# Patient Record
Sex: MALE | Race: WHITE | Employment: OTHER | URBAN - METROPOLITAN AREA
[De-identification: names, ages, dates, MRNs, and addresses within clinical notes are randomized per-mention and may not be internally consistent; named-entity substitution may affect disease eponyms.]

---

## 2022-02-14 ENCOUNTER — HOSPITAL ENCOUNTER (INPATIENT)
Age: 62
LOS: 5 days | Discharge: HOME | DRG: 982 | End: 2022-02-19
Attending: EMERGENCY MEDICINE | Admitting: INTERNAL MEDICINE
Payer: COMMERCIAL

## 2022-02-14 ENCOUNTER — APPOINTMENT (OUTPATIENT)
Dept: CT IMAGING | Age: 62
DRG: 982 | End: 2022-02-14
Payer: COMMERCIAL

## 2022-02-14 ENCOUNTER — APPOINTMENT (OUTPATIENT)
Dept: GENERAL RADIOLOGY | Age: 62
DRG: 982 | End: 2022-02-14
Payer: COMMERCIAL

## 2022-02-14 DIAGNOSIS — E11.10 DIABETIC KETOACIDOSIS WITHOUT COMA ASSOCIATED WITH TYPE 2 DIABETES MELLITUS (HCC): Primary | ICD-10-CM

## 2022-02-14 DIAGNOSIS — D64.9 ANEMIA, UNSPECIFIED TYPE: ICD-10-CM

## 2022-02-14 DIAGNOSIS — R93.5 ABNORMAL CT OF THE ABDOMEN: ICD-10-CM

## 2022-02-14 DIAGNOSIS — C20 RECTAL CANCER (HCC): ICD-10-CM

## 2022-02-14 PROBLEM — E10.10 DKA, TYPE 1, NOT AT GOAL (HCC): Status: ACTIVE | Noted: 2022-02-14

## 2022-02-14 LAB
A/G RATIO: 0.9 (ref 1.1–2.2)
ABO/RH: NORMAL
ALBUMIN SERPL-MCNC: 3.1 G/DL (ref 3.4–5)
ALP BLD-CCNC: 126 U/L (ref 40–129)
ALT SERPL-CCNC: 17 U/L (ref 10–40)
ANION GAP SERPL CALCULATED.3IONS-SCNC: 15 MMOL/L (ref 3–16)
ANION GAP SERPL CALCULATED.3IONS-SCNC: 29 MMOL/L (ref 3–16)
ANION GAP SERPL CALCULATED.3IONS-SCNC: 29 MMOL/L (ref 3–16)
ANTIBODY SCREEN: NORMAL
AST SERPL-CCNC: 10 U/L (ref 15–37)
BASE EXCESS VENOUS: -17.6 MMOL/L (ref -3–3)
BETA-HYDROXYBUTYRATE: >8 MMOL/L (ref 0–0.27)
BILIRUB SERPL-MCNC: <0.2 MG/DL (ref 0–1)
BILIRUBIN URINE: NEGATIVE
BLOOD, URINE: NEGATIVE
BUN BLDV-MCNC: 20 MG/DL (ref 7–20)
BUN BLDV-MCNC: 25 MG/DL (ref 7–20)
BUN BLDV-MCNC: 31 MG/DL (ref 7–20)
CALCIUM SERPL-MCNC: 8.6 MG/DL (ref 8.3–10.6)
CALCIUM SERPL-MCNC: 8.6 MG/DL (ref 8.3–10.6)
CALCIUM SERPL-MCNC: 9.1 MG/DL (ref 8.3–10.6)
CARBOXYHEMOGLOBIN: 3.1 % (ref 0–1.5)
CHLORIDE BLD-SCNC: 100 MMOL/L (ref 99–110)
CHLORIDE BLD-SCNC: 87 MMOL/L (ref 99–110)
CHLORIDE BLD-SCNC: 96 MMOL/L (ref 99–110)
CLARITY: CLEAR
CO2: 16 MMOL/L (ref 21–32)
CO2: 8 MMOL/L (ref 21–32)
CO2: 9 MMOL/L (ref 21–32)
COLOR: YELLOW
CREAT SERPL-MCNC: 1 MG/DL (ref 0.8–1.3)
CREAT SERPL-MCNC: 1.1 MG/DL (ref 0.8–1.3)
CREAT SERPL-MCNC: 1.3 MG/DL (ref 0.8–1.3)
FERRITIN: 60.5 NG/ML (ref 30–400)
FOLATE: 11.29 NG/ML (ref 4.78–24.2)
GFR AFRICAN AMERICAN: >60
GFR NON-AFRICAN AMERICAN: 56
GFR NON-AFRICAN AMERICAN: >60
GFR NON-AFRICAN AMERICAN: >60
GLUCOSE BLD-MCNC: 161 MG/DL (ref 70–99)
GLUCOSE BLD-MCNC: 163 MG/DL (ref 70–99)
GLUCOSE BLD-MCNC: 166 MG/DL (ref 70–99)
GLUCOSE BLD-MCNC: 177 MG/DL (ref 70–99)
GLUCOSE BLD-MCNC: 191 MG/DL (ref 70–99)
GLUCOSE BLD-MCNC: 234 MG/DL (ref 70–99)
GLUCOSE BLD-MCNC: 320 MG/DL (ref 70–99)
GLUCOSE BLD-MCNC: 356 MG/DL (ref 70–99)
GLUCOSE BLD-MCNC: 363 MG/DL (ref 70–99)
GLUCOSE BLD-MCNC: 543 MG/DL (ref 70–99)
GLUCOSE BLD-MCNC: 597 MG/DL (ref 70–99)
GLUCOSE URINE: >=1000 MG/DL
HCO3 VENOUS: 10 MMOL/L (ref 23–29)
HCT VFR BLD CALC: 26.7 % (ref 40.5–52.5)
HEMATOLOGY PATH CONSULT: NORMAL
HEMOGLOBIN, VEN, REDUCED: 38 %
HEMOGLOBIN: 7.9 G/DL (ref 13.5–17.5)
IRON SATURATION: 10 % (ref 20–50)
IRON: 21 UG/DL (ref 59–158)
KETONES, URINE: >=80 MG/DL
LACTIC ACID: 1.9 MMOL/L (ref 0.4–2)
LEUKOCYTE ESTERASE, URINE: NEGATIVE
LIPASE: 60 U/L (ref 13–60)
MAGNESIUM: 2 MG/DL (ref 1.8–2.4)
MAGNESIUM: 2 MG/DL (ref 1.8–2.4)
METHEMOGLOBIN VENOUS: 0.3 %
MICROSCOPIC EXAMINATION: ABNORMAL
NITRITE, URINE: NEGATIVE
O2 CONTENT, VEN: 8 VOL %
O2 SAT, VEN: 61 %
O2 THERAPY: ABNORMAL
OCCULT BLOOD DIAGNOSTIC: ABNORMAL
PCO2, VEN: 29.3 MMHG (ref 40–50)
PERFORMED ON: ABNORMAL
PH UA: 5 (ref 5–8)
PH VENOUS: 7.14 (ref 7.35–7.45)
PHOSPHORUS: 1.4 MG/DL (ref 2.5–4.9)
PHOSPHORUS: 2.7 MG/DL (ref 2.5–4.9)
PO2, VEN: 40.5 MMHG (ref 25–40)
POTASSIUM REFLEX MAGNESIUM: 4.8 MMOL/L (ref 3.5–5.1)
POTASSIUM SERPL-SCNC: 4.1 MMOL/L (ref 3.5–5.1)
POTASSIUM SERPL-SCNC: 4.6 MMOL/L (ref 3.5–5.1)
PROTEIN UA: NEGATIVE MG/DL
SODIUM BLD-SCNC: 125 MMOL/L (ref 136–145)
SODIUM BLD-SCNC: 131 MMOL/L (ref 136–145)
SODIUM BLD-SCNC: 133 MMOL/L (ref 136–145)
SPECIFIC GRAVITY UA: 1.03 (ref 1–1.03)
TCO2 CALC VENOUS: 24 MMOL/L
TOTAL IRON BINDING CAPACITY: 209 UG/DL (ref 260–445)
TOTAL PROTEIN: 6.7 G/DL (ref 6.4–8.2)
URINE REFLEX TO CULTURE: ABNORMAL
URINE TYPE: ABNORMAL
UROBILINOGEN, URINE: 0.2 E.U./DL
VITAMIN B-12: 1044 PG/ML (ref 211–911)

## 2022-02-14 PROCEDURE — 96375 TX/PRO/DX INJ NEW DRUG ADDON: CPT

## 2022-02-14 PROCEDURE — 85025 COMPLETE CBC W/AUTO DIFF WBC: CPT

## 2022-02-14 PROCEDURE — 6360000002 HC RX W HCPCS: Performed by: NURSE PRACTITIONER

## 2022-02-14 PROCEDURE — 2580000003 HC RX 258: Performed by: EMERGENCY MEDICINE

## 2022-02-14 PROCEDURE — 96361 HYDRATE IV INFUSION ADD-ON: CPT

## 2022-02-14 PROCEDURE — 82746 ASSAY OF FOLIC ACID SERUM: CPT

## 2022-02-14 PROCEDURE — 99285 EMERGENCY DEPT VISIT HI MDM: CPT

## 2022-02-14 PROCEDURE — 99254 IP/OBS CNSLTJ NEW/EST MOD 60: CPT | Performed by: SURGERY

## 2022-02-14 PROCEDURE — 83690 ASSAY OF LIPASE: CPT

## 2022-02-14 PROCEDURE — 74177 CT ABD & PELVIS W/CONTRAST: CPT

## 2022-02-14 PROCEDURE — 71045 X-RAY EXAM CHEST 1 VIEW: CPT

## 2022-02-14 PROCEDURE — 86923 COMPATIBILITY TEST ELECTRIC: CPT

## 2022-02-14 PROCEDURE — 86901 BLOOD TYPING SEROLOGIC RH(D): CPT

## 2022-02-14 PROCEDURE — 6360000002 HC RX W HCPCS: Performed by: EMERGENCY MEDICINE

## 2022-02-14 PROCEDURE — 2580000003 HC RX 258: Performed by: INTERNAL MEDICINE

## 2022-02-14 PROCEDURE — 85018 HEMOGLOBIN: CPT

## 2022-02-14 PROCEDURE — 2000000000 HC ICU R&B

## 2022-02-14 PROCEDURE — 6370000000 HC RX 637 (ALT 250 FOR IP): Performed by: INTERNAL MEDICINE

## 2022-02-14 PROCEDURE — 83550 IRON BINDING TEST: CPT

## 2022-02-14 PROCEDURE — 2580000003 HC RX 258: Performed by: NURSE PRACTITIONER

## 2022-02-14 PROCEDURE — 82728 ASSAY OF FERRITIN: CPT

## 2022-02-14 PROCEDURE — 82607 VITAMIN B-12: CPT

## 2022-02-14 PROCEDURE — G0328 FECAL BLOOD SCRN IMMUNOASSAY: HCPCS

## 2022-02-14 PROCEDURE — 84100 ASSAY OF PHOSPHORUS: CPT

## 2022-02-14 PROCEDURE — 36415 COLL VENOUS BLD VENIPUNCTURE: CPT

## 2022-02-14 PROCEDURE — P9016 RBC LEUKOCYTES REDUCED: HCPCS

## 2022-02-14 PROCEDURE — 80053 COMPREHEN METABOLIC PANEL: CPT

## 2022-02-14 PROCEDURE — 83540 ASSAY OF IRON: CPT

## 2022-02-14 PROCEDURE — 86900 BLOOD TYPING SEROLOGIC ABO: CPT

## 2022-02-14 PROCEDURE — 82378 CARCINOEMBRYONIC ANTIGEN: CPT

## 2022-02-14 PROCEDURE — 6360000004 HC RX CONTRAST MEDICATION: Performed by: NURSE PRACTITIONER

## 2022-02-14 PROCEDURE — 2500000003 HC RX 250 WO HCPCS: Performed by: EMERGENCY MEDICINE

## 2022-02-14 PROCEDURE — 96374 THER/PROPH/DIAG INJ IV PUSH: CPT

## 2022-02-14 PROCEDURE — 87040 BLOOD CULTURE FOR BACTERIA: CPT

## 2022-02-14 PROCEDURE — 83735 ASSAY OF MAGNESIUM: CPT

## 2022-02-14 PROCEDURE — 82803 BLOOD GASES ANY COMBINATION: CPT

## 2022-02-14 PROCEDURE — 6370000000 HC RX 637 (ALT 250 FOR IP): Performed by: NURSE PRACTITIONER

## 2022-02-14 PROCEDURE — 86850 RBC ANTIBODY SCREEN: CPT

## 2022-02-14 PROCEDURE — 85014 HEMATOCRIT: CPT

## 2022-02-14 PROCEDURE — APPNB30 APP NON BILLABLE TIME 0-30 MINS: Performed by: NURSE PRACTITIONER

## 2022-02-14 PROCEDURE — 83605 ASSAY OF LACTIC ACID: CPT

## 2022-02-14 PROCEDURE — 81003 URINALYSIS AUTO W/O SCOPE: CPT

## 2022-02-14 PROCEDURE — 82010 KETONE BODYS QUAN: CPT

## 2022-02-14 RX ORDER — DEXTROSE MONOHYDRATE 25 G/50ML
12.5 INJECTION, SOLUTION INTRAVENOUS PRN
Status: DISCONTINUED | OUTPATIENT
Start: 2022-02-14 | End: 2022-02-14 | Stop reason: SDUPTHER

## 2022-02-14 RX ORDER — 0.9 % SODIUM CHLORIDE 0.9 %
1000 INTRAVENOUS SOLUTION INTRAVENOUS ONCE
Status: COMPLETED | OUTPATIENT
Start: 2022-02-14 | End: 2022-02-14

## 2022-02-14 RX ORDER — MAGNESIUM SULFATE 1 G/100ML
1000 INJECTION INTRAVENOUS PRN
Status: DISCONTINUED | OUTPATIENT
Start: 2022-02-14 | End: 2022-02-19 | Stop reason: HOSPADM

## 2022-02-14 RX ORDER — MORPHINE SULFATE 4 MG/ML
4 INJECTION, SOLUTION INTRAMUSCULAR; INTRAVENOUS ONCE
Status: COMPLETED | OUTPATIENT
Start: 2022-02-14 | End: 2022-02-14

## 2022-02-14 RX ORDER — POTASSIUM CHLORIDE 7.45 MG/ML
10 INJECTION INTRAVENOUS PRN
Status: DISCONTINUED | OUTPATIENT
Start: 2022-02-14 | End: 2022-02-16

## 2022-02-14 RX ORDER — METOPROLOL SUCCINATE 100 MG/1
50 TABLET, EXTENDED RELEASE ORAL DAILY
COMMUNITY
End: 2022-10-18

## 2022-02-14 RX ORDER — POTASSIUM CHLORIDE 7.45 MG/ML
10 INJECTION INTRAVENOUS PRN
Status: DISCONTINUED | OUTPATIENT
Start: 2022-02-14 | End: 2022-02-14 | Stop reason: SDUPTHER

## 2022-02-14 RX ORDER — 0.9 % SODIUM CHLORIDE 0.9 %
15 INTRAVENOUS SOLUTION INTRAVENOUS ONCE
Status: DISCONTINUED | OUTPATIENT
Start: 2022-02-14 | End: 2022-02-14 | Stop reason: SDUPTHER

## 2022-02-14 RX ORDER — DEXTROSE MONOHYDRATE 25 G/50ML
12.5 INJECTION, SOLUTION INTRAVENOUS PRN
Status: DISCONTINUED | OUTPATIENT
Start: 2022-02-14 | End: 2022-02-19 | Stop reason: HOSPADM

## 2022-02-14 RX ORDER — VERAPAMIL HYDROCHLORIDE 360 MG/1
360 CAPSULE, DELAYED RELEASE PELLETS ORAL NIGHTLY
Status: ON HOLD | COMMUNITY
End: 2022-02-18 | Stop reason: HOSPADM

## 2022-02-14 RX ORDER — ACETAMINOPHEN 325 MG/1
650 TABLET ORAL EVERY 4 HOURS PRN
Status: DISCONTINUED | OUTPATIENT
Start: 2022-02-14 | End: 2022-02-19 | Stop reason: HOSPADM

## 2022-02-14 RX ORDER — MAGNESIUM SULFATE 1 G/100ML
1000 INJECTION INTRAVENOUS PRN
Status: DISCONTINUED | OUTPATIENT
Start: 2022-02-14 | End: 2022-02-14 | Stop reason: SDUPTHER

## 2022-02-14 RX ORDER — DEXTROSE AND SODIUM CHLORIDE 5; .45 G/100ML; G/100ML
INJECTION, SOLUTION INTRAVENOUS CONTINUOUS PRN
Status: DISCONTINUED | OUTPATIENT
Start: 2022-02-14 | End: 2022-02-16

## 2022-02-14 RX ORDER — METOPROLOL SUCCINATE 50 MG/1
100 TABLET, EXTENDED RELEASE ORAL DAILY
Status: DISCONTINUED | OUTPATIENT
Start: 2022-02-14 | End: 2022-02-19 | Stop reason: HOSPADM

## 2022-02-14 RX ORDER — SODIUM CHLORIDE 9 MG/ML
INJECTION, SOLUTION INTRAVENOUS CONTINUOUS
Status: DISCONTINUED | OUTPATIENT
Start: 2022-02-14 | End: 2022-02-15

## 2022-02-14 RX ORDER — ONDANSETRON 2 MG/ML
4 INJECTION INTRAMUSCULAR; INTRAVENOUS ONCE
Status: COMPLETED | OUTPATIENT
Start: 2022-02-14 | End: 2022-02-14

## 2022-02-14 RX ORDER — DEXTROSE, SODIUM CHLORIDE, AND POTASSIUM CHLORIDE 5; .45; .15 G/100ML; G/100ML; G/100ML
INJECTION INTRAVENOUS CONTINUOUS PRN
Status: DISCONTINUED | OUTPATIENT
Start: 2022-02-14 | End: 2022-02-14 | Stop reason: SDUPTHER

## 2022-02-14 RX ORDER — SODIUM CHLORIDE 9 MG/ML
INJECTION, SOLUTION INTRAVENOUS CONTINUOUS
Status: DISCONTINUED | OUTPATIENT
Start: 2022-02-14 | End: 2022-02-14 | Stop reason: SDUPTHER

## 2022-02-14 RX ORDER — POLYETHYLENE GLYCOL 3350 17 G/17G
17 POWDER, FOR SOLUTION ORAL DAILY PRN
Status: DISCONTINUED | OUTPATIENT
Start: 2022-02-14 | End: 2022-02-19 | Stop reason: HOSPADM

## 2022-02-14 RX ADMIN — SODIUM CHLORIDE 1000 ML: 9 INJECTION, SOLUTION INTRAVENOUS at 11:31

## 2022-02-14 RX ADMIN — ACETAMINOPHEN 650 MG: 325 TABLET ORAL at 22:12

## 2022-02-14 RX ADMIN — DEXTROSE AND SODIUM CHLORIDE: 5; 450 INJECTION, SOLUTION INTRAVENOUS at 18:16

## 2022-02-14 RX ADMIN — SODIUM CHLORIDE: 9 INJECTION, SOLUTION INTRAVENOUS at 15:33

## 2022-02-14 RX ADMIN — SODIUM CHLORIDE 7.7 UNITS/HR: 9 INJECTION, SOLUTION INTRAVENOUS at 15:43

## 2022-02-14 RX ADMIN — SODIUM CHLORIDE 1000 ML: 9 INJECTION, SOLUTION INTRAVENOUS at 10:11

## 2022-02-14 RX ADMIN — SODIUM PHOSPHATE, MONOBASIC, MONOHYDRATE 20 MMOL: 276; 142 INJECTION, SOLUTION INTRAVENOUS at 22:35

## 2022-02-14 RX ADMIN — IOPAMIDOL 75 ML: 755 INJECTION, SOLUTION INTRAVENOUS at 11:23

## 2022-02-14 RX ADMIN — METOPROLOL SUCCINATE 100 MG: 50 TABLET, FILM COATED, EXTENDED RELEASE ORAL at 15:51

## 2022-02-14 RX ADMIN — POTASSIUM CHLORIDE 10 MEQ: 7.46 INJECTION, SOLUTION INTRAVENOUS at 23:19

## 2022-02-14 RX ADMIN — POTASSIUM CHLORIDE 10 MEQ: 7.46 INJECTION, SOLUTION INTRAVENOUS at 15:56

## 2022-02-14 RX ADMIN — POTASSIUM CHLORIDE 10 MEQ: 7.46 INJECTION, SOLUTION INTRAVENOUS at 22:14

## 2022-02-14 RX ADMIN — MORPHINE SULFATE 4 MG: 4 INJECTION INTRAVENOUS at 13:31

## 2022-02-14 RX ADMIN — INSULIN HUMAN 10 UNITS: 100 INJECTION, SOLUTION PARENTERAL at 11:45

## 2022-02-14 RX ADMIN — ONDANSETRON 4 MG: 2 INJECTION INTRAMUSCULAR; INTRAVENOUS at 13:31

## 2022-02-14 RX ADMIN — POTASSIUM CHLORIDE 10 MEQ: 7.46 INJECTION, SOLUTION INTRAVENOUS at 18:20

## 2022-02-14 ASSESSMENT — ENCOUNTER SYMPTOMS
EYE DISCHARGE: 0
BLOOD IN STOOL: 1
BACK PAIN: 0
EYE ITCHING: 0
COLOR CHANGE: 0
CHEST TIGHTNESS: 0
APNEA: 0
ABDOMINAL PAIN: 1

## 2022-02-14 ASSESSMENT — PAIN SCALES - GENERAL
PAINLEVEL_OUTOF10: 4
PAINLEVEL_OUTOF10: 2
PAINLEVEL_OUTOF10: 5
PAINLEVEL_OUTOF10: 8
PAINLEVEL_OUTOF10: 5

## 2022-02-14 ASSESSMENT — PAIN DESCRIPTION - ONSET: ONSET: GRADUAL

## 2022-02-14 ASSESSMENT — PAIN DESCRIPTION - FREQUENCY: FREQUENCY: CONTINUOUS

## 2022-02-14 ASSESSMENT — PAIN DESCRIPTION - DESCRIPTORS
DESCRIPTORS: DULL;ACHING
DESCRIPTORS: ACHING

## 2022-02-14 ASSESSMENT — PAIN DESCRIPTION - LOCATION
LOCATION: BACK
LOCATION: ABDOMEN

## 2022-02-14 ASSESSMENT — PAIN DESCRIPTION - ORIENTATION: ORIENTATION: LOWER

## 2022-02-14 NOTE — CONSULTS
Abdifatahon Tripp    CC-weakness, lightheadedness    HPI: 77-year-old male who presented to the emergency room with a 1 month his weakness, fatigue and lightheadedness. He presented to the emergency room today at the urging of one of his friends. He reports some lower abdominal fullness and back pain. This does not occur every day. The pain is dull. Nothing makes it better or worse. He has had recent change in bowel habits over the last 1 month. The stools have contained mucus/blood and he has become incontinent with frequent leakage. No previous colonoscopy in the past.  He has had a 35 pound weight loss over the last several months. He also reports decreased urination with some dysuria. No history of nausea, vomiting, anorexia fevers or chills. The patient is diabetic and reports that he has not been checking his blood sugars. He has been diagnosed with diabetic ketoacidosis and is to be admitted to the intensive care unit. Past Medical History:   Diagnosis Date    Diabetes mellitus ([facility] [address]     Hypertension        History reviewed. No pertinent surgical history.     Social History     Socioeconomic History    Marital status: Single     Spouse name: Not on file    Number of children: Not on file    Years of education: Not on file    Highest education level: Not on file   Occupational History    Not on file   Tobacco Use    Smoking status: Not on file    Smokeless tobacco: Not on file   Substance and Sexual Activity    Alcohol use: Not on file    Drug use: Not on file    Sexual activity: Not on file   Other Topics Concern    Not on file   Social History Narrative    Not on file     Social Determinants of Health     Financial Resource Strain:     Difficulty of Paying Living Expenses: Not on file   Food Insecurity:     Worried About Running Out of Food in the Last Year: Not on file    [place]ito of Food in the Last Year: Not on file   Transportation Needs:     Lack of Transportation (Medical): Not on file    Lack of Transportation (Non-Medical): Not on file   Physical Activity:     Days of Exercise per Week: Not on file    Minutes of Exercise per Session: Not on file   Stress:     Feeling of Stress : Not on file   Social Connections:     Frequency of Communication with Friends and Family: Not on file    Frequency of Social Gatherings with Friends and Family: Not on file    Attends Judaism Services: Not on file    Active Member of [address] [place] [address] [employer] or Organizations: Not on file    Attends Club or Organization Meetings: Not on file    Marital Status: Not on file   Intimate Partner Violence:     Fear of Current or Ex-Partner: Not on file    Emotionally Abused: Not on file    Physically Abused: Not on file    Sexually Abused: Not on file   Housing Stability:     Unable to Pay for Housing in the Last Year: Not on file    Number of [place] in the Last Year: Not on file    Unstable Housing in the Last Year: Not on file       Allergies: No Known Allergies    Prior to Admission medications    Medication Sig Start Date End Date Taking? Authorizing Provider   verapamil (VERELAN) 360 MG extended release capsule Take 360 mg by mouth nightly   Yes Historical Provider, MD   metoprolol succinate (TOPROL XL) 100 MG extended release tablet Take 100 mg by mouth daily   Yes Historical Provider, MD       Active Problems:    DKA, type 1, not at goal [facility])    Diabetic ketoacidosis without coma associated with type 2 diabetes mellitus ([facility]  Resolved Problems:    * No resolved hospital problems. *      Blood pressure 126/67, pulse 92, temperature 97.5 °F (36.4 °C), temperature source Oral, resp. rate 18, weight 170 lb (77.1 kg), SpO2 99 %. Review of Systems   Constitutional: Positive for activity change and unexpected weight change. Negative for appetite change, chills and fever. HENT: Negative for congestion and dental problem. Eyes: Negative for discharge and itching.    Respiratory: Negative for apnea and chest tightness. Cardiovascular: Negative for chest pain and leg swelling. Gastrointestinal: Positive for abdominal pain and blood in stool. Endocrine: Negative for cold intolerance and heat intolerance. Genitourinary: Negative for difficulty urinating and dysuria. Musculoskeletal: Negative for arthralgias and back pain. Skin: Negative for color change and pallor. Allergic/Immunologic: Negative for environmental allergies and food allergies. Neurological: Negative for dizziness and facial asymmetry. Hematological: Negative for adenopathy. Does not bruise/bleed easily. Psychiatric/Behavioral: Negative for agitation and behavioral problems. Physical Exam  Constitutional:       Appearance: He is well-developed. Comments: Patient appears cachectic   HENT:      Head: Normocephalic and atraumatic. Right Ear: External ear normal.      Left Ear: External ear normal.   Eyes:      Conjunctiva/sclera: Conjunctivae normal.   Cardiovascular:      Rate and Rhythm: Normal rate and regular rhythm. Pulmonary:      Effort: Pulmonary effort is normal.      Breath sounds: Normal breath sounds. Abdominal:      Palpations: Abdomen is soft. Comments: There is a central pelvic fullness without significant tenderness   Genitourinary:     Comments: On rectal examination, there is both blood and mucus noted. There is a mass which involves at least the posterior wall of the rectum and begins about 5 cm above the anal verge  Musculoskeletal:         General: Normal range of motion. Cervical back: Normal range of motion and neck supple. Skin:     General: Skin is warm and dry. Neurological:      Mental Status: He is alert and oriented to person, place, and time.    Psychiatric:         Behavior: Behavior normal.         Assessment:  60-year-old male diabetic with no previous history of colonoscopy who presented to the emergency department today with a 1 month history of complaints of blood/mucus in his stools. He also reports a full sensation in the pelvis and lower back pain. He has had about a 35 pound unexpected weight loss over the last 7 months. Other symptoms include less frequent urination and dysuria. On physical examination, there is no palpable peripheral adenopathy. There is a nontender fullness appreciated in the suprapubic region. On digital rectal examination, there is both mucus and blood appreciated. There is a mass which involves at least the posterior wall of the rectum starting about 5 cm above the anal verge. White blood count is elevated 18.5. The remainder of his labs are consistent with diabetic ketoacidosis. The overall findings are consistent with a locally advanced rectal cancer. Plan:  Admit to ICU for treatment of diabetic ketoacidosis per primary team  Neurology to see patient regarding bilateral hydronephrosis  Spoke with Dr. Chloé Lynne, gastroenterology, about a flexible sigmoidoscopy with biopsy when the patient is more medically stable.   Will check a CEA level and coags  Will involve medical oncology/radiation oncology once biopsies have been obtained    Presley Greco MD  2/14/2022

## 2022-02-14 NOTE — ACP (ADVANCE CARE PLANNING)
Advanced Care Planning Note.     Purpose of Encounter: Advanced care planning in light of hospitalization  Parties In Attendance: Patient,    Decisional Capacity: Yes  Subjective: Patient  understand that this conversation is to address long term care goal  Objective: Mid to hospital with sigmoid mass and GI bleeding and DKA  Goals of Care Determination: Patient would not want any aggressive measures including cpr or intubation   Code Status: dnr cca  Time spent on Advanced care Plannin minutes  Advanced Care Planning Documents: documented patient's wishes, would like Sister Lu Hines Early to make medical decisions if unable to make decisions

## 2022-02-14 NOTE — ED PROVIDER NOTES
In addition to the advanced practice provider, I personally saw Ye Castañeda and performed a substantive portion of the visit including all aspects of the medical decision making. Medical Decision making  62M, hx of DM, HTN, noted specks of blood in stool and fatigue. Hasn't been checking his sugars at home    On exam, WDWNM, NAD, heart RRR, Lungs cTAB    Labs noted, DKA with pseudohyponatremia hyperglycemia and anion gap 29. No renal dysfunction. Start DKA protocols & admit. 35min critical care time; indication and intervention as above. Screenings     Shahab Coma Scale  Eye Opening: Spontaneous  Best Verbal Response: Oriented  Best Motor Response: Obeys commands  Shahab Coma Scale Score: 15               Patient Referrals:  No follow-up provider specified. Discharge Medications:  New Prescriptions    No medications on file       FINAL IMPRESSION  1. Diabetic ketoacidosis without coma associated with type 2 diabetes mellitus ([facility] [address] [facility]    2. Anemia, unspecified type    3. Abnormal CT of the abdomen        Blood pressure 138/79, pulse 87, temperature 97.5 °F (36.4 °C), temperature source Oral, resp. rate 18, weight 170 lb (77.1 kg), SpO2 100 %. For further details of [facility] emergency department encounter, please see documentation by advanced practice provider, Jenna Wong.        Minna Lee MD  02/14/22 [id number]

## 2022-02-14 NOTE — ED PROVIDER NOTES
[address] [facility]        Pt Name: Cole Love  MRN: [id number]  [place] 1960  Date of evaluation: 2/14/2022  Provider: MELODY Molina CNP  PCP: No primary care provider on file. Note Started: 9:10 AM EST        I have seen and evaluated this patient with my supervising physician Dr. Su Goodman       Chief Complaint   Patient presents with    Abdominal Pain     Pt to triage c/o abd. pain x 5 days, rectal bleeding,       HISTORY OF PRESENT ILLNESS   (Location, Timing/Onset, Context/Setting, Quality, Duration, Modifying Factors, Severity, Associated Signs and Symptoms)  Note limiting factors. Chief Complaint: fatigue     Cole Love is a 58 y.o. male medical history of diabetes and hypertension who presents the emergency department with c/o fatigue and exhaustion for the past few days. Also c/o stool with red spots in stool for a few months. Did take a few doses of Pepto-Bismol and thought this was possibly the culprit. He does note whenever he took the Pepto-Bismol did seem to help with his abdominal cramping. He has had some intermittent abdominal cramping with current pain level at 8/10. Denies any aggravating or alleviating factors. Denies f/u with PCP for these complaints. Seen [facility] for poss UTI and was placed steroids and numerous ABx. He did not follow back up and is unsure what the initial diagnosis was. He denies being anticoagulated. Denies n/v/d, urinary complaints, abd pain, chest pain, cough, soa, wheezing, rashes, fevers, lightheaded, dizzy, syncope, leg swelling, palpitations, visual disturbance, ataxia. He denies any recent trauma, accidents, head injuries, or falls. .  Had covid 19 vaccine series. Denies any known exposure to any was tested positive for COVID-19. Nursing Notes were all reviewed and agreed with or any disagreements were addressed in the HPI.     REVIEW OF SYSTEMS    (2-9 systems for level 4, 10 or more for level 5)     Review of Systems    Positives and Pertinent negatives as per HPI. Except as noted above in the ROS, all other systems were reviewed and negative. PAST MEDICAL HISTORY     Past Medical History:   Diagnosis Date    Diabetes mellitus ([address]     Hypertension          SURGICAL HISTORY   History reviewed. No pertinent surgical history. CURRENTMEDICATIONS       Previous Medications    METOPROLOL SUCCINATE (TOPROL XL) 100 MG EXTENDED RELEASE TABLET    Take 100 mg by mouth daily    VERAPAMIL (VERELAN) 360 MG EXTENDED RELEASE CAPSULE    Take 360 mg by mouth nightly         ALLERGIES     Patient has no known allergies. FAMILYHISTORY     History reviewed. No pertinent family history. SOCIAL HISTORY       Social History     Tobacco Use    Smoking status: Not on file    Smokeless tobacco: Not on file   Substance Use Topics    Alcohol use: Not on file    Drug use: Not on file       SCREENINGS    Shahab Coma Scale  Eye Opening: Spontaneous  Best Verbal Response: Oriented  Best Motor Response: Obeys commands  Shahab Coma Scale Score: 15        PHYSICAL EXAM    (up to 7 for level 4, 8 or more for level 5)     ED Triage Vitals [02/14/22 1012]   Enc Vitals Group      BP (!) 142/74      Pulse 89      Resp 18      Temp       Temp src       SpO2 100 %   Temperature not documented at triage      Physical Exam  Vitals and nursing note reviewed. Constitutional:       General: He is awake. Appearance: Normal appearance. He is well-developed and overweight. HENT:      Head: Normocephalic and atraumatic. Nose: Nose normal.   Eyes:      General:         Right eye: No discharge. Left eye: No discharge. Cardiovascular:      Rate and Rhythm: Regular rhythm. Tachycardia present. Heart sounds: Normal heart sounds. Pulmonary:      Effort: Pulmonary effort is normal. No respiratory distress.       Breath sounds: Normal breath sounds. Abdominal:      General: Bowel sounds are normal.      Palpations: Abdomen is soft. Tenderness: There is no abdominal tenderness. There is no right CVA tenderness or left CVA tenderness. Genitourinary:     Rectum: Guaiac result positive. Tenderness present. Derrell Cabrera RN chaperoned rectal exam.  Mucousy stool with blood noted around a rectal fold  Musculoskeletal:         General: Normal range of motion. Cervical back: Normal range of motion. Skin:     General: Skin is warm and dry. Coloration: Skin is pale. Neurological:      Mental Status: He is alert and oriented to person, place, and time. Psychiatric:         Behavior: Behavior normal. Behavior is cooperative.          DIAGNOSTIC RESULTS   LABS:    Labs Reviewed   CBC WITH AUTO DIFFERENTIAL - Abnormal; Notable for the following components:       Result Value    WBC 18.5 (*)     Hemoglobin 9.1 (*)     Hematocrit 31.4 (*)     MCV 67.5 (*)     MCH 19.5 (*)     MCHC 28.9 (*)     RDW 18.7 (*)     Platelets 383 (*)     Neutrophils Absolute 15.9 (*)     Myelocyte Percent 1 (*)     Anisocytosis 1+ (*)     Poikilocytes Occasional (*)     Ovalocytes Occasional (*)     All other components within normal limits    Narrative:     Performed at:  [facility] [address], [facility], [address]   Phone [phone]   COMPREHENSIVE METABOLIC PANEL W/ REFLEX TO MG FOR LOW K - Abnormal; Notable for the following components:    Sodium 125 (*)     Chloride 87 (*)     CO2 9 (*)     Anion Gap 29 (*)     Glucose 597 (*)     BUN 31 (*)     GFR Non- 56 (*)     Albumin 3.1 (*)     Albumin/Globulin Ratio 0.9 (*)     AST 10 (*)     All other components within normal limits    Narrative:     CALL  Snow  [facility] tel. [phone],  Chemistry results called to and read back by wandy albert rn, 02/14/2022  11:07, by Neela Madsen  Performed at:  [facility]  [address], [facility] [place]   Phone [phone]   URINE RT REFLEX TO CULTURE - Abnormal; Notable for the following components:    Glucose, Ur >=1000 (*)     Ketones, Urine >=80 (*)     All other components within normal limits    Narrative:     Performed at:  [facility] [address] [facility], [address] [facility]   Phone [phone]   BETA-HYDROXYBUTYRATE - Abnormal; Notable for the following components:    Beta-Hydroxybutyrate >8.00 (*)     All other components within normal limits    Narrative:     Laverne Gabriele  [facility] tel. [phone],  Chemistry results called to and read back by wandy albert rn, 02/14/2022  11:07, by Luis Martin  Performed at:  [facility] [address],  [place], [facility] [place]   Phone [phone]   BLOOD GAS, VENOUS - Abnormal; Notable for the following components:    pH, Andrzej 7.140 (*)     pCO2, Andrzej 29.3 (*)     pO2, Andrzej 40.5 (*)     HCO3, Venous 10.0 (*)     Base Excess, Andrzej -17.6 (*)     Carboxyhemoglobin 3.1 (*)     All other components within normal limits    Narrative:     Laverne Suazo  [facility] tel. [phone],  Chemistry results called to and read back by paolo puri rn, 02/14/2022  10:19, by Luis Martin  Performed at:  [facility] [address],  [place], [facility] [place]   Phone [phone]   POCT GLUCOSE - Abnormal; Notable for the following components:    POC Glucose 543 (*)     All other components within normal limits    Narrative:     Performed at:  [facility] [address],  [place], [facility] [place]   Phone [phone]   CULTURE, BLOOD 2   CULTURE, BLOOD 1   LACTIC ACID, PLASMA    Narrative:     Performed at:  [facility] [address],  [place], [facility] [place]   Phone [phone]   BLOOD OCCULT STOOL DIAGNOSTIC   POCT GLUCOSE   TYPE AND SCREEN       When ordered only abnormal lab results are displayed.  All other labs were within normal range or not returned as of this dictation. EKG: When ordered, EKG's are interpreted by the Emergency Department Physician in the absence of a cardiologist.  Please see their note for interpretation of EKG. RADIOLOGY:   Non-plain film images such as CT, Ultrasound and MRI are read by the radiologist. Plain radiographic images are visualized and preliminarily interpreted by the ED Provider with the below findings:        Interpretation per the Radiologist below, if available at the time of this note:    CT ABDOMEN PELVIS W IV CONTRAST Additional Contrast? None   Preliminary Result   1. Large heterogeneous pelvic mass centered on the distal sigmoid colon and   rectum presumably malignant. Findings suggesting direct invasion of the   prostate and possibly the posterior bladder wall. Moderate stool proximal to   the mass with no evidence of obstruction. Probable adenopathy posterior to   the upper margin of the mass. No evidence of distal metastatic disease. Surgical consultation recommended. 2. Bilateral hydronephrosis secondary to mass effect on the distal ureters,   right greater than left. XR CHEST PORTABLE   Final Result   Clear chest without acute cardiopulmonary process. No results found.         PROCEDURES   Unless otherwise noted below, none     Procedures    CRITICAL CARE TIME       CONSULTS:  IP CONSULT TO HOSPITALIST  IP CONSULT TO GENERAL SURGERY      EMERGENCY DEPARTMENT COURSE and DIFFERENTIAL DIAGNOSIS/MDM:   Vitals:    Vitals:    02/14/22 1031 02/14/22 1046 02/14/22 1115 02/14/22 1133   BP: (!) 151/75 (!) 145/74 (!) 140/69 (!) 155/79   Pulse:    87   Resp:       Temp:    97.5 °F (36.4 °C)   TempSrc:    Oral   SpO2: 100% 100% 100%        Patient was given the following medications:  Medications   insulin regular (HUMULIN R;NOVOLIN R) 100 Units in sodium chloride 0.9 % 100 mL infusion (has no administration in time range)   morphine injection 4 mg (has no administration in time range)   ondansetron (ZOFRAN) injection 4 mg (has no administration in time range)   0.9 % sodium chloride bolus (0 mLs IntraVENous Stopped 2/14/22 1112)   0.9 % sodium chloride bolus (1,000 mLs IntraVENous New Bag 2/14/22 1131)   iopamidol (ISOVUE-370) 76 % injection 75 mL (75 mLs IntraVENous Given 2/14/22 1123)   insulin regular (HUMULIN R;NOVOLIN R) injection 10 Units (10 Units IntraVENous Given 2/14/22 1145)           Care of this patient took place during the COVID-19 pandemic emergency. ED COURSE & MEDICAL DECISION MAKING    - The patient presented to the ER with complaints of rectal bleeding, fatigue. Vital signs were reviewed. Exam well-developed, well-nourished male who appears uncomfortable. Peripheral IV placed. Labs, Imaging ordered. - Pertinent Labs & Imaging studies reviewed. (See chart for details)   -  Patient seen and evaluated in the emergency department. -  Triage and nursing notes reviewed and incorporated. -  Old chart records reviewed and incorporated.  -  Dr. Alma Rosa Griffiths emergency department attending assessed the patient  -  Differential diagnosis includes: kidney stone, pyelonephritis, UTI, peritonitis, hepatitis, Crohn's disease, ulcerative colitis, IBD, enteric adenitis, toxic megacolon, H. pylori, PUD, dissection, C. difficile, appendicitis, bowel obstruction, diverticulitis, hernia, gastroenteritis, colitis vs COVID-19  -  Work-up included:  See above  -  ED treatment included:   Insulin, morphine, Zofran, normal saline  -  Results discussed with patient. Leena Miller is a 41-year-old male with complaints of intermittent lower abdominal pain with rectal bleeding. Patient states he has felt generally unwell for the past few days and this is what prompted his ED arrival.  He denies any follow-up with PCP due to a change in insurance coverage. Does state he is diabetic, although is not compliant with medication or monitoring his glucose levels.  On exam he does appear pale. He does have some slight generalized abdominal tenderness. Lab work and imaging is obtained. Accu-Chek 543. CBC with WBC 18.5, hemoglobin 9.1, hematocrit 31.4, MCV 67.5, MCH 19.5, MCHC 28.9, RDW 18.7, platelets 125, absolute neutrophils 15.9. Actiq acid 1.9. VBG 7.140, PCO2 29.3, PO2 40.5, bicarb 10, base excess 17.6. UA with greater than 1000 glucose and greater than 80 ketones Abdomen pelvis shows a large heterogeneous pelvic mass centered on the distal sigmoid colon and rectum presumably malignant. Findings suggest direct invasion of the prostate possibly the posterior bladder wall. Moderate stool proximal to the mass with no evidence of obstruction. Probable adenopathy posterior to the upper margin of the mass. No evidence of distal metastatic disease. A surgical consult recommended. Bilateral hydronephrosis secondary to mass-effect on the distal ureters right greater than left. Hospitalist Dr. Alysha Miramontes was consulted admission orders are placed consult with Triston Medeiros with general surgery who said they will consult. SEP-1 CORE MEASURE DATA      Sepsis Criteria   Severe Sepsis Criteria   Septic Shock Criteria     Must be confirmed or suspected to move forward with diagnosis of sepsis. Must meet 2:    [] Temperature > 100.9 F (38.3 C)        or < 96.8 F (36 C)  [] HR > 90  [] RR > 20  [x] WBC > 12 or < 4 or 10% bands    AND:    [x] Infection Confirmed or        Suspected.     OR:    [] Exclude from SEP-1 because:    [] No infection present or suspected  [x] Does not have 2+ SIRS criteria but may have an incidental infection that requires treatment  [] May have sepsis, but does not meet criteria for severe sepsis or septic shock  [] Alternative explanation for abnormal labs and/or vitals (see MDM)  [] Viral etiology found or highly suspected (including COVID-19) without concomitant bacterial infection   Must meet 1:    [] Lactate > 2       or   [] Signs of Organ Dysfunction:    - SBP < 90 or MAP < 65  - Altered mental status  - Creatinine > 2 or increased from      baseline  - Urine Output < 0.5 ml/kg/hr  - Bilirubin > 2  - INR > 1.5 (not anticoagulated)  - Platelets < 458,402  - Acute Respiratory Failure as     evidenced by new need for NIPPV     or mechanical ventilation        [] No criteria met for Severe Sepsis. Must meet 1:    [] Lactate > 4        or   [] SBP < 90 or MAP < 65 for at        least two readings in the first        hour after fluid bolus        administration      [] Vasopressors initiated (if hypotension persists after fluid resuscitation)                [] No criteria met for Septic Shock. Patient Vitals for the past 6 hrs:   BP Temp Pulse Resp SpO2   02/14/22 1012 (!) 142/74 -- 89 18 100 %   02/14/22 1031 (!) 151/75 -- -- -- 100 %   02/14/22 1046 (!) 145/74 -- -- -- 100 %   02/14/22 1115 (!) 140/69 -- -- -- 100 %   02/14/22 1133 (!) 155/79 97.5 °F (36.4 °C) 87 -- --      Recent Labs     02/14/22  0950   WBC 18.5*   LACTA 1.9   CREATININE 1.3   BILITOT <0.2   *           CRITICAL CARE TIME   Total Critical Care time was 45 minutes, excluding separately reportable procedures. There was a high probability of clinically significant/life threatening deterioration in the patient's condition which required my urgent intervention. FINAL IMPRESSION      1. Diabetic ketoacidosis without coma associated with type 2 diabetes mellitus ([facility]    2. Anemia, unspecified type    3.  Abnormal CT of the abdomen          DISPOSITION/PLAN   DISPOSITION    Admission       (Please note that portions of this note were completed with a voice recognition program.  Efforts were made to edit the dictations but occasionally words are mis-transcribed.)    MELODY Ennis CNP (electronically signed)            MELODY Ennis CNP  02/14/22 1847

## 2022-02-14 NOTE — H&P
HOSPITALISTS HISTORY AND PHYSICAL    2/14/2022 1:18 PM    Patient Information:  Olvin Mathew is a 58 y.o. male [id number]  PCP:  No primary care provider on file. (Tel: None )    Chief complaint:    Chief Complaint   Patient presents with    Abdominal Pain     Pt to triage c/o abd. pain x 5 days, rectal bleeding,   =  History of Present Illness:  Carito Leone is a 58 y.o. male who has been having on and off abdominal pain for the last 1 month that is at worst 8 out of 10 sharp in nature no diarrhea no nausea vomiting with this patient does have on and off stool with some blood in it. Patient is also had about 5 pounds of weight loss in the last 3 months. Patient does have a history of diabetes and has been noncompliant with medication for the last 3 months as has lost PCP. Patient denies any cough fever chills nausea vomiting slightly lightheaded no shortness of          REVIEW OF SYSTEMS:   Constitutional: Negative for fever,chills or night sweats  ENT: Negative for rhinorrhea, epistaxis, hoarseness, sore throat. Respiratory: Negative for shortness of breath,wheezing  Cardiovascular: Negative for chest pain, palpitations   Gastrointestinal: see above  Genitourinary: Negative for polyuria, dysuria   Hematologic/Lymphatic: Negative for bleeding tendency, easy bruising  Musculoskeletal: Negative for myalgias and arthralgias  Neurologic: Negative for confusion,dysarthria. Skin: Negative for itching,rash  Psychiatric: Negative for depression,anxiety, agitation. Endocrine: Negative for polydipsia,polyuria,heat /cold intolerance. Past Medical History:   has a past medical history of Diabetes mellitus ([address] and Hypertension. Past Surgical History:  denies     Medications:  No current facility-administered medications on file prior to encounter.      Current Outpatient Medications on File Prior to Encounter   Medication Sig Dispense Refill    verapamil (VERELAN) 360 MG extended release capsule Take 360 mg by mouth nightly      metoprolol succinate (TOPROL XL) 100 MG extended release tablet Take 100 mg by mouth daily         Allergies:  No Known Allergies     Social History:  Patient Lives at home does not smoke or drink        Family History:  htn    Physical Exam:  /79   Pulse 92   Temp 97.5 °F (36.4 °C) (Oral)   Resp 18   Wt 170 lb (77.1 kg)   SpO2 100%     General appearance:  Appears comfortable. AAOx3  HEENT: atraumatic, Pupils equal, muscous membranes dry, no masses appreciated  Cardiovascular: Regular rate and rhythm no murmurs appreciated  Respiratory: CTAB no wheezing  Gastrointestinal: Abdomen soft, non-tender, BS+  EXT: no edema  Neurology: no gross focal deficts  Psychiatry: Appropriate affect. Not agitated  Skin: Warm, dry, no rashes appreciated    Labs:  CBC:   Lab Results   Component Value Date    WBC 18.5 02/14/2022    RBC 4.64 02/14/2022    HGB 9.1 02/14/2022    HCT 31.4 02/14/2022    MCV 67.5 02/14/2022    MCH 19.5 02/14/2022    MCHC 28.9 02/14/2022    RDW 18.7 02/14/2022     02/14/2022    MPV 7.6 02/14/2022     BMP:    Lab Results   Component Value Date     02/14/2022    K 4.8 02/14/2022    CL 87 02/14/2022    CO2 9 02/14/2022    BUN 31 02/14/2022    CREATININE 1.3 02/14/2022    CALCIUM 9.1 02/14/2022    GFRAA >60 02/14/2022    LABGLOM 56 02/14/2022    GLUCOSE 597 02/14/2022     CT ABDOMEN PELVIS W IV CONTRAST Additional Contrast? None   Preliminary Result   1. Large heterogeneous pelvic mass centered on the distal sigmoid colon and   rectum presumably malignant. Findings suggesting direct invasion of the   prostate and possibly the posterior bladder wall. Moderate stool proximal to   the mass with no evidence of obstruction. Probable adenopathy posterior to   the upper margin of the mass. No evidence of distal metastatic disease.    Surgical consultation recommended. 2. Bilateral hydronephrosis secondary to mass effect on the distal ureters,   right greater than left. XR CHEST PORTABLE   Final Result   Clear chest without acute cardiopulmonary process. Recent imaging reviewed          Assessment/Plan:   Dka: Insulin gtt   - bolous 2L ivf today then ns   Check a1c    Sigmoid and rectum mass  - surgey consult    GI bleeding secondary to above  - monitor h/h transfue to keep >7  - chek iron b12 folate    Bilateral hydronephrosis  - urology consulted      DVT prophylaxis scds  Code status dnr cca    I spent 35 minutes during critical care services to this patient        Admit as inpatient I anticipate hospitalization spanning more than two midnights for investigation and treatment of the above medically necessary diagnoses. Please note that some part of this chart was generated using Dragon dictation software. Although every effort was made to ensure the accuracy of this automated transcription, some errors in transcription may have occurred inadvertently. If you may need any clarification, please do not hesitate to contact me through [facility].        Latesha Raman MD    2/14/2022 1:18 PM

## 2022-02-15 ENCOUNTER — ANESTHESIA EVENT (OUTPATIENT)
Dept: ENDOSCOPY | Age: 62
DRG: 982 | End: 2022-02-15
Payer: COMMERCIAL

## 2022-02-15 ENCOUNTER — ANESTHESIA (OUTPATIENT)
Dept: ENDOSCOPY | Age: 62
DRG: 982 | End: 2022-02-15
Payer: COMMERCIAL

## 2022-02-15 ENCOUNTER — APPOINTMENT (OUTPATIENT)
Dept: CT IMAGING | Age: 62
DRG: 982 | End: 2022-02-15
Payer: COMMERCIAL

## 2022-02-15 VITALS
OXYGEN SATURATION: 99 % | DIASTOLIC BLOOD PRESSURE: 57 MMHG | RESPIRATION RATE: 12 BRPM | SYSTOLIC BLOOD PRESSURE: 100 MMHG

## 2022-02-15 LAB
ANION GAP SERPL CALCULATED.3IONS-SCNC: 10 MMOL/L (ref 3–16)
ANION GAP SERPL CALCULATED.3IONS-SCNC: 12 MMOL/L (ref 3–16)
ANION GAP SERPL CALCULATED.3IONS-SCNC: 12 MMOL/L (ref 3–16)
BUN BLDV-MCNC: 12 MG/DL (ref 7–20)
BUN BLDV-MCNC: 15 MG/DL (ref 7–20)
BUN BLDV-MCNC: 16 MG/DL (ref 7–20)
CALCIUM SERPL-MCNC: 8.2 MG/DL (ref 8.3–10.6)
CALCIUM SERPL-MCNC: 8.3 MG/DL (ref 8.3–10.6)
CALCIUM SERPL-MCNC: 8.5 MG/DL (ref 8.3–10.6)
CEA: 18.6 NG/ML (ref 0–5)
CEA: 22.4 NG/ML (ref 0–5)
CHLORIDE BLD-SCNC: 100 MMOL/L (ref 99–110)
CHLORIDE BLD-SCNC: 104 MMOL/L (ref 99–110)
CHLORIDE BLD-SCNC: 106 MMOL/L (ref 99–110)
CO2: 18 MMOL/L (ref 21–32)
CO2: 19 MMOL/L (ref 21–32)
CO2: 19 MMOL/L (ref 21–32)
CREAT SERPL-MCNC: 0.8 MG/DL (ref 0.8–1.3)
CREAT SERPL-MCNC: 0.8 MG/DL (ref 0.8–1.3)
CREAT SERPL-MCNC: 0.9 MG/DL (ref 0.8–1.3)
GFR AFRICAN AMERICAN: >60
GFR NON-AFRICAN AMERICAN: >60
GLUCOSE BLD-MCNC: 138 MG/DL (ref 70–99)
GLUCOSE BLD-MCNC: 151 MG/DL (ref 70–99)
GLUCOSE BLD-MCNC: 177 MG/DL (ref 70–99)
GLUCOSE BLD-MCNC: 178 MG/DL (ref 70–99)
GLUCOSE BLD-MCNC: 183 MG/DL (ref 70–99)
GLUCOSE BLD-MCNC: 184 MG/DL (ref 70–99)
GLUCOSE BLD-MCNC: 188 MG/DL (ref 70–99)
GLUCOSE BLD-MCNC: 192 MG/DL (ref 70–99)
GLUCOSE BLD-MCNC: 226 MG/DL (ref 70–99)
GLUCOSE BLD-MCNC: 240 MG/DL (ref 70–99)
GLUCOSE BLD-MCNC: 251 MG/DL (ref 70–99)
GLUCOSE BLD-MCNC: 258 MG/DL (ref 70–99)
GLUCOSE BLD-MCNC: 274 MG/DL (ref 70–99)
GLUCOSE BLD-MCNC: 295 MG/DL (ref 70–99)
HCT VFR BLD CALC: 25 % (ref 40.5–52.5)
HCT VFR BLD CALC: 25.8 % (ref 40.5–52.5)
HCT VFR BLD CALC: 25.9 % (ref 40.5–52.5)
HEMATOLOGY PATH CONSULT: NO
HEMOGLOBIN: 7.5 G/DL (ref 13.5–17.5)
HEMOGLOBIN: 7.6 G/DL (ref 13.5–17.5)
HEMOGLOBIN: 7.7 G/DL (ref 13.5–17.5)
INR BLD: 1.04 (ref 0.88–1.12)
MAGNESIUM: 1.7 MG/DL (ref 1.8–2.4)
MAGNESIUM: 1.7 MG/DL (ref 1.8–2.4)
MAGNESIUM: 1.8 MG/DL (ref 1.8–2.4)
PERFORMED ON: ABNORMAL
PHOSPHORUS: 1.7 MG/DL (ref 2.5–4.9)
PHOSPHORUS: 2.1 MG/DL (ref 2.5–4.9)
PHOSPHORUS: 2.4 MG/DL (ref 2.5–4.9)
POTASSIUM SERPL-SCNC: 4 MMOL/L (ref 3.5–5.1)
POTASSIUM SERPL-SCNC: 4.2 MMOL/L (ref 3.5–5.1)
POTASSIUM SERPL-SCNC: 4.3 MMOL/L (ref 3.5–5.1)
PROTHROMBIN TIME: 11.8 SEC (ref 9.9–12.7)
SARS-COV-2, NAAT: NOT DETECTED
SODIUM BLD-SCNC: 130 MMOL/L (ref 136–145)
SODIUM BLD-SCNC: 133 MMOL/L (ref 136–145)
SODIUM BLD-SCNC: 137 MMOL/L (ref 136–145)

## 2022-02-15 PROCEDURE — 71260 CT THORAX DX C+: CPT

## 2022-02-15 PROCEDURE — 85610 PROTHROMBIN TIME: CPT

## 2022-02-15 PROCEDURE — 6370000000 HC RX 637 (ALT 250 FOR IP): Performed by: INTERNAL MEDICINE

## 2022-02-15 PROCEDURE — 2500000003 HC RX 250 WO HCPCS: Performed by: EMERGENCY MEDICINE

## 2022-02-15 PROCEDURE — 1200000000 HC SEMI PRIVATE

## 2022-02-15 PROCEDURE — 84100 ASSAY OF PHOSPHORUS: CPT

## 2022-02-15 PROCEDURE — 7100000001 HC PACU RECOVERY - ADDTL 15 MIN: Performed by: INTERNAL MEDICINE

## 2022-02-15 PROCEDURE — 3609008300 HC SIGMOIDOSCOPY W/BIOPSY SINGLE/MULTIPLE: Performed by: INTERNAL MEDICINE

## 2022-02-15 PROCEDURE — 2709999900 HC NON-CHARGEABLE SUPPLY: Performed by: INTERNAL MEDICINE

## 2022-02-15 PROCEDURE — 6370000000 HC RX 637 (ALT 250 FOR IP): Performed by: CLINICAL NURSE SPECIALIST

## 2022-02-15 PROCEDURE — APPSS15 APP SPLIT SHARED TIME 0-15 MINUTES: Performed by: NURSE PRACTITIONER

## 2022-02-15 PROCEDURE — 84238 ASSAY NONENDOCRINE RECEPTOR: CPT

## 2022-02-15 PROCEDURE — 6360000004 HC RX CONTRAST MEDICATION: Performed by: SURGERY

## 2022-02-15 PROCEDURE — 2500000003 HC RX 250 WO HCPCS: Performed by: NURSE ANESTHETIST, CERTIFIED REGISTERED

## 2022-02-15 PROCEDURE — 3609008900 HC SIGMOIDOSCOPY POLYP SNARE: Performed by: INTERNAL MEDICINE

## 2022-02-15 PROCEDURE — 36415 COLL VENOUS BLD VENIPUNCTURE: CPT

## 2022-02-15 PROCEDURE — APPNB30 APP NON BILLABLE TIME 0-30 MINS: Performed by: NURSE PRACTITIONER

## 2022-02-15 PROCEDURE — 85018 HEMOGLOBIN: CPT

## 2022-02-15 PROCEDURE — 6370000000 HC RX 637 (ALT 250 FOR IP): Performed by: NURSE PRACTITIONER

## 2022-02-15 PROCEDURE — 88305 TISSUE EXAM BY PATHOLOGIST: CPT

## 2022-02-15 PROCEDURE — 0DJD8ZZ INSPECTION OF LOWER INTESTINAL TRACT, VIA NATURAL OR ARTIFICIAL OPENING ENDOSCOPIC: ICD-10-PCS | Performed by: INTERNAL MEDICINE

## 2022-02-15 PROCEDURE — 80048 BASIC METABOLIC PNL TOTAL CA: CPT

## 2022-02-15 PROCEDURE — 3700000000 HC ANESTHESIA ATTENDED CARE: Performed by: INTERNAL MEDICINE

## 2022-02-15 PROCEDURE — 87635 SARS-COV-2 COVID-19 AMP PRB: CPT

## 2022-02-15 PROCEDURE — 2580000003 HC RX 258: Performed by: ANESTHESIOLOGY

## 2022-02-15 PROCEDURE — 2000000000 HC ICU R&B

## 2022-02-15 PROCEDURE — 3700000001 HC ADD 15 MINUTES (ANESTHESIA): Performed by: INTERNAL MEDICINE

## 2022-02-15 PROCEDURE — 7100000000 HC PACU RECOVERY - FIRST 15 MIN: Performed by: INTERNAL MEDICINE

## 2022-02-15 PROCEDURE — 82378 CARCINOEMBRYONIC ANTIGEN: CPT

## 2022-02-15 PROCEDURE — 85014 HEMATOCRIT: CPT

## 2022-02-15 PROCEDURE — 6360000002 HC RX W HCPCS: Performed by: EMERGENCY MEDICINE

## 2022-02-15 PROCEDURE — 83735 ASSAY OF MAGNESIUM: CPT

## 2022-02-15 PROCEDURE — 6360000002 HC RX W HCPCS: Performed by: INTERNAL MEDICINE

## 2022-02-15 PROCEDURE — 6360000002 HC RX W HCPCS: Performed by: NURSE ANESTHETIST, CERTIFIED REGISTERED

## 2022-02-15 PROCEDURE — 99233 SBSQ HOSP IP/OBS HIGH 50: CPT | Performed by: SURGERY

## 2022-02-15 PROCEDURE — 2580000003 HC RX 258: Performed by: EMERGENCY MEDICINE

## 2022-02-15 RX ORDER — TRAMADOL HYDROCHLORIDE 50 MG/1
100 TABLET ORAL EVERY 4 HOURS PRN
Status: DISCONTINUED | OUTPATIENT
Start: 2022-02-15 | End: 2022-02-19 | Stop reason: HOSPADM

## 2022-02-15 RX ORDER — NICOTINE POLACRILEX 4 MG
15 LOZENGE BUCCAL PRN
Status: DISCONTINUED | OUTPATIENT
Start: 2022-02-15 | End: 2022-02-19 | Stop reason: HOSPADM

## 2022-02-15 RX ORDER — PROPOFOL 10 MG/ML
INJECTION, EMULSION INTRAVENOUS PRN
Status: DISCONTINUED | OUTPATIENT
Start: 2022-02-15 | End: 2022-02-15 | Stop reason: SDUPTHER

## 2022-02-15 RX ORDER — WATER / MINERAL OIL / WHITE PETROLATUM 16 OZ
CREAM TOPICAL PRN
Status: DISCONTINUED | OUTPATIENT
Start: 2022-02-15 | End: 2022-02-19 | Stop reason: HOSPADM

## 2022-02-15 RX ORDER — MORPHINE SULFATE 2 MG/ML
2 INJECTION, SOLUTION INTRAMUSCULAR; INTRAVENOUS
Status: DISCONTINUED | OUTPATIENT
Start: 2022-02-15 | End: 2022-02-19 | Stop reason: HOSPADM

## 2022-02-15 RX ORDER — SODIUM CHLORIDE 9 MG/ML
INJECTION, SOLUTION INTRAVENOUS CONTINUOUS
Status: DISCONTINUED | OUTPATIENT
Start: 2022-02-15 | End: 2022-02-15

## 2022-02-15 RX ORDER — LIDOCAINE HYDROCHLORIDE 20 MG/ML
INJECTION, SOLUTION EPIDURAL; INFILTRATION; INTRACAUDAL; PERINEURAL PRN
Status: DISCONTINUED | OUTPATIENT
Start: 2022-02-15 | End: 2022-02-15 | Stop reason: SDUPTHER

## 2022-02-15 RX ORDER — INSULIN LISPRO 100 [IU]/ML
0-6 INJECTION, SOLUTION INTRAVENOUS; SUBCUTANEOUS NIGHTLY
Status: DISCONTINUED | OUTPATIENT
Start: 2022-02-15 | End: 2022-02-19 | Stop reason: HOSPADM

## 2022-02-15 RX ORDER — PROPOFOL 10 MG/ML
INJECTION, EMULSION INTRAVENOUS CONTINUOUS PRN
Status: DISCONTINUED | OUTPATIENT
Start: 2022-02-15 | End: 2022-02-15 | Stop reason: SDUPTHER

## 2022-02-15 RX ORDER — DEXTROSE MONOHYDRATE 50 MG/ML
100 INJECTION, SOLUTION INTRAVENOUS PRN
Status: DISCONTINUED | OUTPATIENT
Start: 2022-02-15 | End: 2022-02-19 | Stop reason: HOSPADM

## 2022-02-15 RX ORDER — DEXTROSE MONOHYDRATE 25 G/50ML
12.5 INJECTION, SOLUTION INTRAVENOUS PRN
Status: DISCONTINUED | OUTPATIENT
Start: 2022-02-15 | End: 2022-02-15 | Stop reason: SDUPTHER

## 2022-02-15 RX ORDER — INSULIN LISPRO 100 [IU]/ML
6 INJECTION, SOLUTION INTRAVENOUS; SUBCUTANEOUS
Status: DISCONTINUED | OUTPATIENT
Start: 2022-02-15 | End: 2022-02-17

## 2022-02-15 RX ORDER — TAMSULOSIN HYDROCHLORIDE 0.4 MG/1
0.4 CAPSULE ORAL DAILY
Status: DISCONTINUED | OUTPATIENT
Start: 2022-02-15 | End: 2022-02-19 | Stop reason: HOSPADM

## 2022-02-15 RX ORDER — INSULIN LISPRO 100 [IU]/ML
0-12 INJECTION, SOLUTION INTRAVENOUS; SUBCUTANEOUS
Status: DISCONTINUED | OUTPATIENT
Start: 2022-02-15 | End: 2022-02-19 | Stop reason: HOSPADM

## 2022-02-15 RX ADMIN — POTASSIUM CHLORIDE 10 MEQ: 7.46 INJECTION, SOLUTION INTRAVENOUS at 03:39

## 2022-02-15 RX ADMIN — INSULIN LISPRO 6 UNITS: 100 INJECTION, SOLUTION INTRAVENOUS; SUBCUTANEOUS at 18:17

## 2022-02-15 RX ADMIN — TAMSULOSIN HYDROCHLORIDE 0.4 MG: 0.4 CAPSULE ORAL at 14:13

## 2022-02-15 RX ADMIN — DEXTROSE AND SODIUM CHLORIDE: 5; 450 INJECTION, SOLUTION INTRAVENOUS at 01:24

## 2022-02-15 RX ADMIN — TRAMADOL HYDROCHLORIDE 100 MG: 50 TABLET, COATED ORAL at 14:13

## 2022-02-15 RX ADMIN — MORPHINE SULFATE 2 MG: 2 INJECTION, SOLUTION INTRAMUSCULAR; INTRAVENOUS at 05:35

## 2022-02-15 RX ADMIN — TRAMADOL HYDROCHLORIDE 100 MG: 50 TABLET, COATED ORAL at 22:20

## 2022-02-15 RX ADMIN — SODIUM CHLORIDE: 9 INJECTION, SOLUTION INTRAVENOUS at 10:38

## 2022-02-15 RX ADMIN — PROPOFOL 140 MCG/KG/MIN: 10 INJECTION, EMULSION INTRAVENOUS at 11:44

## 2022-02-15 RX ADMIN — POTASSIUM CHLORIDE 10 MEQ: 7.46 INJECTION, SOLUTION INTRAVENOUS at 00:21

## 2022-02-15 RX ADMIN — LIDOCAINE HYDROCHLORIDE 50 MG: 20 INJECTION, SOLUTION EPIDURAL; INFILTRATION; INTRACAUDAL; PERINEURAL at 11:44

## 2022-02-15 RX ADMIN — INSULIN LISPRO 6 UNITS: 100 INJECTION, SOLUTION INTRAVENOUS; SUBCUTANEOUS at 14:03

## 2022-02-15 RX ADMIN — IOPAMIDOL 75 ML: 755 INJECTION, SOLUTION INTRAVENOUS at 17:13

## 2022-02-15 RX ADMIN — INSULIN LISPRO 3 UNITS: 100 INJECTION, SOLUTION INTRAVENOUS; SUBCUTANEOUS at 20:57

## 2022-02-15 RX ADMIN — INSULIN GLARGINE 20 UNITS: 100 INJECTION, SOLUTION SUBCUTANEOUS at 14:03

## 2022-02-15 RX ADMIN — INSULIN LISPRO 6 UNITS: 100 INJECTION, SOLUTION INTRAVENOUS; SUBCUTANEOUS at 14:04

## 2022-02-15 RX ADMIN — LIDOCAINE HYDROCHLORIDE 50 MG: 20 INJECTION, SOLUTION EPIDURAL; INFILTRATION; INTRACAUDAL; PERINEURAL at 11:53

## 2022-02-15 RX ADMIN — POTASSIUM CHLORIDE 10 MEQ: 7.46 INJECTION, SOLUTION INTRAVENOUS at 04:36

## 2022-02-15 RX ADMIN — POTASSIUM CHLORIDE 10 MEQ: 7.46 INJECTION, SOLUTION INTRAVENOUS at 06:54

## 2022-02-15 RX ADMIN — INSULIN LISPRO 6 UNITS: 100 INJECTION, SOLUTION INTRAVENOUS; SUBCUTANEOUS at 18:19

## 2022-02-15 RX ADMIN — POTASSIUM CHLORIDE 10 MEQ: 7.46 INJECTION, SOLUTION INTRAVENOUS at 08:05

## 2022-02-15 RX ADMIN — SODIUM PHOSPHATE, MONOBASIC, MONOHYDRATE 15 MMOL: 276; 142 INJECTION, SOLUTION INTRAVENOUS at 08:05

## 2022-02-15 RX ADMIN — PROPOFOL 50 MG: 10 INJECTION, EMULSION INTRAVENOUS at 11:44

## 2022-02-15 ASSESSMENT — PAIN SCALES - GENERAL
PAINLEVEL_OUTOF10: 0
PAINLEVEL_OUTOF10: 0
PAINLEVEL_OUTOF10: 8
PAINLEVEL_OUTOF10: 4
PAINLEVEL_OUTOF10: 0
PAINLEVEL_OUTOF10: 4
PAINLEVEL_OUTOF10: 0
PAINLEVEL_OUTOF10: 4
PAINLEVEL_OUTOF10: 8
PAINLEVEL_OUTOF10: 0

## 2022-02-15 ASSESSMENT — PULMONARY FUNCTION TESTS
PIF_VALUE: 1

## 2022-02-15 ASSESSMENT — PAIN DESCRIPTION - PAIN TYPE: TYPE: CHRONIC PAIN

## 2022-02-15 ASSESSMENT — PAIN DESCRIPTION - FREQUENCY: FREQUENCY: CONTINUOUS

## 2022-02-15 ASSESSMENT — PAIN - FUNCTIONAL ASSESSMENT
PAIN_FUNCTIONAL_ASSESSMENT: ACTIVITIES ARE NOT PREVENTED
PAIN_FUNCTIONAL_ASSESSMENT: PREVENTS OR INTERFERES SOME ACTIVE ACTIVITIES AND ADLS
PAIN_FUNCTIONAL_ASSESSMENT: 0-10

## 2022-02-15 ASSESSMENT — PAIN DESCRIPTION - LOCATION
LOCATION: BACK
LOCATION: BACK

## 2022-02-15 ASSESSMENT — PAIN DESCRIPTION - PROGRESSION: CLINICAL_PROGRESSION: GRADUALLY WORSENING

## 2022-02-15 ASSESSMENT — PAIN DESCRIPTION - ORIENTATION: ORIENTATION: LOWER

## 2022-02-15 ASSESSMENT — PAIN DESCRIPTION - DESCRIPTORS
DESCRIPTORS: ACHING
DESCRIPTORS: ACHING

## 2022-02-15 ASSESSMENT — PAIN DESCRIPTION - ONSET: ONSET: ON-GOING

## 2022-02-15 NOTE — PROGRESS NOTES
This RN transferred patient to room.  Bedside handoff with Carmen Blunt RN    Electronically signed by Quenten Moritz, RN on 2/15/2022 at [phone]

## 2022-02-15 NOTE — ANESTHESIA POSTPROCEDURE EVALUATION
Department of Anesthesiology  Postprocedure Note    Patient: Edison Chacon  MRN: [id number]  YOB: 1960  Date of evaluation: 2/15/2022  Time:  12:16 PM     Procedure Summary     Date: 02/15/22 Room / Location: [address] [place] [address]    Anesthesia Start: 1140 Anesthesia Stop: 2790    Procedure: SIGMOIDOSCOPY BIOPSY FLEXIBLE (N/A ) Diagnosis: (rectal mass)    Surgeons: Darrian Wiley MD Responsible Provider: Eleanor Torres MD    Anesthesia Type: MAC ASA Status: 3          Anesthesia Type: MAC    Reema Phase I: Reema Score: 9    Reema Phase II:      Last vitals: Reviewed and per EMR flowsheets.        Anesthesia Post Evaluation    Patient location during evaluation: PACU  Patient participation: complete - patient participated  Level of consciousness: awake  Airway patency: patent  Nausea & Vomiting: no vomiting and no nausea  Complications: no  Cardiovascular status: hemodynamically stable  Respiratory status: acceptable  Hydration status: stable  Multimodal analgesia pain management approach

## 2022-02-15 NOTE — PROGRESS NOTES
[facility]        Progress Note    Patient Name: Aleksandra Aldridge  MRN: [id number]  YOB: 1960  Date of Evaluation: 2/15/2022    Chief Complaint: Weakness, lightheadedness    Subjective:  No acute events overnight  No reports of significant pain  No nausea or vomiting  Resting in bed at this time, seen in recovery following scope      Vital Signs:  Patient Vitals for the past 24 hrs:   BP Temp Temp src Pulse Resp SpO2 Weight   02/15/22 1230 (!) 143/80 97.6 °F (36.4 °C) Temporal 80 19 100 % --   02/15/22 1220 133/76 97.6 °F (36.4 °C) Temporal 78 20 100 % --   02/15/22 1215 130/86 -- -- 80 -- 100 % --   02/15/22 1210 112/78 -- -- 76 -- 99 % --   02/15/22 1205 (!) 97/53 -- -- 77 -- 98 % --   02/15/22 1203 97/83 97 °F (36.1 °C) Temporal 77 18 98 % --   02/15/22 1019 137/79 98.2 °F (36.8 °C) Temporal 84 16 99 % --   02/15/22 0800 117/77 97.6 °F (36.4 °C) Temporal 80 16 96 % --   02/15/22 0400 (!) 136/91 97.4 °F (36.3 °C) Temporal 82 14 98 % 177 lb 4 oz (80.4 kg)   02/15/22 0200 (!) 114/46 -- -- 89 16 -- --   02/15/22 0000 124/82 97.2 °F (36.2 °C) Temporal 83 13 97 % --   22 2200 125/70 -- -- 86 17 -- --   22 2000 (!) 104/57 97.8 °F (36.6 °C) Temporal 86 15 -- --   22 1600 131/61 -- -- 91 17 -- 175 lb 4.3 oz (79.5 kg)      TEMPERATURE HISTORY 24H: Temp (24hrs), Av.6 °F (36.4 °C), Min:97 °F (36.1 °C), Max:98.2 °F (36.8 °C)    BLOOD PRESSURE HISTORY: Systolic (06YCT), GYZ:038 , Min:97 , FCE:949    Diastolic (57URA), USX:07, Min:46, Max:91      Intake/Output:  I/O last 3 completed shifts: In: 3603.7 [I.V.:2390.1;  IV Piggyback:1213.7]  Out: -   I/O this shift:  In: 200 [I.V.:200]  Out: 650 [Urine:650]  Drain/tube Output:       Physical Exam:  General: awake, alert, oriented to  person, place, time  Cardiovascular:  regular rate and rhythm and no murmur noted  Lungs: clear to auscultation  Abdomen: soft, nontender, mild lower abdominal distention, bowel sounds normal     Labs:  CBC:    Recent Labs     02/14/22  0950 02/14/22  0950 02/14/22  1914 02/15/22  0627 02/15/22  1349   WBC 18.5*  --   --   --   --    HGB 9.1*   < > 7.9* 7.5* 7.7*   HCT 31.4*   < > 26.7* 25.0* 25.9*   *  --   --   --   --     < > = values in this interval not displayed. BMP:    Recent Labs     02/15/22  0243 02/15/22  0626 02/15/22  1349    133* 130*   K 4.3 4.0 4.2    104 100   CO2 19* 19* 18*   BUN 16 15 12   CREATININE 0.9 0.8 0.8   GLUCOSE 178* 188* 258*     Hepatic:    Recent Labs     02/14/22  0950   AST 10*   ALT 17   BILITOT <0.2   ALKPHOS 126     Amylase:  No results found for: AMYLASE  Lipase:    Lab Results   Component Value Date    LIPASE 60.0 02/14/2022      Mag:    Lab Results   Component Value Date    MG 1.70 02/15/2022    MG 1.70 02/15/2022     Phos:     Lab Results   Component Value Date    PHOS 2.4 02/15/2022    PHOS 1.7 02/15/2022      Coags:   Lab Results   Component Value Date    PROTIME 11.8 02/15/2022    INR 1.04 02/15/2022       Cultures:  Anaerobic culture  No results found for: LABANAE  Fungus stain  No results found for requested labs within last 30 days. Gram stain  No results found for requested labs within last 30 days. Organism  No results found for: [facility]  Surgical culture  No results found for: CXSURG  Blood culture 1  No results found for requested labs within last 30 days. Blood culture 2  No results found for requested labs within last 30 days. Fecal occult  Results in Past 30 Days  Result Component Current Result Ref Range Previous Result Ref Range   Occult Blood Diagnostic Result: POSITIVE  Normal range: Negative   (A) (2/14/2022)  Not in Time Range      GI bacterial pathogens by PCR  No results found for requested labs within last 30 days. C. difficile  No results found for requested labs within last 30 days.      Urine culture  No results found for: LABURIN    Pathology:  Pathology results pending Imaging:  I have personally reviewed the following films:    CT ABDOMEN PELVIS W IV CONTRAST Additional Contrast? None    Result Date: 2/15/2022  EXAMINATION: CT OF THE ABDOMEN AND PELVIS WITH CONTRAST 2/14/2022 11:10 am TECHNIQUE: CT of the abdomen and pelvis was performed with the administration of intravenous contrast. Multiplanar reformatted images are provided for review. Dose modulation, iterative reconstruction, and/or weight based adjustment of the mA/kV was utilized to reduce the radiation dose to as low as reasonably achievable. COMPARISON: None. HISTORY: ORDERING SYSTEM PROVIDED HISTORY: lower abd pain, rectal bleeding TECHNOLOGIST PROVIDED HISTORY: Additional Contrast?->None Reason for exam:->lower abd pain, rectal bleeding Decision Support Exception - unselect if not a suspected or confirmed emergency medical condition->Emergency Medical Condition (MA) Reason for Exam: lower abd pain, rectal bleeding FINDINGS: Lower Chest: No acute infiltrate at the lung bases. Organs: The liver, spleen, pancreas and adrenal glands are unremarkable. No acute biliary findings. There is mild bilateral hydronephrosis and hydroureter, right greater than left. No cystic or solid renal mass. No obstructing calculi. GI/Bowel: There is a heterogeneous mass in the distal sigmoid colon and rectum measuring maximally 7.1 cm transversely, 10.1 cm in AP dimension and 14.4 cm in length. There is mild infiltration of the adjacent retroperitoneal fat. Mass likely encompasses the prostate gland and possibly the posterior bladder wall. The ureters are compressed by the mass but appear to be not directly involved. No significant free fluid. Possible adenopathy along the posterior to the superior margin of the mass measuring 14 x 20 mm and 10 x 16 mm. Mild retained stool throughout the colon. No small bowel distension. The stomach and duodenal sweep are unremarkable. Pelvis: No significant free pelvic fluid.   No additional enlarged pelvic nodes. Moderate distention of the urinary bladder. Mild bladder wall thickening posteriorly, possibly direct invasion from the mass. Peritoneum/Retroperitoneum: The abdominal aorta is normal in caliber. No pathologic retroperitoneal adenopathy. No upper abdominal ascites. Bones/Soft Tissues: No acute osseous or soft tissue abnormality. Small fat containing umbilical hernia. No lytic or blastic osseous lesions. 1. Large heterogeneous pelvic mass centered on the distal sigmoid colon and rectum presumably malignant. Findings suggesting direct invasion of the prostate and possibly the posterior bladder wall. Moderate stool proximal to the mass with no evidence of obstruction. Probable adenopathy posterior to the upper margin of the mass. No evidence of distal metastatic disease. Surgical consultation recommended. 2. Bilateral hydronephrosis secondary to mass effect on the distal ureters, right greater than left. XR CHEST PORTABLE    Result Date: 2/14/2022  EXAMINATION: ONE XRAY VIEW OF THE CHEST 2/14/2022 9:25 am COMPARISON: None. HISTORY: ORDERING SYSTEM PROVIDED HISTORY: fatigue TECHNOLOGIST PROVIDED HISTORY: Reason for exam:->fatigue Reason for Exam: fatigue FINDINGS: Cardiac and mediastinal silhouettes appear within normal limits for size. Pulmonary vascularity is normal.  No parenchymal consolidation or pleural effusion. No pneumothorax. No acute osseous abnormality is identified. Clear chest without acute cardiopulmonary process.        Scheduled Meds:   tamsulosin  0.4 mg Oral Daily    insulin glargine  0.25 Units/kg SubCUTAneous Nightly    insulin lispro  6 Units SubCUTAneous TID WC    insulin lispro  0-12 Units SubCUTAneous TID WC    insulin lispro  0-6 Units SubCUTAneous Nightly    metoprolol succinate  100 mg Oral Daily     Continuous Infusions:   dextrose      dextrose 5 % and 0.45 % NaCl 150 mL/hr at 02/15/22 0610     PRN Meds:.morphine, glucose, glucagon (rDNA), dextrose, traMADol, polyethylene glycol, dextrose, potassium chloride, magnesium sulfate, sodium phosphate IVPB **OR** sodium phosphate IVPB **OR** sodium phosphate IVPB, dextrose 5 % and 0.45 % NaCl, acetaminophen      Assessment:  58 y.o. male admitted with   1. Diabetic ketoacidosis without coma associated with type 2 diabetes mellitus ([facility] [address]    2. Anemia, unspecified type    3. Abnormal CT of the abdomen        Rectal mass  DKA  Bilateral hydronephrosis  Anemia      Plan:  1. Sigmoidoscopy with biopsy today per GI, biopsies pending; timing of possible colostomy and port placement to be determined  2. NPO for procedure; monitor bowel function  3. IV hydration; monitor and correct electrolytes  4. Activity as tolerated  5. PRN analgesics and antiemetics--minimizing narcotics as tolerated  6. DVT prophylaxis with SCD's  7. Management of medical comorbid etiologies per primary team and consulting services; consult placed to oncology    EDUCATION:  Educated patient on plan of care and disease process--all questions answered. Plans discussed with patient and nursing. Reviewed and discussed with Dr. Melba Roberson.       Signed:  MELODY Aviles - CNP  2/15/2022 3:27 PM     Endoscopy findings noted  The findings are consistent with a locally advanced rectal cancer  Hopefully will have biopsy results by tomorrow  CEA is elevated at 22.4  Spoke with Dr. Jocelyn Verdugo who will see him for an oncology consult  Okay for clear liquid diet today  Anticipating possible colostomy and port placement later this week

## 2022-02-15 NOTE — CONSULTS
Urology Consult Note  [facility]     Patient: Ashlyn Banegas MRN: [id number]  Room/Bed: [facility]5820/3786-05   YOB: 1960  Age/Sex: 58 y.o.male  Admission Date: 2/14/2022     Date of Service:  2/15/2022    Consulting Provider: Sage Matta, MELODY - REILLY  Admitting/Requesting Physician: Elmer Nicholas MD  Primary Care Physician: No primary care provider on file. Reason for Consult: Hydronephrosis    ASSESSMENT/PLAN     57 yo male seen down in endoscopy this morning    He presents with abdominal pain x1 month, rectal bleeding and weight loss  Hx of DM and has been noncompliant with medications, noted to be in DKA  CT a/p 02/14/2022 demonstrates a large pelvic mass centered in the sigmoid colon and rectum with invasion of the prostate and possibly the posterior bladder wall. Bilateral hydronephrosis 2/2 to mass effect on the distal ureters R>L. UA is unremarkable   Cr is improved to 0.8 today  AFVSS  Exam negative for flank pain, no CVAT, bladder is distended, + SP pain. He states, \"Pain where my spine meets my pelvis\"    Recommendations:  Bladder appears distended on CT, bladder scan and place multani for PVR >400cc's  Start Flomax  flex sigmoidoscopy with bx today  No urgent  intervention although we will follow along and monitor his Cr and for development of flank pain.       All patient questions were answered. He understands the plan as listed above. HISTORY     Chief Complaint:   Chief Complaint   Patient presents with    Abdominal Pain     Pt to triage c/o abd. pain x 5 days, rectal bleeding,       History of Present Illness: Ashlyn Banegas is a 58 y.o. male who has been having on and off abdominal pain for the last 1 month that is at worst 8 out of 10 sharp in nature no diarrhea no nausea vomiting with this patient does have on and off stool with some blood in it. Patient is also had about 5 pounds of weight loss in the last 3 months.   Patient does have a history of diabetes and has been noncompliant with medication for the last 3 months as has lost PCP. Patient denies any cough fever chills nausea vomiting slightly lightheaded no shortness of breath. Urology was consulted for hydronephrosis 2/2 to distal ureteral extrinsic compression 2/2 to sigmoid colon mass. He was noted to have a distended bladder. Quach was ordered. We will start Flomax and hopefully attempt a VT in the coming days. Past Medical History:  He has a past medical history of Diabetes mellitus ([facility] [address] and Hypertension. Hospital Problem List:  Active Problems:    DKA, type 1, not at goal [facility])    Diabetic ketoacidosis without coma associated with type 2 diabetes mellitus ([facility] [address]  Resolved Problems:    * No resolved hospital problems. *      Past Surgical History:  He has a past surgical history that includes Abdomen surgery (1975). Social History:  He reports that he has never smoked. He has never used smokeless tobacco. He reports previous alcohol use. He reports that he does not use drugs. Family History:  family history is not on file. Allergies:  No Known Allergies    Medications:  Scheduled Meds:   metoprolol succinate  100 mg Oral Daily     Continuous Infusions:   insulin 2.48 Units/hr (02/15/22 0809)    sodium chloride Stopped (02/14/22 1816)    dextrose 5 % and 0.45 % NaCl 150 mL/hr at 02/15/22 0610     PRN Meds:morphine, polyethylene glycol, dextrose, potassium chloride, magnesium sulfate, sodium phosphate IVPB **OR** sodium phosphate IVPB **OR** sodium phosphate IVPB, dextrose 5 % and 0.45 % NaCl, acetaminophen    Review of Systems:  Pertinent positives/negatives reviewed in HPI. All other systems reviewed and negative, unless noted below. Constitutional: Negative  Genitourinary: Frequency, incomplete bladder emptying, pelvis distension.   see HPI  HEENT: Negative   Cardiovascular: Negative   Respiratory: Negative   Gastrointestinal: lower abdominal pain and distension  Musculoskeletal: \"pain where my spine meets my pelvis\"   Neurological: Negative   Psychiatric: Negative   Integumentary: Negative     PHYSICAL EXAM     Vitals:    02/15/22 0800   BP: 117/77   Pulse: 80   Resp: 16   Temp: 97.6 °F (36.4 °C)   SpO2: 96%     CONSTITUTIONAL: The patient is well nourished/developed, with no distress noted. NEUROLOGICAL/PSYCHIATRIC: Oriented to place and time, normal affected noted. NECK: The neck is symmetrical and supple, with no masses noted. CARDIOVASCULAR: Regular rate and rhythm, no evidence of swelling noted. RESPIRATORY: Normal respiratory effort with no wheezing noted. ABDOMEN: Distended pelvis, suprapubic tenderness, otherwise Abdomen soft. No enlarged liver or spleen. No hernias noted. Stool occult blood not indicated. SKIN: Skin appears normal.  LYMPHATICS: No adenopathy noted. CVA: No CVA tenderness bilaterally. GENITOURINARY: The penis is without rash or lesions and meatus with expected size and location. The scrotum appears normal. Bilateral testicles appears to be of normal size and location. No masses or tenderness noted of testicles or epididymis.      Ins/Outs:    Intake/Output Summary (Last 24 hours) at 2/15/2022 0931  Last data filed at 2/15/2022 0908  Gross per 24 hour   Intake 3603.74 ml   Output 650 ml   Net 2953.74 ml       LABS     CBC   Lab Results   Component Value Date    WBC 18.5 02/14/2022    RBC 4.64 02/14/2022    HGB 7.5 02/15/2022    HCT 25.0 02/15/2022    MCV 67.5 02/14/2022    MCH 19.5 02/14/2022    MCHC 28.9 02/14/2022    RDW 18.7 02/14/2022     02/14/2022    MPV 7.6 02/14/2022     BMP   Lab Results   Component Value Date     02/15/2022    K 4.0 02/15/2022    K 4.8 02/14/2022     02/15/2022    CO2 19 02/15/2022    BUN 15 02/15/2022    CREATININE 0.8 02/15/2022    GLUCOSE 188 02/15/2022    CALCIUM 8.2 02/15/2022     Urinalysis:   Lab Results   Component Value Date    COLORU YELLOW 02/14/2022    GLUCOSEU >=1000 02/14/2022    BLOODU Negative 02/14/2022    NITRU Negative 02/14/2022    LEUKOCYTESUR Negative 02/14/2022     Urine culture: No results for input(s): Cher Lux in the last 72 hours. PSA: No results found for: PSA      IMAGING     CT ABDOMEN PELVIS W IV CONTRAST Additional Contrast? None    Result Date: 2/14/2022  EXAMINATION: CT OF THE ABDOMEN AND PELVIS WITH CONTRAST 2/14/2022 11:10 am TECHNIQUE: CT of the abdomen and pelvis was performed with the administration of intravenous contrast. Multiplanar reformatted images are provided for review. Dose modulation, iterative reconstruction, and/or weight based adjustment of the mA/kV was utilized to reduce the radiation dose to as low as reasonably achievable. COMPARISON: None. HISTORY: ORDERING SYSTEM PROVIDED HISTORY: lower abd pain, rectal bleeding TECHNOLOGIST PROVIDED HISTORY: Additional Contrast?->None Reason for exam:->lower abd pain, rectal bleeding Decision Support Exception - unselect if not a suspected or confirmed emergency medical condition->Emergency Medical Condition (MA) Reason for Exam: lower abd pain, rectal bleeding FINDINGS: Lower Chest: No acute infiltrate at the lung bases. Organs: The liver, spleen, pancreas and adrenal glands are unremarkable. No acute biliary findings. There is mild bilateral hydronephrosis and hydroureter, right greater than left. No cystic or solid renal mass. No obstructing calculi. GI/Bowel: There is a heterogeneous mass in the distal sigmoid colon and rectum measuring maximally 7.1 cm transversely, 10.1 cm in AP dimension and 14.4 cm in length. There is mild infiltration of the adjacent retroperitoneal fat. Mass likely encompasses the prostate gland and possibly the posterior bladder wall. The ureters are compressed by the mass but appear to be not directly involved. No significant free fluid. Possible adenopathy along the posterior to the superior margin of the mass measuring 14 x 20 mm and 10 x 16 mm.  Mild retained stool throughout the colon. No small bowel distension. The stomach and duodenal sweep are unremarkable. Pelvis: No significant free pelvic fluid. No additional enlarged pelvic nodes. Moderate distention of the urinary bladder. Mild bladder wall thickening posteriorly, possibly direct invasion from the mass. Peritoneum/Retroperitoneum: The abdominal aorta is normal in caliber. No pathologic retroperitoneal adenopathy. No upper abdominal ascites. Bones/Soft Tissues: No acute osseous or soft tissue abnormality. Small fat containing umbilical hernia. No lytic or blastic osseous lesions. 1. Large heterogeneous pelvic mass centered on the distal sigmoid colon and rectum presumably malignant. Findings suggesting direct invasion of the prostate and possibly the posterior bladder wall. Moderate stool proximal to the mass with no evidence of obstruction. Probable adenopathy posterior to the upper margin of the mass. No evidence of distal metastatic disease. Surgical consultation recommended. 2. Bilateral hydronephrosis secondary to mass effect on the distal ureters, right greater than left. XR CHEST PORTABLE    Result Date: 2/14/2022  EXAMINATION: ONE XRAY VIEW OF THE CHEST 2/14/2022 9:25 am COMPARISON: None. HISTORY: ORDERING SYSTEM PROVIDED HISTORY: fatigue TECHNOLOGIST PROVIDED HISTORY: Reason for exam:->fatigue Reason for Exam: fatigue FINDINGS: Cardiac and mediastinal silhouettes appear within normal limits for size. Pulmonary vascularity is normal.  No parenchymal consolidation or pleural effusion. No pneumothorax. No acute osseous abnormality is identified. Clear chest without acute cardiopulmonary process.             Electronically signed by: MELODY Bailey CNP  2/15/2022   [facility]  Office Contact: [phone]

## 2022-02-15 NOTE — CONSULTS
[facility]   CONSULT NOTE    2/15/2022 2:00 PM    Patient Information: Catina Zee   Date of Admit:  2/14/2022  Primary Care Physician:  No primary care provider on file. Requesting Physician:  Christiano Acosta MD    Reason for consult:   Evaluation and recommendations for rectal mass    Chief complaint:    Chief Complaint   Patient presents with    Abdominal Pain     Pt to triage c/o abd. pain x 5 days, rectal bleeding,       History of Present Illness:  Catina Zee is a 58 y.o. male on Christiano Acosta MD service who was admitted on 2/14/2022 for abdominal pain. He has had abdominal pain, back pain, constipation, rectal bleeding and difficulty urinating for about 6 weeks. He also reports about 30 lb weight loss this year. CT of the abdomen and pelvis this admit shows large heterogenous pelvic mass measuring 7.1 x 10.1 x 14.4 centered on the distal sigmoid colon and rectum presumably malignant, probable adenopathy posterior to the upper margin of the mass. There is bilateral hydronephrosis secondary to mass effect on the distal ureters, right greater than left. He had a colonoscopy today which found a large friable rectosigmoid malignant appearing mass which was biopsied. He denies personal and family history of cancer. He feels weak and tired, but states that he is independent at home and is able to drive. He lives in [place], [place]. Past Medical History:     has a past medical history of Diabetes mellitus ([address] and Hypertension.      Past Surgical History:    Past Surgical History:   Procedure Laterality Date    ABDOMEN SURGERY  1975    Exploratory        Current Medications:     tamsulosin  0.4 mg Oral Daily    insulin glargine  0.25 Units/kg SubCUTAneous Nightly    insulin lispro  6 Units SubCUTAneous TID WC    insulin lispro  0-12 Units SubCUTAneous TID WC    insulin lispro  0-6 Units SubCUTAneous Nightly    metoprolol succinate  100 mg Oral Daily       Allergies:    No Known extremities  Respiratory: Clear to auscultation bilaterally. No wheeze. Good inspiratory effort  Gastrointestinal: Abdomen soft, not tender, not distended, normal bowel sounds  Musculoskeletal: No cyanosis in digits, warm extremities  Neurologic: Cranial nerves grossly intact, no motor or speech deficits. Psychiatric: Normal affect. Alert and oriented to time, place and person. Skin: Warm, dry, normal turgor, no rash    DATA:  CBC:   Lab Results   Component Value Date    WBC 18.5 02/14/2022    RBC 4.64 02/14/2022    HGB 7.7 02/15/2022    HCT 25.9 02/15/2022    MCV 67.5 02/14/2022    MCH 19.5 02/14/2022    MCHC 28.9 02/14/2022    RDW 18.7 02/14/2022     02/14/2022    MPV 7.6 02/14/2022     BMP:  Lab Results   Component Value Date     02/15/2022    K 4.0 02/15/2022    K 4.8 02/14/2022     02/15/2022    CO2 19 02/15/2022    BUN 15 02/15/2022    CREATININE 0.8 02/15/2022    CALCIUM 8.2 02/15/2022    GFRAA >60 02/15/2022    LABGLOM >60 02/15/2022    GLUCOSE 188 02/15/2022     Magnesium:   Lab Results   Component Value Date    MG 1.70 02/15/2022    MG 1.80 02/15/2022    MG 2.00 02/14/2022     LIVER PROFILE:   Recent Labs     02/14/22  0950 02/14/22  1457   AST 10*  --    ALT 17  --    LIPASE  --  60.0   BILITOT <0.2  --    ALKPHOS 126  --      PT/INR:  No results found for: PROTIME, INR    IMAGING:    Narrative   EXAMINATION:   ONE XRAY VIEW OF THE CHEST       2/14/2022 9:25 am       COMPARISON:   None.       HISTORY:   ORDERING SYSTEM PROVIDED HISTORY: fatigue   TECHNOLOGIST PROVIDED HISTORY:   Reason for exam:->fatigue   Reason for Exam: fatigue       FINDINGS:   Cardiac and mediastinal silhouettes appear within normal limits for size.    Pulmonary vascularity is normal.  No parenchymal consolidation or pleural   effusion.  No pneumothorax.  No acute osseous abnormality is identified.           Impression   Clear chest without acute cardiopulmonary process.         Narrative   EXAMINATION:   CT OF THE ABDOMEN AND PELVIS WITH CONTRAST 2/14/2022 11:10 am       TECHNIQUE:   CT of the abdomen and pelvis was performed with the administration of   intravenous contrast. Multiplanar reformatted images are provided for review. Dose modulation, iterative reconstruction, and/or weight based adjustment of   the mA/kV was utilized to reduce the radiation dose to as low as reasonably   achievable.       COMPARISON:   None.       HISTORY:   ORDERING SYSTEM PROVIDED HISTORY: lower abd pain, rectal bleeding   TECHNOLOGIST PROVIDED HISTORY:   Additional Contrast?->None   Reason for exam:->lower abd pain, rectal bleeding   Decision Support Exception - unselect if not a suspected or confirmed   emergency medical condition->Emergency Medical Condition (MA)   Reason for Exam: lower abd pain, rectal bleeding       FINDINGS:   Lower Chest: No acute infiltrate at the lung bases.       Organs: The liver, spleen, pancreas and adrenal glands are unremarkable.  No   acute biliary findings.  There is mild bilateral hydronephrosis and   hydroureter, right greater than left.  No cystic or solid renal mass.  No   obstructing calculi.       GI/Bowel: There is a heterogeneous mass in the distal sigmoid colon and   rectum measuring maximally 7.1 cm transversely, 10.1 cm in AP dimension and   14.4 cm in length.  There is mild infiltration of the adjacent   retroperitoneal fat.  Mass likely encompasses the prostate gland and possibly   the posterior bladder wall.  The ureters are compressed by the mass but   appear to be not directly involved.  No significant free fluid.  Possible   adenopathy along the posterior to the superior margin of the mass measuring   14 x 20 mm and 10 x 16 mm.       Mild retained stool throughout the colon.  No small bowel distension.  The   stomach and duodenal sweep are unremarkable.       Pelvis: No significant free pelvic fluid.  No additional enlarged pelvic   nodes.  Moderate distention of the urinary bladder. -Transfuse if hgb less than 7.     4. DKA:  -Resolved. -Diabetes management per primary team.       This plan was discussed with the patient and he/she verbalized understanding. Thank you for allowing us to participate in the care of this patient. Ulysses Reid, [address]  [phone]          Agree with the above note. Suspected Locally advanced rectal cancer, biopsy results pending. If final pathology confirms rectal adenocarcinoma and staging CT thorax does not show lung mets, Will need port placement, Followed by total fela-adjuvant therapy 8-9 treatments of FOLFOX followed by Capecitabine plus radiation, followed by surgery. May require diverting colostomy prior to above therapy if impending obstruction is suspected surgery to evaluate patient for need for diverting colostomy.        Don Reyes MD  [facility]

## 2022-02-15 NOTE — PROGRESS NOTES
Assessment complete, vitals obtained. NSR. Pt A/o x4. C/o back pain 5/10. PRN tylenol given, see MAR. DKA protocol. On room air. Lungs clear, pt reports some SOB w/ exertion. Ab soft, + bowel sounds. Skin warm and dry, hands & feet dry / flaky / cracked. Pt incontinent of bowel, reports has been happening @ home recently. Reports some blood & mucous in stool. Ambulated to bathroom w/o difficulty. Repositioning self, reviewed plan of care. Call light in reach. Safety precautions in place. 19:00 labs not resulted yet. Call to lab, lab re-running labs. Will retime next set.

## 2022-02-15 NOTE — ANESTHESIA PRE PROCEDURE
Department of Anesthesiology  Preprocedure Note       Name:  Ashlyn Banegas   Age:  58 y.o.  :  1960                                          MRN:  [id number]         Date:  2/15/2022      Surgeon: Virginia Coats):  Sathya Galvez MD    Procedure: Procedure(s):  SIGMOIDOSCOPY DIAGNOSTIC FLEXIBLE    Medications prior to admission:   Prior to Admission medications    Medication Sig Start Date End Date Taking?  Authorizing Provider   verapamil (VERELAN) 360 MG extended release capsule Take 360 mg by mouth nightly   Yes Historical Provider, MD   metoprolol succinate (TOPROL XL) 100 MG extended release tablet Take 100 mg by mouth daily   Yes Historical Provider, MD       Current medications:    Current Facility-Administered Medications   Medication Dose Route Frequency Provider Last Rate Last Admin    morphine (PF) injection 2 mg  2 mg IntraVENous Q3H PRN August Oseguera MD   2 mg at 02/15/22 0535    0.9 % sodium chloride infusion   IntraVENous Continuous Brenda High  mL/hr at 02/15/22 1038 New Bag at 02/15/22 1038    tamsulosin (FLOMAX) capsule 0.4 mg  0.4 mg Oral Daily Delray Ahr, APRN - CNP        metoprolol succinate (TOPROL XL) extended release tablet 100 mg  100 mg Oral Daily Elmer Nicholas MD   100 mg at 22 1551    polyethylene glycol (GLYCOLAX) packet 17 g  17 g Oral Daily PRN Elmer Nicholas MD        insulin regular (HUMULIN R;NOVOLIN R) 100 Units in sodium chloride 0.9 % 100 mL infusion  0.1 Units/kg/hr IntraVENous Continuous Elmer Nicholas MD 2.5 mL/hr at 02/15/22 0809 2.48 Units/hr at 02/15/22 0809    dextrose 50 % IV solution  12.5 g IntraVENous PRN Abraham Vizcarra MD        potassium chloride 10 mEq/100 mL IVPB (Peripheral Line)  10 mEq IntraVENous PRN Abraham Vizcarra  mL/hr at 02/15/22 0805 10 mEq at 02/15/22 0805    magnesium sulfate 1000 mg in dextrose 5% 100 mL IVPB  1,000 mg IntraVENous PRN Abraham Vizcarra MD        sodium phosphate 10 mmol in dextrose 5 % 250 mL IVPB  10 mmol IntraVENous PRN Cecil Acuña MD        Or    sodium phosphate 15 mmol in dextrose 5 % 250 mL IVPB  15 mmol IntraVENous PRN Cecil Acuña MD 62.5 mL/hr at 02/15/22 0805 15 mmol at 02/15/22 0805    Or    sodium phosphate 20 mmol in dextrose 5 % 500 mL IVPB  20 mmol IntraVENous PRN Cecil Acuña MD   Stopped at 02/15/22 0501    0.9 % sodium chloride infusion   IntraVENous Continuous Cecil Acuña MD   Stopped at 02/14/22 1816    dextrose 5 % and 0.45 % sodium chloride infusion   IntraVENous Continuous PRN Cecil Acuña  mL/hr at 02/15/22 0610 Rate Verify at 02/15/22 0610    acetaminophen (TYLENOL) tablet 650 mg  650 mg Oral Q4H PRN Doris Adrian MD   650 mg at 02/14/22 2212       Allergies:  No Known Allergies    Problem List:    Patient Active Problem List   Diagnosis Code    DKA, type 1, not at goal [facility]) E10.10    Diabetic ketoacidosis without coma associated with type 2 diabetes mellitus ([facility] E11.10       Past Medical History:        Diagnosis Date    Diabetes mellitus ([facility]     Hypertension        Past Surgical History:        Procedure Laterality Date    ABDOMEN SURGERY  1975    Exploratory       Social History:    Social History     Tobacco Use    Smoking status: Never Smoker    Smokeless tobacco: Never Used   Substance Use Topics    Alcohol use: Not Currently     Comment: not in last 18 months                                Counseling given: Not Answered      Vital Signs (Current):   Vitals:    02/15/22 0200 02/15/22 0400 02/15/22 0800 02/15/22 1019   BP: (!) 114/46 (!) 136/91 117/77 137/79   Pulse: 89 82 80 84   Resp: 16 14 16 16   Temp:  97.4 °F (36.3 °C) 97.6 °F (36.4 °C) 98.2 °F (36.8 °C)   TempSrc:  Temporal Temporal Temporal   SpO2:  98% 96% 99%   Weight:  177 lb 4 oz (80.4 kg)                                                BP Readings from Last 3 Encounters:   02/15/22 137/79       NPO Status: Time of last liquid consumption: 2359                        Time of last solid consumption: 2359                        Date of last liquid consumption: 02/14/22                        Date of last solid food consumption: 02/13/22    BMI:   Wt Readings from Last 3 Encounters:   02/15/22 177 lb 4 oz (80.4 kg)     There is no height or weight on file to calculate BMI.    CBC:   Lab Results   Component Value Date    WBC 18.5 02/14/2022    RBC 4.64 02/14/2022    HGB 7.5 02/15/2022    HCT 25.0 02/15/2022    MCV 67.5 02/14/2022    RDW 18.7 02/14/2022     02/14/2022       CMP:   Lab Results   Component Value Date     02/15/2022    K 4.0 02/15/2022    K 4.8 02/14/2022     02/15/2022    CO2 19 02/15/2022    BUN 15 02/15/2022    CREATININE 0.8 02/15/2022    GFRAA >60 02/15/2022    AGRATIO 0.9 02/14/2022    LABGLOM >60 02/15/2022    GLUCOSE 188 02/15/2022    PROT 6.7 02/14/2022    CALCIUM 8.2 02/15/2022    BILITOT <0.2 02/14/2022    ALKPHOS 126 02/14/2022    AST 10 02/14/2022    ALT 17 02/14/2022       POC Tests:   Recent Labs     02/15/22  1040   POCGLU 226*       Coags: No results found for: PROTIME, INR, APTT    HCG (If Applicable): No results found for: PREGTESTUR, PREGSERUM, HCG, HCGQUANT     ABGs: No results found for: PHART, PO2ART, NDU6UOW, RSO7OKM, BEART, F1USWYFV     Type & Screen (If Applicable):  No results found for: LABABO, LABRH    Drug/Infectious Status (If Applicable):  No results found for: HIV, HEPCAB    COVID-19 Screening (If Applicable):   Lab Results   Component Value Date    COVID19 Not Detected 02/15/2022           Anesthesia Evaluation  Patient summary reviewed and Nursing notes reviewed  Airway: Mallampati: II        Dental:          Pulmonary:                              Cardiovascular:    (+) hypertension:,                   Neuro/Psych:               GI/Hepatic/Renal:             Endo/Other:    (+) Diabetes, . Abdominal:             Vascular:           Other Findings: Anesthesia Plan      MAC     ASA 3                                 Jeny MD Rosie   2/15/2022

## 2022-02-15 NOTE — PROGRESS NOTES
[facility] PROGRESS NOTE    2/15/2022 1:14 PM        Name: Carito Leone . Admitted: 2/14/2022  Primary Care Provider: No primary care provider on file. (Tel: None)            Subjective:    lyin in bed no chest pain sob fever or chills      Reviewed interval ancillary notes    Current Medications  morphine (PF) injection 2 mg, Q3H PRN  tamsulosin (FLOMAX) capsule 0.4 mg, Daily  insulin glargine (LANTUS;BASAGLAR) injection pen 20 Units, Nightly  insulin lispro (1 Unit Dial) 6 Units, TID WC  insulin lispro (1 Unit Dial) 0-12 Units, TID WC  insulin lispro (1 Unit Dial) 0-6 Units, Nightly  glucose (GLUTOSE) 40 % oral gel 15 g, PRN  dextrose 50 % IV solution, PRN  glucagon (rDNA) injection 1 mg, PRN  dextrose 5 % solution, PRN  metoprolol succinate (TOPROL XL) extended release tablet 100 mg, Daily  polyethylene glycol (GLYCOLAX) packet 17 g, Daily PRN  dextrose 50 % IV solution, PRN  potassium chloride 10 mEq/100 mL IVPB (Peripheral Line), PRN  magnesium sulfate 1000 mg in dextrose 5% 100 mL IVPB, PRN  sodium phosphate 10 mmol in dextrose 5 % 250 mL IVPB, PRN   Or  sodium phosphate 15 mmol in dextrose 5 % 250 mL IVPB, PRN   Or  sodium phosphate 20 mmol in dextrose 5 % 500 mL IVPB, PRN  dextrose 5 % and 0.45 % sodium chloride infusion, Continuous PRN  acetaminophen (TYLENOL) tablet 650 mg, Q4H PRN        Objective:  BP (!) 143/80   Pulse 80   Temp 97.6 °F (36.4 °C) (Temporal)   Resp 19   Wt 177 lb 4 oz (80.4 kg)   SpO2 100%     Intake/Output Summary (Last 24 hours) at 2/15/2022 1314  Last data filed at 2/15/2022 1202  Gross per 24 hour   Intake 3803.74 ml   Output 650 ml   Net 3153.74 ml      Wt Readings from Last 3 Encounters:   02/15/22 177 lb 4 oz (80.4 kg)       General appearance:  Appears comfortable.  AAOx3  HEENT: atraumatic, Pupils equal, muscous membranes dry, no masses appreciated  Cardiovascular: Regular rate and rhythm no murmurs appreciated  Respiratory: CTAB no wheezing  Gastrointestinal: Abdomen soft, non-tender, BS+  EXT: no edema  Neurology: no gross focal deficts  Psychiatry: Appropriate affect. Not agitated  Skin: Warm, dry, no rashes appreciated  Labs and Tests:  CBC:   Recent Labs     02/14/22  0950 02/14/22  1914 02/15/22  0627   WBC 18.5*  --   --    HGB 9.1* 7.9* 7.5*   *  --   --      BMP:    Recent Labs     02/14/22  1914 02/15/22  0243 02/15/22  0626   * 137 133*   K 4.1 4.3 4.0    106 104   CO2 16* 19* 19*   BUN 20 16 15   CREATININE 1.0 0.9 0.8   GLUCOSE 191* 178* 188*     Hepatic:   Recent Labs     02/14/22  0950   AST 10*   ALT 17   BILITOT <0.2   ALKPHOS 126     CT ABDOMEN PELVIS W IV CONTRAST Additional Contrast? None   Final Result   1. Large heterogeneous pelvic mass centered on the distal sigmoid colon and   rectum presumably malignant. Findings suggesting direct invasion of the   prostate and possibly the posterior bladder wall. Moderate stool proximal to   the mass with no evidence of obstruction. Probable adenopathy posterior to   the upper margin of the mass. No evidence of distal metastatic disease. Surgical consultation recommended. 2. Bilateral hydronephrosis secondary to mass effect on the distal ureters,   right greater than left. XR CHEST PORTABLE   Final Result   Clear chest without acute cardiopulmonary process. Recent imaging reviewed    Problem List  Active Problems:    DKA, type 1, not at goal [facility])    Diabetic ketoacidosis without coma associated with type 2 diabetes mellitus ([facility] [address]  Resolved Problems:    * No resolved hospital problems. *             Assessment/Plan:   Dka:   a1c pendign  - start lantus and lispro     Sigmoid and rectum mass  -s/p flex sig Large friable rectosigmoid malignant appearing mass, occupying 70-80% of lumen, from 20 cm to 5 cm. Extensive biopsies obtained. 6 mm sessile rectal polyp, cold snared.     GI bleeding secondary to above  - monitor h/h transfue to keep >7  - iron deficiecny anemia     Bilateral hydronephrosis  - urinary retention start on flomax and multani placed        DVT prophylaxis scds  Code status dnr cca         Shila Lynne MD   2/15/2022 1:14 PM

## 2022-02-15 NOTE — PROGRESS NOTES
Blood sugar 226. Per Breezy Hood RN ICU, insulin drip was stopped for procedure and will be restarted upon return to unit. Dr. Dennys Polanco updated. No new orders.

## 2022-02-15 NOTE — PROGRESS NOTES
Physician Progress Note      PATIENT:               Jonathan Richmond  CSN #:                  [id number]  :                       1960  ADMIT DATE:       2022 9:03 AM  [address] [place] DATE:  RESPONDING  PROVIDER #:        Waqas Nicole MD          QUERY TEXT:    Patient admitted with DKA and GI bleeding noted to have WBC 18.5 and HR 92. If   possible, please document in progress notes and discharge summary if you are   evaluating and/or treating any of the following: The medical record reflects the following:  Risk Factors: DKA  Clinical Indicators: Patient admitted with DKA and noted to have WBC 18.5 and   HR 92. Treatment: Insulin gtt,  monitor h/h  -  Options provided:  -- SIRS of non-infectious origin due to GI bleed with DKA  -- Other - I will add my own diagnosis  -- Disagree - Not applicable / Not valid  -- Disagree - Clinically unable to determine / Unknown  -- Refer to Clinical Documentation Reviewer    PROVIDER RESPONSE TEXT:    Provider disagreed with this query.     Query created by: Bobby Long on 2/15/2022 6:02 PM      Electronically signed by:  Waqas Nicole MD 2/15/2022 6:35 PM

## 2022-02-15 NOTE — CONSULTS
PALLIATIVE MEDICINE CONSULTATION     Patient name:Juan Bee   NJU:[id number]    :1960  Room/Bed:ICU-3907/3907-01   LOS: 1 day         Date of consult:2/15/2022    Consult Information  Palliative Medicine Consult performed by: MELODY Green CNP      Inpatient consult to Palliative Care  Consult performed by: MELODY Green CNP  Consult ordered by: Adry Izaguirre MD  Reason for consult: [address]        Number of admissions past 12 months: 0      ASSESSMENT/RECOMMENDATIONS     58 y.o. male with Back pain and weight loss with new pelvic mass      Symptom Management:  Back pain- related to pelvic mass pt reports morphine made him drowsy but didn't help with pain he reports that Tylenol helped at home. Will try Ultram Q4   Weight Loss- pt reports 50lb weight loss over past 3 months  Goals of Care- talked to sisters at bedside and attempted to talk to pt he reports that he is tired s/p colonoscopy and overwhelmed will attempt to discuss more tomorrow. Patient/Family Goals of Care :    talked to sisters at bedside and attempted to talk to pt he reports that he is tired s/p colonoscopy and overwhelmed will attempt to discuss more tomorrow. Disposition/Discharge Plan:   pending    Advance Directives:  Surrogate Decision Maker: Steph Anaya  Code status:  DNR-CCA    Case discussed with: patient, floor RN, Nini OLIVER  Thank you for allowing us to participate in the care of this patient. HISTORY     CC: Back pain  HPI: The patient is a 58 y.o. male with Hx of DM and has been noncompliant with medications, noted to be in DKA. CT a/p 2022 demonstrates a large pelvic mass centered in the sigmoid colon and rectum with invasion of the prostate and possibly the posterior bladder wall.      Palliative Medicine SymptomScreening/ROS:  Review of Systems -   History obtained from chart review and the patient  General ROS: positive for  - fatigue, malaise and weight loss  Gastrointestinal ROS: positive for - blood in stools, change in bowel habits, change in stools and constipation  Genito-Urinary ROS: positive for - dysuria and urinary retention     A complete 10 count ROS was obtained. Pertinent positives mentioned above in HPI/ROS. All others if not mentioned are negative.      Pain:    Home med list and hospital medications reviewed in chart as of 2/15/2022     EXAM     Vitals:    02/15/22 1230   BP: (!) 143/80   Pulse: 80   Resp: 19   Temp: 97.6 °F (36.4 °C)   SpO2: 100%       Physical Examination:   General appearance - alert, well appearing, and in no distress  Mental status - alert, oriented to person, place, and time  Neck - supple, no significant adenopathy  Chest - clear to auscultation, no wheezes, rales or rhonchi, symmetric air entry  Abdomen - soft, nontender, nondistended, no masses or organomegaly  Musculoskeletal - no joint tenderness, deformity or swelling  Skin - normal coloration and turgor, no rashes, no suspicious skin lesions noted        Current labs in the epic chart reviewed as of 2/15/2022   Review of previous notes, admits, labs, radiology and testing relevant to this consult done in this chart today 2/15/2022      Total time: 70 minutes  >50% of time spent counseling patient at bedside or POA/family member if applicable , reviewing information and discussing care, coordinating with care team  Signed By: Electronically signed by MELODY Osborne CNP on 2/15/2022 at 12:59 PM  Palliative Medicine     February 15, 2022

## 2022-02-15 NOTE — PROGRESS NOTES
Pt stable and able to be transferred from PACU to room 3907. A&O , VSS, with no complaints at this time. ICU called and notified that pt is being transferred out of PACU and to room. Cristin Pineda RN at bedside for transport.

## 2022-02-15 NOTE — PROGRESS NOTES
Reassessment complete, vitals obtained. Pt c/o back pain 8/10. Unable to get comfortable. Visibly restless and grimacing. Page to MD. Sarah Rios order for morphine.

## 2022-02-15 NOTE — PROGRESS NOTES
Pt arrived from endo to PACU bay 1. Reported received from endo staff. Pt arousable to voice. Pt on RA, NSR, and VSS. Will continue to monitor.

## 2022-02-16 ENCOUNTER — ANESTHESIA EVENT (OUTPATIENT)
Dept: OPERATING ROOM | Age: 62
DRG: 982 | End: 2022-02-16
Payer: COMMERCIAL

## 2022-02-16 DIAGNOSIS — C20 RECTAL CANCER (HCC): Primary | ICD-10-CM

## 2022-02-16 LAB
ANION GAP SERPL CALCULATED.3IONS-SCNC: 11 MMOL/L (ref 3–16)
ANISOCYTOSIS: ABNORMAL
BASOPHILS ABSOLUTE: 0 K/UL (ref 0–0.2)
BASOPHILS RELATIVE PERCENT: 0.3 %
BLOOD BANK DISPENSE STATUS: NORMAL
BLOOD BANK PRODUCT CODE: NORMAL
BPU ID: NORMAL
BUN BLDV-MCNC: 14 MG/DL (ref 7–20)
BURR CELLS: ABNORMAL
CALCIUM SERPL-MCNC: 7.9 MG/DL (ref 8.3–10.6)
CHLORIDE BLD-SCNC: 102 MMOL/L (ref 99–110)
CO2: 19 MMOL/L (ref 21–32)
CREAT SERPL-MCNC: 0.8 MG/DL (ref 0.8–1.3)
DESCRIPTION BLOOD BANK: NORMAL
EOSINOPHILS ABSOLUTE: 0.2 K/UL (ref 0–0.6)
EOSINOPHILS RELATIVE PERCENT: 2.1 %
GFR AFRICAN AMERICAN: >60
GFR NON-AFRICAN AMERICAN: >60
GLUCOSE BLD-MCNC: 144 MG/DL (ref 70–99)
GLUCOSE BLD-MCNC: 222 MG/DL (ref 70–99)
GLUCOSE BLD-MCNC: 239 MG/DL (ref 70–99)
GLUCOSE BLD-MCNC: 247 MG/DL (ref 70–99)
GLUCOSE BLD-MCNC: 261 MG/DL (ref 70–99)
HCT VFR BLD CALC: 22.8 % (ref 40.5–52.5)
HCT VFR BLD CALC: 25.8 % (ref 40.5–52.5)
HCT VFR BLD CALC: 26.3 % (ref 40.5–52.5)
HEMATOLOGY PATH CONSULT: NO
HEMOGLOBIN: 6.9 G/DL (ref 13.5–17.5)
HEMOGLOBIN: 8 G/DL (ref 13.5–17.5)
HEMOGLOBIN: 8.1 G/DL (ref 13.5–17.5)
HYPOCHROMIA: ABNORMAL
LYMPHOCYTES ABSOLUTE: 0.7 K/UL (ref 1–5.1)
LYMPHOCYTES RELATIVE PERCENT: 8.9 %
MCH RBC QN AUTO: 19.4 PG (ref 26–34)
MCHC RBC AUTO-ENTMCNC: 30.5 G/DL (ref 31–36)
MCV RBC AUTO: 63.7 FL (ref 80–100)
MICROCYTES: ABNORMAL
MONOCYTES ABSOLUTE: 0.8 K/UL (ref 0–1.3)
MONOCYTES RELATIVE PERCENT: 10 %
NEUTROPHILS ABSOLUTE: 6.5 K/UL (ref 1.7–7.7)
NEUTROPHILS RELATIVE PERCENT: 78.7 %
OVALOCYTES: ABNORMAL
PDW BLD-RTO: 18.3 % (ref 12.4–15.4)
PERFORMED ON: ABNORMAL
PLATELET # BLD: 332 K/UL (ref 135–450)
PLATELET SLIDE REVIEW: ADEQUATE
PMV BLD AUTO: 6.9 FL (ref 5–10.5)
POLYCHROMASIA: ABNORMAL
POTASSIUM REFLEX MAGNESIUM: 4.1 MMOL/L (ref 3.5–5.1)
RBC # BLD: 3.58 M/UL (ref 4.2–5.9)
SLIDE REVIEW: ABNORMAL
SODIUM BLD-SCNC: 132 MMOL/L (ref 136–145)
WBC # BLD: 8.3 K/UL (ref 4–11)

## 2022-02-16 PROCEDURE — 6370000000 HC RX 637 (ALT 250 FOR IP): Performed by: CLINICAL NURSE SPECIALIST

## 2022-02-16 PROCEDURE — 83036 HEMOGLOBIN GLYCOSYLATED A1C: CPT

## 2022-02-16 PROCEDURE — 36430 TRANSFUSION BLD/BLD COMPNT: CPT

## 2022-02-16 PROCEDURE — 6360000002 HC RX W HCPCS: Performed by: STUDENT IN AN ORGANIZED HEALTH CARE EDUCATION/TRAINING PROGRAM

## 2022-02-16 PROCEDURE — 85014 HEMATOCRIT: CPT

## 2022-02-16 PROCEDURE — APPNB30 APP NON BILLABLE TIME 0-30 MINS: Performed by: NURSE PRACTITIONER

## 2022-02-16 PROCEDURE — P9016 RBC LEUKOCYTES REDUCED: HCPCS

## 2022-02-16 PROCEDURE — 1200000000 HC SEMI PRIVATE

## 2022-02-16 PROCEDURE — 2580000003 HC RX 258: Performed by: STUDENT IN AN ORGANIZED HEALTH CARE EDUCATION/TRAINING PROGRAM

## 2022-02-16 PROCEDURE — 6370000000 HC RX 637 (ALT 250 FOR IP): Performed by: INTERNAL MEDICINE

## 2022-02-16 PROCEDURE — 80048 BASIC METABOLIC PNL TOTAL CA: CPT

## 2022-02-16 PROCEDURE — APPSS15 APP SPLIT SHARED TIME 0-15 MINUTES: Performed by: NURSE PRACTITIONER

## 2022-02-16 PROCEDURE — 2580000003 HC RX 258: Performed by: INTERNAL MEDICINE

## 2022-02-16 PROCEDURE — 85025 COMPLETE CBC W/AUTO DIFF WBC: CPT

## 2022-02-16 PROCEDURE — 99232 SBSQ HOSP IP/OBS MODERATE 35: CPT | Performed by: SURGERY

## 2022-02-16 PROCEDURE — 36415 COLL VENOUS BLD VENIPUNCTURE: CPT

## 2022-02-16 PROCEDURE — 85018 HEMOGLOBIN: CPT

## 2022-02-16 PROCEDURE — 6370000000 HC RX 637 (ALT 250 FOR IP): Performed by: NURSE PRACTITIONER

## 2022-02-16 PROCEDURE — 51798 US URINE CAPACITY MEASURE: CPT

## 2022-02-16 RX ORDER — SODIUM CHLORIDE 9 MG/ML
INJECTION, SOLUTION INTRAVENOUS PRN
Status: COMPLETED | OUTPATIENT
Start: 2022-02-16 | End: 2022-02-16

## 2022-02-16 RX ORDER — SKIN PROTECTANT 44 G/100G
OINTMENT TOPICAL 4 TIMES DAILY PRN
Status: DISCONTINUED | OUTPATIENT
Start: 2022-02-16 | End: 2022-02-19 | Stop reason: HOSPADM

## 2022-02-16 RX ADMIN — TRAMADOL HYDROCHLORIDE 100 MG: 50 TABLET, COATED ORAL at 18:59

## 2022-02-16 RX ADMIN — INSULIN LISPRO 1 UNITS: 100 INJECTION, SOLUTION INTRAVENOUS; SUBCUTANEOUS at 21:23

## 2022-02-16 RX ADMIN — IRON SUCROSE 200 MG: 20 INJECTION, SOLUTION INTRAVENOUS at 09:58

## 2022-02-16 RX ADMIN — SKIN PROTECTANT: 44 OINTMENT TOPICAL at 08:39

## 2022-02-16 RX ADMIN — TAMSULOSIN HYDROCHLORIDE 0.4 MG: 0.4 CAPSULE ORAL at 08:31

## 2022-02-16 RX ADMIN — METOPROLOL SUCCINATE 100 MG: 50 TABLET, FILM COATED, EXTENDED RELEASE ORAL at 08:31

## 2022-02-16 RX ADMIN — INSULIN LISPRO 6 UNITS: 100 INJECTION, SOLUTION INTRAVENOUS; SUBCUTANEOUS at 12:08

## 2022-02-16 RX ADMIN — SKIN PROTECTANT: 44 OINTMENT TOPICAL at 21:12

## 2022-02-16 RX ADMIN — INSULIN LISPRO 4 UNITS: 100 INJECTION, SOLUTION INTRAVENOUS; SUBCUTANEOUS at 08:52

## 2022-02-16 RX ADMIN — TRAMADOL HYDROCHLORIDE 100 MG: 50 TABLET, COATED ORAL at 07:30

## 2022-02-16 RX ADMIN — TRAMADOL HYDROCHLORIDE 100 MG: 50 TABLET, COATED ORAL at 11:31

## 2022-02-16 RX ADMIN — SODIUM CHLORIDE: 9 INJECTION, SOLUTION INTRAVENOUS at 09:57

## 2022-02-16 RX ADMIN — TRAMADOL HYDROCHLORIDE 100 MG: 50 TABLET, COATED ORAL at 14:57

## 2022-02-16 RX ADMIN — INSULIN LISPRO 4 UNITS: 100 INJECTION, SOLUTION INTRAVENOUS; SUBCUTANEOUS at 17:03

## 2022-02-16 RX ADMIN — INSULIN LISPRO 6 UNITS: 100 INJECTION, SOLUTION INTRAVENOUS; SUBCUTANEOUS at 17:03

## 2022-02-16 RX ADMIN — INSULIN LISPRO 6 UNITS: 100 INJECTION, SOLUTION INTRAVENOUS; SUBCUTANEOUS at 08:52

## 2022-02-16 ASSESSMENT — PAIN SCALES - GENERAL
PAINLEVEL_OUTOF10: 0
PAINLEVEL_OUTOF10: 4
PAINLEVEL_OUTOF10: 4
PAINLEVEL_OUTOF10: 0
PAINLEVEL_OUTOF10: 7
PAINLEVEL_OUTOF10: 0
PAINLEVEL_OUTOF10: 5
PAINLEVEL_OUTOF10: 4
PAINLEVEL_OUTOF10: 5

## 2022-02-16 ASSESSMENT — PAIN DESCRIPTION - LOCATION
LOCATION: ABDOMEN
LOCATION: ABDOMEN

## 2022-02-16 ASSESSMENT — PAIN DESCRIPTION - ORIENTATION
ORIENTATION: LOWER
ORIENTATION: LOWER

## 2022-02-16 ASSESSMENT — PAIN DESCRIPTION - DESCRIPTORS: DESCRIPTORS: DULL;ACHING

## 2022-02-16 NOTE — PLAN OF CARE
Problem: Skin Integrity:  Goal: Absence of new skin breakdown  Description: Absence of new skin breakdown  Outcome: Ongoing  Note: Shakira Calderón showed no new signs of breakdown during this shift. Problem: Pain:  Goal: Control of acute pain  Description: Control of acute pain  Outcome: Ongoing  Note: Shakira Calderón was given PRN tramadol to help control his pain during this shift.

## 2022-02-16 NOTE — ANESTHESIA PRE PROCEDURE
Department of Anesthesiology  Preprocedure Note       Name:  Zen Gonzales   Age:  58 y.o.  :  1960                                          MRN:  [id number]         Date:  2022      Surgeon: Giovanna Major):  Memo Antoine MD    Procedure: Procedure(s):  PORT A CATH INSERTION    Medications prior to admission:   Prior to Admission medications    Medication Sig Start Date End Date Taking? Authorizing Provider   verapamil (VERELAN) 360 MG extended release capsule Take 360 mg by mouth nightly    Historical Provider, MD   metoprolol succinate (TOPROL XL) 100 MG extended release tablet Take 100 mg by mouth daily    Historical Provider, MD       Current medications:    No current facility-administered medications for this visit. No current outpatient medications on file.      Facility-Administered Medications Ordered in Other Visits   Medication Dose Route Frequency Provider Last Rate Last Admin    dermaphor ointment   Topical 4x Daily PRN Byron Redmond MD   Given at 22 0839    0.9 % sodium chloride infusion   IntraVENous PRN Juani Villa  mL/hr at 22 0957 New Bag at 22 0957    insulin glargine (LANTUS;BASAGLAR) injection pen 25 Units  25 Units SubCUTAneous Daily Byron Redmond MD   25 Units at 22 0852    morphine (PF) injection 2 mg  2 mg IntraVENous Q3H PRN Romina Shabazz MD   2 mg at 02/15/22 0535    tamsulosin (FLOMAX) capsule 0.4 mg  0.4 mg Oral Daily Magdy Counter, APRN - CNP   0.4 mg at 22 0831    insulin lispro (1 Unit Dial) 6 Units  6 Units SubCUTAneous TID ANTOINE Redmond MD   6 Units at 22 0852    insulin lispro (1 Unit Dial) 0-12 Units  0-12 Units SubCUTAneous TID ANTOINE Redmond MD   4 Units at 22 0852    insulin lispro (1 Unit Dial) 0-6 Units  0-6 Units SubCUTAneous Nightly Byron Redmond MD   3 Units at 02/15/22 2057    glucose (GLUTOSE) 40 % oral gel 15 g  15 g Oral PRN Byron Redmond MD       Yessi Current glucagon (rDNA) injection 1 mg  1 mg IntraMUSCular PRN Nena Zhao MD        dextrose 5 % solution  100 mL/hr IntraVENous PRN Nena Zhao MD        traMADol Lasharicardo Tang) tablet 100 mg  100 mg Oral Q4H PRN MELODY Ge - CNP   100 mg at 02/16/22 0730    iron sucrose (VENOFER) 200 mg in sodium chloride 0.9 % 100 mL IVPB  200 mg IntraVENous Q24H Blayne Eli MD        eucerin cream   Topical PRN Mark Nieves MD        metoprolol succinate (TOPROL XL) extended release tablet 100 mg  100 mg Oral Daily Nena Zhao MD   100 mg at 02/16/22 0831    polyethylene glycol (GLYCOLAX) packet 17 g  17 g Oral Daily PRN Nena Zhao MD        dextrose 50 % IV solution  12.5 g IntraVENous PRN Jonnie Sabillon MD        magnesium sulfate 1000 mg in dextrose 5% 100 mL IVPB  1,000 mg IntraVENous PRN Jonnie Sabillon MD        acetaminophen (TYLENOL) tablet 650 mg  650 mg Oral Q4H PRN Gerald Alex MD   650 mg at 02/14/22 2212       Allergies:  No Known Allergies    Problem List:    Patient Active Problem List   Diagnosis Code    DKA, type 1, not at goal [facility]) E10.10    Diabetic ketoacidosis without coma associated with type 2 diabetes mellitus ([facility] [address] E11.10       Past Medical History:        Diagnosis Date    Diabetes mellitus ([facility] [address]     Hypertension        Past Surgical History:        Procedure Laterality Date    ABDOMEN SURGERY  1975    Exploratory    SIGMOIDOSCOPY N/A 2/15/2022    SIGMOIDOSCOPY BIOPSY FLEXIBLE performed by Deonna No MD at [facility]  2/15/2022    SIGMOIDOSCOPY POLYP SNARE performed by Deonna No MD at [address] [employer]  [employer] History:    Social History     Tobacco Use    Smoking status: Never Smoker    Smokeless tobacco: Never Used   Substance Use Topics    Alcohol use: Not Currently     Comment: not in last 18 months                                Counseling given: Not Answered      Vital Signs (Current):    There were no vitals filed for this visit. BP Readings from Last 3 Encounters:   02/16/22 128/81   02/15/22 (!) 100/57       NPO Status:                                                                                 BMI:   Wt Readings from Last 3 Encounters:   02/16/22 184 lb 8.4 oz (83.7 kg)     There is no height or weight on file to calculate BMI.    CBC:   Lab Results   Component Value Date    WBC 8.3 02/16/2022    RBC 3.58 02/16/2022    HGB 6.9 02/16/2022    HCT 22.8 02/16/2022    MCV 63.7 02/16/2022    RDW 18.3 02/16/2022     02/16/2022       CMP:   Lab Results   Component Value Date     02/16/2022    K 4.1 02/16/2022     02/16/2022    CO2 19 02/16/2022    BUN 14 02/16/2022    CREATININE 0.8 02/16/2022    GFRAA >60 02/16/2022    AGRATIO 0.9 02/14/2022    LABGLOM >60 02/16/2022    GLUCOSE 247 02/16/2022    PROT 6.7 02/14/2022    CALCIUM 7.9 02/16/2022    BILITOT <0.2 02/14/2022    ALKPHOS 126 02/14/2022    AST 10 02/14/2022    ALT 17 02/14/2022       POC Tests:   Recent Labs     02/16/22  0825   POCGLU 239*       Coags:   Lab Results   Component Value Date    PROTIME 11.8 02/15/2022    INR 1.04 02/15/2022       HCG (If Applicable): No results found for: PREGTESTUR, PREGSERUM, HCG, HCGQUANT     ABGs: No results found for: PHART, PO2ART, IGQ1OIX, QRH5PID, BEART, H6KBOVMJ     Type & Screen (If Applicable):  No results found for: LABABO, LABRH    Drug/Infectious Status (If Applicable):  No results found for: HIV, HEPCAB    COVID-19 Screening (If Applicable):   Lab Results   Component Value Date    COVID19 Not Detected 02/15/2022           Anesthesia Evaluation  Patient summary reviewed and Nursing notes reviewed  Airway: Mallampati: II        Dental:          Pulmonary:                              Cardiovascular:    (+) hypertension:,                   Neuro/Psych:               GI/Hepatic/Renal:             Endo/Other:    (+) Diabetes, .                  Abdominal: Vascular:           Other Findings:               Anesthesia Plan      MAC     ASA 3                                 MELODY Rouse - CRNA   2/16/2022

## 2022-02-16 NOTE — PROGRESS NOTES
[facility] and Laparoscopic [facility]        Progress Note    Patient Name: Fatoumata Mariee  MRN: [id number]  [place]: 1960  Date of Evaluation: 2022    Chief Complaint: Weakness, lightheadedness    Subjective:  No acute events overnight  No reports of significant pain at rest/lying; worse when sitting upright--primary in low back and rectum  No nausea or vomiting  Reports clear/bloody mucus BM  Resting in bed at this time      Vital Signs:  Patient Vitals for the past 24 hrs:   BP Temp Temp src Pulse Resp SpO2 Height Weight   22 1228 (!) 140/71 97.6 °F (36.4 °C) Temporal 88 16 96 % -- --   22 1125 -- -- -- -- -- -- 5' 11\" (1.803 m) --   22 1048 126/63 97.4 °F (36.3 °C) Temporal 88 16 99 % -- --   22 0841 128/81 97 °F (36.1 °C) Temporal 96 18 97 % -- --   22 0820 (!) 140/81 97 °F (36.1 °C) Temporal 89 18 95 % -- --   22 0441 (!) 114/55 97.1 °F (36.2 °C) Temporal 94 18 96 % 5' 11\" (1.803 m) 184 lb 8.4 oz (83.7 kg)   02/15/22 2357 105/64 97.8 °F (36.6 °C) Temporal 100 18 96 % -- --   02/15/22 2010 114/62 97.6 °F (36.4 °C) Temporal 90 18 96 % -- --      TEMPERATURE HISTORY 24H: Temp (24hrs), Av.4 °F (36.3 °C), Min:97 °F (36.1 °C), Max:97.8 °F (36.6 °C)    BLOOD PRESSURE HISTORY: Systolic (37EIT), SCX:148 , Min:97 , RFI:230    Diastolic (60GQU), WOR:02, Min:53, Max:91      Intake/Output:  I/O last 3 completed shifts: In: 3923.7 [P.O.:120; I.V.:2590.1;  IV Piggyback:1213.7]  Out: 650 [Urine:650]  I/O this shift:  In: 610 [P.O.:240; Blood:370]  Out: 825 [Urine:825]  Drain/tube Output:       Physical Exam:  General: awake, alert, oriented to  person, place, time  Cardiovascular:  regular rate and rhythm and no murmur noted  Lungs: clear to auscultation  Abdomen: soft, minimal left lower quadrant tenderness, mild lower abdominal distention, bowel sounds normal     Labs:  CBC:    Recent Labs     22  0950 22  1914 02/15/22  1850 22  0438 02/16/22  1154   WBC 18.5*  --   --  8.3  --    HGB 9.1*   < > 7.6* 6.9* 8.1*   HCT 31.4*   < > 25.8* 22.8* 26.3*   *  --   --  332  --     < > = values in this interval not displayed. BMP:    Recent Labs     02/15/22  0626 02/15/22  1349 02/16/22  0438   * 130* 132*   K 4.0 4.2 4.1    100 102   CO2 19* 18* 19*   BUN 15 12 14   CREATININE 0.8 0.8 0.8   GLUCOSE 188* 258* 247*     Hepatic:    Recent Labs     02/14/22  0950   AST 10*   ALT 17   BILITOT <0.2   ALKPHOS 126     Amylase:  No results found for: AMYLASE  Lipase:    Lab Results   Component Value Date    LIPASE 60.0 02/14/2022      Mag:    Lab Results   Component Value Date    MG 1.70 02/15/2022    MG 1.70 02/15/2022     Phos:     Lab Results   Component Value Date    PHOS 2.4 02/15/2022    PHOS 1.7 02/15/2022      Coags:   Lab Results   Component Value Date    PROTIME 11.8 02/15/2022    INR 1.04 02/15/2022       Cultures:  Anaerobic culture  No results found for: LABANAE  Fungus stain  No results found for requested labs within last 30 days. Gram stain  No results found for requested labs within last 30 days. Organism  No results found for: [facility]  Surgical culture  No results found for: CXSURG  Blood culture 1  Results in Past 30 Days  Result Component Current Result Ref Range Previous Result Ref Range   Blood Culture, Routine No Growth to date. Any change in status will be called. (2/14/2022)  Not in Time Range      Blood culture 2  Results in Past 30 Days  Result Component Current Result Ref Range Previous Result Ref Range   Culture, Blood 2 No Growth to date. Any change in status will be called.  (2/14/2022)  Not in Time Range      Fecal occult  Results in Past 30 Days  Result Component Current Result Ref Range Previous Result Ref Range   Occult Blood Diagnostic Result: POSITIVE  Normal range: Negative   (A) (2/14/2022)  Not in Time Range      GI bacterial pathogens by PCR  No results found for requested labs within last 30 days.     C. difficile  No results found for requested labs within last 30 days. Urine culture  No results found for: LABURIN    Pathology:  Flexible sigmoidoscopy on 2/15/2022--FINAL DIAGNOSIS:        A. Colon, recto sigmoid mass, biopsy:      - Adenocarcinoma, at least intramucosal.        B. Colon, rectum, polyp, biopsy:      -Tubulovillous adenoma. Imaging:  I have personally reviewed the following films:    CT CHEST W CONTRAST    Result Date: 2/15/2022  EXAMINATION: CT OF THE CHEST WITH CONTRAST 2/15/2022 5:14 pm TECHNIQUE: CT of the chest was performed with the administration of intravenous contrast. Multiplanar reformatted images are provided for review. Dose modulation, iterative reconstruction, and/or weight based adjustment of the mA/kV was utilized to reduce the radiation dose to as low as reasonably achievable. COMPARISON: None. HISTORY: ORDERING SYSTEM PROVIDED HISTORY: Colon cancer staging TECHNOLOGIST PROVIDED HISTORY: Reason for exam:->Colon cancer staging Reason for Exam: Colon cancer staging FINDINGS: Mediastinum: Mild cardiomegaly. Small pericardial effusion. Pulmonary trunk measuring approximately 3.6 cm in diameter. Thoracic aorta is normal in caliber. No significant enlarged mediastinal lymph nodes. Lungs/pleura: No pulmonary nodules. No acute consolidation. No effusion or pneumothorax. Upper Abdomen: Subtle areas of hypoattenuation noted in the left hepatic lobe measuring 1.5 cm in diameter and in the right hepatic lobe posteriorly with capsular retraction measuring approximately 2.5 cm. The spleen measures approximately 15.0 cm in AP dimension. Soft Tissues/Bones: No acute soft tissue abnormality identified. Small sclerotic focus in the T4 vertebral body most likely represents a bone island. Subtle rounded sclerotic foci noted within the posterior aspect of the vertebral bodies throughout the thoracic spine, presumably benign.      No convincing evidence of metastatic disease to the chest. Small pericardial effusion. Dilated pulmonary trunk suggesting pulmonary hypertension. Subtle areas of hypoattenuation within the right and left hepatic lobes as described above. Liver protocol MRI with and without contrast recommended to exclude metastatic lesions. Splenomegaly. Scheduled Meds:   insulin glargine  25 Units SubCUTAneous Daily    tamsulosin  0.4 mg Oral Daily    insulin lispro  6 Units SubCUTAneous TID WC    insulin lispro  0-12 Units SubCUTAneous TID WC    insulin lispro  0-6 Units SubCUTAneous Nightly    iron sucrose (VENOFER) iv piggyback 100 mL (Admin over 60 minutes)  200 mg IntraVENous Q24H    metoprolol succinate  100 mg Oral Daily     Continuous Infusions:   dextrose       PRN Meds:.dermaphor, morphine, glucose, glucagon (rDNA), dextrose, traMADol, eucerin, polyethylene glycol, dextrose, magnesium sulfate, acetaminophen      Assessment:  58 y.o. male admitted with   1. Diabetic ketoacidosis without coma associated with type 2 diabetes mellitus ([facility] [address]    2. Anemia, unspecified type    3. Abnormal CT of the abdomen        Rectal adenocarcinoma  DKA  Bilateral hydronephrosis  Anemia      Plan:  1. Hemoglobin decrease today--transfusion per primary team, patient otherwise stable, biopsies reveal adenocarcinoma--Dr. Sam Atkins discussed treatment plan with Dr. Leandro Posada, plans to hold on diverting colostomy at this time, rectal cancer protocol MRI next week as outpatient; port placement tomorrow per Dr. Sam Atkins  2. Diet as tolerated today, NPO at midnight; monitor bowel function--bloody mucus output  3. IV hydration; monitor and correct electrolytes  4. Activity as tolerated  5. PRN analgesics and antiemetics--minimizing narcotics as tolerated  6. DVT prophylaxis with SCD's  7. Management of medical comorbid etiologies per primary team and consulting services; oncology following  8.  Disposition: Discharge planning    EDUCATION:  Educated patient on plan of care and disease process--all questions answered. Plans discussed with patient and nursing. Reviewed and discussed with Dr. Stacie Lofton. Signed:  Brian Mejia, MELODY - CNP  2/16/2022 3:32 PM     71-year-old male seen in follow-up for locally advanced rectal cancer  Feeling better today  Required blood transfusion for anemia  No plans for diverting colostomy at this time  Port-A-Cath scheduled for tomorrow  Planning for enrollment in the [facility] rectal cancer program with . The patient has an MRI scheduled at [facility] [place] next Tuesday.

## 2022-02-16 NOTE — FLOWSHEET NOTE
02/16/22 0539   Provider Notification   Reason for Communication Critical Value (comment)  (Hgb 6.9)   Provider Name Dr Bonnie Vanessa    Provider Notification Physician   Method of Communication Secure Message   Response Waiting for response   Notification Time 3968

## 2022-02-16 NOTE — PROGRESS NOTES
Urology Progress Note  [facility]    Provider: MELODY Camarillo CNP  Patient ID:  Admission Date: 2022 Name: Aniya Maurice Date: 2022 MRN: [id number]   Patient Location: [facility]5090/2148-14 : 1960  Attending: Tonny Phalen, MD Date of Service: 2022  PCP: No primary care provider on file. Diagnoses:  Hydronephrosis    Assessment/Plan:  57 yo male seen down in endoscopy this morning     He presents with abdominal pain x1 month, rectal bleeding and weight loss  Hx of DM and has been noncompliant with medications, noted to be in DKA  CT a/p 2022 demonstrates a large pelvic mass centered in the sigmoid colon and rectum with invasion of the prostate and possibly the posterior bladder wall. Bilateral hydronephrosis 2/2 to mass effect on the distal ureters R>L. UA is unremarkable   Cr is improved to 0.8 today  AFVSS      Denies flank pain. No CVAT. No SP pain. Reports good stream this morning on flomax. PVR was 347cc's yesterday and a multani was not placed.     Recommendations:  Hydronephrosis likely related to extrinsic ureteral compression  No multani was placed, patient would very much like to avoid multani  Continue Flomax  Obtain PVR's every shift, a multani should be placed if >400cc's  S/p flex sigmoidoscopy with bx and pathology is pending  No urgent  intervention although we will follow along and monitor his Cr and for development of flank pain. He may nee ureteral stenting. The patient had a chance to ask questions which were answered. he understands the above plan. Subjective:   Amisha Moyer is a 58 y.o. male. He was seen and examined this morning. Today we discussed Avoiding multani. We did discuss his bladder being full on CT. He tells me he has always been told he has an abnormally large bladder.       Objective:   Vitals:  Vitals:    22 0841   BP: 128/81   Pulse: 96   Resp: 18   Temp: 97 °F (36.1 °C)   SpO2: 97%       Intake/Output Summary (Last 24 hours) at 2/16/2022 0911  Last data filed at 2/15/2022 2010  Gross per 24 hour   Intake 320 ml   Output --   Net 320 ml     Physical Exam:  Gen: Alert and oriented x3, no acute distress  CV: Regular rate   Resp: unlabored respirations  Abd: Soft, non-distended, non-tender, no masses  Ext: no peripheral edema noted, moves upper and lower extremities spontaneously  Skin: warmand well perfused, no rashes noted on the face, or arms.      Labs:  Lab Results   Component Value Date    WBC 8.3 02/16/2022    HGB 6.9 (LL) 02/16/2022    HCT 22.8 (L) 02/16/2022    MCV 63.7 (L) 02/16/2022     02/16/2022     Lab Results   Component Value Date    CREATININE 0.8 02/16/2022    BUN 14 02/16/2022     (L) 02/16/2022    K 4.1 02/16/2022     02/16/2022    CO2 19 (L) 02/16/2022       MELODY Aden - CNP   2/16/2022

## 2022-02-16 NOTE — PROCEDURES
Progress/coordination of care note:    Asked by Dr Jazmín Maravilla to be involved due to mid/low rectal cancer. Reviewed chart, endoscopy, path, CT scans, discussed case with Dr Jazmín Maravilla and Dr Dulce Maria Banegas. Agree with port placement to prepare for BAR. Assuming discharge later this week, will bring in for office appt early next week and arrange for rectal CA protocol MRI at [facility]. Plan for presentation at tumor board next Friday, then starting FOLFOX the following Monday.

## 2022-02-16 NOTE — PROGRESS NOTES
PALLIATIVE MEDICINE PROGRESS NOTE     Patient name:Juan Brown    AMK:[id number] :1960  Room/Bed:ICU-3907/3907-01    LOS: 2 days        ASSESSMENT/RECOMMENDATIONS     58 y.o. male with Back pain and weight loss with new pelvic mass        Symptom Management:  Back pain- related to pelvic mass pt reports morphine made him drowsy but didn't help with pain he reports that Tylenol helped at home. Will try Ultram Q4   Weight Loss- pt reports 50lb weight loss over past 3 months  Goals of Care- Pt is adamant that he wants to try Chemo/XRT but wants to be a [facility] not [facility]. He wants no life sustaining Tx at any time not just time of arrest. He wants to complete advance directives if possible this admission naming his sister as HCPOA. He is agreeable to palliative care to follow at home.       Patient/Family Goals of Care :    2/15  talked to sisters at bedside and attempted to talk to pt he reports that he is tired s/p colonoscopy and overwhelmed will attempt to discuss more tomorrow.   Pt is adamant that he wants to try Chemo/XRT but wants to be a [facility] not [facility]. He wants no life sustaining Tx at any time not just time of arrest. He wants to complete advance directives if possible this admission naming his sister as HCPOA. He is agreeable to palliative care to follow at home.      Disposition/Discharge Plan:   pending     Advance Directives:  Surrogate Decision Maker: Angella Harrison  Code status:  DNR-CCA     Case discussed with: patient, floor RN, Keny OLIVER  Thank you for allowing us to participate in the care of this patient. SUBJECTIVE     Chief Complaint: Back pain    Last 24 hours:   Pt reports pain significantly improved with Ultram. No new issues.      ROS:  Review of Systems -     History obtained from chart review and the patient  General ROS: positive for  - fatigue, malaise and weight loss  Gastrointestinal ROS: positive for - blood in stools, change in bowel habits, change in stools and constipation  Genito-Urinary ROS: positive for - dysuria and urinary retention      A complete 10 count ROS was obtained. Pertinent positives mentioned above in HPI/ROS. All others if not mentioned are negative. OBJECTIVE   /63   Pulse 88   Temp 97.4 °F (36.3 °C) (Temporal)   Resp 16   Ht 5' 11\" (1.803 m)   Wt 184 lb 8.4 oz (83.7 kg)   SpO2 99%   BMI 25.74 kg/m²   I/O last 3 completed shifts: In: 3923.7 [P.O.:120; I.V.:2590.1;  IV Piggyback:1213.7]  Out: 650 [Urine:650]  I/O this shift:  In: 610 [P.O.:240; Blood:370]  Out: 825 [Urine:825]      Physical Examination:     General appearance - alert, well appearing, and in no distress  Mental status - alert, oriented to person, place, and time  Neck - supple, no significant adenopathy  Chest - clear to auscultation, no wheezes, rales or rhonchi, symmetric air entry  Abdomen - soft, nontender, nondistended, no masses or organomegaly  Musculoskeletal - no joint tenderness, deformity or swelling  Skin - normal coloration and turgor, no rashes, no suspicious skin lesions noted       Total time: 60 minutes  >50% of time spent counseling patient at bedside or POA/family member if applicable , reviewing information and discussing care, coordinating with care team       Signed By: Electronically signed by MELODY Rodriguez CNP on 2/16/2022 at 11:49 AM   Palliative Medicine       February 16, 2022

## 2022-02-16 NOTE — PROGRESS NOTES
Assessment completed and documented. VSS. Pt on RA satting high 90s. meds given per [facility]. Pt receiving 1 unit of PRBCs. PRN dermaphor applied to pts hands and feet. Denies pain or needs. Call light within reach, will  Continue to monitor.

## 2022-02-16 NOTE — PROGRESS NOTES
[facility] PROGRESS NOTE    2/16/2022 10:45 AM        Name: Mortimer Mourning . Admitted: 2/14/2022  Primary Care Provider: No primary care provider on file. (Tel: None)            Subjective:    Lying in bed no nausea vomiting or chest pain tolerating p.o. intake    Reviewed interval ancillary notes    Current Medications  dermaphor ointment, 4x Daily PRN  insulin glargine (LANTUS;BASAGLAR) injection pen 25 Units, Daily  morphine (PF) injection 2 mg, Q3H PRN  tamsulosin (FLOMAX) capsule 0.4 mg, Daily  insulin lispro (1 Unit Dial) 6 Units, TID WC  insulin lispro (1 Unit Dial) 0-12 Units, TID WC  insulin lispro (1 Unit Dial) 0-6 Units, Nightly  glucose (GLUTOSE) 40 % oral gel 15 g, PRN  glucagon (rDNA) injection 1 mg, PRN  dextrose 5 % solution, PRN  traMADol (ULTRAM) tablet 100 mg, Q4H PRN  iron sucrose (VENOFER) 200 mg in sodium chloride 0.9 % 100 mL IVPB, Q24H  eucerin cream, PRN  metoprolol succinate (TOPROL XL) extended release tablet 100 mg, Daily  polyethylene glycol (GLYCOLAX) packet 17 g, Daily PRN  dextrose 50 % IV solution, PRN  magnesium sulfate 1000 mg in dextrose 5% 100 mL IVPB, PRN  acetaminophen (TYLENOL) tablet 650 mg, Q4H PRN        Objective:  /81   Pulse 96   Temp 97 °F (36.1 °C) (Temporal)   Resp 18   Ht 5' 11\" (1.803 m)   Wt 184 lb 8.4 oz (83.7 kg)   SpO2 97%   BMI 25.74 kg/m²     Intake/Output Summary (Last 24 hours) at 2/16/2022 1045  Last data filed at 2/16/2022 1001  Gross per 24 hour   Intake 560 ml   Output --   Net 560 ml      Wt Readings from Last 3 Encounters:   02/16/22 184 lb 8.4 oz (83.7 kg)       General appearance:  Appears comfortable.  AAOx3  HEENT: atraumatic, Pupils equal, muscous membranes dry, no masses appreciated  Cardiovascular: tachycardia no murmurs appreciated  Respiratory: CTAB no wheezing  Gastrointestinal: Abdomen soft, non-tender, BS+  EXT: no edema  Neurology: no gross focal deficts  Psychiatry: Appropriate affect. Not agitated  Skin: Warm, dry, no rashes appreciated  Labs and Tests:  CBC:   Recent Labs     02/14/22  0950 02/14/22  1914 02/15/22  1349 02/15/22  1850 02/16/22  0438   WBC 18.5*  --   --   --  8.3   HGB 9.1*   < > 7.7* 7.6* 6.9*   *  --   --   --  332    < > = values in this interval not displayed. BMP:    Recent Labs     02/15/22  0626 02/15/22  1349 02/16/22  0438   * 130* 132*   K 4.0 4.2 4.1    100 102   CO2 19* 18* 19*   BUN 15 12 14   CREATININE 0.8 0.8 0.8   GLUCOSE 188* 258* 247*     Hepatic:   Recent Labs     02/14/22  0950   AST 10*   ALT 17   BILITOT <0.2   ALKPHOS 126     CT CHEST W CONTRAST   Final Result   No convincing evidence of metastatic disease to the chest.      Small pericardial effusion. Dilated pulmonary trunk suggesting pulmonary hypertension. Subtle areas of hypoattenuation within the right and left hepatic lobes as   described above. Liver protocol MRI with and without contrast recommended to   exclude metastatic lesions. Splenomegaly. CT ABDOMEN PELVIS W IV CONTRAST Additional Contrast? None   Final Result   1. Large heterogeneous pelvic mass centered on the distal sigmoid colon and   rectum presumably malignant. Findings suggesting direct invasion of the   prostate and possibly the posterior bladder wall. Moderate stool proximal to   the mass with no evidence of obstruction. Probable adenopathy posterior to   the upper margin of the mass. No evidence of distal metastatic disease. Surgical consultation recommended. 2. Bilateral hydronephrosis secondary to mass effect on the distal ureters,   right greater than left. XR CHEST PORTABLE   Final Result   Clear chest without acute cardiopulmonary process.              Recent imaging reviewed    Problem List  Active Problems:    DKA, type 1, not at goal [facility])    Diabetic ketoacidosis without coma associated with type 2 diabetes mellitus ([facility] [address]  Resolved Problems:    * No resolved hospital problems. *             Assessment/Plan:   Dka:   a1c pendign  - hypererglytcmei increase lantus     Sigmoid and rectum mass  -s/p flex sig Large friable rectosigmoid malignant appearing mass, occupying 70-80% of lumen, from 20 cm to 5 cm. Extensive biopsies obtained. 6 mm sessile rectal polyp, cold snared.   - oncology surgery on board    Acute blood loss anemia secondary to GI bleeding secondary to above  - will transfue 1 unit prbcs and monitor q6hrs   - monitor h/h transfue to keep >7  - iron deficiecny anemia getting iv iron    Bilateral hydronephrosis  - no multani placed per patient wishes will monitor with bladder scans   - gu on board     DVT prophylaxis scds  Code status dnr cca     I spent  32 minutes providing critical care services to this patient thus far    Susy Alvarez MD   2/16/2022 10:45 AM

## 2022-02-16 NOTE — PROGRESS NOTES
Nutrition Note    RECOMMENDATIONS  1. PO Diet: Continue current diet   2. ONS: Offer Ensure Enlive BID    NUTRITION ASSESSMENT   Pt admitted with abdominal pain over the past month; found to have locally advanced rectal cancer. Pt reports 30 lb weight loss over the past year despite normal appetite and PO intake, although he is unsure what his usual body weight is and typically refuses to be weighed at MD office. No weight hx available for review. Pt states clothes fit much more loosely. Note some mild muscle wasting and fat loss upon NFPE. Pt consuming 51-75% of meals. Agreeable to trial Ensure Enlive BID.  Nutrition Related Findings: Na+ 132. LBM 2/14. Non-pitting RLE edema. +1 piting LLE edema.  Wounds: None   Nutrition Education:  Education not indicated    Nutrition Goals: Pt will consume at least 50% of meals and supplements     MALNUTRITION ASSESSMENT   Context of Malnutrition: Chronic Illness   Malnutrition Status: Mild malnutrition  Findings of the 6 clinical characteristics of malnutrition:  Energy Intake:  No significant decrease in energy intake  Weight Loss:  Unable to assess     Body Fat Loss:  1 - Mild body fat loss Orbital   Muscle Mass Loss:  1 - Mild muscle mass loss Temples (temporalis),Clavicles (pectoralis & deltoids),Scapula (trapezius)  Fluid Accumulation:  No significant fluid accumulation     Strength:  Not Performed    NUTRITION DIAGNOSIS   Mild malnutrition related to catabolic illness as evidenced by mild loss of subcutaneous fat,mild muscle loss    CURRENT NUTRITION THERAPIES  ADULT DIET; Regular; 4 carb choices (60 gm/meal)     PO Intake: 51-75%   PO Supplement Intake:None Ordered    ADULT DIET;  Regular; 4 carb choices (60 gm/meal)    ANTHROPOMETRICS   Current Height: 5' 11\" (180.3 cm)   Current Weight: 184 lb 8.4 oz (83.7 kg)     Ideal Body Weight (IBW): 172 lbs  (78 kg)        BMI: 25.8    COMPARATIVE STANDARDS  Energy (kcal):  0916-4029     Protein (g):   grams       Fluid (ml/day):  3353-8917 mL    The patient will be monitored per nutrition standards of care. Consult dietitian if additional nutrition interventions are needed prior to RD reassessment.      Miki Hussein, [address] [place] [address], LD    Contact: [phone]

## 2022-02-16 NOTE — PROGRESS NOTES
Oncology [facility]ology Care   Progress Note      2/16/2022 8:54 AM        Name: Hannah Polanco Admitted: 2/14/2022    SUBJECTIVE:  Patient is doing okay. Receiving blood transfusion. No acute distress. Reviewed interval ancillary notes    Current Medications  dermaphor ointment, 4x Daily PRN  0.9 % sodium chloride infusion, PRN  insulin glargine (LANTUS;BASAGLAR) injection pen 25 Units, Daily  morphine (PF) injection 2 mg, Q3H PRN  tamsulosin (FLOMAX) capsule 0.4 mg, Daily  insulin lispro (1 Unit Dial) 6 Units, TID WC  insulin lispro (1 Unit Dial) 0-12 Units, TID WC  insulin lispro (1 Unit Dial) 0-6 Units, Nightly  glucose (GLUTOSE) 40 % oral gel 15 g, PRN  glucagon (rDNA) injection 1 mg, PRN  dextrose 5 % solution, PRN  traMADol (ULTRAM) tablet 100 mg, Q4H PRN  iron sucrose (VENOFER) 200 mg in sodium chloride 0.9 % 100 mL IVPB, Q24H  eucerin cream, PRN  metoprolol succinate (TOPROL XL) extended release tablet 100 mg, Daily  polyethylene glycol (GLYCOLAX) packet 17 g, Daily PRN  dextrose 50 % IV solution, PRN  magnesium sulfate 1000 mg in dextrose 5% 100 mL IVPB, PRN  acetaminophen (TYLENOL) tablet 650 mg, Q4H PRN        Objective:  /81   Pulse 96   Temp 97 °F (36.1 °C) (Temporal)   Resp 18   Ht 5' 11\" (1.803 m)   Wt 184 lb 8.4 oz (83.7 kg)   SpO2 97%   BMI 25.74 kg/m²     Intake/Output Summary (Last 24 hours) at 2/16/2022 0854  Last data filed at 2/15/2022 2010  Gross per 24 hour   Intake 320 ml   Output 650 ml   Net -330 ml      Wt Readings from Last 3 Encounters:   02/16/22 184 lb 8.4 oz (83.7 kg)       General appearance:  Appears comfortable  Eyes: Sclera clear. Pupils equal.  ENT: Moist oral mucosa. Trachea midline, no adenopathy. Cardiovascular: Regular rhythm, normal S1, S2. No murmur. No edema in lower extremities  Respiratory: Not using accessory muscles. Good inspiratory effort. Clear to auscultation bilaterally, no wheeze or crackles.    GI: Abdomen soft, no tenderness, not distended  Musculoskeletal: No cyanosis in digits, neck supple  Neurology: CN 2-12 grossly intact. No speech or motor deficits  Psych: Normal affect. Alert and oriented in time, place and person  Skin: Warm, dry, normal turgor    Labs and Tests:  CBC:   Recent Labs     02/14/22  0950 02/14/22  1914 02/15/22  1349 02/15/22  1850 02/16/22  0438   WBC 18.5*  --   --   --  8.3   HGB 9.1*   < > 7.7* 7.6* 6.9*   *  --   --   --  332    < > = values in this interval not displayed. BMP:    Recent Labs     02/15/22  0626 02/15/22  1349 02/16/22  0438   * 130* 132*   K 4.0 4.2 4.1    100 102   CO2 19* 18* 19*   BUN 15 12 14   CREATININE 0.8 0.8 0.8   GLUCOSE 188* 258* 247*     Hepatic:   Recent Labs     02/14/22  0950   AST 10*   ALT 17   BILITOT <0.2   ALKPHOS 126       ASSESSMENT AND PLAN    Active Problems:    DKA, type 1, not at goal [facility])    Diabetic ketoacidosis without coma associated with type 2 diabetes mellitus ([facility] [address]  Resolved Problems:    * No resolved hospital problems. *      58 year old male with a history of DMII and hypertension. He has:     1. Rectal mass:  -Mass measuring 7.1 x 10.1 x 14.4 centered on the distal sigmoid colon and rectum, probable adenopathy posterior to the upper margin of the mass and bilateral hydronephrosis secondary to mass effect on the distal ureters, right greater than left. -Findings concerning for malignancy.   -Colonoscopy today which found a large friable rectosigmoid malignant appearing mass. Biopsies are pending.  -If final pathology confirms rectal adenocarcinoma and staging CT thorax does not show lung mets will need fela-adjuvant therapy 8-9 treatments of FOLFOX followed by Capecitabine plus radiation, followed by surgery.  May require diverting colostomy prior to above therapy if impending obstruction is suspected surgery to evaluate patient for need for diverting colostomy.   -No lung metastasis on CT chest.    -CEA is 22.4.  -Plan for port placement tomorrow.      2. Hydronephrosis:  -Secondary to mass effect.  -Urology is following.     3. Anemia:  -Iron studies consistent with AOCD and iron deficiency. -Receiving Venofer 200 mg daily x3  -PRBC transfusion today.     4. DKA:  -Resolved. -Diabetes management per primary team.     Ben Carty.  [phone]    Patient was seen and examined. Agree with above. CT chest there is no evidence of metastatic disease. CT abdomen and pelvis has not shown metastatic disease in the liver. CEA is 18.6. Pathology showed adenocarcinoma. Plan of treatment with total neoadjuvant chemotherapy with FOLFOX, followed by chemoradiation, followed by surgery  Anemia is due to malignancy as well as iron deficiency. Transfuse PRBCs.   Getting IV iron    Tayo Bean MD

## 2022-02-17 ENCOUNTER — ANESTHESIA (OUTPATIENT)
Dept: OPERATING ROOM | Age: 62
DRG: 982 | End: 2022-02-17
Payer: COMMERCIAL

## 2022-02-17 ENCOUNTER — APPOINTMENT (OUTPATIENT)
Dept: GENERAL RADIOLOGY | Age: 62
DRG: 982 | End: 2022-02-17
Payer: COMMERCIAL

## 2022-02-17 VITALS — DIASTOLIC BLOOD PRESSURE: 51 MMHG | SYSTOLIC BLOOD PRESSURE: 85 MMHG | OXYGEN SATURATION: 98 %

## 2022-02-17 LAB
ANION GAP SERPL CALCULATED.3IONS-SCNC: 12 MMOL/L (ref 3–16)
BASOPHILS ABSOLUTE: 0 K/UL (ref 0–0.2)
BASOPHILS RELATIVE PERCENT: 0.3 %
BUN BLDV-MCNC: 10 MG/DL (ref 7–20)
CALCIUM SERPL-MCNC: 8 MG/DL (ref 8.3–10.6)
CHLORIDE BLD-SCNC: 100 MMOL/L (ref 99–110)
CO2: 21 MMOL/L (ref 21–32)
CREAT SERPL-MCNC: 0.7 MG/DL (ref 0.8–1.3)
EOSINOPHILS ABSOLUTE: 0.1 K/UL (ref 0–0.6)
EOSINOPHILS RELATIVE PERCENT: 0.9 %
ESTIMATED AVERAGE GLUCOSE: 421.1 MG/DL
GFR AFRICAN AMERICAN: >60
GFR NON-AFRICAN AMERICAN: >60
GLUCOSE BLD-MCNC: 137 MG/DL (ref 70–99)
GLUCOSE BLD-MCNC: 153 MG/DL (ref 70–99)
GLUCOSE BLD-MCNC: 173 MG/DL (ref 70–99)
GLUCOSE BLD-MCNC: 200 MG/DL (ref 70–99)
GLUCOSE BLD-MCNC: 217 MG/DL (ref 70–99)
GLUCOSE BLD-MCNC: 219 MG/DL (ref 70–99)
GLUCOSE BLD-MCNC: 243 MG/DL (ref 70–99)
GLUCOSE BLD-MCNC: 60 MG/DL (ref 70–99)
GLUCOSE BLD-MCNC: 67 MG/DL (ref 70–99)
HBA1C MFR BLD: 16.3 %
HCT VFR BLD CALC: 25.1 % (ref 40.5–52.5)
HCT VFR BLD CALC: 25.6 % (ref 40.5–52.5)
HCT VFR BLD CALC: 25.9 % (ref 40.5–52.5)
HCT VFR BLD CALC: 27.4 % (ref 40.5–52.5)
HEMATOLOGY PATH CONSULT: NO
HEMOGLOBIN: 7.9 G/DL (ref 13.5–17.5)
HEMOGLOBIN: 8.4 G/DL (ref 13.5–17.5)
LYMPHOCYTES ABSOLUTE: 0.7 K/UL (ref 1–5.1)
LYMPHOCYTES RELATIVE PERCENT: 8.6 %
MCH RBC QN AUTO: 20.7 PG (ref 26–34)
MCHC RBC AUTO-ENTMCNC: 31.4 G/DL (ref 31–36)
MCV RBC AUTO: 66 FL (ref 80–100)
MONOCYTES ABSOLUTE: 1 K/UL (ref 0–1.3)
MONOCYTES RELATIVE PERCENT: 11.4 %
NEUTROPHILS ABSOLUTE: 6.7 K/UL (ref 1.7–7.7)
NEUTROPHILS RELATIVE PERCENT: 78.8 %
PDW BLD-RTO: 21 % (ref 12.4–15.4)
PERFORMED ON: ABNORMAL
PLATELET # BLD: 344 K/UL (ref 135–450)
PMV BLD AUTO: 7.5 FL (ref 5–10.5)
POTASSIUM REFLEX MAGNESIUM: 3.7 MMOL/L (ref 3.5–5.1)
RBC # BLD: 3.8 M/UL (ref 4.2–5.9)
SODIUM BLD-SCNC: 133 MMOL/L (ref 136–145)
WBC # BLD: 8.5 K/UL (ref 4–11)

## 2022-02-17 PROCEDURE — 6360000002 HC RX W HCPCS: Performed by: SURGERY

## 2022-02-17 PROCEDURE — A4217 STERILE WATER/SALINE, 500 ML: HCPCS | Performed by: SURGERY

## 2022-02-17 PROCEDURE — 85018 HEMOGLOBIN: CPT

## 2022-02-17 PROCEDURE — 85025 COMPLETE CBC W/AUTO DIFF WBC: CPT

## 2022-02-17 PROCEDURE — 02HV33Z INSERTION OF INFUSION DEVICE INTO SUPERIOR VENA CAVA, PERCUTANEOUS APPROACH: ICD-10-PCS | Performed by: SURGERY

## 2022-02-17 PROCEDURE — 51798 US URINE CAPACITY MEASURE: CPT

## 2022-02-17 PROCEDURE — 6360000002 HC RX W HCPCS: Performed by: NURSE ANESTHETIST, CERTIFIED REGISTERED

## 2022-02-17 PROCEDURE — C1788 PORT, INDWELLING, IMP: HCPCS | Performed by: SURGERY

## 2022-02-17 PROCEDURE — 6370000000 HC RX 637 (ALT 250 FOR IP): Performed by: SURGERY

## 2022-02-17 PROCEDURE — 3700000001 HC ADD 15 MINUTES (ANESTHESIA): Performed by: SURGERY

## 2022-02-17 PROCEDURE — 36561 INSERT TUNNELED CV CATH: CPT | Performed by: SURGERY

## 2022-02-17 PROCEDURE — 71045 X-RAY EXAM CHEST 1 VIEW: CPT

## 2022-02-17 PROCEDURE — 77001 FLUOROGUIDE FOR VEIN DEVICE: CPT

## 2022-02-17 PROCEDURE — 6370000000 HC RX 637 (ALT 250 FOR IP): Performed by: CLINICAL NURSE SPECIALIST

## 2022-02-17 PROCEDURE — 80048 BASIC METABOLIC PNL TOTAL CA: CPT

## 2022-02-17 PROCEDURE — 2580000003 HC RX 258: Performed by: ANESTHESIOLOGY

## 2022-02-17 PROCEDURE — 3600000012 HC SURGERY LEVEL 2 ADDTL 15MIN: Performed by: SURGERY

## 2022-02-17 PROCEDURE — 6360000002 HC RX W HCPCS: Performed by: INTERNAL MEDICINE

## 2022-02-17 PROCEDURE — 3700000000 HC ANESTHESIA ATTENDED CARE: Performed by: SURGERY

## 2022-02-17 PROCEDURE — 7100000001 HC PACU RECOVERY - ADDTL 15 MIN: Performed by: SURGERY

## 2022-02-17 PROCEDURE — 2580000003 HC RX 258: Performed by: SURGERY

## 2022-02-17 PROCEDURE — 0JH60WZ INSERTION OF TOTALLY IMPLANTABLE VASCULAR ACCESS DEVICE INTO CHEST SUBCUTANEOUS TISSUE AND FASCIA, OPEN APPROACH: ICD-10-PCS | Performed by: SURGERY

## 2022-02-17 PROCEDURE — 3600000002 HC SURGERY LEVEL 2 BASE: Performed by: SURGERY

## 2022-02-17 PROCEDURE — 1200000000 HC SEMI PRIVATE

## 2022-02-17 PROCEDURE — 6370000000 HC RX 637 (ALT 250 FOR IP): Performed by: NURSE PRACTITIONER

## 2022-02-17 PROCEDURE — 36415 COLL VENOUS BLD VENIPUNCTURE: CPT

## 2022-02-17 PROCEDURE — 83036 HEMOGLOBIN GLYCOSYLATED A1C: CPT

## 2022-02-17 PROCEDURE — 77001 FLUOROGUIDE FOR VEIN DEVICE: CPT | Performed by: SURGERY

## 2022-02-17 PROCEDURE — 85014 HEMATOCRIT: CPT

## 2022-02-17 PROCEDURE — 6370000000 HC RX 637 (ALT 250 FOR IP): Performed by: INTERNAL MEDICINE

## 2022-02-17 PROCEDURE — 2500000003 HC RX 250 WO HCPCS: Performed by: NURSE ANESTHETIST, CERTIFIED REGISTERED

## 2022-02-17 PROCEDURE — 7100000000 HC PACU RECOVERY - FIRST 15 MIN: Performed by: SURGERY

## 2022-02-17 PROCEDURE — 2709999900 HC NON-CHARGEABLE SUPPLY: Performed by: SURGERY

## 2022-02-17 PROCEDURE — 2500000003 HC RX 250 WO HCPCS: Performed by: SURGERY

## 2022-02-17 DEVICE — PORT IMPL INFUSION 16X26 MM PWR INJ POLYURETHANE CATH XCELA: Type: IMPLANTABLE DEVICE | Site: SUBCLAVIAN | Status: FUNCTIONAL

## 2022-02-17 RX ORDER — ONDANSETRON 2 MG/ML
INJECTION INTRAMUSCULAR; INTRAVENOUS PRN
Status: DISCONTINUED | OUTPATIENT
Start: 2022-02-17 | End: 2022-02-17 | Stop reason: SDUPTHER

## 2022-02-17 RX ORDER — PROPOFOL 10 MG/ML
INJECTION, EMULSION INTRAVENOUS CONTINUOUS PRN
Status: DISCONTINUED | OUTPATIENT
Start: 2022-02-17 | End: 2022-02-17 | Stop reason: SDUPTHER

## 2022-02-17 RX ORDER — FENTANYL CITRATE 50 UG/ML
INJECTION, SOLUTION INTRAMUSCULAR; INTRAVENOUS PRN
Status: DISCONTINUED | OUTPATIENT
Start: 2022-02-17 | End: 2022-02-17 | Stop reason: SDUPTHER

## 2022-02-17 RX ORDER — CEFAZOLIN SODIUM 1 G/3ML
INJECTION, POWDER, FOR SOLUTION INTRAMUSCULAR; INTRAVENOUS PRN
Status: DISCONTINUED | OUTPATIENT
Start: 2022-02-17 | End: 2022-02-17 | Stop reason: SDUPTHER

## 2022-02-17 RX ORDER — SODIUM CHLORIDE 0.9 % (FLUSH) 0.9 %
5-40 SYRINGE (ML) INJECTION EVERY 12 HOURS SCHEDULED
Status: DISCONTINUED | OUTPATIENT
Start: 2022-02-17 | End: 2022-02-17 | Stop reason: HOSPADM

## 2022-02-17 RX ORDER — MIDAZOLAM HYDROCHLORIDE 1 MG/ML
INJECTION INTRAMUSCULAR; INTRAVENOUS PRN
Status: DISCONTINUED | OUTPATIENT
Start: 2022-02-17 | End: 2022-02-17 | Stop reason: SDUPTHER

## 2022-02-17 RX ORDER — MEPERIDINE HYDROCHLORIDE 25 MG/ML
12.5 INJECTION INTRAMUSCULAR; INTRAVENOUS; SUBCUTANEOUS EVERY 5 MIN PRN
Status: DISCONTINUED | OUTPATIENT
Start: 2022-02-17 | End: 2022-02-17 | Stop reason: HOSPADM

## 2022-02-17 RX ORDER — HYDROMORPHONE HCL 110MG/55ML
0.5 PATIENT CONTROLLED ANALGESIA SYRINGE INTRAVENOUS EVERY 5 MIN PRN
Status: DISCONTINUED | OUTPATIENT
Start: 2022-02-17 | End: 2022-02-17 | Stop reason: HOSPADM

## 2022-02-17 RX ORDER — SODIUM CHLORIDE 9 MG/ML
25 INJECTION, SOLUTION INTRAVENOUS PRN
Status: DISCONTINUED | OUTPATIENT
Start: 2022-02-17 | End: 2022-02-17 | Stop reason: HOSPADM

## 2022-02-17 RX ORDER — HYDROMORPHONE HCL 110MG/55ML
0.25 PATIENT CONTROLLED ANALGESIA SYRINGE INTRAVENOUS EVERY 5 MIN PRN
Status: DISCONTINUED | OUTPATIENT
Start: 2022-02-17 | End: 2022-02-17 | Stop reason: HOSPADM

## 2022-02-17 RX ORDER — CEFAZOLIN SODIUM 2 G/100ML
2000 INJECTION, SOLUTION INTRAVENOUS ONCE
Status: DISCONTINUED | OUTPATIENT
Start: 2022-02-17 | End: 2022-02-17

## 2022-02-17 RX ORDER — INSULIN LISPRO 100 [IU]/ML
9 INJECTION, SOLUTION INTRAVENOUS; SUBCUTANEOUS
Status: DISCONTINUED | OUTPATIENT
Start: 2022-02-17 | End: 2022-02-19 | Stop reason: HOSPADM

## 2022-02-17 RX ORDER — ONDANSETRON 2 MG/ML
4 INJECTION INTRAMUSCULAR; INTRAVENOUS
Status: DISCONTINUED | OUTPATIENT
Start: 2022-02-17 | End: 2022-02-17 | Stop reason: HOSPADM

## 2022-02-17 RX ORDER — LIDOCAINE HYDROCHLORIDE 10 MG/ML
INJECTION, SOLUTION INFILTRATION; PERINEURAL
Status: COMPLETED | OUTPATIENT
Start: 2022-02-17 | End: 2022-02-17

## 2022-02-17 RX ORDER — DIPHENHYDRAMINE HYDROCHLORIDE 50 MG/ML
12.5 INJECTION INTRAMUSCULAR; INTRAVENOUS
Status: DISCONTINUED | OUTPATIENT
Start: 2022-02-17 | End: 2022-02-17 | Stop reason: HOSPADM

## 2022-02-17 RX ORDER — PROPOFOL 10 MG/ML
INJECTION, EMULSION INTRAVENOUS PRN
Status: DISCONTINUED | OUTPATIENT
Start: 2022-02-17 | End: 2022-02-17 | Stop reason: SDUPTHER

## 2022-02-17 RX ORDER — SODIUM CHLORIDE 0.9 % (FLUSH) 0.9 %
5-40 SYRINGE (ML) INJECTION PRN
Status: DISCONTINUED | OUTPATIENT
Start: 2022-02-17 | End: 2022-02-17 | Stop reason: HOSPADM

## 2022-02-17 RX ORDER — LIDOCAINE HYDROCHLORIDE 20 MG/ML
INJECTION, SOLUTION EPIDURAL; INFILTRATION; INTRACAUDAL; PERINEURAL PRN
Status: DISCONTINUED | OUTPATIENT
Start: 2022-02-17 | End: 2022-02-17 | Stop reason: SDUPTHER

## 2022-02-17 RX ADMIN — LIDOCAINE HYDROCHLORIDE 40 MG: 20 INJECTION, SOLUTION EPIDURAL; INFILTRATION; INTRACAUDAL; PERINEURAL at 10:31

## 2022-02-17 RX ADMIN — ONDANSETRON 4 MG: 2 INJECTION INTRAMUSCULAR; INTRAVENOUS at 10:39

## 2022-02-17 RX ADMIN — PROPOFOL 20 MG: 10 INJECTION, EMULSION INTRAVENOUS at 10:31

## 2022-02-17 RX ADMIN — SKIN PROTECTANT: 44 OINTMENT TOPICAL at 08:30

## 2022-02-17 RX ADMIN — FENTANYL CITRATE 25 MCG: 50 INJECTION, SOLUTION INTRAMUSCULAR; INTRAVENOUS at 10:31

## 2022-02-17 RX ADMIN — INSULIN LISPRO 9 UNITS: 100 INJECTION, SOLUTION INTRAVENOUS; SUBCUTANEOUS at 15:09

## 2022-02-17 RX ADMIN — FENTANYL CITRATE 25 MCG: 50 INJECTION, SOLUTION INTRAMUSCULAR; INTRAVENOUS at 10:39

## 2022-02-17 RX ADMIN — METOPROLOL SUCCINATE 100 MG: 50 TABLET, FILM COATED, EXTENDED RELEASE ORAL at 08:22

## 2022-02-17 RX ADMIN — INSULIN LISPRO 9 UNITS: 100 INJECTION, SOLUTION INTRAVENOUS; SUBCUTANEOUS at 17:09

## 2022-02-17 RX ADMIN — CEFAZOLIN 2000 MG: 1 INJECTION, POWDER, FOR SOLUTION INTRAVENOUS at 10:25

## 2022-02-17 RX ADMIN — INSULIN LISPRO 4 UNITS: 100 INJECTION, SOLUTION INTRAVENOUS; SUBCUTANEOUS at 08:23

## 2022-02-17 RX ADMIN — MIDAZOLAM 2 MG: 1 INJECTION INTRAMUSCULAR; INTRAVENOUS at 10:25

## 2022-02-17 RX ADMIN — TAMSULOSIN HYDROCHLORIDE 0.4 MG: 0.4 CAPSULE ORAL at 08:22

## 2022-02-17 RX ADMIN — IRON SUCROSE 200 MG: 20 INJECTION, SOLUTION INTRAVENOUS at 13:47

## 2022-02-17 RX ADMIN — MORPHINE SULFATE 2 MG: 2 INJECTION, SOLUTION INTRAMUSCULAR; INTRAVENOUS at 19:07

## 2022-02-17 RX ADMIN — TRAMADOL HYDROCHLORIDE 100 MG: 50 TABLET, COATED ORAL at 04:11

## 2022-02-17 RX ADMIN — INSULIN LISPRO 4 UNITS: 100 INJECTION, SOLUTION INTRAVENOUS; SUBCUTANEOUS at 17:09

## 2022-02-17 RX ADMIN — SODIUM CHLORIDE, PRESERVATIVE FREE 10 ML: 5 INJECTION INTRAVENOUS at 08:30

## 2022-02-17 RX ADMIN — INSULIN GLARGINE 3 UNITS: 100 INJECTION, SOLUTION SUBCUTANEOUS at 15:10

## 2022-02-17 RX ADMIN — SODIUM CHLORIDE: 9 INJECTION, SOLUTION INTRAVENOUS at 10:25

## 2022-02-17 RX ADMIN — TRAMADOL HYDROCHLORIDE 100 MG: 50 TABLET, COATED ORAL at 14:10

## 2022-02-17 RX ADMIN — PROPOFOL 120 MCG/KG/MIN: 10 INJECTION, EMULSION INTRAVENOUS at 10:31

## 2022-02-17 ASSESSMENT — PULMONARY FUNCTION TESTS
PIF_VALUE: 1
PIF_VALUE: 0
PIF_VALUE: 1
PIF_VALUE: 1
PIF_VALUE: 0
PIF_VALUE: 1
PIF_VALUE: 1
PIF_VALUE: 0
PIF_VALUE: 1
PIF_VALUE: 0
PIF_VALUE: 1

## 2022-02-17 ASSESSMENT — PAIN - FUNCTIONAL ASSESSMENT: PAIN_FUNCTIONAL_ASSESSMENT: 0-10

## 2022-02-17 ASSESSMENT — PAIN SCALES - GENERAL
PAINLEVEL_OUTOF10: 5
PAINLEVEL_OUTOF10: 4
PAINLEVEL_OUTOF10: 4
PAINLEVEL_OUTOF10: 0
PAINLEVEL_OUTOF10: 2
PAINLEVEL_OUTOF10: 0

## 2022-02-17 ASSESSMENT — PAIN DESCRIPTION - LOCATION
LOCATION: ABDOMEN
LOCATION: ABDOMEN

## 2022-02-17 NOTE — PROGRESS NOTES
Assessment completed and documented. VSS. Pt A&Ox4, on RA satting high 90s, lung sounds clear. meds given per [facility]. PRN dermaphor applied to both hands and feet. Pt denies pain or needs. NPO for port placement later this morning. Call light within reach, will continue to monitor.

## 2022-02-17 NOTE — OP NOTE
Operative Note      Patient: Leena Miller  YOB: 1960  MRN: [id number]    Date of Procedure: 2/17/2022                                                                                                     Preoperative diagnosis -rectal cancer    Postoperative diagnosis - same    Procedure - placement of left subclavian  Port-A-Cath    Surgeon-Shin Elise with local    EBL-minimal    Operative indications and consent - 58-year-old who is brought in today for placement of a Port-A-Cath for treatment of rectal cancer. The patient was explained the risks, benefits and possible complications including risk of bleeding, pneumothorax, infection and port malfunction. Details of the procedure - the patient was brought to the operating room and placed in the supine position on the operative table. A timeout was performed. The patient was prepped and draped in the usual sterile fashion. The bed was placed in the Trendelenburg position. The skin and subcutaneous tissue was infiltrated with 1% lidocaine. A wire was then placed into the left subclavian vein. This was confirmed with fluoroscopy. The dilator and sheath were then placed over the wire. The wire and dilator were then removed and the Port-A-Cath tubing was fed into the distal superior vena cava. This was again confirmed with fluoroscopy. The skin and subcutaneous tissue was injected with 1% lidocaine approximately 2 cm beneath the initial insertion site. A 3 cm transverse incision was made and a prepectoral pocket was created using cautery. The Port-A-Cath tubing was fed from the initial insertion site into the pocket and then connected to the Port-A-Cath reservoir. The reservoir was then placed in the pocket and sutured in place with 2-0 Prolene sutures. The port was accessed. It had excellent blood return and flushed easily. The entire device was surveyed under fluoroscopy.   There were no kinks within the tubing and there was good position of the catheter tip in the superior vena cava. The subcutaneous layer was closed with interrupted 3-0 Vicryl sutures followed by a running 4-0 Vicryl suture in the skin. Dermabond was applied. The patient tolerated the procedure without difficulty and was transferred to the recovery room in stable condition.         Vero Nguyen MD     Electronically signed by Vero Nguyen MD on 2/17/2022 at 10:58 AM

## 2022-02-17 NOTE — CARE COORDINATION
Discharge Planning:  I have reviewed the patient's medical history in detail and updated the computerized patient record. Patient independent prior to admission. There are no needs identified at this time. If something specific is identified, please notify Discharge Planner. Pt is independent prior to admission and ambulates freely in room. Pt received port-a-cath today for planned chemo. Pt lives alone and has no local family. Plan of treatment with total neoadjuvant chemotherapy with FOLFOX, followed by chemoradiation, followed by surgery. Future plans may include a colostomy and required supplies. Palliative on board, pt requests to be a [facility]. CM will continue to follow.     Electronically signed by Masha Gill RN on 2/17/2022 at 3:14 PM

## 2022-02-17 NOTE — BRIEF OP NOTE
Brief Postoperative Note      Patient: Jermaine Arce  YOB: 1960  MRN: [id number]    Date of Procedure: 2/17/2022    Pre-Op Diagnosis: RECTAL CANCER    Post-Op Diagnosis: Same       Procedure(s):  PORT A CATH INSERTION    Surgeon(s):  Leann Fuller MD    Assistant:  First Assistant: Sirisha Hines RN    Anesthesia: General    Estimated Blood Loss (mL): Minimal    Complications: None    Specimens:   * No specimens in log *    Implants:  Implant Name Type Inv.  Item Serial No.  Lot No. LRB No. Used Action   PORT IMPL INFUSION [id number] MM PWR INJ POLYURETHANE CATH Anat [facility] [id number]  PORT IMPL INFUSION 16X26 MM PWR INJ POLYURETHANE CATH [facility]- [id number] N/A 1 Implanted         Drains: * No LDAs found *    Findings:     Electronically signed by Leann Fuller MD on 2/17/2022 at 10:58 AM

## 2022-02-17 NOTE — PROGRESS NOTES
Oncology Hematology Care   Progress Note      2/17/2022 8:36 AM        Name: Ashlee Alex . Admitted: 2/14/2022    SUBJECTIVE:  He is doing OK, he offers no new complaints. Planning on port placement today. He has rectal pain with sitting, no pain currently while lying flat.      Reviewed interval ancillary notes    Current Medications  sodium chloride flush 0.9 % injection 5-40 mL, 2 times per day  sodium chloride flush 0.9 % injection 5-40 mL, PRN  0.9 % sodium chloride infusion, PRN  meperidine (DEMEROL) injection 12.5 mg, Q5 Min PRN  HYDROmorphone (DILAUDID) injection 0.25 mg, Q5 Min PRN  HYDROmorphone (DILAUDID) injection 0.5 mg, Q5 Min PRN  ondansetron (ZOFRAN) injection 4 mg, Once PRN  diphenhydrAMINE (BENADRYL) injection 12.5 mg, Once PRN  dermaphor ointment, 4x Daily PRN  insulin glargine (LANTUS;BASAGLAR) injection pen 25 Units, Daily  morphine (PF) injection 2 mg, Q3H PRN  tamsulosin (FLOMAX) capsule 0.4 mg, Daily  insulin lispro (1 Unit Dial) 6 Units, TID WC  insulin lispro (1 Unit Dial) 0-12 Units, TID WC  insulin lispro (1 Unit Dial) 0-6 Units, Nightly  glucose (GLUTOSE) 40 % oral gel 15 g, PRN  glucagon (rDNA) injection 1 mg, PRN  dextrose 5 % solution, PRN  traMADol (ULTRAM) tablet 100 mg, Q4H PRN  iron sucrose (VENOFER) 200 mg in sodium chloride 0.9 % 100 mL IVPB, Q24H  eucerin cream, PRN  metoprolol succinate (TOPROL XL) extended release tablet 100 mg, Daily  polyethylene glycol (GLYCOLAX) packet 17 g, Daily PRN  dextrose 50 % IV solution, PRN  magnesium sulfate 1000 mg in dextrose 5% 100 mL IVPB, PRN  acetaminophen (TYLENOL) tablet 650 mg, Q4H PRN        Objective:  BP (!) 140/79   Pulse 74   Temp 97.6 °F (36.4 °C) (Temporal)   Resp 16   Ht 5' 11\" (1.803 m)   Wt 168 lb 6.4 oz (76.4 kg)   SpO2 96%   BMI 23.49 kg/m²     Intake/Output Summary (Last 24 hours) at 2/17/2022 0836  Last data filed at 2/17/2022 0815  Gross per 24 hour   Intake 970 ml   Output 2300 ml   Net -1330 ml pathology confirms rectal adenocarcinoma   -No lung metastasis on CT chest.    -CT abdomen and pelvis has not shown metastatic disease in the liver  -CEA is 22.4.  -Plan of treatment with total neoadjuvant chemotherapy with FOLFOX, followed by chemoradiation, followed by surgery  -Plan for port placement today  - patient will f/u with Dr. Gema Savage next week     2. Hydronephrosis:  -Secondary to mass effect.  -Urology is following.     3. Anemia:  -Iron studies consistent with AOCD and iron deficiency. -Receiving Venofer 200 mg daily x3  -PRBC transfusion 2/16/22  - hgb 8.0 today.     4. DKA:  -Resolved. -Diabetes management per primary team.     Dakotah Marinelli CNP  Oncology Hematology Care        Agree with the above note. Locally advanced rectal cancer. Will require total fela-adjuvant therapy followed by surgery.      Maliha Winston MD  [facility]cology [facility]

## 2022-02-17 NOTE — PROGRESS NOTES
[facility] PROGRESS NOTE    2/17/2022 10:40 AM        Name: Kari Daily . Admitted: 2/14/2022  Primary Care Provider: No primary care provider on file.  (Tel: None)            Subjective:    Did not no nausea vomiting chest pain shortness breath fevers or    Reviewed interval ancillary notes    Current Medications  sodium chloride flush 0.9 % injection 5-40 mL, 2 times per day  sodium chloride flush 0.9 % injection 5-40 mL, PRN  0.9 % sodium chloride infusion, PRN  meperidine (DEMEROL) injection 12.5 mg, Q5 Min PRN  HYDROmorphone (DILAUDID) injection 0.25 mg, Q5 Min PRN  HYDROmorphone (DILAUDID) injection 0.5 mg, Q5 Min PRN  ondansetron (ZOFRAN) injection 4 mg, Once PRN  diphenhydrAMINE (BENADRYL) injection 12.5 mg, Once PRN  [START ON 2/18/2022] insulin glargine (LANTUS;BASAGLAR) injection pen 28 Units, Daily  insulin glargine (LANTUS;BASAGLAR) injection pen 3 Units, Once  insulin lispro (1 Unit Dial) 9 Units, TID WC  ceFAZolin (ANCEF) 2000 mg in dextrose 5 % 50 mL IVPB, On Call to OR  dermaphor ointment, 4x Daily PRN  morphine (PF) injection 2 mg, Q3H PRN  tamsulosin (FLOMAX) capsule 0.4 mg, Daily  insulin lispro (1 Unit Dial) 0-12 Units, TID WC  insulin lispro (1 Unit Dial) 0-6 Units, Nightly  glucose (GLUTOSE) 40 % oral gel 15 g, PRN  glucagon (rDNA) injection 1 mg, PRN  dextrose 5 % solution, PRN  traMADol (ULTRAM) tablet 100 mg, Q4H PRN  iron sucrose (VENOFER) 200 mg in sodium chloride 0.9 % 100 mL IVPB, Q24H  eucerin cream, PRN  metoprolol succinate (TOPROL XL) extended release tablet 100 mg, Daily  polyethylene glycol (GLYCOLAX) packet 17 g, Daily PRN  dextrose 50 % IV solution, PRN  magnesium sulfate 1000 mg in dextrose 5% 100 mL IVPB, PRN  acetaminophen (TYLENOL) tablet 650 mg, Q4H PRN    ceFAZolin (ANCEF) injection, PRN  propofol injection, Continuous PRN  ondansetron (ZOFRAN) injection, PRN  fentaNYL (SUBLIMAZE) injection, PRN        Objective:  BP (!) 145/75   Pulse 76   Temp 97.8 °F (36.6 °C) (Temporal)   Resp 16   Ht 5' 11\" (1.803 m)   Wt 168 lb 6.4 oz (76.4 kg)   SpO2 96%   BMI 23.49 kg/m²     Intake/Output Summary (Last 24 hours) at 2/17/2022 1040  Last data filed at 2/17/2022 0815  Gross per 24 hour   Intake 730 ml   Output 2300 ml   Net -1570 ml      Wt Readings from Last 3 Encounters:   02/17/22 168 lb 6.4 oz (76.4 kg)       General appearance:  Appears comfortable. AAOx3  HEENT: atraumatic, Pupils equal, muscous membranes dry, no masses appreciated  Cardiovascular: tachycardia no murmurs appreciated  Respiratory: CTAB no wheezing  Gastrointestinal: Abdomen soft, non-tender, BS+  EXT: no edema  Neurology: no gross focal deficts  Psychiatry: Appropriate affect. Not agitated  Skin: Warm, dry, no rashes appreciated  Labs and Tests:  CBC:   Recent Labs     02/16/22  0438 02/16/22  1154 02/16/22  1702 02/17/22  0032 02/17/22  0426   WBC 8.3  --   --   --  8.5   HGB 6.9*   < > 8.0* 7.9* 7.9*     --   --   --  344    < > = values in this interval not displayed. BMP:    Recent Labs     02/15/22  1349 02/16/22  0438 02/17/22  0425   * 132* 133*   K 4.2 4.1 3.7    102 100   CO2 18* 19* 21   BUN 12 14 10   CREATININE 0.8 0.8 0.7*   GLUCOSE 258* 247* 219*     Hepatic:   No results for input(s): AST, ALT, ALB, BILITOT, ALKPHOS in the last 72 hours. CT CHEST W CONTRAST   Final Result   No convincing evidence of metastatic disease to the chest.      Small pericardial effusion. Dilated pulmonary trunk suggesting pulmonary hypertension. Subtle areas of hypoattenuation within the right and left hepatic lobes as   described above. Liver protocol MRI with and without contrast recommended to   exclude metastatic lesions. Splenomegaly. CT ABDOMEN PELVIS W IV CONTRAST Additional Contrast? None   Final Result   1.  Large heterogeneous pelvic mass centered on the distal sigmoid colon and   rectum presumably malignant. Findings suggesting direct invasion of the   prostate and possibly the posterior bladder wall. Moderate stool proximal to   the mass with no evidence of obstruction. Probable adenopathy posterior to   the upper margin of the mass. No evidence of distal metastatic disease. Surgical consultation recommended. 2. Bilateral hydronephrosis secondary to mass effect on the distal ureters,   right greater than left. XR CHEST PORTABLE   Final Result   Clear chest without acute cardiopulmonary process. FL VASCULAR ACCESS GUIDANCE    (Results Pending)   MRI ABDOMEN W WO CONTRAST    (Results Pending)       Recent imaging reviewed    Problem List  Active Problems:    DKA, type 1, not at goal [facility])    Diabetic ketoacidosis without coma associated with type 2 diabetes mellitus ([facility] [address]  Resolved Problems:    * No resolved hospital problems. *             Assessment/Plan:   Dka:   a1c pendign  - hypererglytcmei increase lantus     Sigmoid and rectum mass  -s/p flex sig Large friable rectosigmoid malignant appearing mass, occupying 70-80% of lumen, from 20 cm to 5 cm. Extensive biopsies obtained. 6 mm sessile rectal polyp, cold snared.   - - pathology back for adenocarnicoma of rectum  - port today  - mri for possibel liver met    Acute blood loss anemia secondary to GI bleeding secondary to above  -s/p 1 unit prbcs and monitor q6hrs   - monitor h/h transfue to keep >7  - iron deficiecny anemia getting iv iron    Bilateral hydronephrosis  - no multani placed per patient wishes will monitor with bladder scans   - gu on board     DVT prophylaxis scds  Code status dnr cca       Stacie Garibay MD   2/17/2022 10:40 AM

## 2022-02-17 NOTE — ANESTHESIA PRE PROCEDURE
Department of Anesthesiology  Preprocedure Note       Name:  Zen Gonzales   Age:  58 y.o.  :  1960                                          MRN:  [id number]         Date:  2022      Surgeon: Giovanna Major):  Memo Antoine MD    Procedure: Procedure(s):  PORT A CATH INSERTION    Medications prior to admission:   Prior to Admission medications    Medication Sig Start Date End Date Taking? Authorizing Provider   verapamil (VERELAN) 360 MG extended release capsule Take 360 mg by mouth nightly    Historical Provider, MD   metoprolol succinate (TOPROL XL) 100 MG extended release tablet Take 100 mg by mouth daily    Historical Provider, MD       Current medications:    No current facility-administered medications for this visit. No current outpatient medications on file.      Facility-Administered Medications Ordered in Other Visits   Medication Dose Route Frequency Provider Last Rate Last Admin    dermaphor ointment   Topical 4x Daily PRN Byron Redmond MD   Given at 22    insulin glargine (LANTUS;BASAGLAR) injection pen 25 Units  25 Units SubCUTAneous Daily Byron Redmond MD   25 Units at 22 0852    morphine (PF) injection 2 mg  2 mg IntraVENous Q3H PRN Romina Shabazz MD   2 mg at 02/15/22 0535    tamsulosin (FLOMAX) capsule 0.4 mg  0.4 mg Oral Daily Magdy Counter, APRN - CNP   0.4 mg at 22 0831    insulin lispro (1 Unit Dial) 6 Units  6 Units SubCUTAneous TID ANTOINE Redmond MD   6 Units at 22 1703    insulin lispro (1 Unit Dial) 0-12 Units  0-12 Units SubCUTAneous TID ANTOINE Redmond MD   4 Units at 22 1703    insulin lispro (1 Unit Dial) 0-6 Units  0-6 Units SubCUTAneous Nightly Byron Redmond MD   1 Units at 22    glucose (GLUTOSE) 40 % oral gel 15 g  15 g Oral PRN Byron Redmond MD        glucagon (rDNA) injection 1 mg  1 mg IntraMUSCular PRN Byron Redmond MD        dextrose 5 % solution  100 mL/hr IntraVENous PRN Antonieta Corona MD        traMADol Vianne Osorio) tablet 100 mg  100 mg Oral Q4H PRN Mark Copa, APRN - CNP   100 mg at 02/17/22 0411    iron sucrose (VENOFER) 200 mg in sodium chloride 0.9 % 100 mL IVPB  200 mg IntraVENous Q24H Kim Asif MD   Stopped at 02/16/22 1209    eucerin cream   Topical PRN Mark Paris MD        metoprolol succinate (TOPROL XL) extended release tablet 100 mg  100 mg Oral Daily Antonieta Corona MD   100 mg at 02/16/22 0831    polyethylene glycol (GLYCOLAX) packet 17 g  17 g Oral Daily PRN Antonieta Corona MD        dextrose 50 % IV solution  12.5 g IntraVENous PRN Marco Bautista MD        magnesium sulfate 1000 mg in dextrose 5% 100 mL IVPB  1,000 mg IntraVENous PRN Marco Bautista MD        acetaminophen (TYLENOL) tablet 650 mg  650 mg Oral Q4H PRN Janeth Fairchild MD   650 mg at 02/14/22 2212       Allergies:  No Known Allergies    Problem List:    Patient Active Problem List   Diagnosis Code    DKA, type 1, not at goal [facility]) E10.10    Diabetic ketoacidosis without coma associated with type 2 diabetes mellitus ([facility] [address] E11.10       Past Medical History:        Diagnosis Date    Diabetes mellitus ([facility] [address]     Hypertension        Past Surgical History:        Procedure Laterality Date    ABDOMEN SURGERY  1975    Exploratory    SIGMOIDOSCOPY N/A 2/15/2022    SIGMOIDOSCOPY BIOPSY FLEXIBLE performed by Erika Lewis MD at [address]  2/15/2022    SIGMOIDOSCOPY POLYP SNARE performed by Erika Lewis MD at [address],  [employer] History:    Social History     Tobacco Use    Smoking status: Never Smoker    Smokeless tobacco: Never Used   Substance Use Topics    Alcohol use: Not Currently     Comment: not in last 18 months                                Counseling given: Not Answered      Vital Signs (Current): There were no vitals filed for this visit.                                            BP Readings from Last 3 Encounters: 02/17/22 (!) 142/82   02/15/22 (!) 100/57       NPO Status:                                                                                 BMI:   Wt Readings from Last 3 Encounters:   02/17/22 168 lb 6.4 oz (76.4 kg)     There is no height or weight on file to calculate BMI.    CBC:   Lab Results   Component Value Date    WBC 8.5 02/17/2022    RBC 3.80 02/17/2022    HGB 7.9 02/17/2022    HCT 25.1 02/17/2022    MCV 66.0 02/17/2022    RDW 21.0 02/17/2022     02/17/2022       CMP:   Lab Results   Component Value Date     02/17/2022    K 3.7 02/17/2022     02/17/2022    CO2 21 02/17/2022    BUN 10 02/17/2022    CREATININE 0.7 02/17/2022    GFRAA >60 02/17/2022    AGRATIO 0.9 02/14/2022    LABGLOM >60 02/17/2022    GLUCOSE 219 02/17/2022    PROT 6.7 02/14/2022    CALCIUM 8.0 02/17/2022    BILITOT <0.2 02/14/2022    ALKPHOS 126 02/14/2022    AST 10 02/14/2022    ALT 17 02/14/2022       POC Tests:   Recent Labs     02/17/22  0419   POCGLU 243*       Coags:   Lab Results   Component Value Date    PROTIME 11.8 02/15/2022    INR 1.04 02/15/2022       HCG (If Applicable): No results found for: PREGTESTUR, PREGSERUM, HCG, HCGQUANT     ABGs: No results found for: PHART, PO2ART, ESH4WSX, PJX2SEU, BEART, I6NVABNK     Type & Screen (If Applicable):  No results found for: LABABO, LABRH    Drug/Infectious Status (If Applicable):  No results found for: HIV, HEPCAB    COVID-19 Screening (If Applicable):   Lab Results   Component Value Date    COVID19 Not Detected 02/15/2022           Anesthesia Evaluation  Patient summary reviewed and Nursing notes reviewed  Airway: Mallampati: II  TM distance: >3 FB   Neck ROM: full  Mouth opening: > = 3 FB Dental:          Pulmonary:                              Cardiovascular:  Exercise tolerance: good (>4 METS),   (+) hypertension:,                   Neuro/Psych:               GI/Hepatic/Renal:             Endo/Other:    (+) DiabetesType II DM, well controlled, , .                 Abdominal:             Vascular: Other Findings:               Anesthesia Plan      general     ASA 3       Induction: intravenous. MIPS: Postoperative opioids intended and Prophylactic antiemetics administered. Anesthetic plan and risks discussed with patient. Plan discussed with CRNA.                   Joyce Webb MD   2/17/2022

## 2022-02-17 NOTE — ANESTHESIA POSTPROCEDURE EVALUATION
Department of Anesthesiology  Postprocedure Note    Patient: Leena Miller  MRN: [id number]  YOB: 1960  Date of evaluation: 2/17/2022  Time:  11:26 AM     Procedure Summary     Date: 02/17/22 Room / Location: [address]    Anesthesia Start: 1025 Anesthesia Stop: 0997    Procedure: PORT A CATH INSERTION (N/A Neck) Diagnosis: (RECTAL CANCER)    Surgeons: Cielo Duran MD Responsible Provider: Corinna Mai MD    Anesthesia Type: general ASA Status: 3          Anesthesia Type: general    Reema Phase I: Reema Score: 5    Reema Phase II:      Last vitals: Reviewed and per EMR flowsheets.        Anesthesia Post Evaluation    Patient location during evaluation: PACU  Patient participation: complete - patient participated  Level of consciousness: awake and alert  Pain score: 2  Airway patency: patent  Nausea & Vomiting: no vomiting  Complications: no  Cardiovascular status: blood pressure returned to baseline  Respiratory status: acceptable  Hydration status: euvolemic  Multimodal analgesia pain management approach

## 2022-02-18 ENCOUNTER — APPOINTMENT (OUTPATIENT)
Dept: MRI IMAGING | Age: 62
DRG: 982 | End: 2022-02-18
Payer: COMMERCIAL

## 2022-02-18 LAB
ANION GAP SERPL CALCULATED.3IONS-SCNC: 10 MMOL/L (ref 3–16)
BASOPHILS ABSOLUTE: 0.1 K/UL (ref 0–0.2)
BASOPHILS RELATIVE PERCENT: 0.6 %
BLOOD CULTURE, ROUTINE: NORMAL
BUN BLDV-MCNC: 11 MG/DL (ref 7–20)
CALCIUM SERPL-MCNC: 8.1 MG/DL (ref 8.3–10.6)
CHLORIDE BLD-SCNC: 98 MMOL/L (ref 99–110)
CO2: 21 MMOL/L (ref 21–32)
CREAT SERPL-MCNC: 0.9 MG/DL (ref 0.8–1.3)
CULTURE, BLOOD 2: NORMAL
EOSINOPHILS ABSOLUTE: 0.2 K/UL (ref 0–0.6)
EOSINOPHILS RELATIVE PERCENT: 1.7 %
GFR AFRICAN AMERICAN: >60
GFR NON-AFRICAN AMERICAN: >60
GLUCOSE BLD-MCNC: 154 MG/DL (ref 70–99)
GLUCOSE BLD-MCNC: 161 MG/DL (ref 70–99)
GLUCOSE BLD-MCNC: 190 MG/DL (ref 70–99)
GLUCOSE BLD-MCNC: 194 MG/DL (ref 70–99)
GLUCOSE BLD-MCNC: 199 MG/DL (ref 70–99)
GLUCOSE BLD-MCNC: 99 MG/DL (ref 70–99)
HCT VFR BLD CALC: 24.7 % (ref 40.5–52.5)
HCT VFR BLD CALC: 24.9 % (ref 40.5–52.5)
HCT VFR BLD CALC: 25 % (ref 40.5–52.5)
HCT VFR BLD CALC: 25.4 % (ref 40.5–52.5)
HEMOGLOBIN: 7.6 G/DL (ref 13.5–17.5)
HEMOGLOBIN: 7.8 G/DL (ref 13.5–17.5)
HEMOGLOBIN: 7.8 G/DL (ref 13.5–17.5)
HEMOGLOBIN: 8 G/DL (ref 13.5–17.5)
LYMPHOCYTES ABSOLUTE: 0.9 K/UL (ref 1–5.1)
LYMPHOCYTES RELATIVE PERCENT: 9.8 %
MCH RBC QN AUTO: 21 PG (ref 26–34)
MCHC RBC AUTO-ENTMCNC: 31.6 G/DL (ref 31–36)
MCV RBC AUTO: 66.4 FL (ref 80–100)
MONOCYTES ABSOLUTE: 0.9 K/UL (ref 0–1.3)
MONOCYTES RELATIVE PERCENT: 10.1 %
NEUTROPHILS ABSOLUTE: 6.9 K/UL (ref 1.7–7.7)
NEUTROPHILS RELATIVE PERCENT: 77.8 %
PDW BLD-RTO: 20.6 % (ref 12.4–15.4)
PERFORMED ON: ABNORMAL
PERFORMED ON: NORMAL
PLATELET # BLD: 353 K/UL (ref 135–450)
PMV BLD AUTO: 7 FL (ref 5–10.5)
POTASSIUM REFLEX MAGNESIUM: 3.7 MMOL/L (ref 3.5–5.1)
RBC # BLD: 3.82 M/UL (ref 4.2–5.9)
SODIUM BLD-SCNC: 129 MMOL/L (ref 136–145)
SOLUBLE TRANSFERRIN RECEPT: 7.4 MG/L (ref 2.2–5)
WBC # BLD: 8.9 K/UL (ref 4–11)

## 2022-02-18 PROCEDURE — 2580000003 HC RX 258: Performed by: INTERNAL MEDICINE

## 2022-02-18 PROCEDURE — 6360000002 HC RX W HCPCS: Performed by: INTERNAL MEDICINE

## 2022-02-18 PROCEDURE — 6370000000 HC RX 637 (ALT 250 FOR IP): Performed by: INTERNAL MEDICINE

## 2022-02-18 PROCEDURE — 74183 MRI ABD W/O CNTR FLWD CNTR: CPT

## 2022-02-18 PROCEDURE — APPNB30 APP NON BILLABLE TIME 0-30 MINS: Performed by: NURSE PRACTITIONER

## 2022-02-18 PROCEDURE — 85018 HEMOGLOBIN: CPT

## 2022-02-18 PROCEDURE — 6370000000 HC RX 637 (ALT 250 FOR IP): Performed by: SURGERY

## 2022-02-18 PROCEDURE — APPSS15 APP SPLIT SHARED TIME 0-15 MINUTES: Performed by: NURSE PRACTITIONER

## 2022-02-18 PROCEDURE — 36415 COLL VENOUS BLD VENIPUNCTURE: CPT

## 2022-02-18 PROCEDURE — 85014 HEMATOCRIT: CPT

## 2022-02-18 PROCEDURE — A9577 INJ MULTIHANCE: HCPCS | Performed by: INTERNAL MEDICINE

## 2022-02-18 PROCEDURE — 1200000000 HC SEMI PRIVATE

## 2022-02-18 PROCEDURE — 6360000004 HC RX CONTRAST MEDICATION: Performed by: INTERNAL MEDICINE

## 2022-02-18 PROCEDURE — 85025 COMPLETE CBC W/AUTO DIFF WBC: CPT

## 2022-02-18 PROCEDURE — 6360000002 HC RX W HCPCS: Performed by: SURGERY

## 2022-02-18 PROCEDURE — 2580000003 HC RX 258: Performed by: SURGERY

## 2022-02-18 PROCEDURE — 80048 BASIC METABOLIC PNL TOTAL CA: CPT

## 2022-02-18 PROCEDURE — 94760 N-INVAS EAR/PLS OXIMETRY 1: CPT

## 2022-02-18 PROCEDURE — 99024 POSTOP FOLLOW-UP VISIT: CPT | Performed by: SURGERY

## 2022-02-18 RX ORDER — TAMSULOSIN HYDROCHLORIDE 0.4 MG/1
0.4 CAPSULE ORAL DAILY
Qty: 30 CAPSULE | Refills: 3 | Status: SHIPPED | OUTPATIENT
Start: 2022-02-19 | End: 2022-10-18

## 2022-02-18 RX ORDER — INSULIN LISPRO 100 [IU]/ML
9 INJECTION, SOLUTION INTRAVENOUS; SUBCUTANEOUS
Qty: 3 PEN | Refills: 0 | Status: SHIPPED | OUTPATIENT
Start: 2022-02-18 | End: 2022-04-06 | Stop reason: SDUPTHER

## 2022-02-18 RX ORDER — SODIUM CHLORIDE 9 MG/ML
INJECTION, SOLUTION INTRAVENOUS CONTINUOUS
Status: DISCONTINUED | OUTPATIENT
Start: 2022-02-18 | End: 2022-02-19 | Stop reason: HOSPADM

## 2022-02-18 RX ADMIN — SODIUM CHLORIDE: 9 INJECTION, SOLUTION INTRAVENOUS at 18:46

## 2022-02-18 RX ADMIN — METOPROLOL SUCCINATE 100 MG: 50 TABLET, FILM COATED, EXTENDED RELEASE ORAL at 08:01

## 2022-02-18 RX ADMIN — INSULIN LISPRO 2 UNITS: 100 INJECTION, SOLUTION INTRAVENOUS; SUBCUTANEOUS at 18:45

## 2022-02-18 RX ADMIN — INSULIN LISPRO 2 UNITS: 100 INJECTION, SOLUTION INTRAVENOUS; SUBCUTANEOUS at 11:53

## 2022-02-18 RX ADMIN — MORPHINE SULFATE 2 MG: 2 INJECTION, SOLUTION INTRAMUSCULAR; INTRAVENOUS at 21:47

## 2022-02-18 RX ADMIN — MORPHINE SULFATE 2 MG: 2 INJECTION, SOLUTION INTRAMUSCULAR; INTRAVENOUS at 05:01

## 2022-02-18 RX ADMIN — MORPHINE SULFATE 2 MG: 2 INJECTION, SOLUTION INTRAMUSCULAR; INTRAVENOUS at 19:01

## 2022-02-18 RX ADMIN — TRAMADOL HYDROCHLORIDE 100 MG: 50 TABLET, COATED ORAL at 20:24

## 2022-02-18 RX ADMIN — TRAMADOL HYDROCHLORIDE 100 MG: 50 TABLET, COATED ORAL at 10:59

## 2022-02-18 RX ADMIN — IRON SUCROSE 200 MG: 20 INJECTION, SOLUTION INTRAVENOUS at 11:49

## 2022-02-18 RX ADMIN — GADOBENATE DIMEGLUMINE 15 ML: 529 INJECTION, SOLUTION INTRAVENOUS at 15:50

## 2022-02-18 RX ADMIN — TAMSULOSIN HYDROCHLORIDE 0.4 MG: 0.4 CAPSULE ORAL at 08:01

## 2022-02-18 RX ADMIN — INSULIN LISPRO 9 UNITS: 100 INJECTION, SOLUTION INTRAVENOUS; SUBCUTANEOUS at 18:45

## 2022-02-18 RX ADMIN — INSULIN LISPRO 2 UNITS: 100 INJECTION, SOLUTION INTRAVENOUS; SUBCUTANEOUS at 08:06

## 2022-02-18 RX ADMIN — MORPHINE SULFATE 2 MG: 2 INJECTION, SOLUTION INTRAMUSCULAR; INTRAVENOUS at 00:19

## 2022-02-18 RX ADMIN — INSULIN LISPRO 9 UNITS: 100 INJECTION, SOLUTION INTRAVENOUS; SUBCUTANEOUS at 08:05

## 2022-02-18 RX ADMIN — INSULIN LISPRO 9 UNITS: 100 INJECTION, SOLUTION INTRAVENOUS; SUBCUTANEOUS at 11:53

## 2022-02-18 ASSESSMENT — PAIN SCALES - GENERAL
PAINLEVEL_OUTOF10: 1
PAINLEVEL_OUTOF10: 6
PAINLEVEL_OUTOF10: 6
PAINLEVEL_OUTOF10: 5
PAINLEVEL_OUTOF10: 5
PAINLEVEL_OUTOF10: 2
PAINLEVEL_OUTOF10: 6
PAINLEVEL_OUTOF10: 4
PAINLEVEL_OUTOF10: 3
PAINLEVEL_OUTOF10: 6

## 2022-02-18 ASSESSMENT — PAIN DESCRIPTION - FREQUENCY
FREQUENCY: CONTINUOUS
FREQUENCY: CONTINUOUS

## 2022-02-18 ASSESSMENT — PAIN DESCRIPTION - LOCATION
LOCATION: ABDOMEN
LOCATION: ABDOMEN
LOCATION: RECTUM

## 2022-02-18 ASSESSMENT — PAIN DESCRIPTION - PAIN TYPE
TYPE: CHRONIC PAIN
TYPE: CHRONIC PAIN
TYPE: ACUTE PAIN

## 2022-02-18 ASSESSMENT — PAIN DESCRIPTION - DESCRIPTORS
DESCRIPTORS: DISCOMFORT
DESCRIPTORS: DISCOMFORT

## 2022-02-18 NOTE — PROGRESS NOTES
Physical/Occupational Therapy  Hanna Navarro   PT/OT evaluations attempted. The pt was leaving the floor for MRI. PT/OT will follow-up with this pt as the schedule allows. Thanks.   Quincy Beckman, PT DPT [id number]  Negro Faulkner OTR/L  [id number]

## 2022-02-18 NOTE — CARE COORDINATION
CM met with pt and sister Kiersten Baker to discuss dc needs. Kiersten Baker was enquiring about DME equipment such as a hospital bed and walker. CM explained that pt is ambulatory without assist therefore not elegible for insurance covered DME, but a walker can be purchased off line or a local medical pharmacy. Pt and sister MILO. CM provided pt with a list of [facility] physicians and advised to call the number provided and get scheduled with a PCP and if DME needs arise, the PCP can assist. Pt verbalized plan to call and make appointment prior to dc. Sister asked about local supports for person's with a new Cancer Dx. CM provided pt with information, phone number and web site link to the [address].     Electronically signed by Marisol Patel RN on 2/18/2022 at 1:26 PM

## 2022-02-18 NOTE — PROGRESS NOTES
Insulin administration education provided to patient and patient's sister. Patient able to adminster insulin correctly.

## 2022-02-18 NOTE — PROGRESS NOTES
Urology Progress Note  [facility]    Provider: MELODY Ireland CNP  Patient ID:  Admission Date: 2022 Name: Osvaldo Mercado Date: 2022 MRN: [id number]   Patient Location: [id number] : 1960  Attending: Tristan Lorenzo MD Date of Service: 2022  PCP: No primary care provider on file. Diagnoses:  Hydronephrosis    Assessment/Plan:  59 yo male seen down in endoscopy this morning     He presents with abdominal pain x1 month, rectal bleeding and weight loss  Hx of DM and has been noncompliant with medications, noted to be in DKA  CT a/p 2022 demonstrates a large pelvic mass centered in the sigmoid colon and rectum with invasion of the prostate and possibly the posterior bladder wall. Bilateral hydronephrosis 2/2 to mass effect on the distal ureters R>L. UA is unremarkable   Cr is improved to 0.8 today  AFVSS      TODAY Denies flank pain. No CVAT. No SP pain. Reports good stream this morning on flomax. PVR was 53cc's last night. He does however report incomplete bladder emptying which is very likely related to his large pelvic mass impinging on the base of the bladder.      Recommendations:  Hydronephrosis likely related to extrinsic ureteral compression although the kidneys appear to be draining somwhat  No multani was placed, patient would very much like to avoid multani  Continue Flomax  PVR now low 53cc's and Cr is stable  No urgent  intervention although we will follow along and monitor his Cr and for development of flank pain. The patient had a chance to ask questions which were answered. he understands the above plan. Subjective:   Hanna Navarro is a 58 y.o. male. He was seen and examined this morning. Today we discussed Avoiding multani. We did discuss his bladder being full on CT. He tells me he has always been told he has an abnormally large bladder.       Objective:   Vitals:  Vitals:    22 0759   BP: 133/68   Pulse: 81   Resp: 16   Temp: 97.4 °F (36.3 °C)   SpO2: 96%       Intake/Output Summary (Last 24 hours) at 2/18/2022 1038  Last data filed at 2/18/2022 0511  Gross per 24 hour   Intake 700 ml   Output 1650 ml   Net -950 ml     Physical Exam:  Gen: Alert and oriented x3, no acute distress  CV: Regular rate   Resp: unlabored respirations  Abd: Soft, non-distended, non-tender, no masses  Ext: no peripheral edema noted, moves upper and lower extremities spontaneously  Skin: warmand well perfused, no rashes noted on the face, or arms.      Labs:  Lab Results   Component Value Date    WBC 8.9 02/18/2022    HGB 8.0 (L) 02/18/2022    HCT 25.4 (L) 02/18/2022    MCV 66.4 (L) 02/18/2022     02/18/2022     Lab Results   Component Value Date    CREATININE 0.9 02/18/2022    BUN 11 02/18/2022     (L) 02/18/2022    K 3.7 02/18/2022    CL 98 (L) 02/18/2022    CO2 21 02/18/2022       MELODY Vera - CNP   2/18/2022

## 2022-02-18 NOTE — PROGRESS NOTES
[facility]   Progress Note      2/18/2022 9:20 AM        Name: Vinny Malave . Admitted: 2/14/2022    SUBJECTIVE:  He is doing OK, he offers no new complaints. Rectal pain is the same, worse when sitting, pain controlled with Ultram during the day, morphine at night. Reviewed interval ancillary notes    Current Medications  insulin glargine (LANTUS;BASAGLAR) injection pen 28 Units, Daily  insulin lispro (1 Unit Dial) 9 Units, TID WC  dermaphor ointment, 4x Daily PRN  morphine (PF) injection 2 mg, Q3H PRN  tamsulosin (FLOMAX) capsule 0.4 mg, Daily  insulin lispro (1 Unit Dial) 0-12 Units, TID WC  insulin lispro (1 Unit Dial) 0-6 Units, Nightly  glucose (GLUTOSE) 40 % oral gel 15 g, PRN  glucagon (rDNA) injection 1 mg, PRN  dextrose 5 % solution, PRN  traMADol (ULTRAM) tablet 100 mg, Q4H PRN  iron sucrose (VENOFER) 200 mg in sodium chloride 0.9 % 100 mL IVPB, Q24H  eucerin cream, PRN  metoprolol succinate (TOPROL XL) extended release tablet 100 mg, Daily  polyethylene glycol (GLYCOLAX) packet 17 g, Daily PRN  dextrose 50 % IV solution, PRN  magnesium sulfate 1000 mg in dextrose 5% 100 mL IVPB, PRN  acetaminophen (TYLENOL) tablet 650 mg, Q4H PRN        Objective:  /68   Pulse 81   Temp 97.4 °F (36.3 °C) (Temporal)   Resp 16   Ht 5' 11\" (1.803 m)   Wt 166 lb 14.2 oz (75.7 kg)   SpO2 96%   BMI 23.28 kg/m²     Intake/Output Summary (Last 24 hours) at 2/18/2022 0920  Last data filed at 2/18/2022 0511  Gross per 24 hour   Intake 700 ml   Output 1650 ml   Net -950 ml      Wt Readings from Last 3 Encounters:   02/18/22 166 lb 14.2 oz (75.7 kg)       General appearance:  Appears comfortable  Eyes: Sclera clear. Pupils equal.  ENT: Moist oral mucosa. Trachea midline, no adenopathy. Cardiovascular: Regular rhythm, normal S1, S2. No murmur. No edema in lower extremities  Respiratory: Not using accessory muscles. Good inspiratory effort.  Clear to auscultation bilaterally, no wheeze or crackles. GI: Abdomen soft, no tenderness, not distended  Musculoskeletal: No cyanosis in digits, neck supple  Neurology: CN 2-12 grossly intact. No speech or motor deficits  Psych: Normal affect. Alert and oriented in time, place and person  Skin: Warm, dry, normal turgor    Labs and Tests:  CBC:   Recent Labs     02/16/22  0438 02/16/22  1154 02/17/22  0426 02/17/22  1317 02/17/22  1845 02/18/22  0124 02/18/22  0718   WBC 8.3  --  8.5  --   --   --  8.9   HGB 6.9*   < > 7.9*   < > 7.9* 7.8* 8.0*     --  344  --   --   --  353    < > = values in this interval not displayed. BMP:    Recent Labs     02/16/22  0438 02/17/22  0425 02/18/22  0719   * 133* 129*   K 4.1 3.7 3.7    100 98*   CO2 19* 21 21   BUN 14 10 11   CREATININE 0.8 0.7* 0.9   GLUCOSE 247* 219* 194*     Hepatic:   No results for input(s): AST, ALT, ALB, BILITOT, ALKPHOS in the last 72 hours. ASSESSMENT AND PLAN    Active Problems:    DKA, type 1, not at goal [facility])    Diabetic ketoacidosis without coma associated with type 2 diabetes mellitus ([facility] [address]    Rectal cancer ([facility] [address]  Resolved Problems:    * No resolved hospital problems. *      1. Rectal mass:  -Mass measuring 7.1 x 10.1 x 14.4 centered on the distal sigmoid colon and rectum, probable adenopathy posterior to the upper margin of the mass and bilateral hydronephrosis secondary to mass effect on the distal ureters, right greater than left.   -Findings concerning for malignancy.   -Colonoscopy today which found a large friable rectosigmoid malignant appearing mass.   -Final pathology confirms rectal adenocarcinoma   -No lung metastasis on CT chest.    -CT abdomen and pelvis has not shown metastatic disease in the liver  -CEA is 22.4.  -Plan of treatment with total neoadjuvant chemotherapy with FOLFOX, followed by chemoradiation, followed by surgery  - port placed 2/17/22  - patient has appointment with Dr. Sameer Wasserman on 2/22/22   - MRI of liver is planned for today     2. Hydronephrosis:  -Secondary to mass effect.  -Urology is following.     3. Anemia:  -Iron studies consistent with AOCD and iron deficiency. -Receiving Venofer 200 mg daily x3  -PRBC transfusion 2/16/22  - hgb 8.0 today.     4. DKA:  -Resolved. -Diabetes management per primary team.     Ottoniel Blanchard CNP  [facility]    The patient was seen and examined. Agree with above. MRI of the liver will be done later on today. Okay to discharge from medical oncology perspective.   We will ensure outpatient follow-up with Dr. Shepard Cancer in 7 to 10 days to start systemic chemotherapy    Urszula Bell MD

## 2022-02-18 NOTE — PROGRESS NOTES
Patient assessment completed. VSS. Medications given. Patient states pain is 1/10. Patient agrees with plan of care, denies further needs. Call light within reach. Diabetes educator consulted. Will continue to monitor.

## 2022-02-18 NOTE — PROGRESS NOTES
PALLIATIVE MEDICINE PROGRESS NOTE     Patient name:Juan Alba    [facility]:[id number] :1960  Room/Bed:ICU-3907/3907-01    LOS: 4 days        ASSESSMENT/RECOMMENDATIONS     58 y.o. male with Back pain and weight loss with new pelvic mass        Symptom Management:  Back pain- related to pelvic mass pt reports morphine made him drowsy but didn't help with pain he reports that Tylenol helped at home. Will try Ultram Q4   Weight Loss- pt reports 50lb weight loss over past 3 months  Goals of Care- [facility],  palliative care to follow at home.       Patient/Family Goals of Care :    2/15  talked to sisters at bedside and attempted to talk to pt he reports that he is tired s/p colonoscopy and overwhelmed will attempt to discuss more tomorrow.   Pt is adamant that he wants to try Chemo/XRT but wants to be a [address]. He wants no life sustaining Tx at any time not just time of arrest. He wants to complete advance directives if possible this admission naming his sister as HCPOA. He is agreeable to palliative care to follow at home.   Pt pain well controlled today. No new issues today.      Disposition/Discharge Plan:   pending     Advance Directives:  Surrogate Decision Maker: Bi Zuluaga  Code status:  DNR-CCA     Case discussed with: patient, floor RN, William OLIVER  Thank you for allowing us to participate in the care of this patient. SUBJECTIVE     Chief Complaint: Back pain    Last 24 hours:   Pt reports pain is well controlled. No new issues. ROS:  Review of Systems -     History obtained from chart review and the patient  General ROS: positive for  - fatigue, malaise and weight loss  Gastrointestinal ROS: positive for - blood in stools, change in bowel habits, change in stools and constipation  Genito-Urinary ROS: positive for - dysuria and urinary retention      A complete 10 count ROS was obtained. Pertinent positives mentioned above in HPI/ROS.   All others if not mentioned are negative. OBJECTIVE   /68   Pulse 81   Temp 97.4 °F (36.3 °C) (Temporal)   Resp 16   Ht 5' 11\" (1.803 m)   Wt 166 lb 14.2 oz (75.7 kg)   SpO2 96%   BMI 23.28 kg/m²   I/O last 3 completed shifts: In: 36 [P.O.:420; I.V.:400]  Out: 3125 [Urine:3125]  No intake/output data recorded.       Physical Examination:     General appearance - alert, well appearing, and in no distress  Mental status - alert, oriented to person, place, and time  Neck - supple, no significant adenopathy  Chest - clear to auscultation, no wheezes, rales or rhonchi, symmetric air entry  Abdomen - soft, nontender, nondistended, no masses or organomegaly  Musculoskeletal - no joint tenderness, deformity or swelling  Skin - normal coloration and turgor, no rashes, no suspicious skin lesions noted       Total time: 60 minutes  >50% of time spent counseling patient at bedside or POA/family member if applicable , reviewing information and discussing care, coordinating with care team       Signed By: Electronically signed by MELODY Ge CNP on 2/18/2022 at 10:50 AM   Palliative Medicine       February 18, 2022

## 2022-02-18 NOTE — PROGRESS NOTES
CLINICAL PHARMACY NOTE: MEDS TO BEDS    Total # of Prescriptions Filled: 7   The following medications were delivered to the patient:  · Basaglar  · Flomax 0.4mg  · Humalog  · Metformin 500 mg  · Lancets  · Test strips  · Meter    Additional Documentation:    Delivered to [employer] rafael Armstrong CP

## 2022-02-18 NOTE — PROGRESS NOTES
Patient going to be discharged home with insulin. Patient has not been practicing insulin administration while here. Patient doesn't feel comfortable going home today with out more education/ practice. Awaiting diabetes educator. Will provide extra education as well.  Okay to discharge tomorrow per Dr. Dank Dodd.

## 2022-02-18 NOTE — PROGRESS NOTES
[facility] and Laparoscopic Surgery        Progress Note    Patient Name: Jorge A Anderson  MRN: [id number]  [place]: 1960  Date of Evaluation: 2022    Chief Complaint: Weakness, lightheadedness    Subjective:  No acute events overnight  Pain controlled, denies any pain at port site  No nausea or vomiting  Resting in bed at this time      Vital Signs:  Patient Vitals for the past 24 hrs:   BP Temp Temp src Pulse Resp SpO2 Weight   22 0759 133/68 97.4 °F (36.3 °C) Temporal 81 16 96 % --   22 0500 (!) 118/54 97.4 °F (36.3 °C) Temporal 72 16 97 % --   22 0445 -- -- -- -- -- -- 166 lb 14.2 oz (75.7 kg)   22 0019 117/71 97.1 °F (36.2 °C) Temporal 74 16 96 % --   22 -- -- -- 78 -- -- --   22 1953 107/65 97.8 °F (36.6 °C) Temporal 78 16 98 % --   22 194 -- -- -- -- -- -- 168 lb 3.4 oz (76.3 kg)   22 1700 108/60 98.2 °F (36.8 °C) Temporal 79 14 96 % --      TEMPERATURE HISTORY 24H: Temp (24hrs), Av.6 °F (36.4 °C), Min:97.1 °F (36.2 °C), Max:98.2 °F (36.8 °C)    BLOOD PRESSURE HISTORY: Systolic (22RVL), KLF:005 , Min:85 , YOV:894    Diastolic (89RFV), XGX:54, Min:51, Max:82      Intake/Output:  I/O last 3 completed shifts: In: 36 [P.O.:420; I.V.:400]  Out: 3125 [Urine:3125]  No intake/output data recorded.   Drain/tube Output:       Physical Exam:  General: awake, alert, oriented to person, place, time  Cardiovascular:  regular rate and rhythm and no murmur noted  Lungs: clear to auscultation  Abdomen: soft, minimal left lower quadrant tenderness, mild lower abdominal distention, bowel sounds normal   Skin/Wound: left chest wall incision healing well, no drainage, no erythema, well approximated with Dermabond    Labs:  CBC:    Recent Labs     22  0438 22  1154 22  0426 22  1317 22  0124 22  0718 22  1307   WBC 8.3  --  8.5  --   --  8.9  --    HGB 6.9*   < > 7.9*   < > 7.8* 8.0* 7.8*   HCT 22.8* < > 25.1*   < > 24.9* 25.4* 25.0*     --  344  --   --  353  --     < > = values in this interval not displayed. BMP:    Recent Labs     02/16/22  0438 02/17/22  0425 02/18/22  0719   * 133* 129*   K 4.1 3.7 3.7    100 98*   CO2 19* 21 21   BUN 14 10 11   CREATININE 0.8 0.7* 0.9   GLUCOSE 247* 219* 194*     Hepatic:    No results for input(s): AST, ALT, ALB, BILITOT, ALKPHOS in the last 72 hours. Amylase:  No results found for: AMYLASE  Lipase:    Lab Results   Component Value Date    LIPASE 60.0 02/14/2022      Mag:    Lab Results   Component Value Date    MG 1.70 02/15/2022    MG 1.70 02/15/2022     Phos:     Lab Results   Component Value Date    PHOS 2.4 02/15/2022    PHOS 1.7 02/15/2022      Coags:   Lab Results   Component Value Date    PROTIME 11.8 02/15/2022    INR 1.04 02/15/2022       Cultures:  Anaerobic culture  No results found for: LABANAE  Fungus stain  No results found for requested labs within last 30 days. Gram stain  No results found for requested labs within last 30 days. Organism  No results found for: [facility]  Surgical culture  No results found for: CXSURG  Blood culture 1  Results in Past 30 Days  Result Component Current Result Ref Range Previous Result Ref Range   Blood Culture, Routine No Growth to date. Any change in status will be called. (2/14/2022)  Not in Time Range      Blood culture 2  Results in Past 30 Days  Result Component Current Result Ref Range Previous Result Ref Range   Culture, Blood 2 No Growth to date. Any change in status will be called. (2/14/2022)  Not in Time Range      Fecal occult  Results in Past 30 Days  Result Component Current Result Ref Range Previous Result Ref Range   Occult Blood Diagnostic Result: POSITIVE  Normal range: Negative   (A) (2/14/2022)  Not in Time Range      GI bacterial pathogens by PCR  No results found for requested labs within last 30 days.      C. difficile  No results found for requested labs within last 27 days.     Urine culture  No results found for: LABURIN    Pathology:  Flexible sigmoidoscopy on 2/15/2022--FINAL DIAGNOSIS:        A. Colon, recto sigmoid mass, biopsy:      - Adenocarcinoma, at least intramucosal.        B. Colon, rectum, polyp, biopsy:      -Tubulovillous adenoma. Imaging:  I have personally reviewed the following films:    XR CHEST PORTABLE    Result Date: 2/17/2022  EXAMINATION: ONE XRAY VIEW OF THE CHEST 2/17/2022 11:32 am COMPARISON: Chest CT 02/15/2022 and chest radiograph 02/14/2022 HISTORY: ORDERING SYSTEM PROVIDED HISTORY: Status post Port-A-Cath FINDINGS: The cardiac silhouette is enlarged. Calcifications involving the aorta reflect atherosclerosis. The mediastinal and hilar silhouettes appear unremarkable. Low lung volumes. No definite infiltrate or nodule is seen. No pneumothorax is seen. No acute osseous abnormality is identified. Interval placement left subclavian Port-A-Cath, tip at the distal superior vena cava level. 1. Interval placement left subclavian Port-A-Cath, tip at the distal superior vena cava level. No pneumothorax evident. 2. No acute pulmonary disease evident. 3. Calcific atherosclerosis aorta. 4. Cardiomegaly. [facility] VASCULAR ACCESS GUIDANCE    Result Date: 2/17/2022  Radiology exam is complete. No Radiologist dictation. Please follow up with ordering provider.      Scheduled Meds:   insulin glargine  28 Units SubCUTAneous Daily    insulin lispro  9 Units SubCUTAneous TID WC    tamsulosin  0.4 mg Oral Daily    insulin lispro  0-12 Units SubCUTAneous TID WC    insulin lispro  0-6 Units SubCUTAneous Nightly    iron sucrose (VENOFER) iv piggyback 100 mL (Admin over 60 minutes)  200 mg IntraVENous Q24H    metoprolol succinate  100 mg Oral Daily     Continuous Infusions:   dextrose       PRN Meds:.dermaphor, morphine, glucose, glucagon (rDNA), dextrose, traMADol, eucerin, polyethylene glycol, dextrose, magnesium sulfate, acetaminophen      Assessment:  62

## 2022-02-18 NOTE — PROGRESS NOTES
[facility] PROGRESS NOTE    2/18/2022 2:02 PM        Name: Esperanza Polanco Admitted: 2/14/2022  Primary Care Provider: No primary care provider on file. (Tel: None)            Subjective:      Bed no chest pain shortness breath fever chills    Reviewed interval ancillary notes    Current Medications  insulin glargine (LANTUS;BASAGLAR) injection pen 28 Units, Daily  insulin lispro (1 Unit Dial) 9 Units, TID WC  dermaphor ointment, 4x Daily PRN  morphine (PF) injection 2 mg, Q3H PRN  tamsulosin (FLOMAX) capsule 0.4 mg, Daily  insulin lispro (1 Unit Dial) 0-12 Units, TID WC  insulin lispro (1 Unit Dial) 0-6 Units, Nightly  glucose (GLUTOSE) 40 % oral gel 15 g, PRN  glucagon (rDNA) injection 1 mg, PRN  dextrose 5 % solution, PRN  traMADol (ULTRAM) tablet 100 mg, Q4H PRN  eucerin cream, PRN  metoprolol succinate (TOPROL XL) extended release tablet 100 mg, Daily  polyethylene glycol (GLYCOLAX) packet 17 g, Daily PRN  dextrose 50 % IV solution, PRN  magnesium sulfate 1000 mg in dextrose 5% 100 mL IVPB, PRN  acetaminophen (TYLENOL) tablet 650 mg, Q4H PRN        Objective:  /68   Pulse 81   Temp 97.4 °F (36.3 °C) (Temporal)   Resp 16   Ht 5' 11\" (1.803 m)   Wt 166 lb 14.2 oz (75.7 kg)   SpO2 96%   BMI 23.28 kg/m²     Intake/Output Summary (Last 24 hours) at 2/18/2022 1402  Last data filed at 2/18/2022 0511  Gross per 24 hour   Intake 300 ml   Output 1125 ml   Net -825 ml      Wt Readings from Last 3 Encounters:   02/18/22 166 lb 14.2 oz (75.7 kg)       General appearance:  Appears comfortable. AAOx3  HEENT: atraumatic, Pupils equal, muscous membranes dry, no masses appreciated  Cardiovascular: tachycardia no murmurs appreciated  Respiratory: CTAB no wheezing  Gastrointestinal: Abdomen soft, non-tender, BS+  EXT: no edema  Neurology: no gross focal deficts  Psychiatry: Appropriate affect.  Not agitated  Skin: Warm, greater than left. XR CHEST PORTABLE   Final Result   Clear chest without acute cardiopulmonary process. MRI ABDOMEN W WO CONTRAST    (Results Pending)       Recent imaging reviewed    Problem List  Active Problems:    DKA, type 1, not at goal [facility])    Diabetic ketoacidosis without coma associated with type 2 diabetes mellitus ([facility] [address]    Rectal cancer ([facility] [address]  Resolved Problems:    * No resolved hospital problems. *             Assessment/Plan:   Dka: Increase lantus diabetic education and teaching     Sigmoid and rectum mass  -s/p flex sig Large friable rectosigmoid malignant appearing mass, occupying 70-80% of lumen, from 20 cm to 5 cm. Extensive biopsies obtained. 6 mm sessile rectal polyp, cold snared.   - - pathology back for adenocarnicoma of rectum  - port today  - mri for possibel liver met will fu    Acute blood loss anemia secondary to GI bleeding secondary to above  -s/p 1 unit prbcs and monitor q6hrs   - monitor h/h transfue to keep >7  - iron deficiecny anemia getting iv iron    Bilateral hydronephrosis  - no multani placed per patient wishes will monitor with bladder scans   - gu on board     DVT prophylaxis scds  Code status dnr cca       Christiano Acosta MD   2/18/2022 2:02 PM

## 2022-02-19 VITALS
WEIGHT: 173.72 LBS | RESPIRATION RATE: 16 BRPM | BODY MASS INDEX: 24.32 KG/M2 | DIASTOLIC BLOOD PRESSURE: 80 MMHG | TEMPERATURE: 96.8 F | OXYGEN SATURATION: 95 % | HEIGHT: 71 IN | HEART RATE: 88 BPM | SYSTOLIC BLOOD PRESSURE: 156 MMHG

## 2022-02-19 LAB
ANION GAP SERPL CALCULATED.3IONS-SCNC: 10 MMOL/L (ref 3–16)
BUN BLDV-MCNC: 12 MG/DL (ref 7–20)
CALCIUM SERPL-MCNC: 7.8 MG/DL (ref 8.3–10.6)
CHLORIDE BLD-SCNC: 102 MMOL/L (ref 99–110)
CO2: 21 MMOL/L (ref 21–32)
CREAT SERPL-MCNC: 0.7 MG/DL (ref 0.8–1.3)
GFR AFRICAN AMERICAN: >60
GFR NON-AFRICAN AMERICAN: >60
GLUCOSE BLD-MCNC: 154 MG/DL (ref 70–99)
GLUCOSE BLD-MCNC: 164 MG/DL (ref 70–99)
GLUCOSE BLD-MCNC: 165 MG/DL (ref 70–99)
GLUCOSE BLD-MCNC: 202 MG/DL (ref 70–99)
HCT VFR BLD CALC: 23.7 % (ref 40.5–52.5)
HCT VFR BLD CALC: 25 % (ref 40.5–52.5)
HEMOGLOBIN: 7.4 G/DL (ref 13.5–17.5)
HEMOGLOBIN: 7.5 G/DL (ref 13.5–17.5)
MAGNESIUM: 1.5 MG/DL (ref 1.8–2.4)
PERFORMED ON: ABNORMAL
POTASSIUM REFLEX MAGNESIUM: 3.4 MMOL/L (ref 3.5–5.1)
SODIUM BLD-SCNC: 133 MMOL/L (ref 136–145)

## 2022-02-19 PROCEDURE — 6370000000 HC RX 637 (ALT 250 FOR IP): Performed by: INTERNAL MEDICINE

## 2022-02-19 PROCEDURE — 6370000000 HC RX 637 (ALT 250 FOR IP): Performed by: SURGERY

## 2022-02-19 PROCEDURE — 51798 US URINE CAPACITY MEASURE: CPT

## 2022-02-19 PROCEDURE — 85018 HEMOGLOBIN: CPT

## 2022-02-19 PROCEDURE — 85014 HEMATOCRIT: CPT

## 2022-02-19 PROCEDURE — 36415 COLL VENOUS BLD VENIPUNCTURE: CPT

## 2022-02-19 PROCEDURE — 94760 N-INVAS EAR/PLS OXIMETRY 1: CPT

## 2022-02-19 PROCEDURE — 80048 BASIC METABOLIC PNL TOTAL CA: CPT

## 2022-02-19 PROCEDURE — 85025 COMPLETE CBC W/AUTO DIFF WBC: CPT

## 2022-02-19 PROCEDURE — 83735 ASSAY OF MAGNESIUM: CPT

## 2022-02-19 PROCEDURE — 6360000002 HC RX W HCPCS: Performed by: INTERNAL MEDICINE

## 2022-02-19 PROCEDURE — 2580000003 HC RX 258: Performed by: INTERNAL MEDICINE

## 2022-02-19 RX ORDER — OXYCODONE HYDROCHLORIDE AND ACETAMINOPHEN 5; 325 MG/1; MG/1
1 TABLET ORAL EVERY 6 HOURS PRN
Qty: 20 TABLET | Refills: 0 | Status: SHIPPED | OUTPATIENT
Start: 2022-02-19 | End: 2022-02-24

## 2022-02-19 RX ORDER — POTASSIUM CHLORIDE 20 MEQ/1
40 TABLET, EXTENDED RELEASE ORAL ONCE
Status: COMPLETED | OUTPATIENT
Start: 2022-02-19 | End: 2022-02-19

## 2022-02-19 RX ADMIN — INSULIN LISPRO 9 UNITS: 100 INJECTION, SOLUTION INTRAVENOUS; SUBCUTANEOUS at 08:26

## 2022-02-19 RX ADMIN — INSULIN LISPRO 4 UNITS: 100 INJECTION, SOLUTION INTRAVENOUS; SUBCUTANEOUS at 12:46

## 2022-02-19 RX ADMIN — POTASSIUM CHLORIDE 40 MEQ: 1500 TABLET, EXTENDED RELEASE ORAL at 08:41

## 2022-02-19 RX ADMIN — INSULIN LISPRO 9 UNITS: 100 INJECTION, SOLUTION INTRAVENOUS; SUBCUTANEOUS at 12:47

## 2022-02-19 RX ADMIN — INSULIN LISPRO 2 UNITS: 100 INJECTION, SOLUTION INTRAVENOUS; SUBCUTANEOUS at 08:30

## 2022-02-19 RX ADMIN — TAMSULOSIN HYDROCHLORIDE 0.4 MG: 0.4 CAPSULE ORAL at 08:25

## 2022-02-19 RX ADMIN — SODIUM CHLORIDE: 9 INJECTION, SOLUTION INTRAVENOUS at 05:06

## 2022-02-19 RX ADMIN — TRAMADOL HYDROCHLORIDE 100 MG: 50 TABLET, COATED ORAL at 13:46

## 2022-02-19 RX ADMIN — MORPHINE SULFATE 2 MG: 2 INJECTION, SOLUTION INTRAMUSCULAR; INTRAVENOUS at 04:32

## 2022-02-19 RX ADMIN — MORPHINE SULFATE 2 MG: 2 INJECTION, SOLUTION INTRAMUSCULAR; INTRAVENOUS at 00:59

## 2022-02-19 RX ADMIN — METOPROLOL SUCCINATE 100 MG: 50 TABLET, FILM COATED, EXTENDED RELEASE ORAL at 08:25

## 2022-02-19 RX ADMIN — TRAMADOL HYDROCHLORIDE 100 MG: 50 TABLET, COATED ORAL at 00:17

## 2022-02-19 RX ADMIN — TRAMADOL HYDROCHLORIDE 100 MG: 50 TABLET, COATED ORAL at 08:41

## 2022-02-19 ASSESSMENT — PAIN SCALES - GENERAL
PAINLEVEL_OUTOF10: 2
PAINLEVEL_OUTOF10: 0
PAINLEVEL_OUTOF10: 0
PAINLEVEL_OUTOF10: 8
PAINLEVEL_OUTOF10: 4
PAINLEVEL_OUTOF10: 8
PAINLEVEL_OUTOF10: 1
PAINLEVEL_OUTOF10: 1

## 2022-02-19 ASSESSMENT — PAIN DESCRIPTION - PAIN TYPE
TYPE: ACUTE PAIN
TYPE: ACUTE PAIN

## 2022-02-19 ASSESSMENT — PAIN DESCRIPTION - LOCATION
LOCATION: BACK
LOCATION: BACK

## 2022-02-19 NOTE — DISCHARGE INSTR - COC
Continuity of Care Form    Patient Name: Lona Jaffe   :  1960  MRN:  [id number]    Admit date:  2022  Discharge date:  2022      Code Status Order: [facility]   Advance Directives:   [address] [facility] Documentation       Date/Time Healthcare Directive Type of Healthcare Directive Copy in [facility] [address] Agent's Name Healthcare Agent's Phone Number    02/15/22 1026 Yes, patient has an advance directive for healthcare treatment Other (Comment)  DNR-CCA Yes, copy in chart -- -- --            Admitting Physician:  Marylee Garb, MD  PCP: No primary care provider on file. Discharging Nurse: Shanell [facility] Unit[facility] BQW-8995/9373-64  Discharging Unit Phone Number: [phone]    Emergency Contact:   Extended Emergency Contact Information  Primary Emergency Contact: Denisse Muller  Mobile Phone: [phone]  Relation: Brother/Sister    Past Surgical History:  Past Surgical History:   Procedure Laterality Date    ABDOMEN SURGERY  1975    Exploratory    PORT SURGERY N/A 2022    PORT A CATH INSERTION performed by Brit Mitchell MD at [facility] N/A 2/15/2022    SIGMOIDOSCOPY BIOPSY FLEXIBLE performed by Niesha Arriaga MD at [address]  2/15/2022    SIGMOIDOSCOPY POLYP SNARE performed by Niesha Arriaga MD at [address] ENDOSCOPY       Immunization History: There is no immunization history on file for this patient.     Active Problems:  Patient Active Problem List   Diagnosis Code    DKA, type 1, not at goal [facility]) E10.10    Diabetic ketoacidosis without coma associated with type 2 diabetes mellitus ([facility] [address] E11.10    Rectal cancer ([facility] [address] C20       Isolation/Infection:   Isolation            No Isolation          Patient Infection Status       None to display            Nurse Assessment:  Last Vital Signs: /71   Pulse 82   Temp 96.8 °F (36 °C) (Temporal)   Resp 16   Ht 5' 11\" (1.803 m)   Wt 173 lb 11.6 oz (78.8 kg)   SpO2 96%   BMI 24.23 kg/m²     Last documented pain score (0-10 scale): Pain Level: 0  Last Weight:   Wt Readings from Last 1 Encounters:   02/19/22 173 lb 11.6 oz (78.8 kg)     Mental Status:  oriented and alert    IV Access:  - None    Nursing Mobility/ADLs:  Walking   Independent  Transfer  Independent  Bathing  Assisted  Dressing  Independent  [address]  Independent  Med Delivery   whole    Wound Care Documentation and Therapy:        Elimination:  Continence: Bowel: Yes  Bladder: LEONE  Urinary Catheter: Insertion Date: 2/19/2022 PM NURSE    Colostomy/Ileostomy/Ileal Conduit: No       Date of Last BM: 2/14/2022 PER PT, MUCOUS COMING OUT NOW     Intake/Output Summary (Last 24 hours) at 2/19/2022 1313  Last data filed at 2/19/2022 0825  Gross per 24 hour   Intake 1639.19 ml   Output 1950 ml   Net -310.81 ml     I/O last 3 completed shifts: In: 1579.2 [P.O.:300; I.V.:987; IV Piggyback:292.2]  Out: 1002 [Urine:2725]    Safety Concerns:     None    Impairments/Disabilities:      None    Nutrition Therapy:  Current Nutrition Therapy:   - Oral Diet:  General    Routes of Feeding: Oral  Liquids: Thin Liquids  Daily Fluid Restriction: no  Last Modified Barium Swallow with Video (Video Swallowing Test):     Treatments at the Time of Hospital Discharge:   Respiratory Treatments: NONE  Oxygen Therapy:  is not on home oxygen therapy. Ventilator:    - No ventilator support    Rehab Therapies:   Weight Bearing Status/Restrictions: No weight bearing restirctions  Other Medical Equipment (for information only, NOT a DME order): Other Treatments: LEONE CARE    Patient's personal belongings (please select all that are sent with patient):  Glasses, Pone, , shoes.     RN SIGNATURE:  Electronically signed by Phill Faustin RN on 2/19/22 at 1:18 PM EST    CASE MANAGEMENT/SOCIAL WORK SECTION    Inpatient Status Date: ***    Readmission Risk Assessment Score:  Readmission Risk              Risk of Unplanned Readmission:  13           Discharging to Facility/ Agency   Name:   Address:  Phone:  Fax:    Dialysis Facility (if applicable)   Name:  Address:  Dialysis Schedule:  Phone:  Fax:    / signature: {Esignature:059536019}    PHYSICIAN SECTION    Prognosis: {Prognosis:2694526151}    Condition at Discharge: Jeronimo Moore Patient Condition:032742733}    Rehab Potential (if transferring to Rehab): {Prognosis:1145633781}    Recommended Labs or Other Treatments After Discharge: ***    Physician Certification: I certify the above information and transfer of Esperanza Vidal  is necessary for the continuing treatment of the diagnosis listed and that he requires {Admit to Appropriate Level of Care:80890} for {GREATER/LESS:551392186} 30 days.      Update Admission H&P: {CHP DME Changes in YMNFO:871551185}    PHYSICIAN SIGNATURE:  {Esignature:853033530}

## 2022-02-19 NOTE — PROGRESS NOTES
Pt unable to void again and feeling of retaining urine in bladder. [address] put in without difficulty, urine returned. Urine bag emptied with 500 mL yellow, clear, non odor output.

## 2022-02-19 NOTE — PROGRESS NOTES
[facility] Diabetes Education Nurse  Consult Note       NAME:  Carito Leone  MEDICAL RECORD NUMBER:  [id number]  AGE: 58 y.o. GENDER: male  : 1960  TODAY'S DATE:  2022    Subjective:     Reason for Educator consult ---  Evaluation and Assessment:    Carito Leone is a 58 y.o. male referred by:   [x] Physician  [] Nursing  [] Other:      Pt seen for DM education. Has been prescribed insulin for discharge home today. Reviewed insulin regimen for discharge. Reviewed insulin pen use and storage. Provided insulin pen demonstration and handout. Opened home glucose meter, set date and time. Instructed pt on use, including loading lancet, loading test strip, and how to apply blood sample. Reviewed blood sugar goals and notifying PCP of abnormal trends. Reviewed s/s and treatment of hyperglycemia and hypoglycemia. Pt reports dry mouth and increased thirst prior to admission. Discussed carb control diet, avoiding sugary beverages. Provided booklet titled \"Where do I begin? \"      PAST MEDICAL HISTORY      Diagnosis Date    Diabetes mellitus ([address]     Hypertension        PAST SURGICAL HISTORY  Past Surgical History:   Procedure Laterality Date    ABDOMEN SURGERY      Exploratory    PORT SURGERY N/A 2022    PORT A CATH INSERTION performed by Vero Nguyen MD at [address] N/A 2/15/2022    SIGMOIDOSCOPY BIOPSY FLEXIBLE performed by Noa Mohr MD at [facility]  2/15/2022    SIGMOIDOSCOPY POLYP SNARE performed by Noa Mohr MD at [address] [place] [address]  History reviewed. No pertinent family history.     SOCIAL HISTORY  Social History     Tobacco Use    Smoking status: Never Smoker    Smokeless tobacco: Never Used   Vaping Use    Vaping Use: Never used    Passive vaping exposure: Yes   Substance Use Topics    Alcohol use: Not Currently     Comment: not in last 18 months    Drug use: Never       ALLERGIES  No Known Allergies    MEDICATIONS  No current facility-administered medications on file prior to encounter.      Current Outpatient Medications on File Prior to Encounter   Medication Sig Dispense Refill    metoprolol succinate (TOPROL XL) 100 MG extended release tablet Take 100 mg by mouth daily         Objective:     LABS    CBC:   Lab Results   Component Value Date    WBC 10.8 02/19/2022    RBC 3.59 02/19/2022    HGB 7.2 02/19/2022    HCT 24.0 02/19/2022    MCV 66.9 02/19/2022    MCH 20.1 02/19/2022    MCHC 30.1 02/19/2022    RDW 20.6 02/19/2022     02/19/2022    MPV 6.7 02/19/2022     CMP:    Lab Results   Component Value Date     02/19/2022    K 3.4 02/19/2022     02/19/2022    CO2 21 02/19/2022    BUN 12 02/19/2022    CREATININE 0.7 02/19/2022    GFRAA >60 02/19/2022    AGRATIO 0.9 02/14/2022    LABGLOM >60 02/19/2022    GLUCOSE 165 02/19/2022    PROT 6.7 02/14/2022    LABALBU 3.1 02/14/2022    CALCIUM 7.8 02/19/2022    BILITOT <0.2 02/14/2022    ALKPHOS 126 02/14/2022    AST 10 02/14/2022    ALT 17 02/14/2022       HgBA1c:    Lab Results   Component Value Date    LABA1C 16.3 02/16/2022     This patient's last creatinine was   Recent Labs     02/19/22  0711   CREATININE 0.7*        Recent Blood sugars have been   Lab Results   Component Value Date    POCGLU 164 02/19/2022    POCGLU 154 02/19/2022    POCGLU 99 02/18/2022    POCGLU 154 02/18/2022    POCGLU 161 02/18/2022    POCGLU 190 02/18/2022    POCGLU 199 02/18/2022    POCGLU 67 02/17/2022     Lab Results   Component Value Date    GLUCOSE 165 02/19/2022    GLUCOSE 194 02/18/2022    GLUCOSE 219 02/17/2022    GLUCOSE 247 02/16/2022    GLUCOSE 258 02/15/2022    GLUCOSE 188 02/15/2022            Assessment:     Patient Active Problem List   Diagnosis    DKA, type 1, not at goal [facility])    Diabetic ketoacidosis without coma associated with type 2 diabetes mellitus ([facility] [address]    Rectal cancer ([facility]       Plan:     Plan of Care:   Patient given new insulin start information  Nursing to continue to teach insulin injections while hospitalized  Continue to monitor blood sugars as ordered      Discharge Plan:  Patient will need insulin for home which will be new: suggest full basal bolus insulin therapy  Patient to f/u with new PCP. Instructed to log blood sugars and take blood sugar log to f/u appointment.       Jon Aparicio MSN, RN, Linda Ng  Certified Diabetes Care and

## 2022-02-19 NOTE — PLAN OF CARE
Problem: Skin Integrity:  Goal: Will show no infection signs and symptoms  Description: Will show no infection signs and symptoms  2/19/2022 1055 by Amanda Simpson RN  Outcome: Ongoing  2/18/2022 2235 by Rhina Caldeorn RN  Outcome: Ongoing  Goal: Absence of new skin breakdown  Description: Absence of new skin breakdown  2/18/2022 2235 by Rhina Calderon RN  Outcome: Ongoing     Problem: Pain:  Goal: Pain level will decrease  Description: Pain level will decrease  2/19/2022 1055 by Amanda Simpson RN  Outcome: Ongoing  2/18/2022 2235 by Rhina Calderon RN  Outcome: Ongoing  Goal: Control of acute pain  Description: Control of acute pain  2/18/2022 2235 by Rhina Calderon RN  Outcome: Ongoing  Goal: Control of chronic pain  Description: Control of chronic pain  2/19/2022 1055 by Amanda Simpson RN  Outcome: Ongoing  2/18/2022 2235 by Rhina Calderon RN  Outcome: Ongoing     Problem: Falls - Risk of:  Goal: Will remain free from falls  Description: Will remain free from falls  2/19/2022 1055 by Amanda Simpson RN  Outcome: Ongoing  2/18/2022 2235 by Rhina Calderon RN  Outcome: Ongoing  Goal: Absence of physical injury  Description: Absence of physical injury  2/18/2022 2235 by Rhina Calderon RN  Outcome: Ongoing

## 2022-02-19 NOTE — PROGRESS NOTES
Assessment completed, see doc flowsheets. Pt is A&Ox4. Lung sounds are clear. C/o pain in rectal and lower abdominal feels like constipation. VSS. Tele on. Medication given per [facility]. Patient has no needs at this time. Call light within in reach, will continue to monitor.

## 2022-02-19 NOTE — PROGRESS NOTES
Pt discharged to home with his sister via w/c by staff. Pt with no s/s of distress or SOB observed AVS reviewed with patient and sister with no concerns voiced. Insulin pins and home medications sent home with patient and family. Pt with IV's removed and were tolerated well, scant amount of blood from site when removed with dry dressing and tape applied without further problems. Per urology, pt with multani in place to go home and will be seen by pcp in one week. Multani care was completed and bag emptied prior to discharge.

## 2022-02-19 NOTE — FLOWSHEET NOTE
02/18/22 1924   Encounter Summary   Services provided to: Patient   Referral/Consult From: Nurse   Support System Friends/neighbors; Other (Comment)  (Patient Sister is primary)   Continue Visiting Yes  (Support / ACP 2/18 TJR)   Complexity of Encounter High   Length of Encounter 15 minutes;1 hour   Spiritual Assessment Completed Yes   Advance Care Planning Yes   Extended time spent with patient in conversation about his new cancer diagnosis and plan for treatment. Conversation about ACP. Patient was given documents to complete. Saturday  will witness.     Electronically signed by Nilda Steel on 2/18/2022 at 7:27 PM

## 2022-02-19 NOTE — ACP (ADVANCE CARE PLANNING)
Advance Care Planning     Advance Care Planning Inpatient Note  [facility] Department    Today's Date: 2/19/2022  Unit: [facility] ICU    Received request from IDT Member. Upon review of chart and communication with care team, patient's decision making abilities are not in question. . Patient and sister Fernando Soriano present in the room during visit. Goals of ACP Conversation:  Discuss advance care planning documents  Facilitate a discussion related to patient's goals of care as they align with the patient's values and beliefs. Health Care Decision Makers:       Primary Decision Maker: Srinivasa Pranay - Brother/Sister - [phone]    Summary:  Documented Next of Kin, per patient report    Advance Care Planning Documents (Patient Wishes):  None     Assessment:  Patient awake and alert, sitting up in bed, sister Marcha Cabot at bedside. Patient is feeling overwhelmed with new diagnosis and busyness of last few days here. He is not interested in taking time to complete advance directives before discharge. Patient identified four siblings as next of kin, but sister Marcha Cabot is his choice for primary healthcare decision maker even with her residence in [place]. Patient is keeping positive about future. He has no abelardo tradition or [employer] connections and wants to be identified as \"none\".     Interventions:  Discussed and provided education on state decision maker hierarchy  Encouraged ongoing ACP conversation with future decision makers and loved ones  Reviewed but did not complete ACP document    Care Preferences Communicated:   No    Outcomes/Plan:  ACP Discussion: Completed    Electronically signed by Dioni Garcia [facility] on 2/19/2022 at 2:16 PM

## 2022-02-19 NOTE — PLAN OF CARE
Problem: Pain:  Goal: Pain level will decrease  Description: Pain level will decrease  2/19/2022 1607 by Roe Mcarthur RN  Outcome: Completed  2/19/2022 1055 by Roe Mcarthur RN  Outcome: Ongoing  Goal: Control of acute pain  Description: Control of acute pain  Outcome: Completed  Goal: Control of chronic pain  Description: Control of chronic pain  2/19/2022 1607 by Roe Mcarthur RN  Outcome: Completed  2/19/2022 1055 by Roe Mcarthur RN  Outcome: Ongoing

## 2022-02-19 NOTE — DISCHARGE SUMMARY
Hospital Medicine Discharge Summary    Patient: Fatoumata Mariee     Gender: male  : 1960   Age: 58 y.o. MRN: [id number]    Admitting Physician: Susy Alvarez MD  Discharge Physician: Susy Alvarez MD     Code Status: DNR-CC     Admit Date: 2022   Discharge Date:   22    Disposition:  Home  Time spent arranging discharge: 35 minutes    Discharge Diagnoses: Active Hospital Problems    Diagnosis Date Noted    Rectal cancer ([facility] [address] [C20]     DKA, type 1, not at goal [facility]) [E10.10] 2022    Diabetic ketoacidosis without coma associated with type 2 diabetes mellitus ([facility] [address] [E11.10]    bilateral hydronephrosiis        Condition at Discharge:  [address] Course:   DKA: starte on insulin gtt protocol and resolved, on lantus and lispro sugars stable a1c 16.3, fu with pcp and monitor sugars    Sigmoid and rectum mass  -s/p flex sig Large friable rectosigmoid malignant appearing mass, occupying 70-80% of lumen, from 20 cm to 5 cm. Extensive biopsies obtained. 6 mm sessile rectal polyp, cold snared. - - pathology back for adenocarnicoma of rectum  - port today  - MRI ? liver mass  - fu with oncology as outpatient    Acute blood loss anemia secondary to GI bleeding secondary to above  -s/p 1 unit prbcs         Discharge Exam:    /71   Pulse 82   Temp 96.8 °F (36 °C) (Temporal)   Resp 16   Ht 5' 11\" (1.803 m)   Wt 173 lb 11.6 oz (78.8 kg)   SpO2 96%   BMI 24.23 kg/m²   General appearance:  Appears comfortable. AAOx3  HEENT: atraumatic, Pupils equal, muscous membranes moist, no masses appreciated  Cardiovascular: Regular rate and rhythm no murmurs appreciated  Respiratory: CTAB no wheezing  Gastrointestinal: Abdomen soft, non-tender, BS+  EXT: no edema  Neurology: no gross focal deficts  Psychiatry: Appropriate affect.  Not agitated  Skin: Warm, dry, no rashes appreciated    Discharge Medications:   Current Discharge Medication List      START taking these medications Details   insulin glargine (LANTUS;BASAGLAR) 100 UNIT/ML injection pen Inject 28 Units into the skin daily  Qty: 5 pen, Refills: 3      insulin lispro, 1 Unit Dial, 100 UNIT/ML SOPN Inject 9 Units into the skin 3 times daily (with meals)  Qty: 3 pen, Refills: 0      tamsulosin (FLOMAX) 0.4 MG capsule Take 1 capsule by mouth daily  Qty: 30 capsule, Refills: 3      metFORMIN (GLUCOPHAGE) 500 MG tablet Take 1 tablet by mouth 2 times daily (with meals)  Qty: 180 tablet, Refills: 1      blood glucose monitor kit and supplies Dispense sufficient amount for indicated testing frequency plus additional to accommodate PRN testing needs. Dispense all needed supplies to include: monitor, strips, lancing device, lancets, control solutions, alcohol swabs. Qty: 1 kit, Refills: 0    Comments: Brand per patient preference. May round up to next available package size. Current Discharge Medication List        Current Discharge Medication List      CONTINUE these medications which have NOT CHANGED    Details   metoprolol succinate (TOPROL XL) 100 MG extended release tablet Take 100 mg by mouth daily           Current Discharge Medication List      STOP taking these medications       verapamil (VERELAN) 360 MG extended release capsule Comments:   Reason for Stopping:               Labs:  For convenience and continuity at follow-up the following most recent labs are provided:    Lab Results   Component Value Date    WBC 10.8 02/19/2022    HGB 7.2 02/19/2022    HCT 24.0 02/19/2022    MCV 66.9 02/19/2022     02/19/2022     02/19/2022    K 3.4 02/19/2022     02/19/2022    CO2 21 02/19/2022    BUN 12 02/19/2022    CREATININE 0.7 02/19/2022    CALCIUM 7.8 02/19/2022    PHOS 2.4 02/15/2022    ALKPHOS 126 02/14/2022    ALT 17 02/14/2022    AST 10 02/14/2022    BILITOT <0.2 02/14/2022    LABALBU 3.1 02/14/2022     Lab Results   Component Value Date    INR 1.04 02/15/2022       Radiology:  CT CHEST W CONTRAST    Result Date: 2/15/2022  EXAMINATION: CT OF THE CHEST WITH CONTRAST 2/15/2022 5:14 pm TECHNIQUE: CT of the chest was performed with the administration of intravenous contrast. Multiplanar reformatted images are provided for review. Dose modulation, iterative reconstruction, and/or weight based adjustment of the mA/kV was utilized to reduce the radiation dose to as low as reasonably achievable. COMPARISON: None. HISTORY: ORDERING SYSTEM PROVIDED HISTORY: Colon cancer staging TECHNOLOGIST PROVIDED HISTORY: Reason for exam:->Colon cancer staging Reason for Exam: Colon cancer staging FINDINGS: Mediastinum: Mild cardiomegaly. Small pericardial effusion. Pulmonary trunk measuring approximately 3.6 cm in diameter. Thoracic aorta is normal in caliber. No significant enlarged mediastinal lymph nodes. Lungs/pleura: No pulmonary nodules. No acute consolidation. No effusion or pneumothorax. Upper Abdomen: Subtle areas of hypoattenuation noted in the left hepatic lobe measuring 1.5 cm in diameter and in the right hepatic lobe posteriorly with capsular retraction measuring approximately 2.5 cm. The spleen measures approximately 15.0 cm in AP dimension. Soft Tissues/Bones: No acute soft tissue abnormality identified. Small sclerotic focus in the T4 vertebral body most likely represents a bone island. Subtle rounded sclerotic foci noted within the posterior aspect of the vertebral bodies throughout the thoracic spine, presumably benign. No convincing evidence of metastatic disease to the chest. Small pericardial effusion. Dilated pulmonary trunk suggesting pulmonary hypertension. Subtle areas of hypoattenuation within the right and left hepatic lobes as described above. Liver protocol MRI with and without contrast recommended to exclude metastatic lesions. Splenomegaly.      MRI ABDOMEN W WO CONTRAST    Result Date: 2/18/2022  EXAMINATION: MRI OF THE ABDOMEN WITHOUT AND WITH CONTRAST, 2/18/2022 3:14 pm TECHNIQUE: Multiplanar multisequence MRI of the abdomen was performed without and with the administration of intravenous contrast. COMPARISON: 02/14/2022 CT HISTORY: ORDERING SYSTEM PROVIDED HISTORY: liver protocol r/o mets TECHNOLOGIST PROVIDED HISTORY: Reason for exam:->liver protocol r/o mets Specify organ?->Liver Reason for Exam: liver protocol r/o mets Pelvic mass with request to evaluate for potential metastatic disease. FINDINGS: Technical Factors: Arterial phase timing is optimal.  Imaging sequences are technically limited due to moderate patient motion as result of respiration. Best possible images on this inpatient. Diffuse Liver Abnormalities: No evidence of cirrhosis or significant steatosis. Liver Lesion: Location: Segment 6 along the posterior liver capsule. Size: 2.9 x 2.7 x 1.1 cm Change: In retrospect, a subtle area of decreased attenuation is seen at this site on the prior CT. T1: Hypointense. Fat in lesion: No T2: Mildly hyperintense Scar: No Peripheral Rim Enhancement: No Arterial Phase Enhancement: No obvious enhancement in the arterial phase. On more delayed venous phases, there is a mild pattern of enhancement best appreciated on the coronal postcontrast sequence. Tumor Margins: Poorly defined Washout: No Vascular Abnormalities: None Bile Ducts: Normal Gallbladder: Normal Other: No additional hepatic lesions. No other significant finding in the visualized abdomen. Pelvic mass is not included in field of view. 1. Enhancing mass in the right hepatic lobe adjacent to the liver capsule. Imaging features are indeterminate, partially due to respiratory motion artifact. Given prior CT findings, a metastatic lesion is suspected. Hemangioma may be included in the differential, but the imaging features are atypical. 2. No other significant finding in the abdomen.      CT ABDOMEN PELVIS W IV CONTRAST Additional Contrast? None    Result Date: 2/15/2022  EXAMINATION: CT OF THE ABDOMEN AND PELVIS WITH CONTRAST 2/14/2022 11:10 am TECHNIQUE: CT of the abdomen and pelvis was performed with the administration of intravenous contrast. Multiplanar reformatted images are provided for review. Dose modulation, iterative reconstruction, and/or weight based adjustment of the mA/kV was utilized to reduce the radiation dose to as low as reasonably achievable. COMPARISON: None. HISTORY: ORDERING SYSTEM PROVIDED HISTORY: lower abd pain, rectal bleeding TECHNOLOGIST PROVIDED HISTORY: Additional Contrast?->None Reason for exam:->lower abd pain, rectal bleeding Decision Support Exception - unselect if not a suspected or confirmed emergency medical condition->Emergency Medical Condition (MA) Reason for Exam: lower abd pain, rectal bleeding FINDINGS: Lower Chest: No acute infiltrate at the lung bases. Organs: The liver, spleen, pancreas and adrenal glands are unremarkable. No acute biliary findings. There is mild bilateral hydronephrosis and hydroureter, right greater than left. No cystic or solid renal mass. No obstructing calculi. GI/Bowel: There is a heterogeneous mass in the distal sigmoid colon and rectum measuring maximally 7.1 cm transversely, 10.1 cm in AP dimension and 14.4 cm in length. There is mild infiltration of the adjacent retroperitoneal fat. Mass likely encompasses the prostate gland and possibly the posterior bladder wall. The ureters are compressed by the mass but appear to be not directly involved. No significant free fluid. Possible adenopathy along the posterior to the superior margin of the mass measuring 14 x 20 mm and 10 x 16 mm. Mild retained stool throughout the colon. No small bowel distension. The stomach and duodenal sweep are unremarkable. Pelvis: No significant free pelvic fluid. No additional enlarged pelvic nodes. Moderate distention of the urinary bladder. Mild bladder wall thickening posteriorly, possibly direct invasion from the mass.  Peritoneum/Retroperitoneum: The abdominal aorta is normal in caliber. No pathologic retroperitoneal adenopathy. No upper abdominal ascites. Bones/Soft Tissues: No acute osseous or soft tissue abnormality. Small fat containing umbilical hernia. No lytic or blastic osseous lesions. 1. Large heterogeneous pelvic mass centered on the distal sigmoid colon and rectum presumably malignant. Findings suggesting direct invasion of the prostate and possibly the posterior bladder wall. Moderate stool proximal to the mass with no evidence of obstruction. Probable adenopathy posterior to the upper margin of the mass. No evidence of distal metastatic disease. Surgical consultation recommended. 2. Bilateral hydronephrosis secondary to mass effect on the distal ureters, right greater than left. XR CHEST PORTABLE    Result Date: 2/17/2022  EXAMINATION: ONE XRAY VIEW OF THE CHEST 2/17/2022 11:32 am COMPARISON: Chest CT 02/15/2022 and chest radiograph 02/14/2022 HISTORY: ORDERING SYSTEM PROVIDED HISTORY: Status post Port-A-Cath FINDINGS: The cardiac silhouette is enlarged. Calcifications involving the aorta reflect atherosclerosis. The mediastinal and hilar silhouettes appear unremarkable. Low lung volumes. No definite infiltrate or nodule is seen. No pneumothorax is seen. No acute osseous abnormality is identified. Interval placement left subclavian Port-A-Cath, tip at the distal superior vena cava level. 1. Interval placement left subclavian Port-A-Cath, tip at the distal superior vena cava level. No pneumothorax evident. 2. No acute pulmonary disease evident. 3. Calcific atherosclerosis aorta. 4. Cardiomegaly. XR CHEST PORTABLE    Result Date: 2/14/2022  EXAMINATION: ONE XRAY VIEW OF THE CHEST 2/14/2022 9:25 am COMPARISON: None. HISTORY: ORDERING SYSTEM PROVIDED HISTORY: fatigue TECHNOLOGIST PROVIDED HISTORY: Reason for exam:->fatigue Reason for Exam: fatigue FINDINGS: Cardiac and mediastinal silhouettes appear within normal limits for size. Pulmonary vascularity is normal.  No parenchymal consolidation or pleural effusion. No pneumothorax. No acute osseous abnormality is identified. Clear chest without acute cardiopulmonary process. [address] VASCULAR ACCESS GUIDANCE    Result Date: 2/17/2022  Radiology exam is complete. No Radiologist dictation. Please follow up with ordering provider.          Signed:    Carlin Ha MD   2/19/2022

## 2022-02-19 NOTE — PROGRESS NOTES
Bladder scan given 547 mL post void. Pt was informed about the order to put Quach in if PVR > 450 mL. Pt request to try to void more within 1 more hour and bladder scan pt again.

## 2022-02-20 LAB
ANISOCYTOSIS: ABNORMAL
BASOPHILS ABSOLUTE: 0 K/UL (ref 0–0.2)
BASOPHILS ABSOLUTE: 0 K/UL (ref 0–0.2)
BASOPHILS RELATIVE PERCENT: 0 %
BASOPHILS RELATIVE PERCENT: 0.3 %
EOSINOPHILS ABSOLUTE: 0 K/UL (ref 0–0.6)
EOSINOPHILS ABSOLUTE: 0.1 K/UL (ref 0–0.6)
EOSINOPHILS RELATIVE PERCENT: 0 %
EOSINOPHILS RELATIVE PERCENT: 1.4 %
HCT VFR BLD CALC: 24 % (ref 40.5–52.5)
HCT VFR BLD CALC: 31.4 % (ref 40.5–52.5)
HEMATOLOGY PATH CONSULT: NO
HEMATOLOGY PATH CONSULT: YES
HEMOGLOBIN: 7.2 G/DL (ref 13.5–17.5)
HEMOGLOBIN: 9.1 G/DL (ref 13.5–17.5)
LYMPHOCYTES ABSOLUTE: 0.8 K/UL (ref 1–5.1)
LYMPHOCYTES ABSOLUTE: 1.7 K/UL (ref 1–5.1)
LYMPHOCYTES RELATIVE PERCENT: 7.3 %
LYMPHOCYTES RELATIVE PERCENT: 9 %
MCH RBC QN AUTO: 19.5 PG (ref 26–34)
MCH RBC QN AUTO: 20.1 PG (ref 26–34)
MCHC RBC AUTO-ENTMCNC: 28.9 G/DL (ref 31–36)
MCHC RBC AUTO-ENTMCNC: 30.1 G/DL (ref 31–36)
MCV RBC AUTO: 66.9 FL (ref 80–100)
MCV RBC AUTO: 67.5 FL (ref 80–100)
MONOCYTES ABSOLUTE: 0.9 K/UL (ref 0–1.3)
MONOCYTES ABSOLUTE: 1.4 K/UL (ref 0–1.3)
MONOCYTES RELATIVE PERCENT: 12.8 %
MONOCYTES RELATIVE PERCENT: 5 %
MYELOCYTE PERCENT: 1 %
NEUTROPHILS ABSOLUTE: 15.9 K/UL (ref 1.7–7.7)
NEUTROPHILS ABSOLUTE: 8.5 K/UL (ref 1.7–7.7)
NEUTROPHILS RELATIVE PERCENT: 78.2 %
NEUTROPHILS RELATIVE PERCENT: 85 %
OVALOCYTES: ABNORMAL
PDW BLD-RTO: 18.7 % (ref 12.4–15.4)
PDW BLD-RTO: 20.6 % (ref 12.4–15.4)
PLATELET # BLD: 384 K/UL (ref 135–450)
PLATELET # BLD: 684 K/UL (ref 135–450)
PLATELET SLIDE REVIEW: ABNORMAL
PMV BLD AUTO: 6.7 FL (ref 5–10.5)
PMV BLD AUTO: 7.6 FL (ref 5–10.5)
POIKILOCYTES: ABNORMAL
RBC # BLD: 3.59 M/UL (ref 4.2–5.9)
RBC # BLD: 4.64 M/UL (ref 4.2–5.9)
SLIDE REVIEW: ABNORMAL
WBC # BLD: 10.8 K/UL (ref 4–11)
WBC # BLD: 18.5 K/UL (ref 4–11)

## 2022-02-21 ENCOUNTER — CARE COORDINATION (OUTPATIENT)
Dept: CASE MANAGEMENT | Age: 62
End: 2022-02-21

## 2022-02-21 NOTE — CARE COORDINATION
[address] Transitions Initial Follow Up Call    Call within 2 business days of discharge: Yes    Patient: Melba Bonilla Patient : 1960   MRN: <[id number]>  Reason for Admission: DKA, rectal cancer  Discharge Date: 22 RARS: Readmission Risk Score: 13 ( )      Last Discharge [facility]       Complaint Diagnosis Description Type Department Provider    22 Abdominal Pain Diabetic ketoacidosis without coma associated with type 2 diabetes mellitus ([facility] [address] . .. ED to Hosp-Admission (Discharged) (ADMITTED) [facility] ICU Carlin Ha MD; Orion Callow ... Spoke with: 2 [facility]: [facility]     Non-face-to-face services provided:  Obtained and reviewed discharge summary and/or continuity of care documents    Transitions of Care Initial Call    Was this an external facility discharge? No Discharge Facility: NA    Challenges to be reviewed by the provider   Additional needs identified to be addressed with provider: No  none             Method of communication with provider : phone      Advance Care Planning:   Does patient have an Advance Directive: decision maker updated. Advance Care Planning   Healthcare Decision Maker:    Primary Decision Maker: [place] - Brother/Sister - [phone]    Was this a readmission? No  Patient stated reason for admission: abdominal pain  Patients top risk factors for readmission: medical condition-DKA, rectal cancer    Care Transition Nurse (CTN) contacted the patient by telephone to perform post hospital discharge assessment. Verified name and  with patient as identifiers. Provided introduction to self, and explanation of the CTN role. CTN reviewed discharge instructions, medical action plan and red flags with patient who verbalized understanding. Patient given an opportunity to ask questions and does not have any further questions or concerns at this time. Were discharge instructions available to patient?  Yes. Reviewed appropriate site of care based on symptoms and resources available to patient including: PCP and Specialist. The patient agrees to contact the PCP office for questions related to their healthcare. Medication reconciliation was performed with patient, who verbalizes understanding of administration of home medications. Advised obtaining a 90-day supply of all daily and as-needed medications. Covid Risk Education     Educated patient about risk for severe COVID-19 due to risk factors according to CDC guidelines. LPN CC reviewed discharge instructions, medical action plan and red flag symptoms with the patient who verbalized understanding. Discussed COVID vaccination status: No. Education provided on COVID-19 vaccination as appropriate. Discussed exposure protocols and quarantine with CDC Guidelines. Patient was given an opportunity to verbalize any questions and concerns and agrees to contact LPN CC or health care provider for questions related to their healthcare. Reviewed and educated patient on any new and changed medications related to discharge diagnosis. Was patient discharged with a pulse oximeter? No Discussed and confirmed pulse oximeter discharge instructions and when to notify provider or seek emergency care. Patient verified  and was pleasant and agreeable to transition calls. States he is doing ok. Still experiencing some abdominal pain throughout the day. Percocet helps to some degree. Sx more or less unchanged per patient. Has experienced more normal BM at home. Glucose 105. Appetite diminished. LPN CC encouraged patient to increase protein. Denies problems with bladder. PCP f/u was rescheduled by provider. Patient will be seeing a different provider . Upcoming appts reviewed. Medication reconciliation completed. Denies any acute needs at present time. Agreeable to f/u calls.   Educated on the use of urgent care or physicians 24 hr access line if assistance is needed after hours. LPN CC provided contact information. No further follow-up call indicated based on severity of symptoms and risk factors.   Daisha Crespo  [facility]  Care Transitions  [phone]    Care Transitions 24 Hour Call    Care Transitions Interventions         Follow Up  Future Appointments   Date Time Provider Shannen Roy   2/22/2022 10:30 AM MD Rahat Vanegas [facility]   2/22/2022 12:00 PM [facility] MRI RM 2 [facility]   2/23/2022  9:40 AM Jackie Sultana MD [facility] Deirdre Crespo LPN

## 2022-02-22 ENCOUNTER — HOSPITAL ENCOUNTER (OUTPATIENT)
Age: 62
Setting detail: SPECIMEN
Discharge: HOME | End: 2022-02-22

## 2022-02-22 ENCOUNTER — OFFICE VISIT (OUTPATIENT)
Dept: SURGERY | Age: 62
End: 2022-02-22
Payer: COMMERCIAL

## 2022-02-22 ENCOUNTER — HOSPITAL ENCOUNTER (OUTPATIENT)
Dept: MRI IMAGING | Age: 62
Discharge: HOME | End: 2022-02-22
Payer: COMMERCIAL

## 2022-02-22 VITALS
WEIGHT: 174 LBS | DIASTOLIC BLOOD PRESSURE: 74 MMHG | SYSTOLIC BLOOD PRESSURE: 128 MMHG | HEART RATE: 74 BPM | TEMPERATURE: 98.4 F | BODY MASS INDEX: 24.36 KG/M2 | HEIGHT: 71 IN | OXYGEN SATURATION: 91 %

## 2022-02-22 DIAGNOSIS — E11.10 DIABETIC KETOACIDOSIS WITHOUT COMA ASSOCIATED WITH TYPE 2 DIABETES MELLITUS (HCC): ICD-10-CM

## 2022-02-22 DIAGNOSIS — C20 RECTAL CANCER (HCC): ICD-10-CM

## 2022-02-22 DIAGNOSIS — C20 RECTAL CANCER (HCC): Primary | ICD-10-CM

## 2022-02-22 DIAGNOSIS — R15.1 FECAL SMEARING: ICD-10-CM

## 2022-02-22 PROCEDURE — 6360000004 HC RX CONTRAST MEDICATION: Performed by: SURGERY

## 2022-02-22 PROCEDURE — A9579 GAD-BASE MR CONTRAST NOS,1ML: HCPCS | Performed by: SURGERY

## 2022-02-22 PROCEDURE — 72197 MRI PELVIS W/O & W/DYE: CPT

## 2022-02-22 PROCEDURE — 99205 OFFICE O/P NEW HI 60 MIN: CPT | Performed by: SURGERY

## 2022-02-22 RX ADMIN — GADOTERIDOL 16 ML: 279.3 INJECTION, SOLUTION INTRAVENOUS at 13:18

## 2022-02-22 NOTE — PATIENT INSTRUCTIONS
RECTAL CANCER Patient information:          The rectum is the last 6 inches of the large intestine (colon). Rectal cancer arises from the lining of the rectum. In one year, more than 40,000 people in the [place] will be diagnosed with colorectal cancer, making it the third most common cancer in both men and women. About 5% of Americans will develop colorectal cancer during their lifetimes. Colorectal cancer is highly curable if detected in the early stages. WHO IS AT RISK FOR RECTAL CANCER? No one knows the exact causes of rectal cancer. Rectal cancer is more likely to occur as people get older, and more than 90% of people with this disease are diagnosed after age 48. Other risk factors include a family history of colorectal cancer (especially in close relatives), and a personal history of inflammatory bowel disease such as ulcerative colitis, colorectal polyps or cancers of other organs. CAN RECTAL CANCER BE PREVENTED? Rectal cancer can be preventable in some cases. Nearly all rectal cancer develops from rectal polyps, which are precancerous growths on the rectal wall. Detection and removal of these polyps by colonoscopy reduces the risk of getting rectal cancer. Your doctor can provide exact recommendations for rectal cancer screening based on your medical and family history. Screening typically starts at age 48 in patients with average risk, or at younger ages in patients at higher risk for rectal cancer.  Americans should start screening at age 39. Though not definitely proven, there is some evidence that diet may play a significant role in preventing colorectal cancer. As far as we know, a diet high in fiber (whole grains, fruits, vegetables, nuts) and low in fat is the only dietary measure that might help prevent colorectal cancer      WHAT ARE THE SYMPTOMS OF RECTAL CANCER? Many rectal cancers cause no symptoms at all and are detected during routine screening examinations. The most common symptoms of rectal cancer are a change in bowel habits, such as constipation or diarrhea, narrow shaped stools, or blood in your stool. You may also have pelvic or lower abdominal pain, unexplained weight loss, or feel tired all the time. Other common health problems can cause the same symptoms. Hemorrhoids do not cause rectal cancer but can produce similar symptoms. Anyone with these symptoms should see a doctor to be diagnosed and treated as early as possible. Abdominal pain and weight loss are typically late symptoms, indicating possible extensive disease. WHAT TESTS ARE PERFORMED TO DIAGNOSE RECTAL CANCER? Physical exam and medical history  Digital rectal exam (JAYDON)  Proctoscopy: An office based exam of the rectum using a proctoscope, inserted into the rectum. Colonoscopy: A procedure to look inside the rectum and colon for polyps (small pieces of bulging tissue), abnormal areas, or cancer. Biopsy: The removal of cells or tissues so they can be viewed under a microscope to check for signs of cancer. WHAT DETERMINES THE PROGNOSIS (OUTCOME) FOR RECTAL CANCER? The stage of the cancer (how far advanced the cancer is). Where the cancer is found in the rectum. Whether the bowel is blocked or has a hole in it. Whether all of the tumor can be removed by surgery. The patients general health and ability to tolerate different treatment regimens. Whether the cancer has just been diagnosed or has recurred (come back). HOW IS RECTAL CANCER STAGED? Distant Staging:  CT scan can accurately detect the presence of most cancer cells that have spread outside of the rectum. PET scan is occasionally used to detect spread of cancer  CEA assay (blood test)    Local Staging:  MRI is one of the tests used for local staging. This will help determine if the tumor has spread through the wall of the rectum and if it has invaded nearby structures.   Endoscopic ultrasound (EUS): A procedure in which an endoscope or rigid probe is inserted into the body through the rectum. HOW IS RECTAL CANCER TREATED? For complete cure, surgery to remove the rectal cancer is almost always required. Depending on the location and stage, this may be performed through the anus (opening of the rectum) or through the abdomen, or sometimes both. Rectal cancer surgery removes the cancer and lymph nodes, along with a small portion of normal rectum on either side of the tumor. Creation of a colostomy (opening the intestine to a bag on the skin) may be needed in some patients. Trained surgeons may use minimally invasive surgical techniques depending on certain features of your cancer, such as laparoscopic and robotic surgery. Your surgeon will discuss these features with you prior to the operation. Additional treatment with chemotherapy or radiation therapy may be offered either before or after the surgery, depending on the stage and location of the cancer. RISKS OF COLON AND RECTAL SURGERY    Colon and rectal surgery is major surgery, and has risks. Even in healthy patients, complications can happen. Some of these risks can include (but are not limited to): bleeding, wound infections, deeper infections, hernias (especially with open surgery), scar tissue, missed lesions, unexpected findings, need to go back to the operating room or have another procedure, need for temporary or permanent stoma (see above), damage to other structures (such as intestine, blood vessels, other organs, and nerves), blood clots, pneumonia, heart attack, kidney failure, urinary infections, anastomotic leak, and even death. Anastomotic leak occurs if the bowel reconnection does not heal properly. Even in healthy patient with no medical problems, this can occur. Symptoms can include rectal bleeding, fevers, bloating, change in appetite, change in heart rate and generally not feeling well.  Sometimes it can be difficult to diagnose a leak given the big overall with normal recovery and other problems that occur with surgery. Leaks occur most commonly within the first 1-2 weeks after surgery, but can be found much later as well. If a leak does happen, there is a wide range of possible effects, depending on the severity. With small/minor leaks, the leak may seal itself off, and some patients may not notice any changes. Some may have low grade fevers or change in appetite. In some leaks, a fluid collection (abscess) may develop that can typically be treated with antibiotics and sometimes a drainage tube placed by a radiologist. Fluid collections can sometimes heal spontaneously, or sometimes develop into a long term fistula, or abnormal connection to the skin, or another organ. Occasionally these can require a second surgery if it does resolve over a reasonable amount of time (typically 3-6 months). Another long term problem with an imperfect healing anastomosis is a stricture, or over-tightening of the colon, that can cause difficulties eliminating stool. In patients with a substantial or large leak, this can be quite dangerous, and can cause sepsis. Emergency reoperation may be necessary to control the stool spillage in this situation. This last scenario is especially worrisome for need for a stoma, long term issues with bowel function, prolonged recovery, or even death. Many of these complications are increased in those with risk factors such as: current and previous smokers, poorly controlled diabetics, alcohol drinkers, patients with previous abdominal surgery, patients with history of chemotherapy or radiation, those who take steroids or blood thinners, patients with increased disposition to heart, lung, or kidney problems and those who are obese. You will be under general anesthesia (completely asleep) during the operation.  There are risks of anesthesia, especially with longer operations, that will be discussed in greater detail with you by the anesthesiologist on the day of surgery. Some of these risks include changes in breathing or circulation, allergic reactions to medications, need to be on the ventilator after surgery. Please reach out to your surgeon if you have any questions about these risks and complications. WHAT FACTORS INFLUENCE PROGNOSIS (OUTCOME)? The outcome of patients with rectal cancer is most clearly related to the stage at the time of diagnosis, with cancer that is confined to the lining of colon having the best chance of success. This is one reason why early detection through screening methods like colonoscopy is crucial.    WHAT FOLLOW-UP IS NEEDED AFTER TREATMENT? After treatment for rectal cancer, a blood test to measure amounts of CEA (a substance in the blood that may be increased when cancer is present) may be done to see if the cancer has come back. Routine CT scans, clinical examinations, and colonoscopy are also performed at intervals determined by the stage. WHAT IS A COLON AND RECTAL SURGEON? Colon and rectal surgeons are experts in the surgical and non-surgical treatment of diseases of the colon, rectum and anus. They have completed advanced surgical training in the treatment of these diseases as well as full general surgical training. Board-certified colon and rectal surgeons complete residencies in general surgery and colon and rectal surgery, and pass intensive examinations conducted by the American Board of Surgery and the American Board of Colon and Rectal Surgery. They are well-versed in the treatment of both benign and malignant diseases of the colon, rectum and anus and are able to perform routine screening examinations and surgically treat conditions if indicated to do so.

## 2022-02-22 NOTE — PROGRESS NOTES
[address] [facility] COLORECTAL SURGERY  [facility]   Dept: [phone]  Dept Fax: [phone]  Loc: [phone]    Visit Date: 2/22/2022    Leena Miller is a 58 y.o. male who presents today for: New Patient (Rectal Cancer )      HPI:       Leena Miller is a 58 y.o. male referred to me for further evaluation regarding new diagnosis of rectal cancer. Brea Ponce tells me he has had stool changes for almost 6 months. Including bleeding, difficulty with incontinence and loose stool he was recently admitted to [facility] for DKA. He underwent further work-up for his rectal complaints as well, including colonoscopy with biopsy, CT scan of the chest, abdomen, pelvis, MRI of the liver. Had portacath placed during his hospitalization by Dr Dulce Schneider. He is here in the office with 2 friends of his. 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 Still able to tolerate reg diet, but some pain with BMs and back pain. Did have a previous laparotomy as a teenager for blunt trauma, but does not think any resection was needed. Patient's problem list, medications, past medical, surgical, family, and social histories were reviewed and updated in the chart as indicated today. Past Medical History:   Diagnosis Date    Diabetes mellitus ([address]     Hypertension        Past Surgical History:   Procedure Laterality Date    ABDOMEN SURGERY  1975    Exploratory    PORT SURGERY N/A 2/17/2022    PORT A CATH INSERTION performed by Cielo Duran MD at [address] [facility] N/A 2/15/2022    SIGMOIDOSCOPY BIOPSY FLEXIBLE performed by Maryse Anderson MD at [address] [facility]  2/15/2022    SIGMOIDOSCOPY POLYP SNARE performed by Maryse Anderson MD at [address] [place] [address]       Cancer-related family history is not on file.     Social History:   Social History     Tobacco Use    Smoking status: Never Smoker    Smokeless tobacco: Never Used   Substance Use Topics    Alcohol use: Not Currently     Comment: not in last 18 months      Tobacco cessation counseling provided as appropriate. REVIEW OF SYSTEMS:    Pertinent positives and negatives are mentioned in the HPI above. Otherwise, all other systems were reviewed and negative. Objective:     Physical Exam   /74   Pulse 74   Temp 98.4 °F (36.9 °C) (Infrared)   Ht 5' 11\" (1.803 m)   Wt 174 lb (78.9 kg)   SpO2 91%   BMI 24.27 kg/m²   Constitutional: Appears well-developed and well-nourished. Grooming appropriate. No gross deformities. Body mass index is 24.27 kg/m². Eyes: No scleral icterus. Conjunctiva/lids normal. Vision intact grossly. Pupils equal/symmetric, reactive bilaterally. ENT: External ears/nose without defect, scars, or masses. Hearing grossly intact. No facial deformity. Lips normal, normal dentition. Neck: No masses. Trachea midline. No crepitus. Thyroid not enlarged. Cardiovascular: Normal rate. No peripheral edema. Abdominal aorta normal size to palpation. Pulmonary/Chest: Effort normal. No respiratory distress. No wheezes. No use of accessory muscles. Musculoskeletal: Normal range of motion x all 4 extremities and head/neck, without deformity, pain, or crepitus, with normal strength and tone. Normal gait. Nails without clubbing or cyanosis. Neurological: Alert and oriented to person, place, and time. No gross deficits. Sensation intact. Skin: Skin is dry. No rashes noted. No pallor. No induration of nodules. Psychiatric: Normal mood and affect. Behavior normal. Oriented to person, place, and time. Judgment and insight reasonable. Abdominal/wound: soft, previous paramedian laparotomy incision    Anorectal exam with chaperone in room. Patient placed left position. Buttock spread. Noted to have stool staining and incontinence around the perianal skin.   Digital rectal exam performed noting fairly large circumferential tumor about 4 cm from anal verge. Anoscopy performed confirming malignant appearing friable mass circumferential.    Labs reviewed: CBC, CMP, CEA reviewed  Radiology reviewed: CT scan reviewed, MRI reviewed  MRI pelvis arranged for later today    Last colonoscopy: Maryse Anderson    Coordination with Dr Dulce Schneider, Dr Kamini Suarez, Dr Adeline Mi, rectal CA tumor board      Assessment/Plan:     A/P:  New problem(s): Rectal cancer, locally advanced  Established problem(s): DKA, previous ex lap  Additional workup/treatment planned: MRI today, BAR approach  Risk of complications/morbidity: high    I had a very long discussion with Brea Ponce as well as his 2 friends here in the office today. We discussed the pathophysiology, etiology, work-up, natural history, staging, treatment options regarding rectal cancer. So far he has had CT scans of the chest, abdomen, pelvis which revealed likely locally advanced disease with no signs of metastatic disease. His CEA is elevated, and MRI of the liver is a bit indeterminate due to motion artifact. He has MRI of the pelvis scheduled for later this morning which should give us a better idea of any invasion into the prostate, bladder, ureters, or urethra, as this was a concern on his original staging CT scan, and he ended up requiring a Quach catheter placement due to this. He is also having some lower back discomfort and rectal pain with bowel movements, which I think may be related to the tumor mass itself versus invasion of periprostatic or other nerve structures. As far as his bowel function, he is still passing stool, but definitely feels like it is thinner and his appetite has been decreasing. As long as he continues to pass gas and stool, there is no need for diverting colostomy, but if he gets to the point where he is feeling near obstructed, I instructed him to call me right away and we may need to do a diverting colostomy.     We will plan on presenting all of his information at the tumor board this Friday, and he is planning on likely starting total neoadjuvant therapy with FOLFOX chemotherapy starting next week on Monday. He will then undergo chemoradiation, followed by restaging work-up. He understands that surgery will not be for several months, unless he needs a diverting colostomy. He understands potential need for permanent colostomy. He understands potential need for adjacent organ resection, of which the urologist will be involved with his care if needed. Continue with current medications    I provided written information in the After Visit Summary AVS Regarding: rectal cancer    DISPOSITION:  MRI today, tumor board presentation Friday, likely start chemo Monday; diverting colostomy if develops obstructing symptoms    My findings will be relayed to consulting practitioner or PCP via Epic    Total encounter time:  60 min, including any number of the following: review of labs, imaging, provider notes, outside hospital records; performing examination/evaluation; counseling patient and family; ordering medications/tests; placing referrals and communication with referring physicians; coordination of care, and documentation in the EHR. Note completed using dictation software, please excuse any errors.     Electronically signed by Romana Salisbury, MD on 2/22/2022 at 12:36 PM

## 2022-02-23 ENCOUNTER — PREP FOR PROCEDURE (OUTPATIENT)
Dept: SURGERY | Age: 62
End: 2022-02-23

## 2022-02-23 ENCOUNTER — TELEPHONE (OUTPATIENT)
Dept: SURGERY | Age: 62
End: 2022-02-23

## 2022-02-23 ENCOUNTER — OFFICE VISIT (OUTPATIENT)
Dept: INTERNAL MEDICINE CLINIC | Age: 62
End: 2022-02-23
Payer: COMMERCIAL

## 2022-02-23 VITALS
SYSTOLIC BLOOD PRESSURE: 122 MMHG | HEIGHT: 71 IN | BODY MASS INDEX: 24.19 KG/M2 | WEIGHT: 172.8 LBS | OXYGEN SATURATION: 99 % | HEART RATE: 73 BPM | DIASTOLIC BLOOD PRESSURE: 64 MMHG

## 2022-02-23 DIAGNOSIS — Z79.4 TYPE 2 DIABETES MELLITUS WITH HYPERGLYCEMIA, WITH LONG-TERM CURRENT USE OF INSULIN (HCC): Primary | ICD-10-CM

## 2022-02-23 DIAGNOSIS — E11.65 TYPE 2 DIABETES MELLITUS WITH HYPERGLYCEMIA, WITH LONG-TERM CURRENT USE OF INSULIN (HCC): Primary | ICD-10-CM

## 2022-02-23 DIAGNOSIS — D50.0 IRON DEFICIENCY ANEMIA DUE TO CHRONIC BLOOD LOSS: ICD-10-CM

## 2022-02-23 DIAGNOSIS — I10 PRIMARY HYPERTENSION: ICD-10-CM

## 2022-02-23 DIAGNOSIS — F33.2 SEVERE EPISODE OF RECURRENT MAJOR DEPRESSIVE DISORDER, WITHOUT PSYCHOTIC FEATURES (HCC): ICD-10-CM

## 2022-02-23 DIAGNOSIS — L98.9 ECZEMATOUS SKIN LESIONS: ICD-10-CM

## 2022-02-23 DIAGNOSIS — R15.9 INCONTINENCE OF FECES, UNSPECIFIED FECAL INCONTINENCE TYPE: ICD-10-CM

## 2022-02-23 DIAGNOSIS — C20 RECTAL CANCER (HCC): ICD-10-CM

## 2022-02-23 PROBLEM — C18.7 MALIGNANT NEOPLASM OF SIGMOID COLON (HCC): Status: RESOLVED | Noted: 2022-02-23 | Resolved: 2022-02-23

## 2022-02-23 PROBLEM — C18.7 MALIGNANT NEOPLASM OF SIGMOID COLON (HCC): Status: ACTIVE | Noted: 2022-02-23

## 2022-02-23 PROBLEM — E10.10 DKA, TYPE 1, NOT AT GOAL (HCC): Status: RESOLVED | Noted: 2022-02-14 | Resolved: 2022-02-23

## 2022-02-23 PROCEDURE — 99204 OFFICE O/P NEW MOD 45 MIN: CPT | Performed by: INTERNAL MEDICINE

## 2022-02-23 RX ORDER — ACETAMINOPHEN 325 MG/1
1000 TABLET ORAL ONCE
Status: CANCELLED | OUTPATIENT
Start: 2022-02-23 | End: 2022-02-23

## 2022-02-23 RX ORDER — SODIUM CHLORIDE 0.9 % (FLUSH) 0.9 %
5-40 SYRINGE (ML) INJECTION PRN
Status: CANCELLED | OUTPATIENT
Start: 2022-02-23

## 2022-02-23 RX ORDER — SODIUM CHLORIDE 9 MG/ML
25 INJECTION, SOLUTION INTRAVENOUS PRN
Status: CANCELLED | OUTPATIENT
Start: 2022-02-23

## 2022-02-23 RX ORDER — FLUOXETINE 10 MG/1
10 CAPSULE ORAL DAILY
Qty: 90 CAPSULE | Refills: 0 | Status: SHIPPED | OUTPATIENT
Start: 2022-02-23 | End: 2022-04-06 | Stop reason: SDUPTHER

## 2022-02-23 RX ORDER — SODIUM CHLORIDE 0.9 % (FLUSH) 0.9 %
5-40 SYRINGE (ML) INJECTION EVERY 12 HOURS SCHEDULED
Status: CANCELLED | OUTPATIENT
Start: 2022-02-23

## 2022-02-23 SDOH — ECONOMIC STABILITY: FOOD INSECURITY: WITHIN THE PAST 12 MONTHS, YOU WORRIED THAT YOUR FOOD WOULD RUN OUT BEFORE YOU GOT MONEY TO BUY MORE.: NEVER TRUE

## 2022-02-23 SDOH — ECONOMIC STABILITY: FOOD INSECURITY: WITHIN THE PAST 12 MONTHS, THE FOOD YOU BOUGHT JUST DIDN'T LAST AND YOU DIDN'T HAVE MONEY TO GET MORE.: NEVER TRUE

## 2022-02-23 ASSESSMENT — PATIENT HEALTH QUESTIONNAIRE - PHQ9
2. FEELING DOWN, DEPRESSED OR HOPELESS: 3
7. TROUBLE CONCENTRATING ON THINGS, SUCH AS READING THE NEWSPAPER OR WATCHING TELEVISION: 3
6. FEELING BAD ABOUT YOURSELF - OR THAT YOU ARE A FAILURE OR HAVE LET YOURSELF OR YOUR FAMILY DOWN: 3
5. POOR APPETITE OR OVEREATING: 1
SUM OF ALL RESPONSES TO PHQ9 QUESTIONS 1 & 2: 5
3. TROUBLE FALLING OR STAYING ASLEEP: 3
8. MOVING OR SPEAKING SO SLOWLY THAT OTHER PEOPLE COULD HAVE NOTICED. OR THE OPPOSITE, BEING SO FIGETY OR RESTLESS THAT YOU HAVE BEEN MOVING AROUND A LOT MORE THAN USUAL: 3
SUM OF ALL RESPONSES TO PHQ QUESTIONS 1-9: 19
SUM OF ALL RESPONSES TO PHQ QUESTIONS 1-9: 19
4. FEELING TIRED OR HAVING LITTLE ENERGY: 1
SUM OF ALL RESPONSES TO PHQ QUESTIONS 1-9: 19
10. IF YOU CHECKED OFF ANY PROBLEMS, HOW DIFFICULT HAVE THESE PROBLEMS MADE IT FOR YOU TO DO YOUR WORK, TAKE CARE OF THINGS AT HOME, OR GET ALONG WITH OTHER PEOPLE: 2
1. LITTLE INTEREST OR PLEASURE IN DOING THINGS: 2
SUM OF ALL RESPONSES TO PHQ QUESTIONS 1-9: 19
9. THOUGHTS THAT YOU WOULD BE BETTER OFF DEAD, OR OF HURTING YOURSELF: 0

## 2022-02-23 ASSESSMENT — ENCOUNTER SYMPTOMS
ABDOMINAL PAIN: 1
COUGH: 0
WHEEZING: 0
BLOOD IN STOOL: 1
NAUSEA: 0
COLOR CHANGE: 0
ANAL BLEEDING: 1
VOMITING: 0
BACK PAIN: 0
SHORTNESS OF BREATH: 0
SORE THROAT: 0
CHEST TIGHTNESS: 0
ABDOMINAL DISTENTION: 0
CONSTIPATION: 1
RECTAL PAIN: 1

## 2022-02-23 ASSESSMENT — SOCIAL DETERMINANTS OF HEALTH (SDOH): HOW HARD IS IT FOR YOU TO PAY FOR THE VERY BASICS LIKE FOOD, HOUSING, MEDICAL CARE, AND HEATING?: NOT HARD AT ALL

## 2022-02-23 NOTE — ASSESSMENT & PLAN NOTE
Last CBC still with hemoglobin under 10, discussed with patient possibly starting oral iron supplements however again since constipation is going to be a concern due to obstructing mass recommended he discuss this with oncology when he sees them this afternoon and see if will need iron infusion versus oral iron supplement

## 2022-02-23 NOTE — ASSESSMENT & PLAN NOTE
Symptoms limited to palms and soles of feet with scaling and dryness of the skin that is now slightly better, not sure if this is a form of eczematous dermatitis or paraneoplastic symptoms however advised patient since improving can continue using regular skin moisturizer but if relapse or starts having open sores then will  refer to dermatology

## 2022-02-23 NOTE — ASSESSMENT & PLAN NOTE
Unfortunately will not be able to use bulk forming agents since current presence of rectal mass causing obstruction and making stools more formed will worsen the condition, advised increasing the Metformin dose will probably help with stool flow since diarrhea is a known side effect of Metformin and may not need to add Dulcolax however if that is not the case then he will need to start Dulcolax at the recommendation of surgery and hopefully once chemotherapy starts in the tumor mass shrinks the stool incontinence will improve.   As far as difficulty urinating he currently has Quach's catheter in place he is scheduled to follow-up with urology and will continue current dose of Flomax

## 2022-02-23 NOTE — ASSESSMENT & PLAN NOTE
Relapse of symptoms prior to hospitalization however most likely also triggered by recent health concerns, will restart Prozac at 10 mg since patient reporting good relief in the past, will titrate dose accordingly and reevaluate in 6 weeks

## 2022-02-23 NOTE — PROGRESS NOTES
ASSESSMENT/PLAN:  1. Type 2 diabetes mellitus with hyperglycemia, with long-term current use of insulin ([facility])  Assessment & Plan:   Diet recommendations discussed with patient, will refer to diabetes education for further assistance, advised not to take mealtime insulin if skipping a meal to avoid hypoglycemia, recommended to use chewable glucose tablets instead of orange juice to correct to avoid overcorrection. For now will continue same dose for basal insulin, he will maintain blood glucose testing 4 times a day which is mealtime and bedtime and will send 2 weeks log through [employer]. He continues to have frequent hypoglycemic episodes then will adjust insulin regimen. If continues to be well-tolerated then will increase Metformin dose to 1 g twice a day. Will reevaluate in 6 weeks  Orders:  -     [facility] Individual Diabetes Education (Non Care Coord Patient), [facility]  2. Primary hypertension  Assessment & Plan:   Blood pressure stable and well-controlled on current dose of metoprolol, advised patient will transition to lisinopril in the near future however for now we will not change any medication since anticipating to start chemotherapy in the near future and will await until he is more stable. Continue to maintain healthy low-salt low-fat low carbohydrate diet and increase level of physical activity as tolerated  3. Iron deficiency anemia due to chronic blood loss  Assessment & Plan:   Last CBC still with hemoglobin under 10, discussed with patient possibly starting oral iron supplements however again since constipation is going to be a concern due to obstructing mass recommended he discuss this with oncology when he sees them this afternoon and see if will need iron infusion versus oral iron supplement  4. Rectal cancer ([address]  5.  Incontinence of feces, unspecified fecal incontinence type  Assessment & Plan:   Unfortunately will not be able to use bulk forming agents since current presence of rectal mass causing obstruction and making stools more formed will worsen the condition, advised increasing the Metformin dose will probably help with stool flow since diarrhea is a known side effect of Metformin and may not need to add Dulcolax however if that is not the case then he will need to start Dulcolax at the recommendation of surgery and hopefully once chemotherapy starts in the tumor mass shrinks the stool incontinence will improve. As far as difficulty urinating he currently has Quach's catheter in place he is scheduled to follow-up with urology and will continue current dose of Flomax  6. Severe episode of recurrent major depressive disorder, without psychotic features ([facility] [address]  Assessment & Plan:   Relapse of symptoms prior to hospitalization however most likely also triggered by recent health concerns, will restart Prozac at 10 mg since patient reporting good relief in the past, will titrate dose accordingly and reevaluate in 6 weeks  Orders:  -     FLUoxetine (PROZAC) 10 MG capsule; Take 1 capsule by mouth daily, Disp-90 capsule, R-0Normal  7. Eczematous skin lesions  Assessment & Plan:   Symptoms limited to palms and soles of feet with scaling and dryness of the skin that is now slightly better, not sure if this is a form of eczematous dermatitis or paraneoplastic symptoms however advised patient since improving can continue using regular skin moisturizer but if relapse or starts having open sores then will  refer to dermatology      Return in about 6 weeks (around 4/6/2022). SUBJECTIVE  HPI:   Patient here to establish new patient office visits and follow-up on recent hospitalization for DKA and newly diagnosed rectal cancer with severe iron deficiency anemia. He is a 78-year-old male with known prior history of type 2 diabetes mellitus however has not been on his chronic medications since he lost follow up with PCP since the onset of the Covid pandemic.   Presented to the emergency room on 14 February obstruction due to the rectal mass  He is also inquiring about pain management since oxycodone currently not controlling his pain and discomfort and sometimes he cannot find a comfortable position to sit or lie on      Review of Systems   Constitutional: Positive for fatigue and unexpected weight change. Negative for activity change, appetite change and fever. HENT: Negative for congestion, hearing loss, mouth sores and sore throat. Eyes: Negative for visual disturbance. Respiratory: Negative for cough, chest tightness, shortness of breath and wheezing. Cardiovascular: Negative for chest pain, palpitations and leg swelling. Gastrointestinal: Positive for abdominal pain, anal bleeding, blood in stool, constipation and rectal pain. Negative for abdominal distention, nausea and vomiting. Endocrine: Negative for cold intolerance and heat intolerance. Genitourinary: Positive for difficulty urinating. Negative for dysuria, frequency, hematuria and urgency. Musculoskeletal: Negative for arthralgias, back pain, gait problem and joint swelling. Skin: Negative for color change. Allergic/Immunologic: Negative for environmental allergies and immunocompromised state. Neurological: Positive for weakness. Negative for dizziness, light-headedness and headaches. Psychiatric/Behavioral: Positive for dysphoric mood and sleep disturbance. Negative for behavioral problems, self-injury and suicidal ideas. The patient is not nervous/anxious. OBJECTIVE:    /64   Pulse 73   Ht 5' 11\" (1.803 m)   Wt 172 lb 12.8 oz (78.4 kg)   SpO2 99%   BMI 24.10 kg/m²    Physical Exam  Constitutional:       General: He is not in acute distress. Appearance: Normal appearance. He is normal weight. He is not toxic-appearing. HENT:      Head: Normocephalic. Eyes:      Conjunctiva/sclera: Conjunctivae normal.   Cardiovascular:      Rate and Rhythm: Normal rate and regular rhythm.       Heart sounds: Normal heart sounds. No murmur heard. Pulmonary:      Effort: Pulmonary effort is normal. No respiratory distress. Breath sounds: No wheezing. Abdominal:      Palpations: Abdomen is soft. There is no mass. Tenderness: There is abdominal tenderness. There is no rebound. Musculoskeletal:         General: Tenderness present. Normal range of motion. Cervical back: Neck supple. Skin:     General: Skin is warm and dry. Coloration: Skin is pale. Neurological:      General: No focal deficit present. Mental Status: He is alert. Cranial Nerves: No cranial nerve deficit. Psychiatric:         Mood and Affect: Mood normal.         Behavior: Behavior normal.         Thought Content: Thought content normal.         Judgment: Judgment normal.           Electronically signed by Deangelo Jay MD on 2/23/2022 at 12:22 PM.    This dictation was generated by voice recognition computer software. Although all attempts are made to edit the dictation for accuracy, there may be errors in the transcription that are not intended.

## 2022-02-23 NOTE — ASSESSMENT & PLAN NOTE
Diet recommendations discussed with patient, will refer to diabetes education for further assistance, advised not to take mealtime insulin if skipping a meal to avoid hypoglycemia, recommended to use chewable glucose tablets instead of orange juice to correct to avoid overcorrection. For now will continue same dose for basal insulin, he will maintain blood glucose testing 4 times a day which is mealtime and bedtime and will send 2 weeks log through [employer]. He continues to have frequent hypoglycemic episodes then will adjust insulin regimen. If continues to be well-tolerated then will increase Metformin dose to 1 g twice a day.   Will reevaluate in 6 weeks

## 2022-02-23 NOTE — TELEPHONE ENCOUNTER
Patient returned Dr Vane Webber call. He is available Friday or Monday for surgery.     Please call: [phone]

## 2022-02-23 NOTE — TELEPHONE ENCOUNTER
Patient is returning Dr. Maria Elena Negron phone call regarding his test results    Please contact the patient at [phone]

## 2022-02-24 RX ORDER — NEOMYCIN SULFATE 500 MG/1
TABLET ORAL
Qty: 6 TABLET | Refills: 0 | Status: ON HOLD | OUTPATIENT
Start: 2022-02-24 | End: 2022-02-28

## 2022-02-24 RX ORDER — METRONIDAZOLE 500 MG/1
TABLET ORAL
Qty: 3 TABLET | Refills: 0 | Status: ON HOLD | OUTPATIENT
Start: 2022-02-24 | End: 2022-02-28

## 2022-02-24 NOTE — PROGRESS NOTES
Place patient label inside box (if no patient label, complete below)  Name:  :  MR#:   Cora Larson / PROCEDURE  1. I (we), Tamy Robert  (Patient Name) authorize DR. Gutierrez Gregorio  (Provider / [facility]) and/or such assistants as may be selected by him/her, to perform the following operation/procedure(s): LAPAROSCOPIC COLOSTOMY CREATION; LAPAROSCOPIC UMBILICAL HERNIA REPAIR       Note: If unable to obtain consent prior to an emergent procedure, document the emergent reason in the medical record. This procedure has been explained to my (our) satisfaction and included in the explanation was:  A) The intended benefit, nature, and extent of the procedure to be performed;  B) The significant risks involved and the probability of success;  C) Alternative procedures and methods of treatment;  D) The dangers and probable consequences of such alternatives (including no procedure or treatment); E) The expected consequences of the procedure on my future health;  F) Whether other qualified individuals would be performing important surgical tasks and/or whether  would be present to advise or support the procedure. I (we) understand that there are other risks of infection and other serious complications in the pre-operative/procedural and postoperative/procedural stages of my (our) care. I (we) have asked all of the questions which I (we) thought were important in deciding whether or not to undergo treatment or diagnosis. These questions have been answered to my (our) satisfaction. I (we) understand that no assurance can be given that the procedure will be a success, and no guarantee or warranty of success has been given to me (us).     2. It has been explained to me (us) that during the course of the operation/procedure, unforeseen conditions may be revealed that necessitate extension of the original procedure(s) or different procedure(s) than those set forth in Paragraph 1. I (we) authorize and request that the above-named physician, his/her assistants or his/her designees, perform procedures as necessary and desirable if deemed to be in my (our) best interest.     Revised 8/2/2021                                                                          Page 1 of 2       3. I acknowledge that health care personnel may be observing this procedure for the purpose of medical education or other specified purposes as may be necessary as requested and/or approved by my (our) physician. 4. I (we) consent to the disposal by the hospital Pathologist of the removed tissue, parts or organs in accordance with hospital policy. 5. I do ____ do not ____ consent to the use of a local infiltration pain blocking agent that will be used by my provider/surgical provider to help alleviate pain during my procedure. 6. I do ____ do not ____ consent to an emergent blood transfusion in the case of a life-threatening situation that requires blood components to be administered. This consent is valid for 24 hours from the beginning of the procedure. 7. This patient does ____ or does not ____ currently have a DNR status/order. If DNR order is in place, obtain Addendum to the Surgical Consent for ALL Patients with a DNR Order to address ed-operative status for limited intervention or DNR suspension.      8. I have read and fully understand the above Consent for Operation/Procedure and that all blanks were completed before I signed the consent.   _____________________________       _____________________      ____/____am/pm  Signature of Patient or legal representative      Printed Name / Relationship            Date / Time   ____________________________       _____________________      ____/____am/pm  Witness to Signature                                    Printed Name                    Date / Time     If patient is unable to sign or is a minor, complete the following)  Patient is a minor, ____ years of age, or unable to sign because:   ______________________________________________________________________________________________    [facility] If a phone consent is obtained, consent will be documented by using two health care professionals, each affirming that the consenting party has no questions and gives consent for the procedure discussed with the physician/provider.   _____________________          ____________________       _____/_____am/pm   2nd witness to phone consent        Printed name           Date / Time    Informed Consent:  I have provided the explanation described above in section 1 to the patient and/or legal representative.  I have provided the patient and/or legal representative with an opportunity to ask any questions about the proposed operation/procedure.   ___________________________          ____________________         ____/____am/pm  Provider / Proceduralist                            Printed name            Date / Time  Revised 8/2/2021                                                                      Page 2 of 2

## 2022-02-24 NOTE — PROGRESS NOTES
[address] [facility] patients having surgery or anesthesia are required to be Covid tested OR to have been vaccinated at least 14 days prior to your procedure. It is very important to return our call to [phone] and notify the staff of your last vaccination date otherwise you will be required to complete Covid PCR test within the 5-6 days prior to surgery & quarantine. The results will need to be faxed to PreAdmission Testing at [phone]. PRIOR TO PROCEDURE DATE:        1. PLEASE FOLLOW ANY  GUIDELINES/ INSTRUCTIONS PRIOR TO YOUR PROCEDURE AS ADVISED BY YOUR SURGEON. 2. Arrange for someone to drive you home and be with you for the first 24 hours after discharge for your safety after your procedure for which you received sedation. Ensure it is someone we can share information with regarding your discharge. 3. You must contact your surgeon for instructions IF:   You are taking any blood thinners, aspirin, anti-inflammatory or vitamin E.   There is a change in your physical condition such as a cold, fever, rash, cuts, sores or any other infection, especially near your surgical site. 4. Do not drink alcohol the day before or day of your procedure. 5. A Pre-op History and Physical for surgery MUST be completed by your Physician or Urgent Care within 30 days of your procedure date. Please bring a copy with you on the day of your procedure and along with any other testing performed. THE DAY OF YOUR PROCEDURE:  1. Follow instructions for ARRIVAL TIME as DIRECTED BY YOUR SURGEON. 2. Enter the MAIN entrance from "[employer]" and follow the signs to the free [employer] or [employer] parking (offered free of charge 6am-5pm). 3. Enter the Main Entrance of the hospital (do not enter from the lower level of the parking garage). Upon entrance, check in with the  at the main desk on your left. If no one is available at the desk, proceed into the [facility] Waiting Room and go through the door directly into the [facility]. There is a Check-in desk ACROSS from Room 5 (marked with a sign hanging from the ceiling). The phone number for the surgery center is [phone]. 4. Please call [phone] option #2 option #2 if you have not been preregistered yet. On the day of your procedure bring your insurance card and photo ID. You will be registered at your bedside once brought back to your room. 5. DO NOT EAT ANYTHING eight hours prior to your arrival for surgery. May have 8 ounces of water 4 hours prior to your arrival for surgery. NOTE: ALL Gastric, Bariatric and Bowel surgery patients MUST follow their surgeon's instructions. 6. MEDICATIONS    Take the following medications with a SMALL sip of water: METOPROLOL AND TAMSULOSIN    Bariatric patient's call surgeon if on diabetic medications as some need to be stopped 1 week preop   Use your usual dose of inhalers the morning of surgery. BRING your rescue inhaler with you to hospital.    Anesthesia does NOT want you to take insulin the morning of surgery. They will control your blood sugar while you are at the hospital. Please contact your ordering physician for instructions regarding your insulin the night before your procedure. If you have an insulin pump, please keep it set on basal rate. 7. Do not swallow water when brushing teeth. No gum, candy, mints or ice chips. Refrain from smoking or at least decrease the amount. 8. Dress in loose, comfortable clothing appropriate for redressing after your procedure. Do not wear jewelry (including body piercings), make-up (especially NO eye make-up), fingernail polish (NO toenail polish if foot/leg surgery), lotion, powders or metal hairclips. 9. Dentures, glasses, or contacts will need to be removed before your procedure.  Bring cases for your glasses, contacts, dentures, or hearing aids to protect them while you are in surgery. 10. If you use a CPAP, please bring it with you on the day of your procedure. 11. We recommend that valuable personal  belongings such as cash, cell phones, e-tablets or jewelry, be left at home during your stay. The hospital will not be responsible for valuables that are not secured in the hospital safe. However, if your insurance requires a co-pay, you may want to bring a method of payment, i.e. Check or credit card, if you wish to pay your co-pay the day of surgery. 12. If you are to stay overnight, you may bring a bag with personal items. Please have any large items you may need brought in by your family after your arrival to your hospital room. 15. If you have a Living Will or Durable Power of , please bring a copy on the day of your procedure. 15. With your permission, one family member may accompany you while you are being prepared for surgery. Once you are ready, additional family members may join you. HOW WE KEEP YOU SAFE and WORK TO PREVENT SURGICAL SITE INFECTIONS:  1. Health care workers should always check your ID bracelet to verify your name and birth date. You will be asked many times to state your name, date of birth, and allergies. 2. Health care workers should always clean their hands with soap or alcohol gel before providing care to you. It is okay to ask anyone if they cleaned their hands before they touch you. 3. You will be actively involved in verifying the type of procedure you are having and ensuring the correct surgical site. This will be confirmed multiple times prior to your procedure. Do NOT zoë your surgery site UNLESS instructed to by your surgeon. 4. Do not shave or wax for 72 hours prior to procedure near your operative site. Shaving with a razor can irritate your skin and make it easier to develop an infection.  On the day of your procedure, any hair that needs to be removed near the surgical site will be clipped by a healthcare worker using a special clippers designed to avoid skin irritation. 5. When you are in the operating room, your surgical site will be cleansed with a special soap, and in most cases, you will be given an antibiotic before the surgery begins. What to expect AFTER YOUR PROCEDURE:  1. Immediately following your procedure, your will be taken to the PACU for the first phase of your recovery. Your nurse will help you recover from any potential side effects of anesthesia, such as extreme drowsiness, changes in your vital signs or breathing patterns. Nausea, headache, muscle aches, or sore throat may also occur after anesthesia. Your nurse will help you manage these potential side effects. 2. For comfort and safety, arrange to have someone at home with you for the first 24 hours after discharge. 3. You and your family will be given written instructions about your diet, activity, dressing care, medications, and return visits. 4. Once at home, should issues with nausea, pain, or bleeding occur, or should you notice any signs of infection, you should call your surgeon. 5. Always clean your hands before and after caring for your wound. Do not let your family touch your surgery site without cleaning their hands. 6. Narcotic pain medications can cause significant constipation. You may want to add a stool softener to your postoperative medication schedule or speak to your surgeon on how best to manage this SIDE EFFECT. SPECIAL INSTRUCTIONS     Thank you for allowing us to care for you. We strive to exceed your expectations in the delivery of care and service provided to you and your family. If you need to contact the [place] [address] staff for any reason, please call us at [phone]     Instructions reviewed with patient during preadmission testing phone interview.   Chung Castro RN.2/24/2022 .1:18 PM      ADDITIONAL EDUCATIONAL INFORMATION REVIEWED PER PHONE WITH YOU AND/OR YOUR FAMILY:  No Hibiclens® Bathing Instructions   Yes Antibacterial Soap

## 2022-02-24 NOTE — PROGRESS NOTES
Surgeon office notified of WBC, H&H, and Platelet  Results from 2/23 in [facility] note. Surgeon already ordered repeat labs for DOS.  Dr. Chio Brar notified  Candia Prader

## 2022-02-24 NOTE — TELEPHONE ENCOUNTER
Patient has been scheduled for:    Procedure: Lap colostomy creation  Date: 2/28/22  Time: 3:30pm  Arrival: 1:00pm  [facility]  [facility]?: N/A  Prep? #2 reviewed by phone, emailed, ABS sent to pharmacy    Pre-op? N/A    Post-op Appt? 3/16/22 at 2:15pm     Patient advised they will be admitted to hospital after surgery. Orders routed to surgery scheduling. Instructions have been emailed to:  [email or web address]

## 2022-02-25 ENCOUNTER — TELEPHONE (OUTPATIENT)
Dept: SURGERY | Age: 62
End: 2022-02-25

## 2022-02-25 ENCOUNTER — ANESTHESIA EVENT (OUTPATIENT)
Dept: OPERATING ROOM | Age: 62
DRG: 330 | End: 2022-02-25
Payer: COMMERCIAL

## 2022-02-25 ENCOUNTER — CLINICAL DOCUMENTATION (OUTPATIENT)
Dept: SURGERY | Age: 62
End: 2022-02-25

## 2022-02-25 NOTE — TELEPHONE ENCOUNTER
I have placed a reminder call to patient for upcoming procedure. Did you speak directly to patient or leave a voicemail? Voicemail    Prep? NPO 12AM  Prep #2    Must have a  that is over the age of 25. Must be a friend or family member that can be responsible for signing them out after surgery.     Arrive at the main entrance [facility] at 12:00pm

## 2022-02-25 NOTE — TELEPHONE ENCOUNTER
Called patient to let them know their surgery time has been changed. Arrive at 9:30AM    shasta Barba nurse to let her know that he will be arriving at 9:30AM, she will more than likely have to change the time he is being marked.

## 2022-02-25 NOTE — PROGRESS NOTES
Chela Seo  [id number]  1960    Rectal Cancer Tumor Board Pretreatment Summary:  Presented on 02/25/22    - Tumor location: low  - Sphincter involvement: yes  - Clinical Stage: [id number] (possible M1)  - Pretreatment circumferential margin status: involved  - CEA: 18.6 (2/15/22)  - Path reviewed by MDT member: yes - Anupama Larson  - MRI reviewed by MDT member: yes - Kelsi Nielson  - Neoadjuvant treatment recommendation: yes  - Type and duration of neoadjuvant therapy recommended: FOLFOX x 8 cycles (possible restaging after 4 to eval liver lesion), followed by chemo/rads  - Anticipated date and type of surgical procedure: 6-10 wks after completion chemo/rads - Ultra low LAR with ISP vs APR with cystoprostatectomy  - Clinical research study eligibility and/or enrollment: none    Other:  - invasion of bladder, prostate, with extramural perforation  - suspicious R post liver lesion - will follow   - will need consultation with [facility] and urology

## 2022-02-28 ENCOUNTER — ANESTHESIA (OUTPATIENT)
Dept: OPERATING ROOM | Age: 62
DRG: 330 | End: 2022-02-28
Payer: COMMERCIAL

## 2022-02-28 ENCOUNTER — HOSPITAL ENCOUNTER (INPATIENT)
Age: 62
LOS: 2 days | Discharge: HOME HEALTH | DRG: 330 | End: 2022-03-02
Attending: SURGERY | Admitting: SURGERY
Payer: COMMERCIAL

## 2022-02-28 VITALS — SYSTOLIC BLOOD PRESSURE: 116 MMHG | TEMPERATURE: 97.2 F | OXYGEN SATURATION: 100 % | DIASTOLIC BLOOD PRESSURE: 59 MMHG

## 2022-02-28 DIAGNOSIS — G89.18 POST-OP PAIN: Primary | ICD-10-CM

## 2022-02-28 DIAGNOSIS — C20 RECTAL CANCER (HCC): ICD-10-CM

## 2022-02-28 PROBLEM — Z43.3 COLOSTOMY CARE (HCC): Status: ACTIVE | Noted: 2022-02-28

## 2022-02-28 LAB
ABO/RH: NORMAL
ANION GAP SERPL CALCULATED.3IONS-SCNC: 13 MMOL/L (ref 3–16)
ANTIBODY SCREEN: NORMAL
BUN BLDV-MCNC: 15 MG/DL (ref 7–20)
CALCIUM SERPL-MCNC: 8.6 MG/DL (ref 8.3–10.6)
CHLORIDE BLD-SCNC: 97 MMOL/L (ref 99–110)
CO2: 25 MMOL/L (ref 21–32)
CREAT SERPL-MCNC: 0.9 MG/DL (ref 0.8–1.3)
GFR AFRICAN AMERICAN: >60
GFR NON-AFRICAN AMERICAN: >60
GLUCOSE BLD-MCNC: 155 MG/DL (ref 70–99)
GLUCOSE BLD-MCNC: 169 MG/DL (ref 70–99)
GLUCOSE BLD-MCNC: 175 MG/DL (ref 70–99)
GLUCOSE BLD-MCNC: 184 MG/DL (ref 70–99)
GLUCOSE BLD-MCNC: 337 MG/DL (ref 70–99)
HCT VFR BLD CALC: 26.5 % (ref 40.5–52.5)
HEMOGLOBIN: 8.1 G/DL (ref 13.5–17.5)
MCH RBC QN AUTO: 21.3 PG (ref 26–34)
MCHC RBC AUTO-ENTMCNC: 30.7 G/DL (ref 31–36)
MCV RBC AUTO: 69.4 FL (ref 80–100)
PDW BLD-RTO: 23.8 % (ref 12.4–15.4)
PERFORMED ON: ABNORMAL
PLATELET # BLD: 419 K/UL (ref 135–450)
PMV BLD AUTO: 7.2 FL (ref 5–10.5)
POTASSIUM REFLEX MAGNESIUM: 4.1 MMOL/L (ref 3.5–5.1)
RBC # BLD: 3.82 M/UL (ref 4.2–5.9)
SODIUM BLD-SCNC: 135 MMOL/L (ref 136–145)
WBC # BLD: 8.1 K/UL (ref 4–11)

## 2022-02-28 PROCEDURE — C9290 INJ, BUPIVACAINE LIPOSOME: HCPCS | Performed by: ANESTHESIOLOGY

## 2022-02-28 PROCEDURE — 80048 BASIC METABOLIC PNL TOTAL CA: CPT

## 2022-02-28 PROCEDURE — 6360000002 HC RX W HCPCS: Performed by: ANESTHESIOLOGY

## 2022-02-28 PROCEDURE — 85027 COMPLETE CBC AUTOMATED: CPT

## 2022-02-28 PROCEDURE — 2580000003 HC RX 258: Performed by: ANESTHESIOLOGY

## 2022-02-28 PROCEDURE — 7100000000 HC PACU RECOVERY - FIRST 15 MIN: Performed by: SURGERY

## 2022-02-28 PROCEDURE — 64488 TAP BLOCK BI INJECTION: CPT | Performed by: ANESTHESIOLOGY

## 2022-02-28 PROCEDURE — 88302 TISSUE EXAM BY PATHOLOGIST: CPT

## 2022-02-28 PROCEDURE — 7100000001 HC PACU RECOVERY - ADDTL 15 MIN: Performed by: SURGERY

## 2022-02-28 PROCEDURE — 44188 LAP COLOSTOMY: CPT | Performed by: SURGERY

## 2022-02-28 PROCEDURE — 2500000003 HC RX 250 WO HCPCS: Performed by: ANESTHESIOLOGY

## 2022-02-28 PROCEDURE — 3700000000 HC ANESTHESIA ATTENDED CARE: Performed by: SURGERY

## 2022-02-28 PROCEDURE — 6360000002 HC RX W HCPCS: Performed by: SURGERY

## 2022-02-28 PROCEDURE — 2720000010 HC SURG SUPPLY STERILE: Performed by: SURGERY

## 2022-02-28 PROCEDURE — 0D1M4Z4 BYPASS DESCENDING COLON TO CUTANEOUS, PERCUTANEOUS ENDOSCOPIC APPROACH: ICD-10-PCS | Performed by: SURGERY

## 2022-02-28 PROCEDURE — 2709999900 HC NON-CHARGEABLE SUPPLY: Performed by: SURGERY

## 2022-02-28 PROCEDURE — 49653 PR LAP, VENTRAL HERNIA REPAIR,INCARCERATED: CPT | Performed by: SURGERY

## 2022-02-28 PROCEDURE — 0WQF4ZZ REPAIR ABDOMINAL WALL, PERCUTANEOUS ENDOSCOPIC APPROACH: ICD-10-PCS | Performed by: SURGERY

## 2022-02-28 PROCEDURE — 86901 BLOOD TYPING SEROLOGIC RH(D): CPT

## 2022-02-28 PROCEDURE — 6370000000 HC RX 637 (ALT 250 FOR IP): Performed by: SURGERY

## 2022-02-28 PROCEDURE — 1200000000 HC SEMI PRIVATE

## 2022-02-28 PROCEDURE — 3600000014 HC SURGERY LEVEL 4 ADDTL 15MIN: Performed by: SURGERY

## 2022-02-28 PROCEDURE — 2580000003 HC RX 258: Performed by: NURSE ANESTHETIST, CERTIFIED REGISTERED

## 2022-02-28 PROCEDURE — 2580000003 HC RX 258: Performed by: SURGERY

## 2022-02-28 PROCEDURE — 3700000001 HC ADD 15 MINUTES (ANESTHESIA): Performed by: SURGERY

## 2022-02-28 PROCEDURE — 2500000003 HC RX 250 WO HCPCS: Performed by: NURSE ANESTHETIST, CERTIFIED REGISTERED

## 2022-02-28 PROCEDURE — 86850 RBC ANTIBODY SCREEN: CPT

## 2022-02-28 PROCEDURE — 6360000002 HC RX W HCPCS: Performed by: NURSE ANESTHETIST, CERTIFIED REGISTERED

## 2022-02-28 PROCEDURE — 2580000003 HC RX 258: Performed by: STUDENT IN AN ORGANIZED HEALTH CARE EDUCATION/TRAINING PROGRAM

## 2022-02-28 PROCEDURE — 6370000000 HC RX 637 (ALT 250 FOR IP): Performed by: STUDENT IN AN ORGANIZED HEALTH CARE EDUCATION/TRAINING PROGRAM

## 2022-02-28 PROCEDURE — 3600000004 HC SURGERY LEVEL 4 BASE: Performed by: SURGERY

## 2022-02-28 PROCEDURE — 86900 BLOOD TYPING SEROLOGIC ABO: CPT

## 2022-02-28 PROCEDURE — 2500000003 HC RX 250 WO HCPCS: Performed by: SURGERY

## 2022-02-28 PROCEDURE — A4217 STERILE WATER/SALINE, 500 ML: HCPCS | Performed by: SURGERY

## 2022-02-28 RX ORDER — PHENYLEPHRINE HYDROCHLORIDE 10 MG/ML
INJECTION INTRAVENOUS PRN
Status: DISCONTINUED | OUTPATIENT
Start: 2022-02-28 | End: 2022-02-28 | Stop reason: SDUPTHER

## 2022-02-28 RX ORDER — LEVOFLOXACIN 5 MG/ML
500 INJECTION, SOLUTION INTRAVENOUS
Status: COMPLETED | OUTPATIENT
Start: 2022-02-28 | End: 2022-02-28

## 2022-02-28 RX ORDER — SODIUM CHLORIDE 0.9 % (FLUSH) 0.9 %
5-40 SYRINGE (ML) INJECTION EVERY 12 HOURS SCHEDULED
Status: DISCONTINUED | OUTPATIENT
Start: 2022-02-28 | End: 2022-02-28

## 2022-02-28 RX ORDER — BUPIVACAINE HYDROCHLORIDE 5 MG/ML
INJECTION, SOLUTION EPIDURAL; INTRACAUDAL
Status: COMPLETED | OUTPATIENT
Start: 2022-02-28 | End: 2022-02-28

## 2022-02-28 RX ORDER — SODIUM CHLORIDE, SODIUM LACTATE, POTASSIUM CHLORIDE, CALCIUM CHLORIDE 600; 310; 30; 20 MG/100ML; MG/100ML; MG/100ML; MG/100ML
INJECTION, SOLUTION INTRAVENOUS CONTINUOUS PRN
Status: DISCONTINUED | OUTPATIENT
Start: 2022-02-28 | End: 2022-02-28 | Stop reason: SDUPTHER

## 2022-02-28 RX ORDER — HYDROMORPHONE HCL 110MG/55ML
PATIENT CONTROLLED ANALGESIA SYRINGE INTRAVENOUS PRN
Status: DISCONTINUED | OUTPATIENT
Start: 2022-02-28 | End: 2022-02-28 | Stop reason: SDUPTHER

## 2022-02-28 RX ORDER — EPHEDRINE SULFATE 50 MG/ML
INJECTION INTRAVENOUS PRN
Status: DISCONTINUED | OUTPATIENT
Start: 2022-02-28 | End: 2022-02-28 | Stop reason: SDUPTHER

## 2022-02-28 RX ORDER — DEXTROSE MONOHYDRATE 25 G/50ML
12.5 INJECTION, SOLUTION INTRAVENOUS PRN
Status: DISCONTINUED | OUTPATIENT
Start: 2022-02-28 | End: 2022-02-28

## 2022-02-28 RX ORDER — SODIUM CHLORIDE, SODIUM LACTATE, POTASSIUM CHLORIDE, CALCIUM CHLORIDE 600; 310; 30; 20 MG/100ML; MG/100ML; MG/100ML; MG/100ML
INJECTION, SOLUTION INTRAVENOUS CONTINUOUS
Status: DISCONTINUED | OUTPATIENT
Start: 2022-02-28 | End: 2022-02-28

## 2022-02-28 RX ORDER — ONDANSETRON 2 MG/ML
4 INJECTION INTRAMUSCULAR; INTRAVENOUS
Status: DISCONTINUED | OUTPATIENT
Start: 2022-02-28 | End: 2022-02-28

## 2022-02-28 RX ORDER — ONDANSETRON 2 MG/ML
4 INJECTION INTRAMUSCULAR; INTRAVENOUS EVERY 6 HOURS PRN
Status: DISCONTINUED | OUTPATIENT
Start: 2022-02-28 | End: 2022-03-02 | Stop reason: HOSPADM

## 2022-02-28 RX ORDER — MIDAZOLAM HYDROCHLORIDE 1 MG/ML
INJECTION INTRAMUSCULAR; INTRAVENOUS PRN
Status: DISCONTINUED | OUTPATIENT
Start: 2022-02-28 | End: 2022-02-28 | Stop reason: SDUPTHER

## 2022-02-28 RX ORDER — FENTANYL CITRATE 50 UG/ML
INJECTION, SOLUTION INTRAMUSCULAR; INTRAVENOUS PRN
Status: DISCONTINUED | OUTPATIENT
Start: 2022-02-28 | End: 2022-02-28 | Stop reason: SDUPTHER

## 2022-02-28 RX ORDER — LIDOCAINE HYDROCHLORIDE 20 MG/ML
INJECTION, SOLUTION INTRAVENOUS PRN
Status: DISCONTINUED | OUTPATIENT
Start: 2022-02-28 | End: 2022-02-28 | Stop reason: SDUPTHER

## 2022-02-28 RX ORDER — IBUPROFEN 600 MG/1
600 TABLET ORAL EVERY 6 HOURS
Status: DISCONTINUED | OUTPATIENT
Start: 2022-02-28 | End: 2022-03-02 | Stop reason: HOSPADM

## 2022-02-28 RX ORDER — BUPIVACAINE HYDROCHLORIDE 5 MG/ML
INJECTION, SOLUTION EPIDURAL; INTRACAUDAL PRN
Status: DISCONTINUED | OUTPATIENT
Start: 2022-02-28 | End: 2022-02-28 | Stop reason: ALTCHOICE

## 2022-02-28 RX ORDER — OXYCODONE HYDROCHLORIDE AND ACETAMINOPHEN 5; 325 MG/1; MG/1
1 TABLET ORAL EVERY 4 HOURS PRN
COMMUNITY
End: 2022-10-18

## 2022-02-28 RX ORDER — SODIUM CHLORIDE 0.9 % (FLUSH) 0.9 %
5-40 SYRINGE (ML) INJECTION PRN
Status: DISCONTINUED | OUTPATIENT
Start: 2022-02-28 | End: 2022-02-28

## 2022-02-28 RX ORDER — SODIUM CHLORIDE 9 MG/ML
25 INJECTION, SOLUTION INTRAVENOUS PRN
Status: DISCONTINUED | OUTPATIENT
Start: 2022-02-28 | End: 2022-02-28

## 2022-02-28 RX ORDER — OXYCODONE HYDROCHLORIDE 5 MG/1
10 TABLET ORAL EVERY 4 HOURS PRN
Status: DISCONTINUED | OUTPATIENT
Start: 2022-02-28 | End: 2022-03-02 | Stop reason: HOSPADM

## 2022-02-28 RX ORDER — SODIUM CHLORIDE 9 MG/ML
INJECTION, SOLUTION INTRAVENOUS CONTINUOUS
Status: ACTIVE | OUTPATIENT
Start: 2022-02-28 | End: 2022-03-01

## 2022-02-28 RX ORDER — ACETAMINOPHEN 500 MG
1000 TABLET ORAL ONCE
Status: COMPLETED | OUTPATIENT
Start: 2022-02-28 | End: 2022-02-28

## 2022-02-28 RX ORDER — OXYCODONE HYDROCHLORIDE 5 MG/1
5 TABLET ORAL EVERY 4 HOURS PRN
Status: DISCONTINUED | OUTPATIENT
Start: 2022-02-28 | End: 2022-03-02 | Stop reason: HOSPADM

## 2022-02-28 RX ORDER — ACETAMINOPHEN 500 MG
500 TABLET ORAL EVERY 6 HOURS PRN
Status: ON HOLD | COMMUNITY
End: 2022-03-01 | Stop reason: HOSPADM

## 2022-02-28 RX ORDER — ONDANSETRON 4 MG/1
4 TABLET, ORALLY DISINTEGRATING ORAL EVERY 8 HOURS PRN
Status: DISCONTINUED | OUTPATIENT
Start: 2022-02-28 | End: 2022-03-02 | Stop reason: HOSPADM

## 2022-02-28 RX ORDER — DEXTROSE MONOHYDRATE 50 MG/ML
100 INJECTION, SOLUTION INTRAVENOUS PRN
Status: DISCONTINUED | OUTPATIENT
Start: 2022-02-28 | End: 2022-03-02 | Stop reason: HOSPADM

## 2022-02-28 RX ORDER — METOPROLOL SUCCINATE 50 MG/1
100 TABLET, EXTENDED RELEASE ORAL 2 TIMES DAILY
Status: DISCONTINUED | OUTPATIENT
Start: 2022-02-28 | End: 2022-03-02 | Stop reason: HOSPADM

## 2022-02-28 RX ORDER — FLUOXETINE 10 MG/1
10 CAPSULE ORAL DAILY
Status: DISCONTINUED | OUTPATIENT
Start: 2022-02-28 | End: 2022-02-28

## 2022-02-28 RX ORDER — ACETAMINOPHEN 500 MG
1000 TABLET ORAL EVERY 6 HOURS
Status: DISCONTINUED | OUTPATIENT
Start: 2022-02-28 | End: 2022-03-02 | Stop reason: HOSPADM

## 2022-02-28 RX ORDER — SODIUM CHLORIDE 0.9 % (FLUSH) 0.9 %
5-40 SYRINGE (ML) INJECTION EVERY 12 HOURS SCHEDULED
Status: DISCONTINUED | OUTPATIENT
Start: 2022-02-28 | End: 2022-03-02 | Stop reason: HOSPADM

## 2022-02-28 RX ORDER — SODIUM CHLORIDE 0.9 % (FLUSH) 0.9 %
5-40 SYRINGE (ML) INJECTION PRN
Status: DISCONTINUED | OUTPATIENT
Start: 2022-02-28 | End: 2022-03-02 | Stop reason: HOSPADM

## 2022-02-28 RX ORDER — TAMSULOSIN HYDROCHLORIDE 0.4 MG/1
0.4 CAPSULE ORAL DAILY
Status: DISCONTINUED | OUTPATIENT
Start: 2022-03-01 | End: 2022-03-02 | Stop reason: HOSPADM

## 2022-02-28 RX ORDER — NICOTINE POLACRILEX 4 MG
15 LOZENGE BUCCAL PRN
Status: DISCONTINUED | OUTPATIENT
Start: 2022-02-28 | End: 2022-03-01 | Stop reason: ALTCHOICE

## 2022-02-28 RX ORDER — PROPOFOL 10 MG/ML
INJECTION, EMULSION INTRAVENOUS PRN
Status: DISCONTINUED | OUTPATIENT
Start: 2022-02-28 | End: 2022-02-28 | Stop reason: SDUPTHER

## 2022-02-28 RX ORDER — DEXAMETHASONE SODIUM PHOSPHATE 4 MG/ML
INJECTION, SOLUTION INTRA-ARTICULAR; INTRALESIONAL; INTRAMUSCULAR; INTRAVENOUS; SOFT TISSUE PRN
Status: DISCONTINUED | OUTPATIENT
Start: 2022-02-28 | End: 2022-02-28 | Stop reason: SDUPTHER

## 2022-02-28 RX ORDER — GLYCOPYRROLATE 1 MG/5 ML
SYRINGE (ML) INTRAVENOUS PRN
Status: DISCONTINUED | OUTPATIENT
Start: 2022-02-28 | End: 2022-02-28 | Stop reason: SDUPTHER

## 2022-02-28 RX ORDER — MAGNESIUM HYDROXIDE 1200 MG/15ML
LIQUID ORAL CONTINUOUS PRN
Status: COMPLETED | OUTPATIENT
Start: 2022-02-28 | End: 2022-02-28

## 2022-02-28 RX ORDER — FLUOXETINE 10 MG/1
10 CAPSULE ORAL DAILY
Status: DISCONTINUED | OUTPATIENT
Start: 2022-03-01 | End: 2022-03-02 | Stop reason: HOSPADM

## 2022-02-28 RX ORDER — SODIUM CHLORIDE 9 MG/ML
25 INJECTION, SOLUTION INTRAVENOUS PRN
Status: DISCONTINUED | OUTPATIENT
Start: 2022-02-28 | End: 2022-03-02 | Stop reason: HOSPADM

## 2022-02-28 RX ORDER — ROCURONIUM BROMIDE 10 MG/ML
INJECTION, SOLUTION INTRAVENOUS PRN
Status: DISCONTINUED | OUTPATIENT
Start: 2022-02-28 | End: 2022-02-28 | Stop reason: SDUPTHER

## 2022-02-28 RX ORDER — ONDANSETRON 2 MG/ML
INJECTION INTRAMUSCULAR; INTRAVENOUS PRN
Status: DISCONTINUED | OUTPATIENT
Start: 2022-02-28 | End: 2022-02-28 | Stop reason: SDUPTHER

## 2022-02-28 RX ADMIN — DEXAMETHASONE SODIUM PHOSPHATE 8 MG: 4 INJECTION, SOLUTION INTRAMUSCULAR; INTRAVENOUS at 13:09

## 2022-02-28 RX ADMIN — PROPOFOL 50 MG: 10 INJECTION, EMULSION INTRAVENOUS at 12:54

## 2022-02-28 RX ADMIN — ACETAMINOPHEN 1000 MG: 500 TABLET ORAL at 16:03

## 2022-02-28 RX ADMIN — INSULIN HUMAN 3 UNITS: 100 INJECTION, SOLUTION PARENTERAL at 17:55

## 2022-02-28 RX ADMIN — INSULIN HUMAN 10 UNITS: 100 INJECTION, SOLUTION PARENTERAL at 20:38

## 2022-02-28 RX ADMIN — ONDANSETRON 4 MG: 2 INJECTION INTRAMUSCULAR; INTRAVENOUS at 13:09

## 2022-02-28 RX ADMIN — METRONIDAZOLE 500 MG: 500 INJECTION, SOLUTION INTRAVENOUS at 13:00

## 2022-02-28 RX ADMIN — SODIUM CHLORIDE, SODIUM LACTATE, POTASSIUM CHLORIDE, AND CALCIUM CHLORIDE: .6; .31; .03; .02 INJECTION, SOLUTION INTRAVENOUS at 10:09

## 2022-02-28 RX ADMIN — BUPIVACAINE HYDROCHLORIDE 20 ML: 5 INJECTION, SOLUTION EPIDURAL; INTRACAUDAL; PERINEURAL at 13:00

## 2022-02-28 RX ADMIN — HYDROMORPHONE HYDROCHLORIDE 0.5 MG: 2 INJECTION, SOLUTION INTRAMUSCULAR; INTRAVENOUS; SUBCUTANEOUS at 14:40

## 2022-02-28 RX ADMIN — SUGAMMADEX 250 MG: 100 INJECTION, SOLUTION INTRAVENOUS at 14:21

## 2022-02-28 RX ADMIN — ROCURONIUM BROMIDE 5 MG: 10 INJECTION INTRAVENOUS at 12:52

## 2022-02-28 RX ADMIN — METOPROLOL SUCCINATE 100 MG: 50 TABLET, EXTENDED RELEASE ORAL at 20:37

## 2022-02-28 RX ADMIN — FENTANYL CITRATE 50 MCG: 50 INJECTION, SOLUTION INTRAMUSCULAR; INTRAVENOUS at 12:51

## 2022-02-28 RX ADMIN — FENTANYL CITRATE 25 MCG: 50 INJECTION, SOLUTION INTRAMUSCULAR; INTRAVENOUS at 13:29

## 2022-02-28 RX ADMIN — PHENYLEPHRINE HYDROCHLORIDE 100 MCG: 10 INJECTION INTRAVENOUS at 13:03

## 2022-02-28 RX ADMIN — ACETAMINOPHEN 1000 MG: 500 TABLET ORAL at 23:35

## 2022-02-28 RX ADMIN — PROPOFOL 150 MG: 10 INJECTION, EMULSION INTRAVENOUS at 12:52

## 2022-02-28 RX ADMIN — LEVOFLOXACIN 500 MG: 5 INJECTION, SOLUTION INTRAVENOUS at 13:10

## 2022-02-28 RX ADMIN — OXYCODONE 10 MG: 5 TABLET ORAL at 18:34

## 2022-02-28 RX ADMIN — SODIUM CHLORIDE, POTASSIUM CHLORIDE, SODIUM LACTATE AND CALCIUM CHLORIDE: 600; 310; 30; 20 INJECTION, SOLUTION INTRAVENOUS at 13:18

## 2022-02-28 RX ADMIN — EPHEDRINE SULFATE 5 MG: 50 INJECTION INTRAVENOUS at 13:22

## 2022-02-28 RX ADMIN — Medication 0.2 MG: at 12:38

## 2022-02-28 RX ADMIN — MIDAZOLAM HYDROCHLORIDE 2 MG: 2 INJECTION, SOLUTION INTRAMUSCULAR; INTRAVENOUS at 12:38

## 2022-02-28 RX ADMIN — SODIUM CHLORIDE: 9 INJECTION, SOLUTION INTRAVENOUS at 15:11

## 2022-02-28 RX ADMIN — ENOXAPARIN SODIUM 40 MG: 100 INJECTION SUBCUTANEOUS at 10:46

## 2022-02-28 RX ADMIN — SODIUM CHLORIDE, POTASSIUM CHLORIDE, SODIUM LACTATE AND CALCIUM CHLORIDE: 600; 310; 30; 20 INJECTION, SOLUTION INTRAVENOUS at 10:44

## 2022-02-28 RX ADMIN — ACETAMINOPHEN 1000 MG: 500 TABLET ORAL at 10:45

## 2022-02-28 RX ADMIN — IBUPROFEN 600 MG: 600 TABLET ORAL at 16:20

## 2022-02-28 RX ADMIN — HYDROMORPHONE HYDROCHLORIDE 0.5 MG: 2 INJECTION, SOLUTION INTRAMUSCULAR; INTRAVENOUS; SUBCUTANEOUS at 14:27

## 2022-02-28 RX ADMIN — LIDOCAINE HYDROCHLORIDE 80 MG: 20 INJECTION, SOLUTION INTRAVENOUS at 12:49

## 2022-02-28 RX ADMIN — BUPIVACAINE 20 ML: 13.3 INJECTION, SUSPENSION, LIPOSOMAL INFILTRATION at 13:00

## 2022-02-28 RX ADMIN — PHENYLEPHRINE HYDROCHLORIDE 100 MCG: 10 INJECTION INTRAVENOUS at 12:59

## 2022-02-28 RX ADMIN — HYDROMORPHONE HYDROCHLORIDE 0.5 MG: 2 INJECTION, SOLUTION INTRAMUSCULAR; INTRAVENOUS; SUBCUTANEOUS at 14:19

## 2022-02-28 RX ADMIN — HYDROMORPHONE HYDROCHLORIDE 0.5 MG: 2 INJECTION, SOLUTION INTRAMUSCULAR; INTRAVENOUS; SUBCUTANEOUS at 13:51

## 2022-02-28 RX ADMIN — ROCURONIUM BROMIDE 50 MG: 10 INJECTION INTRAVENOUS at 13:18

## 2022-02-28 RX ADMIN — ROCURONIUM BROMIDE 45 MG: 10 INJECTION INTRAVENOUS at 12:53

## 2022-02-28 RX ADMIN — FENTANYL CITRATE 25 MCG: 50 INJECTION, SOLUTION INTRAMUSCULAR; INTRAVENOUS at 13:28

## 2022-02-28 ASSESSMENT — PULMONARY FUNCTION TESTS
PIF_VALUE: 16
PIF_VALUE: 18
PIF_VALUE: 18
PIF_VALUE: 19
PIF_VALUE: 18
PIF_VALUE: 16
PIF_VALUE: 1
PIF_VALUE: 26
PIF_VALUE: 22
PIF_VALUE: 13
PIF_VALUE: 29
PIF_VALUE: 18
PIF_VALUE: 17
PIF_VALUE: 15
PIF_VALUE: 16
PIF_VALUE: 18
PIF_VALUE: 18
PIF_VALUE: 16
PIF_VALUE: 2
PIF_VALUE: 14
PIF_VALUE: 16
PIF_VALUE: 25
PIF_VALUE: 15
PIF_VALUE: 17
PIF_VALUE: 16
PIF_VALUE: 23
PIF_VALUE: 1
PIF_VALUE: 30
PIF_VALUE: 18
PIF_VALUE: 1
PIF_VALUE: 16
PIF_VALUE: 17
PIF_VALUE: 28
PIF_VALUE: 16
PIF_VALUE: 18
PIF_VALUE: 22
PIF_VALUE: 5
PIF_VALUE: 17
PIF_VALUE: 28
PIF_VALUE: 17
PIF_VALUE: 29
PIF_VALUE: 1
PIF_VALUE: 19
PIF_VALUE: 1
PIF_VALUE: 19
PIF_VALUE: 26
PIF_VALUE: 16
PIF_VALUE: 30
PIF_VALUE: 16
PIF_VALUE: 17
PIF_VALUE: 16
PIF_VALUE: 27
PIF_VALUE: 27
PIF_VALUE: 29
PIF_VALUE: 18
PIF_VALUE: 27
PIF_VALUE: 30
PIF_VALUE: 19
PIF_VALUE: 15
PIF_VALUE: 20
PIF_VALUE: 18
PIF_VALUE: 20
PIF_VALUE: 16
PIF_VALUE: 29
PIF_VALUE: 14
PIF_VALUE: 17
PIF_VALUE: 1
PIF_VALUE: 16
PIF_VALUE: 30
PIF_VALUE: 18
PIF_VALUE: 17
PIF_VALUE: 29
PIF_VALUE: 14
PIF_VALUE: 16
PIF_VALUE: 17
PIF_VALUE: 25
PIF_VALUE: 21
PIF_VALUE: 18
PIF_VALUE: 16
PIF_VALUE: 29
PIF_VALUE: 17
PIF_VALUE: 18
PIF_VALUE: 29
PIF_VALUE: 16
PIF_VALUE: 20
PIF_VALUE: 17
PIF_VALUE: 16
PIF_VALUE: 20
PIF_VALUE: 1
PIF_VALUE: 16
PIF_VALUE: 22
PIF_VALUE: 18
PIF_VALUE: 1
PIF_VALUE: 16
PIF_VALUE: 18
PIF_VALUE: 18
PIF_VALUE: 17
PIF_VALUE: 30
PIF_VALUE: 29
PIF_VALUE: 1
PIF_VALUE: 30
PIF_VALUE: 16
PIF_VALUE: 29

## 2022-02-28 ASSESSMENT — PAIN DESCRIPTION - FREQUENCY
FREQUENCY: CONTINUOUS

## 2022-02-28 ASSESSMENT — PAIN DESCRIPTION - DESCRIPTORS
DESCRIPTORS: SORE
DESCRIPTORS: DISCOMFORT
DESCRIPTORS: SORE
DESCRIPTORS: ACHING;DISCOMFORT;DULL
DESCRIPTORS: DISCOMFORT

## 2022-02-28 ASSESSMENT — PAIN SCALES - GENERAL
PAINLEVEL_OUTOF10: 4
PAINLEVEL_OUTOF10: 6
PAINLEVEL_OUTOF10: 7
PAINLEVEL_OUTOF10: 3
PAINLEVEL_OUTOF10: 3
PAINLEVEL_OUTOF10: 4
PAINLEVEL_OUTOF10: 7
PAINLEVEL_OUTOF10: 3

## 2022-02-28 ASSESSMENT — PAIN - FUNCTIONAL ASSESSMENT
PAIN_FUNCTIONAL_ASSESSMENT: PREVENTS OR INTERFERES SOME ACTIVE ACTIVITIES AND ADLS
PAIN_FUNCTIONAL_ASSESSMENT: 0-10
PAIN_FUNCTIONAL_ASSESSMENT: ACTIVITIES ARE NOT PREVENTED

## 2022-02-28 ASSESSMENT — PAIN DESCRIPTION - LOCATION
LOCATION: ABDOMEN

## 2022-02-28 ASSESSMENT — PAIN DESCRIPTION - PROGRESSION: CLINICAL_PROGRESSION: NOT CHANGED

## 2022-02-28 ASSESSMENT — PAIN DESCRIPTION - ONSET
ONSET: ON-GOING

## 2022-02-28 ASSESSMENT — PAIN DESCRIPTION - ORIENTATION
ORIENTATION: MID

## 2022-02-28 ASSESSMENT — PAIN DESCRIPTION - PAIN TYPE
TYPE: SURGICAL PAIN

## 2022-02-28 ASSESSMENT — LIFESTYLE VARIABLES: SMOKING_STATUS: 0

## 2022-02-28 NOTE — PROGRESS NOTES
Patient to pacu 10 s/p LAPAROSCOPIC COLOSTOMY CREATION, laparoscopic incarcerated hernia repair, report received from CRNA and Dr Champ Harvey, reported hemodynamically stable intra op, also multani catheter started draining brown urine intra op, when pre op urine was yellow. Dr Champ Harvey aware and urology consult placed. Multani catheter is from home.

## 2022-02-28 NOTE — CARE COORDINATION
Cm following, pt from home alone with family support, POD#0 new ostomy, plans to DC home with Reji Hughes referral made.   Electronically signed by Savannah Basilio RN on 2/28/2022 at 4:53 PM   [phone]

## 2022-02-28 NOTE — PROGRESS NOTES
Colorectal Surgery  Post-operative Note      Procedure(s) Performed: Diverting colostomy creation    Subjective:   Patient states he feels very well after surgery. He tolerated a full plate of dinner, and denies any nausea or vomiting. He reports some abdominal soreness. He's urinating via his Quach. He has not yet gotten out of bed,. Objective:  Anesthesia type: General      I/O    Intra op    Post op     Fluids  1700 mL 400 mL     EBL 30 mL 0 mL     Urine 475 mL 150 mL     Vitals:   Vitals:    02/28/22 1440 02/28/22 1445 02/28/22 1515 02/28/22 1546   BP: 124/67 120/65 115/68 122/68   Pulse: 77 74 76 74   Resp: 12 11 11 16   Temp:   97.6 °F (36.4 °C) 97.8 °F (36.6 °C)   TempSrc:   Temporal Oral   SpO2: 98% 98% 97% 96%   Weight:       Height:           Physical Exam:  Post-op vital signs:  Stable   General appearance: alert, no acute distress, grooming appropriate  Eyes: No scleral icterus, EOM grossly intact  Neck: trachea midline, no JVD, no lymphadenopathy, neck supple  Chest/Lungs: normal effort with no accessory muscle use on 2L NC, 1750 pulled using IS  Cardiovascular: RRR, brisk capillary refill distally  Abdomen: Soft, appropriate incisional tenderness, lap port incisions are C/D/I and well-approximated with Dermabond, ostomy with some serosanguinous sweat in the bag, stoma is pink and viable  Skin: warm and dry, no rashes  Extremities: no edema, no cyanosis  Genitourinary: Quach in place with clear yellow urine  Neuro: A&Ox3, no focal deficits, sensation intact    Assessment and Plan  This is a 58y.o. year old male status post diverting colostomy creation secondary to rectal cancer in anticipation for APR vs LAR. (2/28) POD0.     Pain management: Roxicodone pain panel and scheduled tylenol TAP block immediately post-op  Cardiovasc: hemodynamically stable, will continue to monitor  Respiratory:  IS ordered to bedside, encourage hourly IS and deep breathing, wean oxygen as tolerated  Fluids:  , will SLIV with good UOP Diet: General diet with carb control  : Urine output is adequate, patient with baseline Quach, receiving Flomax, follow up Urology consult, will appreciate recs  Ambulation: OOB to chair, encourage ambulation  Prophylaxis: SCDs, lovenox  Antibiotics: None  Wound: Local wound care, patient to receive ostomy teaching    Vasyl Rosenthal DO  PGY1, General Surgery  02/28/22  5:58 PM  [phone]

## 2022-02-28 NOTE — ANESTHESIA PRE PROCEDURE
Provider, MD       Current medications:    No current facility-administered medications for this visit. No current outpatient medications on file.      Facility-Administered Medications Ordered in Other Visits   Medication Dose Route Frequency Provider Last Rate Last Admin    lactated ringers infusion   IntraVENous Continuous Henry Betancourt DO        0.9 % sodium chloride infusion  25 mL IntraVENous PRN Amber Galarza MD        acetaminophen (TYLENOL) tablet 1,000 mg  1,000 mg Oral Once Amber Galarza MD        enoxaparin (LOVENOX) injection 40 mg  40 mg SubCUTAneous Once Amber Galarza MD        metronidazole (FLAGYL) 500 mg in NaCl 100 mL IVPB premix  500 mg IntraVENous On Call to Via Jose G Tolliver MD        And    levoFLOXacin (LEVAQUIN) 500 MG/100ML infusion 500 mg  500 mg IntraVENous On Call to [address] Jose G Tolliver MD        sodium chloride flush 0.9 % injection 5-40 mL  5-40 mL IntraVENous 2 times per day Amber Galarza MD        sodium chloride flush 0.9 % injection 5-40 mL  5-40 mL IntraVENous PRN Amber Galarza MD        lactated ringers infusion   IntraVENous Continuous PRN MELODY Joel - CRNA   New Bag at 02/28/22 1009       Allergies:  No Known Allergies    Problem List:    Patient Active Problem List   Diagnosis Code    Rectal cancer ([facility] C20    Type 2 diabetes mellitus with hyperglycemia, with long-term current use of insulin ([facility]) E11.65, Z79.4    Primary hypertension I10    Iron deficiency anemia due to chronic blood loss D50.0    Incontinence of feces R15.9    Severe episode of recurrent major depressive disorder, without psychotic features ([facility] F33.2    Eczematous skin lesions L98.9    Colostomy care ([facility] Z43.3       Past Medical History:        Diagnosis Date    Diabetes mellitus ([address]     Difficulty voiding     History of blood transfusion 02/2022    Hypertension     Rectal cancer [facility])        Past Surgical History:        Procedure Laterality Date    ABDOMEN SURGERY  1975    Exploratory    PORT SURGERY N/A 2/17/2022    PORT A CATH INSERTION performed by Waqar Golden MD at [address] N/A 2/15/2022    SIGMOIDOSCOPY BIOPSY FLEXIBLE performed by Aurora Cardenas MD at [address]  2/15/2022    SIGMOIDOSCOPY POLYP SNARE performed by Aurora Cardenas MD at [address] [employer] ENDOSCOPY       Social History:    Social History     Tobacco Use    Smoking status: Never Smoker    Smokeless tobacco: Never Used   Substance Use Topics    Alcohol use: Not Currently     Comment: not in last 18 months                                Counseling given: Not Answered      Vital Signs (Current): There were no vitals filed for this visit. BP Readings from Last 3 Encounters:   02/28/22 129/65   02/23/22 122/64   02/22/22 128/74       NPO Status:                                                                                 BMI:   Wt Readings from Last 3 Encounters:   02/28/22 166 lb (75.3 kg)   02/23/22 172 lb 12.8 oz (78.4 kg)   02/22/22 174 lb (78.9 kg)     There is no height or weight on file to calculate BMI.    CBC:   Lab Results   Component Value Date    WBC 10.8 02/19/2022    RBC 3.59 02/19/2022    HGB 7.5 02/19/2022    HCT 25.0 02/19/2022    MCV 66.9 02/19/2022    RDW 20.6 02/19/2022     02/19/2022       CMP:   Lab Results   Component Value Date     02/19/2022    K 3.4 02/19/2022     02/19/2022    CO2 21 02/19/2022    BUN 12 02/19/2022    CREATININE 0.7 02/19/2022    GFRAA >60 02/19/2022    AGRATIO 0.9 02/14/2022    LABGLOM >60 02/19/2022    GLUCOSE 165 02/19/2022    PROT 6.7 02/14/2022    CALCIUM 7.8 02/19/2022    BILITOT <0.2 02/14/2022    ALKPHOS 126 02/14/2022    AST 10 02/14/2022    ALT 17 02/14/2022       POC Tests:   No results for input(s): POCGLU, POCNA, POCK, POCCL, POCBUN, POCHEMO, POCHCT in the last 72 hours.     Coags:   Lab Results   Component Value Date    PROTIME 11.8 02/15/2022    INR 1.04 02/15/2022       HCG (If Applicable): No results found for: PREGTESTUR, PREGSERUM, HCG, HCGQUANT     ABGs: No results found for: PHART, PO2ART, BLI7XTF, BCA2YIK, BEART, S0EYADYU     Type & Screen (If Applicable):  No results found for: LABABO, LABRH    Drug/Infectious Status (If Applicable):  No results found for: HIV, HEPCAB    COVID-19 Screening (If Applicable):   Lab Results   Component Value Date    COVID19 Not Detected 02/15/2022           Anesthesia Evaluation  Patient summary reviewed and Nursing notes reviewed  Airway: Mallampati: II  TM distance: >3 FB   Neck ROM: full  Mouth opening: > = 3 FB Dental: normal exam         Pulmonary: breath sounds clear to auscultation      (-) COPD, asthma and not a current smoker                           Cardiovascular:  Exercise tolerance: good (>4 METS),   (+) hypertension:,     (-) past MI, CAD, CABG/stent, dysrhythmias,  angina and  CHF      Rhythm: regular  Rate: normal           Beta Blocker:  Dose within 24 Hrs         Neuro/Psych:   (+) depression/anxiety             GI/Hepatic/Renal:        (-) GERD, liver disease and no renal disease       Endo/Other:    (+) DiabetesType II DM, well controlled, , blood dyscrasia (fe def anemia): anemia:., malignancy/cancer (rectal ca). (-) hypothyroidism, hyperthyroidism               Abdominal:             Vascular: Other Findings:               Anesthesia Plan      general     ASA 3       Induction: intravenous. MIPS: Postoperative opioids intended and Prophylactic antiemetics administered. Anesthetic plan and risks discussed with patient. Plan discussed with CRNA.                   Sami Oviedo MD   2/28/2022

## 2022-02-28 NOTE — H&P
[address] Same Day Surgery Update H & P  Department of General Surgery   Surgical Service   Pre-operative History and Physical  Last H & P within the last 30 days. DIAGNOSIS:   Rectal cancer ([facility] [C20]  Col[facility] care ([facility] [Z43.3]    Procedure(s):  LAPAROSCOPIC COLOSTOMY CREATION    History obtained from: Patient interview and EHR      HISTORY OF PRESENT ILLNESS:   Patient is a 57 y/o male with c/o stool changes over the past 6 months, including bleeding and urinary incontinence. He was hospitalized on 2/14/2022 due to DKA, and further evaluated for his GI/ complaints. An inpatient flex sig demonstrated a large friable rectosigmoid mass, with a subsequent biopsy revealing adenocarcinoma. Follow up MRI demonstrated an enhancing hepatic mass, concerning for metastatic disease. He has recovered satisfactorily since hospital discharge, and presents for the above procedure. Covid 19:  Vaccinated. Patient denies fever, chills, worsening cough, or known exposure to Covid-19.        Past Medical History:        Diagnosis Date    Diabetes mellitus ([facility]     Difficulty voiding     History of blood transfusion 02/2022    Hypertension     Rectal cancer [facility])      Past Surgical History:        Procedure Laterality Date    ABDOMEN SURGERY  1975    Exploratory    PORT SURGERY N/A 2/17/2022    PORT A CATH INSERTION performed by Cindy Davis MD at [address] N/A 2/15/2022    SIGMOIDOSCOPY BIOPSY FLEXIBLE performed by Aurora Cardenas MD at [address]  2/15/2022    SIGMOIDOSCOPY POLYP SNARE performed by Aurora Cardenas MD at [address] ENDOSCOPY     Past Social History:  Social History     Socioeconomic History    Marital status: Single     Spouse name: None    Number of children: 0    Years of education: None    Highest education level: None   Occupational History    None   Tobacco Use    Smoking status: Never Smoker    Smokeless tobacco: Never Used   Vaping Use    Vaping Use: Never used    Passive vaping exposure: Yes   Substance and Sexual Activity    Alcohol use: Not Currently     Comment: not in last 18 months    Drug use: Never    Sexual activity: Not Currently   Other Topics Concern    None   Social History Narrative    None     Social Determinants of Health     Financial Resource Strain: Low Risk     Difficulty of Paying Living Expenses: Not hard at all   Food Insecurity: No Food Insecurity    Worried About Running Out of Food in the Last Year: Never true    [place] of Food in the Last Year: Never true   Transportation Needs:     Lack of Transportation (Medical): Not on file    Lack of Transportation (Non-Medical): Not on file   Physical Activity:     Days of Exercise per Week: Not on file    Minutes of Exercise per Session: Not on file   Stress:     Feeling of Stress : Not on file   Social Connections:     Frequency of Communication with Friends and Family: Not on file    Frequency of Social Gatherings with Friends and Family: Not on file    Attends Mu-ism Services: Not on file    Active Member of [address] [place] [address] or Organizations: Not on file    Attends Club or Organization Meetings: Not on file    Marital Status: Not on file   Intimate Partner Violence:     Fear of Current or Ex-Partner: Not on file    Emotionally Abused: Not on file    Physically Abused: Not on file    Sexually Abused: Not on file   Housing Stability:     Unable to Pay for Housing in the Last Year: Not on file    Number of [place] in the Last Year: Not on file    Unstable Housing in the Last Year: Not on file         Medications Prior to Admission:      Prior to Admission medications    Medication Sig Start Date End Date Taking? Authorizing Provider   metroNIDAZOLE (FLAGYL) 500 MG tablet Take one tablet by mouth 3 times on the day prior to surgery.  Take one tablet at 1pm, 2pm and 9pm. 2/24/22  Yes Darci Bernardo MD   neomycin [facility]) 500 MG tablet Take two tablets 3 times the day before surgery. Take two tablets at 1pm, two tablets at 2pm and two tablets at 9pm. 2/24/22  Yes Noah Scott MD   FLUoxetine (PROZAC) 10 MG capsule Take 1 capsule by mouth daily 2/23/22  Yes Rena Roman MD   insulin glargine (LANTUS;BASAGLAR) 100 UNIT/ML injection pen Inject 28 Units into the skin daily  Patient taking differently: Inject 20 Units into the skin daily  2/19/22  Yes Manolo Hampton MD   insulin lispro, 1 Unit Dial, 100 UNIT/ML SOPN Inject 9 Units into the skin 3 times daily (with meals)  Patient taking differently: Inject 6 Units into the skin 3 times daily (with meals)  2/18/22  Yes Manolo Hampton MD   tamsulosin (FLOMAX) 0.4 MG capsule Take 1 capsule by mouth daily 2/19/22  Yes Manolo Hampton MD   metFORMIN (GLUCOPHAGE) 500 MG tablet Take 1 tablet by mouth 2 times daily (with meals) 2/18/22  Yes Manolo Hampton MD   metoprolol succinate (TOPROL XL) 100 MG extended release tablet Take 100 mg by mouth in the morning and at bedtime    Yes Historical MD Shilo   blood glucose monitor kit and supplies Dispense sufficient amount for indicated testing frequency plus additional to accommodate PRN testing needs. Dispense all needed supplies to include: monitor, strips, lancing device, lancets, control solutions, alcohol swabs. 2/18/22   Manolo Hampton MD         Allergies:  Patient has no known allergies.     PHYSICAL EXAM:      /65   Pulse 68   Temp 98.5 °F (36.9 °C) (Oral)   Resp 11   Ht 5' 11\" (1.803 m)   Wt 166 lb (75.3 kg)   SpO2 96%   BMI 23.15 kg/m²      Airway:  Airway patent with no audible stridor    Heart:  Regular rate and rhythm, No murmur noted    Lungs:  No increased work of breathing, good air exchange, clear to auscultation bilaterally, no crackles or wheezing    Abdomen:  Soft, non-distended, mild tenderness to palpation over suprapubic area, no rebound tenderness or guarding, and no masses palpated    ASSESSMENT AND PLAN     Patient is a 58 y.o. male with above specified procedure planned. 1.  The patients history and physical was obtained and signed off by the pre-admission testing department. Patient seen and focused exam done today- no new changes since last physical exam on 2/23/2022.    2.  Access to ancillary services are available per request of the provider.     MELODY Amaral - CNP     2/28/2022

## 2022-02-28 NOTE — PROGRESS NOTES
4 Eyes Admission Assessment     I agree as the admission nurse that 2 RN's have performed a thorough Head to Toe Skin Assessment on the patient. ALL assessment sites listed below have been assessed on admission. Areas assessed by both nurses:   [x]   Head, Face, and Ears   [x]   Shoulders, Back, and Chest- surgical scar  [x]   Arms, Elbows, and Hands   [x]   Coccyx, Sacrum, and Ischium  [x]   Legs, Feet, and Heels- extremely dried heels and cracking  Some redness noted on heels, mepilexes in place  Tear noted on bottom of left hallus        Does the Patient have Skin Breakdown?   No         Jericho Prevention initiated:  No   Wound Care Orders initiated:  No      WOC nurse consulted for Pressure Injury (Stage 3,4, Unstageable, DTI, NWPT, and Complex wounds) or Jericho score 18 or lower:  No      Nurse 1 eSignature: Electronically signed by Karin Pandya RN on 2/28/22 at 4:35 PM EST    **SHARE this note so that the co-signing nurse is able to place an eSignature**    Nurse 2 eSignature: Electronically signed by Denver Hernandez RN on 2/28/22 at 4:48 PM EST

## 2022-02-28 NOTE — PROGRESS NOTES
WOAUSTIN consulted for stoma marking for colostomy. Scheduled for Laparoscopic Colostomy Creation and Umbilical Hernia Repair today with Dr. Sharona Quarles. Pt's abdomen flat. When sitting and standing has a small abdomen pannus lower abdomen. Patient reports he wears his undergarments, pants with belt below this abdominal pannus. 2 colostomy sites marked left lower quadrant on the addomen within the rectus muscle. These sites were examined for a flat pouching surface to accomadate placement of ostomy bag and wafer. These site will not interfer with his clothing. Patient reported he had good visualization of this area and able to see the markings. Reviewed anatomy and physiology, basic pouching procedures, and maintanence of the colostomy. Will follow up post op.

## 2022-02-28 NOTE — ANESTHESIA PROCEDURE NOTES
Peripheral Block    Patient location during procedure: pre-op  Staffing  Performed: anesthesiologist   Anesthesiologist: Braydon Dickerson MD  Preanesthetic Checklist  Completed: patient identified, IV checked, site marked, risks and benefits discussed, surgical consent, monitors and equipment checked, pre-op evaluation, timeout performed, anesthesia consent given, oxygen available and patient being monitored  Peripheral Block  Patient position: supine  Prep: ChloraPrep  Patient monitoring: cardiac monitor, continuous pulse ox, frequent blood pressure checks and IV access  Block type: TAP  Laterality: bilateral  Injection technique: single-shot  Guidance: ultrasound guided  Local infiltration: lidocaine  Infiltration strength: 1 %  Dose: 3 mL  Provider prep: mask and sterile gloves  Local infiltration: lidocaine  Needle  Needle gauge: 21 G  Needle length: 10 cm  Needle localization: ultrasound guidance  Assessment  Injection assessment: negative aspiration for heme, no paresthesia on injection and local visualized surrounding nerve on ultrasound  Paresthesia pain: none  Slow fractionated injection: yes  Hemodynamics: stable  Additional Notes  Immediately prior to procedure a \"time out\" was called to verify the correct patient, allergies, laterality, procedure and equipment. Time out performed with  RN    Local Anesthetic: 10cc 0.5 %  Bupivacaine + 10 cc Exparel  Amount: 20 ml  in 5 ml increments after negative aspiration each time to each side    External Oblique muscle, Internal Oblique muscle, Transversus Abdominis muscle are identified; the tip of the needle and the spread of the local anesthetic between the Internal Oblique muscle and the Transversus Abdominis muscles are visualized. The muscles appeared to be anatomically normal and there were no abnormal pathologically findings seen.          Medications Administered  Bupivacaine (MARCAINE) PF injection 0.5%, 20 mL  bupivacaine liposome (EXPAREL) injection 1.3%, 20 mL  Reason for block: post-op pain management and at surgeon's request

## 2022-02-28 NOTE — ANESTHESIA POSTPROCEDURE EVALUATION
Department of Anesthesiology  Postprocedure Note    Patient: Madhuri Junior  MRN: [id number]  YOB: 1960  Date of evaluation: 2/28/2022  Time:  3:33 PM     Procedure Summary     Date: 02/28/22 Room / Location: [facility] [address]    Anesthesia Start: 7381 Anesthesia Stop: 1081    Procedure: LAPAROSCOPIC COLOSTOMY CREATION, laparoscopic incarcerated hernia repair (N/A Abdomen) Diagnosis:       Rectal cancer ([address]      (Obstructing Rectal cancer ([address] [C20])    Surgeons: Darci Bernardo MD Responsible Provider: Giuliana Jha MD    Anesthesia Type: general ASA Status: 3          Anesthesia Type: general    Reema Phase I: Reema Score: 9    Reema Phase II:      Last vitals: Reviewed and per EMR flowsheets.        Anesthesia Post Evaluation    Patient location during evaluation: PACU  Patient participation: complete - patient participated  Level of consciousness: awake and alert  Airway patency: patent  Nausea & Vomiting: no nausea and no vomiting  Complications: no  Cardiovascular status: hemodynamically stable  Respiratory status: acceptable  Hydration status: euvolemic

## 2022-03-01 LAB
ALBUMIN SERPL-MCNC: 2.3 G/DL (ref 3.4–5)
ANION GAP SERPL CALCULATED.3IONS-SCNC: 12 MMOL/L (ref 3–16)
BASOPHILS ABSOLUTE: 0 K/UL (ref 0–0.2)
BASOPHILS RELATIVE PERCENT: 0.6 %
BUN BLDV-MCNC: 20 MG/DL (ref 7–20)
CALCIUM SERPL-MCNC: 8.3 MG/DL (ref 8.3–10.6)
CHLORIDE BLD-SCNC: 98 MMOL/L (ref 99–110)
CO2: 22 MMOL/L (ref 21–32)
CREAT SERPL-MCNC: 1.1 MG/DL (ref 0.8–1.3)
EOSINOPHILS ABSOLUTE: 0 K/UL (ref 0–0.6)
EOSINOPHILS RELATIVE PERCENT: 0 %
GFR AFRICAN AMERICAN: >60
GFR NON-AFRICAN AMERICAN: >60
GLUCOSE BLD-MCNC: 235 MG/DL (ref 70–99)
GLUCOSE BLD-MCNC: 244 MG/DL (ref 70–99)
GLUCOSE BLD-MCNC: 246 MG/DL (ref 70–99)
GLUCOSE BLD-MCNC: 251 MG/DL (ref 70–99)
GLUCOSE BLD-MCNC: 256 MG/DL (ref 70–99)
GLUCOSE BLD-MCNC: 278 MG/DL (ref 70–99)
GLUCOSE BLD-MCNC: >600 MG/DL (ref 70–99)
HCT VFR BLD CALC: 25 % (ref 40.5–52.5)
HEMOGLOBIN: 7.7 G/DL (ref 13.5–17.5)
LYMPHOCYTES ABSOLUTE: 0.8 K/UL (ref 1–5.1)
LYMPHOCYTES RELATIVE PERCENT: 9.6 %
MAGNESIUM: 1.6 MG/DL (ref 1.8–2.4)
MCH RBC QN AUTO: 21.4 PG (ref 26–34)
MCHC RBC AUTO-ENTMCNC: 30.9 G/DL (ref 31–36)
MCV RBC AUTO: 69.3 FL (ref 80–100)
MONOCYTES ABSOLUTE: 0.7 K/UL (ref 0–1.3)
MONOCYTES RELATIVE PERCENT: 8 %
NEUTROPHILS ABSOLUTE: 6.7 K/UL (ref 1.7–7.7)
NEUTROPHILS RELATIVE PERCENT: 81.8 %
PDW BLD-RTO: 24.1 % (ref 12.4–15.4)
PERFORMED ON: ABNORMAL
PHOSPHORUS: 3.6 MG/DL (ref 2.5–4.9)
PLATELET # BLD: 388 K/UL (ref 135–450)
PMV BLD AUTO: 7.7 FL (ref 5–10.5)
POTASSIUM SERPL-SCNC: 5 MMOL/L (ref 3.5–5.1)
RBC # BLD: 3.61 M/UL (ref 4.2–5.9)
SODIUM BLD-SCNC: 132 MMOL/L (ref 136–145)
WBC # BLD: 8.2 K/UL (ref 4–11)

## 2022-03-01 PROCEDURE — 2580000003 HC RX 258: Performed by: STUDENT IN AN ORGANIZED HEALTH CARE EDUCATION/TRAINING PROGRAM

## 2022-03-01 PROCEDURE — 83735 ASSAY OF MAGNESIUM: CPT

## 2022-03-01 PROCEDURE — 80069 RENAL FUNCTION PANEL: CPT

## 2022-03-01 PROCEDURE — 6360000002 HC RX W HCPCS: Performed by: STUDENT IN AN ORGANIZED HEALTH CARE EDUCATION/TRAINING PROGRAM

## 2022-03-01 PROCEDURE — 36415 COLL VENOUS BLD VENIPUNCTURE: CPT

## 2022-03-01 PROCEDURE — 99024 POSTOP FOLLOW-UP VISIT: CPT | Performed by: SURGERY

## 2022-03-01 PROCEDURE — 6370000000 HC RX 637 (ALT 250 FOR IP): Performed by: STUDENT IN AN ORGANIZED HEALTH CARE EDUCATION/TRAINING PROGRAM

## 2022-03-01 PROCEDURE — 6360000002 HC RX W HCPCS

## 2022-03-01 PROCEDURE — 1200000000 HC SEMI PRIVATE

## 2022-03-01 PROCEDURE — 85025 COMPLETE CBC W/AUTO DIFF WBC: CPT

## 2022-03-01 RX ORDER — MAGNESIUM SULFATE IN WATER 40 MG/ML
2000 INJECTION, SOLUTION INTRAVENOUS ONCE
Status: COMPLETED | OUTPATIENT
Start: 2022-03-01 | End: 2022-03-01

## 2022-03-01 RX ORDER — OXYCODONE HYDROCHLORIDE 5 MG/1
5 TABLET ORAL EVERY 6 HOURS PRN
Qty: 28 TABLET | Refills: 0 | Status: SHIPPED | OUTPATIENT
Start: 2022-03-01 | End: 2022-03-08

## 2022-03-01 RX ORDER — ONDANSETRON 4 MG/1
4 TABLET, ORALLY DISINTEGRATING ORAL EVERY 8 HOURS PRN
Qty: 20 TABLET | Refills: 0 | Status: SHIPPED | OUTPATIENT
Start: 2022-03-01 | End: 2022-10-18

## 2022-03-01 RX ADMIN — METOPROLOL SUCCINATE 100 MG: 50 TABLET, EXTENDED RELEASE ORAL at 21:21

## 2022-03-01 RX ADMIN — OXYCODONE 5 MG: 5 TABLET ORAL at 14:12

## 2022-03-01 RX ADMIN — IBUPROFEN 600 MG: 600 TABLET ORAL at 21:23

## 2022-03-01 RX ADMIN — SODIUM CHLORIDE, PRESERVATIVE FREE 10 ML: 5 INJECTION INTRAVENOUS at 10:15

## 2022-03-01 RX ADMIN — ENOXAPARIN SODIUM 40 MG: 100 INJECTION SUBCUTANEOUS at 09:12

## 2022-03-01 RX ADMIN — MAGNESIUM SULFATE HEPTAHYDRATE 2000 MG: 2 INJECTION, SOLUTION INTRAVENOUS at 09:43

## 2022-03-01 RX ADMIN — ACETAMINOPHEN 1000 MG: 500 TABLET ORAL at 09:07

## 2022-03-01 RX ADMIN — INSULIN HUMAN 8 UNITS: 100 INJECTION, SOLUTION PARENTERAL at 17:26

## 2022-03-01 RX ADMIN — IBUPROFEN 600 MG: 600 TABLET ORAL at 17:26

## 2022-03-01 RX ADMIN — TAMSULOSIN HYDROCHLORIDE 0.4 MG: 0.4 CAPSULE ORAL at 09:11

## 2022-03-01 RX ADMIN — METOPROLOL SUCCINATE 100 MG: 50 TABLET, EXTENDED RELEASE ORAL at 09:10

## 2022-03-01 RX ADMIN — ACETAMINOPHEN 1000 MG: 500 TABLET ORAL at 17:25

## 2022-03-01 RX ADMIN — INSULIN HUMAN 8 UNITS: 100 INJECTION, SOLUTION PARENTERAL at 11:33

## 2022-03-01 RX ADMIN — INSULIN HUMAN 6 UNITS: 100 INJECTION, SOLUTION PARENTERAL at 21:23

## 2022-03-01 RX ADMIN — FLUOXETINE 10 MG: 10 CAPSULE ORAL at 09:11

## 2022-03-01 RX ADMIN — SODIUM CHLORIDE, PRESERVATIVE FREE 10 ML: 5 INJECTION INTRAVENOUS at 21:27

## 2022-03-01 ASSESSMENT — PAIN SCALES - GENERAL
PAINLEVEL_OUTOF10: 3
PAINLEVEL_OUTOF10: 4
PAINLEVEL_OUTOF10: 0
PAINLEVEL_OUTOF10: 2
PAINLEVEL_OUTOF10: 1
PAINLEVEL_OUTOF10: 4
PAINLEVEL_OUTOF10: 0
PAINLEVEL_OUTOF10: 0

## 2022-03-01 ASSESSMENT — PAIN DESCRIPTION - PAIN TYPE: TYPE: SURGICAL PAIN

## 2022-03-01 ASSESSMENT — PAIN DESCRIPTION - LOCATION: LOCATION: ABDOMEN

## 2022-03-01 ASSESSMENT — PAIN DESCRIPTION - DESCRIPTORS: DESCRIPTORS: SORE

## 2022-03-01 ASSESSMENT — PAIN DESCRIPTION - ORIENTATION: ORIENTATION: MID

## 2022-03-01 NOTE — DISCHARGE INSTR - COC
Continuity of Care Form    Patient Name: Eri Kelly   :  1960  MRN:  [id number]    Admit date:  2022  Discharge date:  ***    Code Status Order: Prior   Advance Directives:      Admitting Physician:  Jacquelyn Eldridge MD  PCP: Trudy Carlson MD    Discharging Nurse: [facility] Unit/Room#: 7123/4025-00  Discharging Unit Phone Number: ***    Emergency Contact:   Extended Emergency Contact Information  Primary Emergency Contact: Noé Mota  Home Phone: [phone]  Mobile Phone: [phone]  Relation: Brother/Sister    Past Surgical History:  Past Surgical History:   Procedure Laterality Date    ABDOMEN SURGERY      Exploratory    COLOSTOMY N/A 2022    LAPAROSCOPIC COLOSTOMY CREATION, laparoscopic incarcerated hernia repair performed by Jacquelyn Eldridge MD at [place] [address] 2022    PORT A CATH INSERTION performed by María Gomez MD at [address] [place] [address] N/A 2/15/2022    [facility] performed by Inga Olivera MD at [address]  2/15/2022    SIGMOIDOSCOPY POLYP [address] [facility] performed by Inga Olivera MD at [address] [facility] ENDOSCOPY       Immunization History:   Immunization History   Administered Date(s) Administered    COVID-19, Arnshiva Dun, Primary or Immunocompromised, PF, 100mcg/0.5mL 03/10/2021, 2021, 2021    Influenza Virus Vaccine 10/27/2014, 2015, 2016, 10/23/2017    Tdap (Boostrix, Adacel) 10/23/2017       Active Problems:  Patient Active Problem List   Diagnosis Code    Rectal cancer ([facility] C20    Type 2 diabetes mellitus with hyperglycemia, with long-term current use of insulin ([facility] E11.65, Z79.4    Primary hypertension I10    Iron deficiency anemia due to chronic blood loss D50.0    Incontinence of feces R15.9    Severe episode of recurrent major depressive disorder, without psychotic features ([facility] F33.2    Eczematous skin lesions L98.9    Colostomy care ([facility] Z43.3    Incarcerated umbilical hernia K42.0       Isolation/Infection:   Isolation            No Isolation          Patient Infection Status       None to display            Nurse Assessment:  Last Vital Signs: BP (!) 101/56   Pulse 62   Temp 97.4 °F (36.3 °C) (Oral)   Resp 16   Ht 5' 11\" (1.803 m)   Wt 166 lb (75.3 kg)   SpO2 95%   BMI 23.15 kg/m²     Last documented pain score (0-10 scale): Pain Level: 0  Last Weight:   Wt Readings from Last 1 Encounters:   22 166 lb (75.3 kg)     Mental Status:  {IP PT MENTAL STATUS:}    IV Access:  { KATHIA IV ACCESS:286954470}    Nursing Mobility/ADLs:  Walking   {P DME ZVNQ:847704715}  Transfer  {P DME [facility]}  Bathing  {P DME YNPC:528311627}  Dressing  {P DME XKSP:486517304}  Toileting  {[facility] DME ZVSS:677702050}  Feeding  {[facility] DME STE}  Med Admin  {[facility] DME MDZM:167156718}  Med Delivery   { KATHIA MED Delivery:651251348}    Wound Care Documentation and Therapy:        Elimination:  Continence: Bowel: {YES / VR:40642}  Bladder: {YES / MT:30478}  Urinary Catheter: {Urinary Catheter:256264206}   Colostomy/Ileostomy/Ileal Conduit: {YES / OM:21798}  Colostomy LLQ-Stomal Appliance: 2 piece,Changed  Colostomy LLQ-Flange Size (inches): 2.5 Inches  Colostomy LLQ-Stoma  Assessment: Pink,Protrudes,Moist  Colostomy LLQ-Mucocutaneous Junction: Intact  Colostomy LLQ-Peristomal Assessment: Clean,Intact  Colostomy LLQ-Treatment: Bag change,Stoma paste  Colostomy LLQ-Stool Appearance: Mucous  Colostomy LLQ-Stool Color: Red  Colostomy LLQ-Stool Amount: Small    Patient has had one day of education. Needs re-enforcement of knowledge and guidance in changing the appliance independently. Date of Last BM: ***    Intake/Output Summary (Last 24 hours) at 3/1/2022 1122  Last data filed at 3/1/2022 1015  Gross per 24 hour   Intake 2390 ml   Output 1135 ml   Net 1255 ml     I/O last 3 completed shifts: In: 2380 [P.O.:180; I.V.:2200]  Out: 4087 [Urine:1105;  Blood:30]    Safety Concerns: [address] [facility] Safety Concerns:698325675}    Impairments/Disabilities:      [address] [facility] Impairments/Disabilities:278191189}    Nutrition Therapy:  Current Nutrition Therapy:   [address] [facility] Diet List:177143546}    Routes of Feeding: {CHP DME Other Feedings:449335545}  Liquids: {Slp liquid thickness:60402}  Daily Fluid Restriction: {CHP DME Yes amt example:403657725}  Last Modified Barium Swallow with Video (Video Swallowing Test): {Done Not Done TEKB:528812640}    Treatments at the Time of Hospital Discharge:   Respiratory Treatments: ***  Oxygen Therapy:  {Therapy; copd oxygen:93680}  Ventilator:    { CC Vent BTA}    Rehab Therapies: {THERAPEUTIC INTERVENTION:2093143928}  Weight Bearing Status/Restrictions: [address]  Weight Bearin}  Other Medical Equipment (for information only, NOT a DME order):  {EQUIPMENT:817789264}  Other Treatments: ***    Patient's personal belongings (please select all that are sent with patient):  {[facility] DME Belongings:196528948}    RN SIGNATURE:  {Esignature:381384519}    CASE MANAGEMENT/SOCIAL WORK SECTION    Inpatient Status Date: ***    Readmission Risk Assessment Score:  Readmission Risk              Risk of Unplanned Readmission:  10           Discharging to Facility/ Agency   Name:   Address:  Phone:  Fax:    Dialysis Facility (if applicable)   Name:  Address:  Dialysis Schedule:  Phone:  Fax:    / signature: {Esignature:974106481}    PHYSICIAN SECTION    Prognosis: {Prognosis:5841530384}    Condition at Discharge: [address] Patient Condition:485238637}    Rehab Potential (if transferring to Rehab): {Prognosis:2341719233}    Recommended Labs or Other Treatments After Discharge: ***    Physician Certification: I certify the above information and transfer of Kenneth Frank  is necessary for the continuing treatment of the diagnosis listed and that he requires {Admit to Appropriate Level of Care:92703} for {GREATER/LESS:255139903} 30 days.      Update Admission H&P: {CHP DME Changes in MSATT:499268601}    PHYSICIAN SIGNATURE:  {Esignature:704779697}

## 2022-03-01 NOTE — PROGRESS NOTES
Surgery Daily Progress Note  Sanket Frazier  CC:  Obstructing colon cancer      Subjective :No acute events overnight. Patient remains afebrile and HDS. Pain well controlled. Tolerating po intake. Philomena arizmendi patent      Objective    Infusions:   sodium chloride      dextrose          I/O:I/O last 3 completed shifts: In: 1700 [I.V.:1700]  Out: 535 [Urine:505; Blood:30]           Wt Readings from Last 1 Encounters:   02/28/22 166 lb (75.3 kg)                 LABS:    Recent Labs     02/28/22  1035   WBC 8.1   HGB 8.1*   HCT 26.5*   MCV 69.4*           Recent Labs     02/28/22  1035   *   K 4.1   CL 97*   CO2 25   BUN 15   CREATININE 0.9      No results for input(s): AST, ALT, ALB, BILIDIR, BILITOT, ALKPHOS in the last 72 hours. No results for input(s): LIPASE, AMYLASE in the last 72 hours. No results for input(s): PROT, INR, APTT in the last 72 hours. No results for input(s): CKTOTAL, CKMB, CKMBINDEX, TROPONINI in the last 72 hours. Exam:BP (!) 110/59   Pulse 79   Temp 97.6 °F (36.4 °C) (Oral)   Resp 16   Ht 5' 11\" (1.803 m)   Wt 166 lb (75.3 kg)   SpO2 97%   BMI 23.15 kg/m²   General appearance: alert, appears stated age and cooperative  Eyes: No scleral icterus, EOM grossly intact  Neck: trachea midline, no JVD, no lymphadenopathy, neck supple  Chest/Lungs: normal effort with no accessory muscle use on 2L NC, 1750 pulled using IS  Cardiovascular: RRR, brisk capillary refill distally  Abdomen: Soft, appropriate incisional tenderness, lap port incisions are C/D/I and well-approximated with Dermabond, ostomy with some mild edema and  serosanguinous sweat in the bag, stoma is pink and viable  Skin: warm and dry, no rashes  Extremities: no edema, no cyanosis  Genitourinary:  Quach in place with clear yellow urine  Neuro: A&Ox3, no focal deficits, sensation intact      ASSESSMENT/PLAN: Pt. is a 58 y.o. male s/p post diverting colostomy creation secondary to rectal cancer in

## 2022-03-01 NOTE — PROGRESS NOTES
VSS. A&Ox4. Ambulated in hallway, tolerated well. Minimal ostomy output. Hypoactive bowel sounds. Quach in place. Yellow, clear, no odor. Reporting surg pain, given prn pain meds and scheduled meds as ordered. Family at bedside. Possible discharge tomorrow. Mucous output from buttocks. Call light within reach.  Will cont to monitor

## 2022-03-01 NOTE — CONSULTS
Urology Attending Consult Note      Reason for Consultation: Multani recommendations    History: 59 yo M found to have large rectal mass impinging on prostate/bladder in mid February initially at [facility]. CT scan showed bilateral hydronephrosis with distended bladder so multani was placed. Underwent diverting colostomy yesterday with plans for chemotherapy in the future. We were consulted for multani recs. Family History, Social History, Review of Systems:  Reviewed and agreed to as per chart    Vitals:  /60   Pulse 69   Temp 97.7 °F (36.5 °C) (Oral)   Resp 16   Ht 5' 11\" (1.803 m)   Wt 166 lb (75.3 kg)   SpO2 96%   BMI 23.15 kg/m²   Temp  Av.3 °F (36.3 °C)  Min: 96.8 °F (36 °C)  Max: 98.5 °F (36.9 °C)    Intake/Output Summary (Last 24 hours) at 3/1/2022 3176  Last data filed at 3/1/2022 0416  Gross per 24 hour   Intake 2380 ml   Output 1135 ml   Net 1245 ml         Physical:   Well developed, well nourished in no acute distress   Mood indicates no abnormalities. Pt doesnt appear depressed   Orientated to time and place   Neck is supple, trachea is midline   Respiratory effort is normal   Cardiovascular show no extremity swelling   Abdomen no masses or hernias are palpated, there is no tenderness. Liver and Spleen appear normal.   Skin show no abnormal lesions   Lymph nodes are not palpated in the inguinal, neck, or axillary area.      Male :  Multani draining olivia urine    Labs:  WBC:    Lab Results   Component Value Date    WBC 8.2 2022     Hemoglobin/Hematocrit:    Lab Results   Component Value Date    HGB 7.7 2022    HCT 25.0 2022     BMP:    Lab Results   Component Value Date     2022    K 5.0 2022    K 4.1 2022    CL 98 2022    CO2 22 2022    BUN 20 2022    LABALBU 2.3 2022    CREATININE 1.1 2022    CALCIUM 8.3 2022    GFRAA >60 2022    LABGLOM >60 2022     PT/INR:    Lab Results Component Value Date    PROTIME 11.8 02/15/2022    INR 1.04 02/15/2022     PTT:  No results found for: APTT[APTT      Impression/Plan: 57 yo M sp diverting colostomy for rectal mass impinging on bladder/prostate. Multani in place for bilateral hydro. We were consulted for recs.    -No pain/issues reported with catheter  -Would recommend leaving multani in place at this time with monthly changes until mass can be treated with chemo.  Explained to patient and he understands  -We can obtain outpatient JEAN CARLOS in a few days to check resolution of hydro.  -Ok to DC from our standpoint today    DARIEL Yu

## 2022-03-01 NOTE — PLAN OF CARE
Problem: Nutrition  Goal: Optimal nutrition therapy  Outcome: Ongoing  Note: Nutrition Problem #1: Increased nutrient needs  Intervention: Food and/or Nutrient Delivery: Continue Current Diet,Start Oral Nutrition Supplement  Nutritional Goals: pt will consistently consume >50% PO intake of meals and ONS through admit

## 2022-03-01 NOTE — CARE COORDINATION
Cm following, spoke with pt and sister at bedside, agree to [address] following [address] [place] aware of new ostomy care at [facility]. Pending return GI function.   Electronically signed by John Silva RN on 3/1/2022 at 4:41 PM   [phone]

## 2022-03-01 NOTE — PROGRESS NOTES
Patient A&Ox4. VSS. Surgical sites cdi. Ostomy with small amount of bloody output and no stool. Patient ambulated in alicia using front wheel walker and tolerated well. Quach draining brown/yellow urine. Patient tolerating diet. IV saline locked per orders. SCDs on. Bed in lowest setting with bed alarm on. Call light within reach.

## 2022-03-01 NOTE — PLAN OF CARE
Problem: Falls - Risk of:  Goal: Will remain free from falls  Description: Will remain free from falls  Outcome: Ongoing  Note: Standby assist. Tolerates well. Call light within reach. Feq rounding. Non skid socks on     Problem: Pain:  Goal: Pain level will decrease  Description: Pain level will decrease  Outcome: Ongoing  Note: 4/10 surg pain. Given prn pain meds. Scheduled meds given as ordered.  Ambulated in hallways

## 2022-03-01 NOTE — CONSULTS
Ostomy Referral Progress Note      NAME:  Chava Ott  MEDICAL RECORD NUMBER:  [id number]  AGE: 58 y.o. GENDER:  male  :  1960  TODAY'S DATE:  3/1/2022    Subjective     Chava Ott is a 58 y.o. male referred by:   [x] Physician  [] Nursing  [] Other:     New Colostomy  Stoma: 55 mm (irregular shape)  Barrier: 2 piece flat (yellow) #07276 with drainable pouch #[id number]    Surgeon Dr. Joi Acevedo Laparoscopic Diverting Loop Descending Colostomy Creation, Laparoscopic Suture Repair of Fat Containing Incarcerated Umbilical Hernia    PAST MEDICAL HISTORY:        Diagnosis Date    Diabetes mellitus ([facility]     Difficulty voiding     History of blood transfusion 2022    Hypertension     Rectal cancer ([facility]        MEDICATIONS:    No current facility-administered medications on file prior to encounter. Current Outpatient Medications on File Prior to Encounter   Medication Sig Dispense Refill    oxyCODONE-acetaminophen (PERCOCET) 5-325 MG per tablet Take 1 tablet by mouth every 4 hours as needed for Pain.  FLUoxetine (PROZAC) 10 MG capsule Take 1 capsule by mouth daily 90 capsule 0    insulin glargine (LANTUS;BASAGLAR) 100 UNIT/ML injection pen Inject 28 Units into the skin daily (Patient taking differently: Inject 20 Units into the skin daily ) 5 pen 3    insulin lispro, 1 Unit Dial, 100 UNIT/ML SOPN Inject 9 Units into the skin 3 times daily (with meals) (Patient taking differently: Inject 6 Units into the skin 3 times daily (with meals) ) 3 pen 0    tamsulosin (FLOMAX) 0.4 MG capsule Take 1 capsule by mouth daily 30 capsule 3    metFORMIN (GLUCOPHAGE) 500 MG tablet Take 1 tablet by mouth 2 times daily (with meals) 180 tablet 1    blood glucose monitor kit and supplies Dispense sufficient amount for indicated testing frequency plus additional to accommodate PRN testing needs.  Dispense all needed supplies to include: monitor, strips, lancing 111/60   Pulse 69   Temp 97.7 °F (36.5 °C) (Oral)   Resp 16   Ht 5' 11\" (1.803 m)   Wt 166 lb (75.3 kg)   SpO2 96%   BMI 23.15 kg/m²     Jericho Risk Score Jericho Scale Score: 20    Patient Active Problem List   Diagnosis Code    Rectal cancer ([facility] C20    Type 2 diabetes mellitus with hyperglycemia, with long-term current use of insulin ([facility]) E11.65, Z79.4    Primary hypertension I10    Iron deficiency anemia due to chronic blood loss D50.0    Incontinence of feces R15.9    Severe episode of recurrent major depressive disorder, without psychotic features ([facility] F33.2    Eczematous skin lesions L98.9    Colostomy care ([facility] Z43.3    Incarcerated umbilical hernia W92.3       Assessment: Stoma is pink, moist and protrudes, swollen, irregular shape. Peristoma skin intact and clean     Colostomy LLQ (Active)   Stomal Appliance 2 piece; Changed 03/01/22 1112   Flange Size (inches) 2.5 Inches 03/01/22 1112   Stoma  Assessment Pink;Protrudes; Moist 03/01/22 1112   Mucocutaneous Junction Intact 03/01/22 1112   Peristomal Assessment Clean; Intact 03/01/22 1112   Treatment Bag change;Stoma paste 03/01/22 1112   Stool Appearance Mucous; Bloody 03/01/22 1112   Stool Color Red 03/01/22 1112   Stool Amount Small 03/01/22 1112   Number of days: 0       Intake/Output Summary (Last 24 hours) at 3/1/2022 1113  Last data filed at 3/1/2022 1015  Gross per 24 hour   Intake 2390 ml   Output 1135 ml   Net 1255 ml         Plan   Plan for Ostomy Care:  [address] - Patient to empty appliance when 1/3 to 1/2 full with assistance of the staff. Cleanse inside and outside of the drain spout prior to rolling closed. Change appliance every 3-5 days or 1-2 times a week.   Call for problems with seal [phone]    To Change Appliance: Gently remove old appliance, clean with warm water ONLY, dry completely, verify size of stoma and cut barrier accordingly, pt's stoma is irregularly shaped - use paste on right side of cut edge to fill in gap and exposed skin, place barrier and attach pouch. 2 piece flat (yellow) #70053 with drainable pouch #15623    Ostomy Plan of Care  [x] Supplies/Instructions left in room  [] Patient using home supplies  [x] Brand/supplies at bedside Colopalst  [] Current pouching system     Current Diet: ADULT DIET;  Regular; 4 carb choices (60 gm/meal)  ADULT ORAL NUTRITION SUPPLEMENT; Lunch, Dinner; Diabetic Oral Supplement  Dietician consult:  No    Discharge Plan:  Placement for patient upon discharge: home with support    Outpatient visit plan Yes  Supplies given Yes   Samples requested No    Referrals:  [x]   [] [facility]  [] Supplies  [] Other      Patient/Caregiver Teaching:  Written Instructions given to patient/family (sister)  Teaching provided:  [x] Reviewed GI and A&P        [x] Supplies  [x] Pouch emptying      [x] Manipulate closure  [x] Routine Care         [] Comment  [x] Pouch maintenance           Level of patient/caregiver understanding able to:  [x] Indicates understanding       [x] Needs reinforcement  [] Unsuccessful      [] Verbal Understanding  [x] Demonstrated understanding       [] No evidence of learning  [] Refused teaching         [] N/A    Electronically signed by Vinay Rodriguez RN, CWOCN on 3/1/2022 at 11:13 AM

## 2022-03-01 NOTE — PROGRESS NOTES
Comprehensive Nutrition Assessment    RECOMMENDATIONS:  1. PO Diet: Continue regular 4CC diet  2. ONS: Start Glucerna supplements with meals  3. Nutrition Education: provided verbal & written dietary educ on increasing caloric intake and colostomy MNT    NUTRITION ASSESSMENT:   Nutritional summary & status: Positive nutrition screen for wt loss and decreased appetite. Pt endorses over 40lb wt loss. Per EMR, pt has lost about 7lb in less than 2 weeks. Pt states his appetite is variable, says sometimes cannot finish his meals d/t early satiety. Consuming >75% PO intake x 1 meal so far per documentation. Noted s/p diverting colostomy creation secondary to rectal cancer per EMR. Discussed tips for increasing calorie & protein and ostomy diet educ. Pt likes Glucerna supplements, prefers chocolate; will send TID to help promote better nutrient intakes. Encouraged pt to continue to consume adequate PO intakes. Will monitor nutritional status throughout adm.     Admission/PMH: Admit for obstructing rectal cancer, s/p post diverting colostomy creation; PMH: DM, HTN, rectal cancer      MALNUTRITION ASSESSMENT  Context of Malnutrition: Acute Illness   Malnutrition Status: Mild malnutrition  Findings of the 6 clinical characteristics of malnutrition (Minimum of 2 out of 6 clinical characteristics is required to make the diagnosis of moderate or severe Protein Calorie Malnutrition based on AND/ASPEN Guidelines):  Energy Intake: Mild decrease in energy intake (comment)   Energy Intake Time: Greater than or equal to 7 days    Weight Loss %: 2% loss or greater    Weight loss Time: Greater than or equal to 7 days    Body Fat Loss: Mild Loss    Body Fat Location: Orbital and Triceps   Body Muscle Loss: Mild Loss    Body Muscle Loss Location: Shoulders and Temples      NUTRITION DIAGNOSIS   Increased nutrient needs related to catabolic illness as evidenced by  (cancer)    [address] and/or Nutrient Delivery: Continue Current Diet,Start Oral Nutrition Supplement  Nutrition Education/Counseling:  Education completed (increasing calorie intake, s/p colostomy MNT)   Goals:  pt will consistently consume >50% PO intake of meals and ONS through admit       Nutrition Monitoring and Evaluation:   Food/Nutrient Intake Outcomes:  Food and Nutrient Intake,Supplement Intake  Physical Signs/Symptoms Outcomes:  Biochemical Data,Weight     OBJECTIVE DATA: Significant to nutrition assessment  · Nutrition-Focused Physical Findings: lbm 2/27-colostomy  · Labs: Reviewed; , Na 132  · Meds: Reviewed; insulin  · Wounds: Surgical Incision       CURRENT NUTRITION THERAPIES  ADULT DIET; Regular; 4 carb choices (60 gm/meal)  ADULT ORAL NUTRITION SUPPLEMENT; Lunch, Dinner; Diabetic Oral Supplement     PO Intake: % (x 1 meal so far)   PO Supplement Intake:%  Additional Sources of Calories/IVF:n/a     ANTHROPOMETRICS  Current Height: 5' 11\" (180.3 cm)  Current Weight: 166 lb (75.3 kg)    Admission weight: 172 lb (78 kg)  Ideal Body Weight (IBW): 172 lbs  (78 kg)    Usual Bodyweight 174 lb (78.9 kg) (per pt report)   Weight Changes: -7lb wt loss x 1 week (4%)      BMI: 23.2    Wt Readings from Last 50 Encounters:   02/28/22 166 lb (75.3 kg)   02/23/22 172 lb 12.8 oz (78.4 kg)   02/22/22 174 lb (78.9 kg)   02/19/22 173 lb 11.6 oz (78.8 kg)     COMPARATIVE STANDARDS  Energy (kcal):  6637-7246     Protein (g):   (1.3-1.8g/kg)       Fluid (ml/day):  6592-9215    The patient will still be monitored per nutrition standards of care. Consult dietitian if nutrition interventions essential to patient care is needed.      Luciano Jung, [address] [place] [address],   Chaim:  [phone]  Office:  [phone]

## 2022-03-01 NOTE — OP NOTE
[address] [facility] [address]               [address] [place], [address]                                OPERATIVE REPORT    PATIENT NAME: Jerrell Aldana                   :        1960  MED REC NO:   [id number]                          ROOM:       5303  ACCOUNT NO:   [de-identified]                           ADMIT DATE: 2022  PROVIDER:     Liz Hayden. Darnell Gardiner MD    DATE OF PROCEDURE:  2022    SURGEON:  Darci Bernardo MD    ASSISTANT:  Giovanny Barkley, PGY-5, resident. PREOPERATIVE DIAGNOSES:  1.  Near obstructing distal rectal cancer. 2.  Fat containing incarcerated umbilical hernia. POSTOPERATIVE DIAGNOSES:  1.  Near obstructing distal rectal cancer. 2.  Fat containing incarcerated umbilical hernia. PROCEDURES:  1. Laparoscopic diverting loop descending colostomy creation. 2.  Laparoscopic suture repair of fat containing incarcerated umbilical  hernia. ANESTHESIA:  General endotracheal.    COMPLICATIONS:  None. SPECIMEN:  Incarcerated hernia fat. ESTIMATED BLOOD LOSS:  Minimal.    INDICATIONS:  The patient is a 57-year-old male. He is known to me for  workup of a fairly large distal rectal cancer with invasion of prostate  and bladder. Unfortunately, he is developing increasing obstructive  type of symptoms and in preparation for starting FOLFOX chemotherapy. I  advised and as well as his oncologist and Radiation Oncology that he  undergo diverting colostomy. He was also noted on exam in the office to  have a fat containing incarcerated umbilical hernia that he also wished  to have addressed in the same time which I thought reasonable. Discussed the risk of the procedure including but not limited to  bleeding, infection, damage to intraabdominal structures, need for open  operation, parastomal hernia, potential permanence of the colostomy.   As  far as the hernia, we discussed suture repair and nonuse of  mesh given  the concomitant colon procedure. Discussed risks of recurrence and  damage to other intraabdominal structures, poor cosmetic result. He  agreed to proceed. PROCEDURE DETAILS:  The patient was brought to the operating theater,  placed supine on the operating table. General endotracheal intubation  was performed by Anesthesiology. The patient was placed in the supine  position. His right arm was padded and tucked. His abdomen was prepped  and draped in the usual sterile fashion using chlorhexidine prep  solution. Timeout was performed confirming the patient's identity as  well as the operative site. Antibiotics were confirmed to be perfusing. All safety points were followed. SCDs were on and functioning. Lovenox  was confirmed given. He had a previous left lateral paramedian  exploratory laparotomy incision from several years ago, so we decided to  start in the right upper quadrant. A 5 mm incision was made in the  right upper quadrant and a 0-degree 5-mm laparoscope was used to enter  the abdominal cavity in direct Optiview fashion. Abdomen was entered  without complications. Additional placement of the right 5 mm port in  the right lateral and right lower quadrant. We then inspected, and we  noticed a very small area of trocar injury to the superficial capsule of  the liver. It had just a small amount of oozing, so a small piece of  Surgicel was placed with good hemostasis noted. We then adjusted the  umbilical hernia. We made an infraumbilical, semicircular incision  around the inferior aspect of the umbilicus. Dissected through  subcutaneous tissue down to the umbilical stalk which was then incised  using cautery. We then noted about an 8 to 9 mm hernia defect with  incarcerated fat. This was then removed as incarcerated hernia fat and  sent for permanent examination. Laparoscopically, we then placed  figure-of-eight 0 Vicryl sutures x2 in order to reapproximate the defect  which came together nicely. The sutures were secured with abdominal  desufflation in order to have the best approximation. Laparoscopically,  we inspected this, appeared to be a good repair. Photo documentation  was obtained before and after the repair. Again, this was through a  completely separate incision away from the ports that were used for the  colostomy creation. We now adjusted the colostomy creation. I noted that the tumor was  extending all the way to the rectosigmoid junction, and there was a  second area of inflammatory adhesions in the bit more proximal and the  mid sigmoid colon, so I decided to go ahead and go even more proximal to  the proximal sigmoid distal descending colon. Lateral to medial  dissection was performed in the proximal sigmoid  from lateral  abdominal wall over the Gerota's fascia and luckily, he did have a fair  amount of floppy descending colon. The colon was noted to be slightly  dilated as well, likely from his partial obstructive symptoms. We noted  that it would easily come up to the abdominal wall, and the area that  was marked by the ostomy nurse in the left lower quadrant. The ostomy  aperture was then created using cut current and Bovie electrocautery in  circular fashion as the circular skin disc was excised, dissected  through the subcutaneous tissue down to the fascia. The fascia was  incised in a cruciate fashion. The rectus muscle was then spread and  posterior sheath was also incised in vertical fashion. It was able to  accommodate about 2.5 fingerbreadths which would be appropriate size for  a diverting loop colostomy. Laparoscopically, the colon was then passed  through the skin and then brought out to the skin site. I went  laparoscopically and one more time ensured that there was no twisting  and that the proximal and distal limbs laid appropriately and not  twisted whatsoever. At this point, we inspected the liver one more time  which was noted to be hemostatic. We inspected the umbilical repair  which also appeared to be intact. Laparoscopic 5 mm ports were then  removed under direct visualization and the abdomen was desufflated. The  incisions including the laparoscopic port sites and the umbilical hernia  repair were then closed after irrigating with 4-0 Monocryl and skin  glue. After all the skin glue was then dry, we then brought our  attention to maturation of the colostomy. Cautery was then used to  incise the colostomy in the mid point. Both the proximal and distal  limbs were then  matured in a slightly brooking fashion to the skin. Both proximal and distal limbs were hemostatic. Both appeared to be  healthy and there were no signs of obstruction at the abdominal wall. A  colostomy appliance was then applied. The patient tolerated the procedure well. He was then extubated and  brought to the PACU in stable condition. All counts correct x2 at the  end of the procedure.         Prudence Higuera MD    D: 02/28/2022 14:16:48       T: 02/28/2022 23:55:40     [email or web address]  Job#: [id number]     Doc#: [id number]    CC:

## 2022-03-02 VITALS
HEIGHT: 71 IN | HEART RATE: 60 BPM | TEMPERATURE: 98.1 F | DIASTOLIC BLOOD PRESSURE: 67 MMHG | RESPIRATION RATE: 16 BRPM | OXYGEN SATURATION: 97 % | BODY MASS INDEX: 23.24 KG/M2 | SYSTOLIC BLOOD PRESSURE: 136 MMHG | WEIGHT: 166 LBS

## 2022-03-02 LAB
ALBUMIN SERPL-MCNC: 2.1 G/DL (ref 3.4–5)
ANION GAP SERPL CALCULATED.3IONS-SCNC: 8 MMOL/L (ref 3–16)
BASOPHILS ABSOLUTE: 0.1 K/UL (ref 0–0.2)
BASOPHILS RELATIVE PERCENT: 0.7 %
BUN BLDV-MCNC: 20 MG/DL (ref 7–20)
CALCIUM SERPL-MCNC: 8.1 MG/DL (ref 8.3–10.6)
CHLORIDE BLD-SCNC: 101 MMOL/L (ref 99–110)
CO2: 25 MMOL/L (ref 21–32)
CREAT SERPL-MCNC: 0.9 MG/DL (ref 0.8–1.3)
EOSINOPHILS ABSOLUTE: 0.1 K/UL (ref 0–0.6)
EOSINOPHILS RELATIVE PERCENT: 1.9 %
GFR AFRICAN AMERICAN: >60
GFR NON-AFRICAN AMERICAN: >60
GLUCOSE BLD-MCNC: 204 MG/DL (ref 70–99)
GLUCOSE BLD-MCNC: 213 MG/DL (ref 70–99)
HCT VFR BLD CALC: 24.9 % (ref 40.5–52.5)
HEMOGLOBIN: 7.3 G/DL (ref 13.5–17.5)
LYMPHOCYTES ABSOLUTE: 0.8 K/UL (ref 1–5.1)
LYMPHOCYTES RELATIVE PERCENT: 11.8 %
MAGNESIUM: 1.8 MG/DL (ref 1.8–2.4)
MCH RBC QN AUTO: 21.5 PG (ref 26–34)
MCHC RBC AUTO-ENTMCNC: 29.4 G/DL (ref 31–36)
MCV RBC AUTO: 73 FL (ref 80–100)
MONOCYTES ABSOLUTE: 0.5 K/UL (ref 0–1.3)
MONOCYTES RELATIVE PERCENT: 7.8 %
NEUTROPHILS ABSOLUTE: 5.3 K/UL (ref 1.7–7.7)
NEUTROPHILS RELATIVE PERCENT: 77.8 %
PDW BLD-RTO: 24 % (ref 12.4–15.4)
PERFORMED ON: ABNORMAL
PHOSPHORUS: 2.5 MG/DL (ref 2.5–4.9)
PLATELET # BLD: 390 K/UL (ref 135–450)
PMV BLD AUTO: 7.1 FL (ref 5–10.5)
POTASSIUM SERPL-SCNC: 4.4 MMOL/L (ref 3.5–5.1)
RBC # BLD: 3.42 M/UL (ref 4.2–5.9)
SODIUM BLD-SCNC: 134 MMOL/L (ref 136–145)
WBC # BLD: 6.9 K/UL (ref 4–11)

## 2022-03-02 PROCEDURE — 6370000000 HC RX 637 (ALT 250 FOR IP)

## 2022-03-02 PROCEDURE — 36415 COLL VENOUS BLD VENIPUNCTURE: CPT

## 2022-03-02 PROCEDURE — 6370000000 HC RX 637 (ALT 250 FOR IP): Performed by: STUDENT IN AN ORGANIZED HEALTH CARE EDUCATION/TRAINING PROGRAM

## 2022-03-02 PROCEDURE — 80069 RENAL FUNCTION PANEL: CPT

## 2022-03-02 PROCEDURE — 6360000002 HC RX W HCPCS

## 2022-03-02 PROCEDURE — 83735 ASSAY OF MAGNESIUM: CPT

## 2022-03-02 PROCEDURE — 99024 POSTOP FOLLOW-UP VISIT: CPT | Performed by: SURGERY

## 2022-03-02 PROCEDURE — 6360000002 HC RX W HCPCS: Performed by: STUDENT IN AN ORGANIZED HEALTH CARE EDUCATION/TRAINING PROGRAM

## 2022-03-02 PROCEDURE — 2580000003 HC RX 258: Performed by: STUDENT IN AN ORGANIZED HEALTH CARE EDUCATION/TRAINING PROGRAM

## 2022-03-02 PROCEDURE — 85025 COMPLETE CBC W/AUTO DIFF WBC: CPT

## 2022-03-02 RX ORDER — MAGNESIUM SULFATE IN WATER 40 MG/ML
2000 INJECTION, SOLUTION INTRAVENOUS ONCE
Status: COMPLETED | OUTPATIENT
Start: 2022-03-02 | End: 2022-03-02

## 2022-03-02 RX ADMIN — IBUPROFEN 600 MG: 600 TABLET ORAL at 09:06

## 2022-03-02 RX ADMIN — SODIUM CHLORIDE, PRESERVATIVE FREE 10 ML: 5 INJECTION INTRAVENOUS at 08:34

## 2022-03-02 RX ADMIN — OXYCODONE 5 MG: 5 TABLET ORAL at 03:50

## 2022-03-02 RX ADMIN — FLUOXETINE 10 MG: 10 CAPSULE ORAL at 08:31

## 2022-03-02 RX ADMIN — DIBASIC SODIUM PHOSPHATE, MONOBASIC POTASSIUM PHOSPHATE AND MONOBASIC SODIUM PHOSPHATE 2 TABLET: 852; 155; 130 TABLET ORAL at 11:27

## 2022-03-02 RX ADMIN — INSULIN HUMAN 9 UNITS: 100 INJECTION, SOLUTION PARENTERAL at 09:04

## 2022-03-02 RX ADMIN — MAGNESIUM SULFATE HEPTAHYDRATE 2000 MG: 2 INJECTION, SOLUTION INTRAVENOUS at 12:17

## 2022-03-02 RX ADMIN — METOPROLOL SUCCINATE 100 MG: 50 TABLET, EXTENDED RELEASE ORAL at 08:31

## 2022-03-02 RX ADMIN — ACETAMINOPHEN 1000 MG: 500 TABLET ORAL at 09:06

## 2022-03-02 RX ADMIN — TAMSULOSIN HYDROCHLORIDE 0.4 MG: 0.4 CAPSULE ORAL at 08:32

## 2022-03-02 RX ADMIN — OXYCODONE 5 MG: 5 TABLET ORAL at 08:33

## 2022-03-02 RX ADMIN — ENOXAPARIN SODIUM 40 MG: 100 INJECTION SUBCUTANEOUS at 08:30

## 2022-03-02 ASSESSMENT — PAIN SCALES - GENERAL
PAINLEVEL_OUTOF10: 6
PAINLEVEL_OUTOF10: 5
PAINLEVEL_OUTOF10: 0
PAINLEVEL_OUTOF10: 6

## 2022-03-02 NOTE — CARE COORDINATION
CTN contacted Larance Leyden with [facility] [phone]. They have accepted this patient, [facility] will pull orders from epic.  They will contact patient and make arrangements for [facility] by 3/3  Electronically signed by Aric Loaiza LPN on 0/3/1301 at 99:98 AM

## 2022-03-02 NOTE — PROGRESS NOTES
Patient A&Ox4. VSS. Surgical sites cdi. No stool output in ostomy. Patient passing gas. Quach in place draining yellow urine. Mucous output from rectum. Patient ambulated in alicia and tolerated well. Ibuprofen and prn oxycodone given per orders and reported relief. IV saline locked. Insulin coverage given. Bed in lowest setting with bed alarm on. Call light is within reach.

## 2022-03-02 NOTE — PROGRESS NOTES
Surgery Daily Progress Note  [facility]  CC:  Obstructing colon cancer      Subjective    No issues overnight. Patient remained afebrile and HDS. States pain is well-controlled. Tolerating diet, no nausea/ vomiting. Having gas from his ostomy, but no stool. Patient states he would like to go home today. Objective    Infusions:   sodium chloride      dextrose          I/O:I/O last 3 completed shifts: In: 2630 [P.O.:420; I.V.:2210]  Out: 2110 [Urine:1755; Blood:30]           Wt Readings from Last 1 Encounters:   02/28/22 166 lb (75.3 kg)                 LABS:    Recent Labs     02/28/22  1035 03/01/22  0556   WBC 8.1 8.2   HGB 8.1* 7.7*   HCT 26.5* 25.0*   MCV 69.4* 69.3*    388        Recent Labs     02/28/22  1035 03/01/22  0556   * 132*   K 4.1 5.0   CL 97* 98*   CO2 25 22   PHOS  --  3.6   BUN 15 20   CREATININE 0.9 1.1      No results for input(s): AST, ALT, ALB, BILIDIR, BILITOT, ALKPHOS in the last 72 hours. No results for input(s): LIPASE, AMYLASE in the last 72 hours. No results for input(s): PROT, INR, APTT in the last 72 hours. No results for input(s): CKTOTAL, CKMB, CKMBINDEX, TROPONINI in the last 72 hours. Exam:/65   Pulse 60   Temp 97.9 °F (36.6 °C) (Oral)   Resp 16   Ht 5' 11\" (1.803 m)   Wt 166 lb (75.3 kg)   SpO2 95%   BMI 23.15 kg/m²   General appearance: alert, appears stated age and cooperative  Eyes: No scleral icterus, EOM grossly intact  Neck: trachea midline, no JVD, no lymphadenopathy, neck supple  Chest/Lungs: normal effort with no accessory muscle use on RA NC, 1750 pulled using IS  Cardiovascular: RRR, brisk capillary refill distally  Abdomen: Soft, appropriate incisional tenderness, lap port incisions are C/D/I and well-approximated with Dermabond, ostomy with some mild edema and  serosanguinous sweat in the bag, stoma is pink and viable  Skin: warm and dry, no rashes  Extremities: no edema, no cyanosis  Genitourinary:  Quach in place with clear yellow urine  Neuro: A&Ox3, no focal deficits, sensation intact      ASSESSMENT/PLAN: Pt. is a 58 y.o. male s/p post diverting colostomy creation secondary to rectal cancer in anticipation for APR vs LAR. (2/28)    - continue to encourage OOB/Ambulation  - patient to receive ostom teaching today with sister  - Educated on monitoring for ostomy output. - patient with baseline multani, Urology consulted, recommending exchange of Multani with them in 2 weeks, and renal ultrasound a few days after discharge as an outpatient to check for resolution of hydronephrosis  - Social work and home health care ordered.    - anticipate discharge today       Damaso Curiel DO  PGY1, General Surgery  03/02/22  6:08 AM  [phone]

## 2022-03-02 NOTE — PLAN OF CARE
Problem: Falls - Risk of:  Goal: Will remain free from falls  Description: Will remain free from falls  3/1/2022 2336 by Jace Emanuel RN  Outcome: Ongoing  Note: Bed in lowest setting. Call light within reach. Bed alarm on. A&O x4. Nurse rounding on patient frequently for visual checks and bathroom needs. Gripper socks on. Ambulating with front wheel walker. Problem: Pain:  Goal: Pain level will decrease  Description: Pain level will decrease  3/1/2022 2336 by Jace Emanuel RN  Outcome: Ongoing  Note: Patient took scheduled ibuprofen for pain. Patient currently rating pain 1/10. Patient sleeping.

## 2022-03-02 NOTE — PROGRESS NOTES
Ostomy Referral Follow-up Progress Note      NAME:  Angel Perea  MEDICAL RECORD NUMBER:  [id number]  AGE: 58 y.o. GENDER:  male  :  1960  TODAY'S DATE:  3/2/2022    Subjective: I feel better today. Angel Perea is a 58 y.o. male referred by:   [x] Physician  [] Nursing  [] Other:     New Colostomy  Stoma: 55 mm (irregular shape)  Barrier: 2 piece flat (yellow) #[id number] with drainable pouch #[id number]    Objective: A/O, sitting up in bed, sister at bedside    /74   Pulse 63   Temp 97.8 °F (36.6 °C) (Oral)   Resp 16   Ht 5' 11\" (1.803 m)   Wt 166 lb (75.3 kg)   SpO2 95%   BMI 23.15 kg/m²     Jericho Risk Score Jericho Scale Score: 20    Assessment: Current appliance intact and sealed   Surgeon Dr. Shaun Palm Laparoscopic Diverting Loop Descending Colostomy Creation, Laparoscopic Suture Repair of Fat Containing Incarcerated Umbilical Hernia    Colostomy      Colostomy LLQ (Active)   Stomal Appliance 2 piece;Clean;Dry; Intact 22 1036   Flange Size (inches) 2.5 Inches 22 1036   Stoma  Assessment Pink;Protrudes; Moist 22 1036   Mucocutaneous Junction Intact 22 1036   Peristomal Assessment Clean; Intact 22 1036   Treatment Bag change;Stoma paste 22 1112   Stool Appearance Loose 22 1036   Stool Color Brown 22 1036   Stool Amount Medium 22 1036   Output (mL) 100 ml 22 1036   Number of days: 1       Intake/Output Summary (Last 24 hours) at 3/2/2022 1037  Last data filed at 3/2/2022 1036  Gross per 24 hour   Intake 900 ml   Output 1875 ml   Net -975 ml       Plan:   Plan for Ostomy Care:  [address] - Patient to empty appliance when 1/3 to 1/2 full with assistance of the staff. Cleanse inside and outside of the drain spout prior to rolling closed. Change appliance every 3-5 days or 1-2 times a week.   Call for problems with seal [phone]     To Change Appliance: Gently remove old appliance, clean with warm water ONLY, dry completely, verify size of stoma and cut barrier accordingly, pt's stoma is irregularly shaped - use paste on right side of cut edge to fill in gap and exposed skin, place barrier and attach pouch. 2 piece flat (yellow) #25822 with drainable pouch #[id number]    Pt practiced measuring stoma, cutting barrier, placing barrier and attaching pouch. Pt able to demonstrate how to open and close pouch. Answered questions, pt stated feels more comfortable going home. Re-enforced [facility] will continue to assist pt with education of colostomy care. Ostomy Plan of Care  [x] Supplies/Instructions left in room  [] Patient using home supplies  [x] Brand/supplies at bedside Colopalst    Current Diet: ADULT DIET;  Regular; 4 carb choices (60 gm/meal)  ADULT ORAL NUTRITION SUPPLEMENT; Lunch, Dinner, Breakfast; Diabetic Oral Supplement  Dietician consult:  No    Discharge Plan:  Placement for patient upon discharge: home with support    Outpatient visit plan No  Supplies given Yes   Samples requested Yes    Referrals:  [x]   [] [facility]  [] Supplies  [] Other      Patient/Caregiver Teaching:  Written Instructions given to patient/family (sister)  Teaching provided:  [x] Reviewed GI and A&P        [x] Supplies  [x] Pouch emptying      [x] Manipulate closure  [x] Routine Care         [] Comment  [x] Pouch maintenance           Level of patient/caregiver understanding able to:  [x] Indicates understanding       [] Needs reinforcement  [] Unsuccessful      [x] Verbal Understanding  [x] Demonstrated understanding       [] No evidence of learning  [] Refused teaching         [] N/A    Electronically signed by Guy Sandifer, RN, CMSRN on 3/2/2022 at 10:37 AM

## 2022-03-02 NOTE — CARE COORDINATION
Case Management Assessment            Discharge Note                    Date / Time of Note: 3/2/2022 11:51 AM                  Discharge Note Completed by: Kati Mendoza RN    Patient Name: Eulas Spurling   YOB: 1960  Diagnosis: Rectal cancer ([facility] [address] [C20]  Colostomy care [facility]) [Z43.3]   Date / Time: 2/28/2022  9:20 AM    Current PCP: Deangelo Jay MD  Clinic patient: No    Hospitalization in the last 30 days: Yes    Advance Directives:  Code Status: Prior  [place] DNR form completed and on chart: Not Indicated    Financial:  Payor: [employer] / Plan: [employer] [place] - EXCHANGE / Product Type: *No Product type* /      Pharmacy:    [facility] [address] [place] [address]  Phone: [phone] Fax: [phone]      Assistance purchasing medications?: Potential Assistance Purchasing Medications: No  Assistance provided by Case Management: None at this time    Does patient want to participate in local refill/ meds to beds program?:      Meds To Beds General Rules:  1. Can ONLY be done Monday- Friday between 8:30am-5pm  2. Prescription(s) must be in pharmacy by 3pm to be filled same day  3. Copy of patient's insurance/ prescription drug card and patient face sheet must be sent along with the prescription(s)  4. Cost of Rx cannot be added to hospital bill. If financial assistance is needed, please contact unit  or ;  or  CANNOT provide pharmacy voucher for patients co-pays  5.  Patients can then  the prescription on their way out of the hospital at discharge, or pharmacy can deliver to the bedside if staff is available. (payment due at time of pick-up or delivery - cash, check, or card accepted)     Able to afford home medications/ co-pay costs: Yes    ADLS:  Current PT AM-PAC Score:   /24  Current OT AM-PAC Score:   /24      DISCHARGE Disposition: Home with Home Health Care: [facility]      LOC at discharge: Not Applicable  KATHIA Completed: Not Indicated    Notification completed in HENS/PAS?:  Not Applicable    IMM Completed:   Not Indicated    Transportation:  Transportation PLAN for discharge: family   Mode of Transport: [address] ordered at discharge: Yes  [address]: [address] [facility]   Phone: [phone]  Fax: [phone]  Orders faxed: Jonathan Gutierrez aware of DC [facility] 3/3    Durable Medical Equipment:  DME Provider: none  Equipment obtained during hospitalization:     Home Oxygen and Respiratory Equipment:  Oxygen needed at discharge?: Not [address]: Not Applicable  Portable tank available for discharge?: Not Indicated    Dialysis:  Dialysis patient: No    Dialysis Center:  Not Applicable      Additional CM Notes: Pt from home has family support, will Dc home with [facility] needs and ostomy education. Will follow up OP with surgical team.     The Plan for Transition of Care is related to the following treatment goals of Rectal cancer ([facility] [address] [C20]  Colostomy care [facility]) [Z43.3]    The Patient and/or patient representative Seven Booker and his family were provided with a choice of provider and agrees with the discharge plan Yes    Freedom of choice list was provided with basic dialogue that supports the patient's individualized plan of care/goals and shares the quality data associated with the providers.  Yes    Care Transitions patient: No    Lana Dent RN  [facility]  Case Management Department  Ph: [phone]  Fax: [phone]

## 2022-03-02 NOTE — PROGRESS NOTES
Discharge noted. IV removed, belongings gathered and packed. Multani bag education given to patient and family at bedside. Patient supplied with extra leg bag and night time multani bag. Wife picked up medications from outpatient pharmacy. Patient to be transported home via family member.

## 2022-03-03 ENCOUNTER — CARE COORDINATION (OUTPATIENT)
Dept: CASE MANAGEMENT | Age: 62
End: 2022-03-03

## 2022-03-03 NOTE — DISCHARGE SUMMARY
Physician Discharge Summary     Patient ID:  Sofie Linares  [id number]  58 y.o.  1960    Admit date: 2/28/2022    Discharge date and time: 3/2/2022  3:33 PM     Admitting Physician: Kimi Mistry MD     Discharge Physician: same    Admission Diagnoses: Rectal cancer ([facility] [address] [C20]  Colostomy care [facility]) [Z43.3]    Discharge Diagnoses: same    Admission Condition: fair    Discharged Condition: stable    Indication for Admission: surgery, IV hydration, IV pain control, IV nausea control, ostomy teaching    Hospital Course: 59 y/o male admitted through [facility] for a planned diverting colostomy creation secondary to rectal cancer in anticipation for APR vs LAR. His surgery was uneventful and he was taken to PACU to begin his recovery. He remained hemodynamically stable and was transferred to his med/surg room for continued recovery    POD # 1 - no acute events overnight. Patient remains afebrile and HDS. No ostomy output as of yet. Quach remains patent. Will receive ostomy teaching today. Patient tearful about going home. Will anticipate patient to stay an additional day    POD # 2 - no acute events overnight. Tolerating diet. Stool and flatus in ostomy pouch. Patient received ostomy teaching yesterday. Additional teaching today. Tolerating diet. Will anticipate dc after ostomy teaching today    Consults: urology        Outstanding Order Results     No orders found from 1/30/2022 to 3/1/2022.           Treatments: IV hydration, antibiotics: Levaquin and metronidazole and analgesia: Dilaudid    Discharge Exam:    Eyes: No scleral icterus, EOM grossly intact  Neck: trachea midline, no JVD, no lymphadenopathy, neck supple  Chest/Lungs: normal effort with no accessory muscle use on RA NC, 1750 pulled using IS  Cardiovascular: RRR, brisk capillary refill distally  Abdomen: Soft, appropriate incisional tenderness, lap port incisions are C/D/I and well-approximated with Dermabond, ostomy with some mild edema and serosanguinous sweat in the bag, stoma is pink and viable  Skin: warm and dry, no rashes  Extremities: no edema, no cyanosis  Genitourinary: Quach in place with clear yellow urine  Neuro: A&Ox3, no focal deficits, sensation intact    Disposition: home    Patient Instructions:   [unfilled]  Activity: no lifting, Driving, or Strenuous exercise until seen at your follow up appointment  Diet: regular diet  Wound Care: as directed    Follow-up with Dr. Gema Savage in 2 weeks.     Signed:  MELODY Redman CNP  3/3/2022  11:04 AM

## 2022-03-04 ENCOUNTER — TELEPHONE (OUTPATIENT)
Dept: SURGERY | Age: 62
End: 2022-03-04

## 2022-03-04 ENCOUNTER — CARE COORDINATION (OUTPATIENT)
Dept: CASE MANAGEMENT | Age: 62
End: 2022-03-04

## 2022-03-04 DIAGNOSIS — C20 RECTAL CANCER (HCC): Primary | ICD-10-CM

## 2022-03-04 NOTE — PROGRESS NOTES
Niko Rider   [address] [facility]  Dept: [phone]  Dept Fax: [phone]    Visit Date: 3/8/2022    Josephine Herring is a 58 y.o. male who presents today for rectal cancer with questionable metastasis to liver. HPI:       Josephine Herring is a 58 y.o. male who underwent a diverting colostomy with Dr. Red Sellers for a near obstructing distal rectal cancer on 2/28/2022. Pelvic mass also invades prostate and posterior bladder wall. MRI from 2/22/22 demonstrated an enhancing hepatic mass, concerning for metastatic disease. Pt was having symptoms (blood in stools, change in bowel habits, pale) for 5 months. Patient started chemotherapy yesterday. Followed by Dr. Cholo Florence. Patient's problem list, medications, past medical, surgical, family, and social histories were reviewed and updated in the chart as indicated today. Past Medical History:   Diagnosis Date    Diabetes mellitus ([facility] [address]     Difficulty voiding     History of blood transfusion 02/2022    Hypertension     Rectal cancer [facility])        Past Surgical History:   Procedure Laterality Date    ABDOMEN SURGERY  1975    Exploratory    COLOSTOMY N/A 2/28/2022    LAPAROSCOPIC COLOSTOMY CREATION, laparoscopic incarcerated hernia repair performed by Keya Briones MD at [facility] N/A 2/17/2022    PORT A CATH INSERTION performed by Yolanda Valderrama MD at [address] [facility] N/A 2/15/2022    SIGMOIDOSCOPY BIOPSY FLEXIBLE performed by Kris Tapia MD at [address] [facility]  2/15/2022    SIGMOIDOSCOPY POLYP SNARE performed by Kris Tapia MD at [address]       Cancer-related family history is not on file.     Social History:   Social History     Tobacco Use    Smoking status: Never Smoker    Smokeless tobacco: Never Used   Substance Use Topics    Alcohol use: Not Currently     Comment: not in last 18 months      Tobacco cessation counseling provided as appropriate. REVIEW OF SYSTEMS:    Pertinent positives and negatives are mentioned in the HPI above. Otherwise, all other systems were reviewed and negative. Objective:     Physical Exam   /68   Pulse 64   Temp 97.3 °F (36.3 °C)   Ht 5' 11\" (1.803 m)   Wt 181 lb 9.6 oz (82.4 kg)   BMI 25.33 kg/m²   Constitutional: Appears well-developed and well-nourished. Grooming appropriate. No gross deformities. Body mass index is 25.33 kg/m². Eyes: No scleral icterus. Conjunctiva/lids normal. Vision intact grossly. Pupils equal/symmetric, reactive bilaterally. ENT: External ears/nose without defect, scars, or masses. Hearing grossly intact. No facial deformity. Lips normal, normal dentition. Neck: No masses. Trachea midline. No crepitus. Thyroid not enlarged. Cardiovascular: Normal rate. No peripheral edema. Abdominal aorta normal size to palpation. Pulmonary/Chest: Effort normal. No respiratory distress. No wheezes. No use of accessory muscles. Musculoskeletal: Normal range of motion x all 4 extremities and head/neck, without deformity, pain, or crepitus, with normal strength and tone. Normal gait. Nails without clubbing or cyanosis. Neurological: Alert and oriented to person, place, and time. No gross deficits. Sensation intact. Skin: Skin is dry. No rashes noted. No pallor. No induration of nodules. Psychiatric: Normal mood and affect. Behavior normal. Oriented to person, place, and time. Judgment and insight reasonable. Abdominal/wound: Colostomy  Urinary catheter in place: Followed by Dr. Adithya Dougherty    Albumin 3/5/22: 2.0    CEA 2/23/22: 2.69, previous 18.6 2/15/22, 22.4 2/14/22. Radiology reviewed:     MRI Abdomen 2/18/2022:     1. Enhancing mass in the right hepatic lobe adjacent to the liver capsule. Imaging features are indeterminate, partially due to respiratory motion artifact.  Given prior CT findings, a metastatic lesion is suspected.  Hemangioma may be included in the differential, but the imaging features are atypical.  2. No other significant finding in the abdomen. CT Chest 2/15/2022:     No convincing evidence of metastatic disease to the chest. Small pericardial effusion.  Dilated pulmonary trunk suggesting pulmonary hypertension.  Subtle areas of hypoattenuation within the right and left hepatic lobes as described above.  Liver protocol MRI with and without contrast recommended to exclude metastatic lesions.  Splenomegaly. CT Abdomen Pelvis 2/14/2022:     1. Large heterogeneous pelvic mass centered on the distal sigmoid colon and rectum presumably malignant.  Findings suggesting direct invasion of the prostate and possibly the posterior bladder wall.  Moderate stool proximal to the mass with no evidence of obstruction.  Probable adenopathy posterior to the upper margin of the mass.  No evidence of distal metastatic disease. Surgical consultation recommended. 2. Bilateral hydronephrosis secondary to mass effect on the distal ureters,  right greater than left. Pathology:    Hernia site 2/28/2022:     Soft tissue, site not specified, excision:   - Fibroadipose tissue consistent with hernia sac. Rectosigmoid mass 2/28/2022:        A. Rectosigmoid mass biopsy ( DOS: 2/15/2022):      Involved with moderately differentiated adenocarcinoma, at least        superficially invasive with ulceration.        B. Rectal polyp biopsy:      Tubulovillous adenoma.      No evidence of high-grade dysplasia or malignancy. Assessment/Plan:     A/P: Rectal cancer with liver mets:     I discussed with the Shannon Deshawn about the diagnosis and management options. Based on the available information patient appears to have locally advanced rectal cancer and liver metastases. Natural course, prognosis and sequencing of treatments discussed. He needs systemic chemo, liver surgery, radiation to rectum and rectal surgery.  The exact order depends on his staging scans after 3-6 cycles of systemic chemo. I explained him about laparoscopic and open liver resection briefly. Printed information was given. All the complications were explained. Risks, benefits and alternatives of surgery explained to the patient. All the questions of the patient are answered to his apparent satisfaction. Patient verbalized understanding of the management plan. Imaging studies, pathology and tumor marker reviewed with the patient and his wife  Follow up with Dr. Oliveira Point  Follow up with Dr. Thi Escamilla  Follow up in 3 months with restaging scans.     Maggie Ortega MD  Surgery Attending

## 2022-03-04 NOTE — CARE COORDINATION
[address] Transitions Initial Follow Up Call    Call within 2 business days of discharge: YES     Patient: Vikki Pascual Patient : 1960   MRN: [id number]   Reason for Admission: RECTAL CANCER / [address] / [address]  Discharge Date: 3/2/22 RARS: Readmission Risk Score: 19.8 ( )      Last Discharge [facility]       Complaint Diagnosis Description Type Department Provider    22  Post-op pain . .. Admission (Discharged) P.OSherri Magdaleno MD           Spoke with: [address] [place] [address] Avenue: [facility]      Non-face-to-face services provided:  Obtained and reviewed discharge summary and/or continuity of care documents  Education of patient/family/caregiver/guardian to support self-management-   Assessment and support for treatment adherence and medication management-      Care Transitions 24 Hour Call    Do you have any ongoing symptoms?: No  Do you have a copy of your discharge instructions?: Yes  Do you have all of your prescriptions and are they filled?: Yes  Have you been contacted by a [address]?: No  Have you scheduled your follow up appointment?: Yes  Were you discharged with any Home Care or Post Acute Services: Yes  Post Acute Services: Home Health (Comment: [address] )  Do you feel like you have everything you need to keep you well at home?: Yes  Care Transitions Interventions       Was this an external facility discharge? NA  Challenges to be reviewed by the provider   Additional needs identified to be addressed with provider:   NO    Discussed COVID-19 related testing which was available at this time yes     Test results were available yes     Patient informed of results, if available? Yes                Method of communication with provider :   phone    Advance Care Planning:   Does patient have an Advance Directive:   patient declined education    Was this a readmission?   no    Care Transition Nurse (CTN) contacted the patient by telephone to perform post hospital discharge assessment. Verified name with patient as identifier. Provided introduction to self, and explanation of the CTN role. CTN reviewed discharge instructions, medical action plan and red flags with patient who verbalized understanding. Patient given an opportunity to ask questions and does not have any further questions or concerns at this time. Were discharge instructions available to patient? Yes      Reviewed appropriate site of care based on symptoms and resources available to patient including:      PCP   Specialist   After hour contact number   Urgent [address]    When to call 911     CTN reviewed discharge instructions, medical action plan and red flags with    patient and discussed any barriers to care and/or understanding of plan of care after discharge. Discussed appropriate site of care based on symptoms and resources available to patient. The patient agrees to contact the PCP office for questions related to their healthcare. Medication reconciliation was performed with Patient, who verbalizes understanding of administration of home medications. Advised obtaining a 90-day supply of all daily and as-needed medications. Covid Risk Education    Patient has following risk factors of:   DM   Immunocompromised    Education provided regarding infection prevention, and signs and symptoms of COVID-19 and when to seek medical attention with patient who verbalized understanding. Discussed exposure protocols and quarantine From CDC: Are you at higher risk for severe illness?   and given an opportunity for questions and concerns. The patient agrees to contact the COVID-19 hotline [phone] or PCP office for questions related to COVID-19. For more information on steps you can take to protect yourself, see CDC's How to Protect Yourself     Discussed follow-up appointments.  If no appointment was previously scheduled, appointment scheduling offered:  yes, Pt reports that all appts have been scheduled / see note below for specifics    Is follow up appointment scheduled within 7 days of discharge? Yes, Chemo    Non-[facility] follow up appointment(s):  Chemo / urology     Plan for f/u call in 7-10 days based on severity of symptoms and risk factors. CTN provided contact information for future needs. SUMMARY  CTC spoke to the Pt who states he is doing good and denies issues with c/o pain, s/s of infection to surgical site(s), chest pain, difficulty breathing, n/v, and f/c. Pt reports that home care saw him today and stated that his \"wound looks good\". Pt reports that he is having no issues with multani catheter and reports it is draining without difficulty and urine is \"pale yellow\" in appearance. CTC and Pt reviewed meds and CTC completed the 1111f and CTN advised Pt to pick one pain med and not to take both. Pt confirms he is taking Roxicodone and not the Percocet and was also advised of the same thing by the d/c nurse. Pt has HFU appt with Dr Margie Caicedo on 3/16 and will begin chemo on Monday. Pt states appts for urology and PCP have been scheduled by his sister but is not sure of the specific dates. Pt will take all meds as prescribed and schedule / keep doctor's appt. CTC provided education on s/s that require medical attention and when to seek medical attention. CTC advised Pt of use urgent care or physician's 24 hr access line if assistance is needed after hours or on the weekend. Pt denies any needs or concerns and is agreeable with additional calls.       Follow Up  Future Appointments   Date Time Provider Shannen Roy   3/8/2022  9:45 AM MD Immanuel Barbosa S/ON [facility]   4/6/2022  9:20 Yareli Mg MD [facility] Deirdre Lofton RN

## 2022-03-04 NOTE — TELEPHONE ENCOUNTER
Called patient to get him in per Dr. Simeon Ojeda on Tuesday 8,2022 I I did leave a message telling him I scheduled him for 9:45 on the 8th I ask him to give us a call back to confirm appointment

## 2022-03-04 NOTE — TELEPHONE ENCOUNTER
Spoke to patient informing him that he can cancel his office visit with Dr. Yimi Ford on 3/16 due to the fact that he is seeing Dr. Yolande Naqvi on 3/8. Patient confirmed he will be at his appointment with Dr. Yolande Naqvi on 3/8 at the [facility] office.

## 2022-03-07 ENCOUNTER — TELEPHONE (OUTPATIENT)
Dept: INTERNAL MEDICINE CLINIC | Age: 62
End: 2022-03-07

## 2022-03-07 NOTE — TELEPHONE ENCOUNTER
Please advise okay to lower the insulin dose by 30% and continue to monitor blood glucose readings since he is also on a sliding scale and can always titrate up or down based on how he is doing

## 2022-03-07 NOTE — TELEPHONE ENCOUNTER
Patient's sister, Scottie Thomason states patient starting chemotherapy today. She states the oncologist is recommending patient take 30% less insulin than what Dr Oly Kauffman recommended for patient. She wanted to discuss insulin dose with Dr Oly Kauffman before changing anything.

## 2022-03-07 NOTE — TELEPHONE ENCOUNTER
Per Dr. Saul Stockton, [facility] for patient to decrease from 20 units daily of insulin glargine (LANTUS;BASAGLAR) to 14/15 units daily. Called sister to advise, no answer. LVM to call the office back.

## 2022-03-08 ENCOUNTER — OFFICE VISIT (OUTPATIENT)
Dept: SURGERY | Age: 62
End: 2022-03-08
Payer: COMMERCIAL

## 2022-03-08 VITALS
WEIGHT: 181.6 LBS | HEART RATE: 64 BPM | DIASTOLIC BLOOD PRESSURE: 68 MMHG | BODY MASS INDEX: 25.42 KG/M2 | TEMPERATURE: 97.3 F | HEIGHT: 71 IN | SYSTOLIC BLOOD PRESSURE: 114 MMHG

## 2022-03-08 DIAGNOSIS — K76.9 LIVER LESION: Primary | ICD-10-CM

## 2022-03-08 PROCEDURE — 99205 OFFICE O/P NEW HI 60 MIN: CPT | Performed by: SURGERY

## 2022-03-10 ENCOUNTER — HOSPITAL ENCOUNTER (OUTPATIENT)
Dept: WOUND CARE | Age: 62
Discharge: HOME | End: 2022-03-10
Payer: COMMERCIAL

## 2022-03-10 VITALS
BODY MASS INDEX: 24.69 KG/M2 | WEIGHT: 177 LBS | HEART RATE: 65 BPM | SYSTOLIC BLOOD PRESSURE: 113 MMHG | TEMPERATURE: 96.2 F | DIASTOLIC BLOOD PRESSURE: 68 MMHG | RESPIRATION RATE: 16 BRPM

## 2022-03-10 DIAGNOSIS — Z43.3 COLOSTOMY CARE (HCC): Primary | ICD-10-CM

## 2022-03-10 PROCEDURE — 99204 OFFICE O/P NEW MOD 45 MIN: CPT | Performed by: NURSE PRACTITIONER

## 2022-03-10 PROCEDURE — 99212 OFFICE O/P EST SF 10 MIN: CPT

## 2022-03-10 ASSESSMENT — PAIN DESCRIPTION - DESCRIPTORS: DESCRIPTORS: ACHING;CONSTANT;CRAMPING

## 2022-03-10 ASSESSMENT — PAIN DESCRIPTION - ORIENTATION: ORIENTATION: LOWER

## 2022-03-10 ASSESSMENT — PAIN DESCRIPTION - FREQUENCY: FREQUENCY: CONTINUOUS

## 2022-03-10 ASSESSMENT — PAIN DESCRIPTION - LOCATION: LOCATION: ABDOMEN

## 2022-03-10 ASSESSMENT — PAIN SCALES - GENERAL: PAINLEVEL_OUTOF10: 6

## 2022-03-10 NOTE — PLAN OF CARE
Discharge instructions given. Patient verbalized understanding. Return to [facility] in 1 week(s).   Called/faxed orders to  [facility]

## 2022-03-10 NOTE — PROGRESS NOTES
[place] [address] [place] [address] [address], [address]  Telephone: [phone]        [facility] Fax # [phone]        Discharge Instructions       [place] [address] [place], [address] [place] [address] Road  Telephone: [phone]        Name: Sanket Frazier  : 1960   AGE: 58 y.o. MRN: [id number]  Today's Date: 3/10/2022    Ostomy skin care  Cleanse skin prior to applying a new pouch/wafer with:  yes - Water    no - Cleanse skin with Mild Soap & Water  yes - May Shower    Other:       Topical Treatments to skin around stoma:  Do not apply lotions, creams, or ointments to wound bed unless directed. no - Apply barrier film/spray to skin around stoma and let it dry  no - Apply stoma powder to irritated skin around stoma, seal in with barrier film/spray and repeat x1  no - Apply antifungal cream to irritated skin surrounding stoma and work into skin until no longer able to feel cream.  no - Apply antifungal powder to irritated skin surrounding stoma, seal in with barrier film; and repeat x1  Other: Size- 2&1/4 x 2&1/5    Pouch/Wafer Application     - Change your pouch every 2 days or when leaking. yes - Appliance: 1 piece [place]    yes - Product Numbers:[id number], Second choice 8031  yes - Other: Accessories: rings and barrier  Other:____________________________________    Application of Pouch  yes - Gather supplies needed to change pouch and wafer. yes - Assemble new pouch system before removing old one.  yes - Using the measuring guide or pattern, trace size of opening onto back new pouch system. yes - Cut out opening. yes - Remove the control backing  yes - Set aside assembled pouch  yes - Remove worn appliance by gently pulling away from skin. yes - Look at back of the pouch before throwing away to see how it is worn.   yes - Wash skin with warm water or _____________, rinse and pat dry.     yes - Inspect  skin for redness or irritation. yes - Apply  barrier seals or rings around stoma or back of wafer. Can cut in half if easier to place. yes - Apply wafer/pouch system over stoma and press down firmly starting around stoma and working outward. yes - Remove control backing paper tape border if applicable and press to skin. yes - Attach new pouch to wafer. yes - Secure bottom of pouch. no - Apply barrier extenders to outside edges of pouch. yes - Lay/Sit quietly for 15-20 min to allow adhesives to mold to skin. Other: _Can try Metamucil every other day  ____________________________________    Emptying Pouch; Colostomy or Urostomy  Colostomy:  yes - Empty  pouch when 1/3 full of gas, stool. Clean bottom edge with damp toilet paper or bathroom wipe and close pouch. yes - For non-drainable pouch - remove when 1/2 full and throw away. Clean wafer flange (ring) with toilet tissue or damp paper towel before reapplying new pouch  Urostomy:  N/A - Empty  pouch when 1/3 full of urine    N/A - Urostomy:  Attach bottom connector of urostomy pouch to a nighttime drainage system and switch spigot to the open position. N/A - Other: Urostomy: Care of nighttime drainage  ________________________________________________________________________    Other:  yes - Shower or Swim Care Pat pouch dry, Use hair dryer on cool setting to dry back of pouch and border and Reapply barrier extenders or tape. no - Odor Control with deodorizer into bottom of pouch after each emptying  no - Lubricating gel to pouch to help emptying of thick stool  Other: See additional instructions _______________________________________         Contact Ostomy Care Nurse Practitioner  leaking issues and skin irritation     Follow up in the wound care center with Sarah Fox CNP in 1 week(s)            Skilled nurse to evaluate and treat for wound care. Change dressing as ordered  once a day 1 day per week using clean technique.  Patient/Family/caregiver may change dressings as needed as instructed when Home Care unavailable. WOUNDS REQUIRING DRESSING Changes:             Patient seen and treated on 3/10/2022    By Sherri Doll  [id number]   (provider/NPI)

## 2022-03-11 ENCOUNTER — CARE COORDINATION (OUTPATIENT)
Dept: CASE MANAGEMENT | Age: 62
End: 2022-03-11

## 2022-03-11 NOTE — PROGRESS NOTES
[facility]    NAME:  Milagros Moses  MEDICAL RECORD NUMBER:  [id number]  AGE: 58 y.o. GENDER:  male  :  1960  TODAY'S DATE:  3/11/2022    Subjective       Chief Complaint   Patient presents with    Wound Check     Initial visit ostomy care         HISTORY of PRESENT ILLNESS HPI     Milagros Moses is a 58 y.o. male New patient referred by Dr. Win Amezcua, who presents today for ostomy/stoma evaluation. Pt accompanied by his sister who lives out of town, pt reports good support system of family and friends. On 2022 pt had LAR diverting colostomy creation for rectal cancer. Due to proximity of tumor to bladder pt has multani catheter in place to be re-evaluated by Dr. Lorena Esposito in 2 weeks. Pt has started FOLFOX chemotherapy and states has two spots on liver. Pt to have both chemo and radiation as well as more surgeries in future. Pouching/Skin Issues: When in hospital, pouching system working well, several days after retuning home leakage issues started.    Current Pouching System: Coloplast 2 piece  History of Ostomy: 2022/Surgeon: Dr. Aleksey Garcia  Wound/Ulcer Pain Timing/Severity: intermittent, mild  abdominal pains  Quality of pain: aching  Severity:  3 / 10   Modifying Factors: \"eventually subsides\"  Associated Signs/Symptoms: none    PAST MEDICAL HISTORY        Diagnosis Date    Diabetes mellitus ([address]     Difficulty voiding     History of blood transfusion 2022    Hypertension     [address] [address] [facility]        PAST SURGICAL HISTORY    Past Surgical History:   Procedure Laterality Date    ABDOMEN SURGERY      Exploratory    COLOSTOMY N/A 2022    LAPAROSCOPIC COLOSTOMY CREATION, laparoscopic incarcerated hernia repair performed by Nicolás Orourke MD at [facility] N/A 2022    PORT A CATH INSERTION performed by Ashley Jenkins MD at [address] [place] [address] 2/15/2022    [facility] [place] by Noa Mohr MD at [address] [place] [address]  2/15/2022    SIGMOIDOSCOPY POLYP SNARE performed by Noa Mohr MD at [facility]    Family History   Problem Relation Age of Onset    Heart Failure Mother     Diabetes Father     Hypertension Father        SOCIAL HISTORY    Social History     Tobacco Use    Smoking status: Never Smoker    Smokeless tobacco: Never Used   Vaping Use    Vaping Use: Never used    Passive vaping exposure: Yes   Substance Use Topics    Alcohol use: Not Currently     Comment: not in last 18 months    Drug use: Never       ALLERGIES    No Known Allergies    MEDICATIONS    Current Outpatient Medications on File Prior to Encounter   Medication Sig Dispense Refill    ondansetron (ZOFRAN-ODT) 4 MG disintegrating tablet Take 1 tablet by mouth every 8 hours as needed for Nausea or Vomiting 20 tablet 0    oxyCODONE-acetaminophen (PERCOCET) 5-325 MG per tablet Take 1 tablet by mouth every 4 hours as needed for Pain. (Patient not taking: Reported on 3/8/2022)      FLUoxetine (PROZAC) 10 MG capsule Take 1 capsule by mouth daily 90 capsule 0    insulin glargine (LANTUS;BASAGLAR) 100 UNIT/ML injection pen Inject 28 Units into the skin daily (Patient taking differently: Inject 20 Units into the skin daily ) 5 pen 3    insulin lispro, 1 Unit Dial, 100 UNIT/ML SOPN Inject 9 Units into the skin 3 times daily (with meals) (Patient taking differently: Inject 6 Units into the skin 3 times daily (with meals) ) 3 pen 0    tamsulosin (FLOMAX) 0.4 MG capsule Take 1 capsule by mouth daily 30 capsule 3    metFORMIN (GLUCOPHAGE) 500 MG tablet Take 1 tablet by mouth 2 times daily (with meals) 180 tablet 1    blood glucose monitor kit and supplies Dispense sufficient amount for indicated testing frequency plus additional to accommodate PRN testing needs.  Dispense all needed supplies to include: monitor, strips, lancing device, lancets, control solutions, alcohol swabs. 1 kit 0    metoprolol succinate (TOPROL XL) 100 MG extended release tablet Take 50 mg by mouth daily        No current facility-administered medications on file prior to encounter. REVIEW OF SYSTEMS    Pertinent items are noted in HPI. Objective    /68   Pulse 65   Temp 96.2 °F (35.7 °C) (Infrared)   Resp 16   Wt 177 lb (80.3 kg)   BMI 24.69 kg/m²     Wt Readings from Last 3 Encounters:   03/10/22 177 lb (80.3 kg)   03/08/22 181 lb 9.6 oz (82.4 kg)   02/28/22 166 lb (75.3 kg)       PHYSICAL EXAM    General Appearance: alert and oriented to person, place and time, in no acute distress and pale  Skin: warm and dry, no rash or erythema  Head: normocephalic and atraumatic  Eyes: pupils equal, round, and reactive to light  Pulmonary/Chest: clear to auscultation bilaterally- no wheezes, rales or rhonchi, normal air movement, no respiratory distress  Cardiovascular: normal rate and regular rhythm  Abdomen soft, but some distention noted by pt. Ostomy Type: colostomy with formed, brown stool and gas    Stoma Assessment: proximal limb for stool and distal limb mucus. Stoma is firm, red, moist with 2 areas of superficial tissue discoloration @ 11 and 5 o'clock. Measures 2 1/2 \" x 2 1/4\"    Peristomal Skin Assessment: intact with some abdominal swelling noted.             LABS       CBC:   Lab Results   Component Value Date    WBC 6.9 03/02/2022    HGB 7.3 03/02/2022    HCT 24.9 03/02/2022    MCV 73.0 03/02/2022     03/02/2022     BMP:   Lab Results   Component Value Date     03/02/2022    K 4.4 03/02/2022    K 4.1 02/28/2022     03/02/2022    CO2 25 03/02/2022    PHOS 2.5 03/02/2022    BUN 20 03/02/2022    CREATININE 0.9 03/02/2022     PT/INR:   Lab Results   Component Value Date    PROTIME 11.8 02/15/2022    INR 1.04 02/15/2022     Prealbumin: No results found for: PREALBUMIN  Albumin:  Lab Results   Component Value Date    LABALBU 2.1 03/02/2022     Sed Rate:No results found for: SEDRATE  Micro:   Lab Results   Component Value Date    BC No Growth after 4 days of incubation. 2022          Plan   Plan for Ostomy Care:  Patient examined and evaluated and education per provider. Initial goals:  1. Get wear time without leaking 2-3 days with one-piece Javier drainable pouch. Samples given and advised pt to leave message @ [facility] if pouch leaks. Once abdominal swelling decreases expect a decrease in size of stoma and peristomal area will adjust pouching system. 2.  Reviewed oral intake which is a challenge given pt is diabetic and is now on chemotherapy affecting his appetite. Encouraged to use supplements between meals not taking the place of a meal  Eat what is appealing to him. 3.  Discussed various pouching systems just lightly touching on pro's and con's. Assured pt that in time will allow him to choose which system and features work for him best and easiest.     Follow up: 1 week    Please see attached Discharge Instructions    Written patient dismissal instructions given to patient and signed by patient or POA. [address]charge [address], [address] [place] [address]  Telephone: [phone]        Name: Mari Cabrera  : 1960   AGE: 58 y.o. MRN: [id number]  Today's Date: 3/10/2022    Ostomy skin care  Cleanse skin prior to applying a new pouch/wafer with:  yes - Water    no - Cleanse skin with Mild Soap & Water  yes - May Shower    Other:       Topical Treatments to skin around stoma:  Do not apply lotions, creams, or ointments to wound bed unless directed.    no - Apply barrier film/spray to skin around stoma and let it dry  no - Apply stoma powder to irritated skin around stoma, seal in with barrier film/spray and repeat x1  no - Apply antifungal cream to irritated skin surrounding stoma and work into skin until no longer able to feel cream.  no - Apply antifungal powder to irritated skin surrounding stoma, seal in with barrier film; and repeat x1  Other: Size- 2&1/4 x 2&1/5    Pouch/Wafer Application     - Change your pouch every 2 days or when leaking. yes - Appliance: 1 piece Javier    yes - Product Numbers:[id number], Second choice 8031  yes - Other: Accessories: rings and barrier  Other:____________________________________    Application of Pouch  yes - Gather supplies needed to change pouch and wafer. yes - Assemble new pouch system before removing old one.  yes - Using the measuring guide or pattern, trace size of opening onto back new pouch system. yes - Cut out opening. yes - Remove the control backing  yes - Set aside assembled pouch  yes - Remove worn appliance by gently pulling away from skin. yes - Look at back of the pouch before throwing away to see how it is worn.   yes - Wash skin with warm water or _____________, rinse and pat dry. yes - Inspect  skin for redness or irritation. yes - Apply  barrier seals or rings around stoma or back of wafer. Can cut in half if easier to place. yes - Apply wafer/pouch system over stoma and press down firmly starting around stoma and working outward. yes - Remove control backing paper tape border if applicable and press to skin. yes - Attach new pouch to wafer. yes - Secure bottom of pouch. no - Apply barrier extenders to outside edges of pouch. yes - Lay/Sit quietly for 15-20 min to allow adhesives to mold to skin. Other: _Can try Metamucil every other day  ____________________________________    Emptying Pouch; Colostomy or Urostomy  Colostomy:  yes - Empty  pouch when 1/3 full of gas, stool. Clean bottom edge with damp toilet paper or bathroom wipe and close pouch. yes - For non-drainable pouch - remove when 1/2 full and throw away.   Clean wafer flange (ring) with toilet tissue or damp paper towel before reapplying new pouch  Urostomy:  N/A - Empty  pouch when 1/3 full of urine    N/A - Urostomy:  Attach bottom connector of urostomy pouch to a nighttime drainage system and switch spigot to the open position. N/A - Other: Urostomy: Care of nighttime drainage  ________________________________________________________________________    Other:  yes - Shower or Swim Care Pat pouch dry, Use hair dryer on cool setting to dry back of pouch and border and Reapply barrier extenders or tape.   no - Odor Control with deodorizer into bottom of pouch after each emptying  no - Lubricating gel to pouch to help emptying of thick stool  Other: See additional instructions _______________________________________         Contact Ostomy Care Nurse Practitioner  leaking issues and skin irritation     Follow up in the wound care center with Jhonny Pereyra CNP in 1 week(s)            Electronically signed by MELODY Cisse CNP on 3/11/2022 at 10:00 AM

## 2022-03-11 NOTE — CARE COORDINATION
[address] [facility] Transitions Initial Follow Up Call    3/11/22    Patient:  Josue Iverson Patient :  1960  MRN:  [id number]   Reason for Admission:   Rectal cancer  Discharge Date:  3/2/22  RARS: 19    CTC attempt to reach Pt regarding recent hospital discharge. CTC left voice recording with call back number requesting a call back. Follow up appointments:    Future Appointments   Date Time Provider Shannen Roy   3/17/2022  2:00 PM MELODY Pittman - CNP [facility] WOUND [facility]   2022  9:20 AM Jovan Mcdaniel MD [place] [place] - DYD   6/10/2022 10:15 AM Carrol Harry MD [facility] S/ON [facility]       IVETTE Jorgensen, RN  Care Transition Coordinator  Contact Number:  [phone]

## 2022-03-17 ENCOUNTER — HOSPITAL ENCOUNTER (OUTPATIENT)
Dept: WOUND CARE | Age: 62
Discharge: HOME | End: 2022-03-17
Admitting: NURSE PRACTITIONER
Payer: COMMERCIAL

## 2022-03-17 VITALS
TEMPERATURE: 97.3 F | SYSTOLIC BLOOD PRESSURE: 128 MMHG | HEART RATE: 68 BPM | DIASTOLIC BLOOD PRESSURE: 71 MMHG | RESPIRATION RATE: 16 BRPM

## 2022-03-17 DIAGNOSIS — Z43.3 COLOSTOMY CARE (HCC): Primary | ICD-10-CM

## 2022-03-17 PROCEDURE — 99214 OFFICE O/P EST MOD 30 MIN: CPT | Performed by: NURSE PRACTITIONER

## 2022-03-17 PROCEDURE — 99212 OFFICE O/P EST SF 10 MIN: CPT

## 2022-03-17 NOTE — PLAN OF CARE
Discharge instructions given. Patient verbalized understanding. Return to [address] [place] [address],[address] in 1 week(s).   Called/faxed orders to  [facility]

## 2022-03-17 NOTE — PROGRESS NOTES
[address] [place], [address] [facility]  Telephone: [phone]        [facility] Fax # [phone]        Discharge Instructions       [place] [address] [place], [address] [place] [address] Road  Telephone: [phone]        Name: Jesús Ng  : 1960   AGE: 58 y.o. MRN: [id number]  Today's Date: 3/10/2022    Ostomy skin care  Cleanse skin prior to applying a new pouch/wafer with:  yes - Water    no - Cleanse skin with Mild Soap & Water  yes - May Shower    Other:       Topical Treatments to skin around stoma:  Do not apply lotions, creams, or ointments to wound bed unless directed. no - Apply barrier film/spray to skin around stoma and let it dry  no - Apply stoma powder to irritated skin around stoma, seal in with barrier film/spray and repeat x1  no - Apply antifungal cream to irritated skin surrounding stoma and work into skin until no longer able to feel cream.  no - Apply antifungal powder to irritated skin surrounding stoma, seal in with barrier film; and repeat x1  Other: Size- 2&1/4 x 2&1/5    Pouch/Wafer Application     - Change your pouch every 2 days or when leaking. yes - Appliance: 1 piece [place]    yes - Product [facility]:4117, Second choice [id number], Ring 780  yes - Other: Accessories: rings and barrier  Other:____________________________________    Application of Pouch  yes - Gather supplies needed to change pouch and wafer. yes - Assemble new pouch system before removing old one.  yes - Using the measuring guide or pattern, trace size of opening onto back new pouch system. yes - Cut out opening. yes - Remove the control backing  yes - Set aside assembled pouch  yes - Remove worn appliance by gently pulling away from skin. yes - Look at back of the pouch before throwing away to see how it is worn.   yes - Wash skin with warm water or _____________, rinse and pat dry.     yes - Inspect skin for redness or irritation. yes - Apply  barrier seals or rings around stoma or back of wafer. Can cut in half if easier to place. yes - Apply wafer/pouch system over stoma and press down firmly starting around stoma and working outward. yes - Remove control backing paper tape border if applicable and press to skin. yes - Attach new pouch to wafer. yes - Secure bottom of pouch. no - Apply barrier extenders to outside edges of pouch. yes - Lay/Sit quietly for 15-20 min to allow adhesives to mold to skin. Other: _Can try Metamucil every other day  ____________________________________    Emptying Pouch; Colostomy or Urostomy  Colostomy:  yes - Empty  pouch when 1/3 full of gas, stool. Clean bottom edge with damp toilet paper or bathroom wipe and close pouch. yes - For non-drainable pouch - remove when 1/2 full and throw away. Clean wafer flange (ring) with toilet tissue or damp paper towel before reapplying new pouch  Urostomy:  N/A - Empty  pouch when 1/3 full of urine    N/A - Urostomy:  Attach bottom connector of urostomy pouch to a nighttime drainage system and switch spigot to the open position. N/A - Other: Urostomy: Care of nighttime drainage  ________________________________________________________________________    Other:  yes - Shower or Swim Care Pat pouch dry, Use hair dryer on cool setting to dry back of pouch and border and Reapply barrier extenders or tape. no - Odor Control with deodorizer into bottom of pouch after each emptying  no - Lubricating gel to pouch to help emptying of thick stool  Other: See additional instructions _______________________________________         Contact Ostomy Care Nurse Practitioner  leaking issues and skin irritation     Follow up in the wound care center with Rosalind Saha CNP in 1 week(s)          Skilled nurse to evaluate and treat for wound care. Change dressing as ordered  once a day on Tuesday using clean technique.  Patient/Family/caregiver may change dressings as needed as instructed when Home Care unavailable.     WOUNDS REQUIRING DRESSING Changes:             Patient seen and treated on 3/17/2022    By Amos Zavala  [id number]   (provider/NPI)

## 2022-03-18 ENCOUNTER — CARE COORDINATION (OUTPATIENT)
Dept: CASE MANAGEMENT | Age: 62
End: 2022-03-18

## 2022-03-18 NOTE — PROGRESS NOTES
[address] [facility]  [address], [place]  Telephone: [phone]        NAME:  Javi Simpson  MEDICAL RECORD NUMBER:  [id number]  AGE: 58 y.o. GENDER:  male  :  1960  TODAY'S DATE:  3/17/2022    Subjective       Chief Complaint   Patient presents with    Wound Check     ostomy         HISTORY of PRESENT ILLNESS HPI   Javi Simpson is a 58 y.o. male New patient referred by Dr. Gera Cherry, who presents today for ostomy/stoma evaluation. Pt reports good support system of family and friends. Pt reports good appetite this past week and noting firming up of stool. Today has on a one piece pouch getting 2-3 day wear time.      On 2022 pt had LAR diverting colostomy creation for rectal cancer. Due to proximity of tumor to bladder pt has multani catheter in place to be re-evaluated by Dr. Olga Dorantes in 2 weeks. Pt has started FOLFOX chemotherapy and states has two spots on liver. Pt to have both chemo and radiation as well as more surgeries in future.       Pouching/Skin Issues: When in hospital, pouching system working well, several days after retuning home leakage issues started. Current Pouching System: Javier one piece drainable.   History of Ostomy: 2022/Surgeon: Dr. Suresh Carrera  Wound/Ulcer Pain Timing/Severity: none  Quality of pain:none  Severity: 0 / 10   Modifying Factors:   Associated Signs/Symptoms: none  PAST MEDICAL HISTORY        Diagnosis Date    Diabetes mellitus ([address]     Difficulty voiding     History of blood transfusion 2022    Hypertension     Rectal cancer ([address]        PAST SURGICAL HISTORY    Past Surgical History:   Procedure Laterality Date    ABDOMEN SURGERY      Exploratory    COLOSTOMY N/A 2022    LAPAROSCOPIC COLOSTOMY CREATION, laparoscopic incarcerated hernia repair performed by Lilly Dexter MD at [facility] N/A 2022    PORT A CATH INSERTION performed by [facility] Gabriel Rosado MD at [facility] [facility] N/A 2/15/2022    SIGMOIDOSCOPY BIOPSY FLEXIBLE performed by Niesha Arriaga MD at [address]  2/15/2022    SIGMOIDOSCOPY POLYP SNARE performed by Niesha Arriaga MD at [address]    Family History   Problem Relation Age of Onset    Heart Failure Mother     Diabetes Father     Hypertension Father        SOCIAL HISTORY    Social History     Tobacco Use    Smoking status: Never Smoker    Smokeless tobacco: Never Used   Vaping Use    Vaping Use: Never used    Passive vaping exposure: Yes   Substance Use Topics    Alcohol use: Not Currently     Comment: not in last 18 months    Drug use: Never       ALLERGIES    No Known Allergies    MEDICATIONS    Current Outpatient Medications on File Prior to Encounter   Medication Sig Dispense Refill    ondansetron (ZOFRAN-ODT) 4 MG disintegrating tablet Take 1 tablet by mouth every 8 hours as needed for Nausea or Vomiting 20 tablet 0    oxyCODONE-acetaminophen (PERCOCET) 5-325 MG per tablet Take 1 tablet by mouth every 4 hours as needed for Pain. (Patient not taking: Reported on 3/8/2022)      FLUoxetine (PROZAC) 10 MG capsule Take 1 capsule by mouth daily 90 capsule 0    insulin glargine (LANTUS;BASAGLAR) 100 UNIT/ML injection pen Inject 28 Units into the skin daily (Patient taking differently: Inject 20 Units into the skin daily ) 5 pen 3    insulin lispro, 1 Unit Dial, 100 UNIT/ML SOPN Inject 9 Units into the skin 3 times daily (with meals) (Patient taking differently: Inject 6 Units into the skin 3 times daily (with meals) ) 3 pen 0    tamsulosin (FLOMAX) 0.4 MG capsule Take 1 capsule by mouth daily 30 capsule 3    metFORMIN (GLUCOPHAGE) 500 MG tablet Take 1 tablet by mouth 2 times daily (with meals) 180 tablet 1    blood glucose monitor kit and supplies Dispense sufficient amount for indicated testing frequency plus additional to accommodate PRN testing needs.  Dispense all needed supplies to include: monitor, strips, lancing device, lancets, control solutions, alcohol swabs. 1 kit 0    metoprolol succinate (TOPROL XL) 100 MG extended release tablet Take 50 mg by mouth daily        No current facility-administered medications on file prior to encounter. REVIEW OF SYSTEMS    Pertinent items are noted in HPI. Objective    /71   Pulse 68   Temp 97.3 °F (36.3 °C) (Infrared)   Resp 16     Wt Readings from Last 3 Encounters:   03/10/22 177 lb (80.3 kg)   03/08/22 181 lb 9.6 oz (82.4 kg)   02/28/22 166 lb (75.3 kg)       PHYSICAL EXAM    General Appearance: alert and oriented to person, place and time, well-developed and well-nourished, in no acute distress  Skin: warm and dry, no rash or erythema  Head: normocephalic and atraumatic  Eyes: pupils equal, round, and reactive to light  Pulmonary/Chest:  normal air movement, no respiratory distress  Cardiovascular: normal rate and regular rhythm    Ostomy Type: colostomy, stoma smaller in size this week, adjusted pattern. Stool is thick, formed stool    Stoma Assessment: red, moist protruding    Peristomal Skin Assessment: skin intact, small peristomal hernia noted. Plan   Patient examined and evaluated and education per provider related to:  1. Pouch wear time of 2-4 days  2. Examined old pouch noting wearing of adhesive on lower edge where mucus stoma drains and plan to put more barrier ring in this area  3. Character of stool,  we spoke of constipation and diarrhea concerns with potential effect from ongoing chemotherapy  4. Advised pt to tell home care to order the one piece drainable pouches with filter, but today gave him samples  5. Pt encouraged to call if any pouching issues or questions, pt agreeable. Please see attached Discharge Instructions    Written patient dismissal instructions given to patient and signed by patient or POA.          Discharge [address], [address] [place] [address]  Telephone: [phone]        Name: Marylou Morejon  : 1960   AGE: 58 y.o. MRN: [id number]  Today's Date: 3/10/2022    Ostomy skin care  Cleanse skin prior to applying a new pouch/wafer with:  yes - Water    no - Cleanse skin with Mild Soap & Water  yes - May Shower    Other:       Topical Treatments to skin around stoma:  Do not apply lotions, creams, or ointments to wound bed unless directed. no - Apply barrier film/spray to skin around stoma and let it dry  no - Apply stoma powder to irritated skin around stoma, seal in with barrier film/spray and repeat x1  no - Apply antifungal cream to irritated skin surrounding stoma and work into skin until no longer able to feel cream.  no - Apply antifungal powder to irritated skin surrounding stoma, seal in with barrier film; and repeat x1  Other: Size- 2&1/4 x 2&1/5    Pouch/Wafer Application     - Change your pouch every 2 days or when leaking. yes - Appliance: 1 piece [place]    yes - Product [id number], Second choice [id number], Ring [id number]  yes - Other: Accessories: rings and barrier  Other:____________________________________    Application of Pouch  yes - Gather supplies needed to change pouch and wafer. yes - Assemble new pouch system before removing old one.  yes - Using the measuring guide or pattern, trace size of opening onto back new pouch system. yes - Cut out opening. yes - Remove the control backing  yes - Set aside assembled pouch  yes - Remove worn appliance by gently pulling away from skin. yes - Look at back of the pouch before throwing away to see how it is worn.   yes - Wash skin with warm water or _____________, rinse and pat dry. yes - Inspect  skin for redness or irritation. yes - Apply  barrier seals or rings around stoma or back of wafer. Can cut in half if easier to place.   yes - Apply wafer/pouch system over stoma and press down firmly starting around stoma and working outward. yes - Remove control backing paper tape border if applicable and press to skin. yes - Attach new pouch to wafer. yes - Secure bottom of pouch. no - Apply barrier extenders to outside edges of pouch. yes - Lay/Sit quietly for 15-20 min to allow adhesives to mold to skin. Other: _Can try Metamucil every other day  ____________________________________    Emptying Pouch; Colostomy or Urostomy  Colostomy:  yes - Empty  pouch when 1/3 full of gas, stool. Clean bottom edge with damp toilet paper or bathroom wipe and close pouch. yes - For non-drainable pouch - remove when 1/2 full and throw away. Clean wafer flange (ring) with toilet tissue or damp paper towel before reapplying new pouch  Urostomy:  N/A - Empty  pouch when 1/3 full of urine    N/A - Urostomy:  Attach bottom connector of urostomy pouch to a nighttime drainage system and switch spigot to the open position. N/A - Other: Urostomy: Care of nighttime drainage  ________________________________________________________________________    Other:  yes - Shower or Swim Care Pat pouch dry, Use hair dryer on cool setting to dry back of pouch and border and Reapply barrier extenders or tape.   no - Odor Control with deodorizer into bottom of pouch after each emptying  no - Lubricating gel to pouch to help emptying of thick stool  Other: See additional instructions _______________________________________         Contact Ostomy Care Nurse Practitioner  leaking issues and skin irritation     Follow up in the wound care center with Naila Saldana CNP in 1 week(s)          Electronically signed by MELODY Rush CNP on 3/17/2022 at 8:56 PM

## 2022-03-18 NOTE — CARE COORDINATION
[address] Transitions Initial Follow Up Call    Call within 2 business days of discharge: Yes    Patient:  Brii Morrell Patient :  1960  MRN:  [id number]   Reason for Admission:  covid 19 monitoring   Discharge Date:  3/2/22  RARS: 13      CTC attempt to reach Pt regarding recent hospital discharge. CTC left voice recording with call back number requesting a call back. Episode closed /unable to reach. Message sent to PCP pool / HFU needed. Follow up appointments:    Future Appointments   Date Time Provider Shannen Roy   3/24/2022  3:00 PM MELODY Cunningham - CNP [facility] WOUND [place] [facility]   2022  9:20 AM Adelia Wilson MD [place] [facility] [place] - DYD   6/10/2022 10:15 AM MD Socorro Guerra S/ON [facility]       LAYLA RasconN, RN  Care Transition Coordinator  Contact Number:  [phone]

## 2022-03-24 ENCOUNTER — HOSPITAL ENCOUNTER (OUTPATIENT)
Dept: WOUND CARE | Age: 62
Discharge: HOME | End: 2022-03-24
Payer: COMMERCIAL

## 2022-03-24 VITALS
HEART RATE: 59 BPM | DIASTOLIC BLOOD PRESSURE: 79 MMHG | SYSTOLIC BLOOD PRESSURE: 127 MMHG | RESPIRATION RATE: 16 BRPM | TEMPERATURE: 97.3 F

## 2022-03-24 DIAGNOSIS — Z43.3 COLOSTOMY CARE (HCC): Primary | ICD-10-CM

## 2022-03-24 PROCEDURE — 99214 OFFICE O/P EST MOD 30 MIN: CPT | Performed by: NURSE PRACTITIONER

## 2022-03-24 PROCEDURE — 99212 OFFICE O/P EST SF 10 MIN: CPT

## 2022-03-24 NOTE — PLAN OF CARE
Discharge instructions given. Patient verbalized understanding. Return to [address] [place] [address],[address] in 3 week(s).   Continue Ostomy care

## 2022-03-25 NOTE — PROGRESS NOTES
[address] [place] [address] Dr    NAME:  Sanket Frazier  MEDICAL RECORD NUMBER:  [id number]  AGE: 58 y.o. GENDER:  male  :  1960  TODAY'S DATE:  3/25/2022    Subjective       Chief Complaint   Patient presents with    Wound Check     ostomy         HISTORY of PRESENT ILLNESS HPI  Mark Diop is a 58 y. o. male returning patient referred by Dr. Kristine Webb, who presents today for ostomy/stoma evaluation. Pt reports good support system of family and friends. Pt reports good appetite this past week and noting firming up of stool. Today has on a one piece pouch getting 2-3 day wear time. Discussed high A1C several weeks ago and was related to elevated blood sugars when he ended up having surgery, but currently blood sugars controlled. He is still having rectal mucus discharge. No rectal soreness or skin irritation    On 2022 pt had LAR diverting colostomy creation for rectal cancer.  Due to proximity of tumor to bladder pt has multani catheter in place to be re-evaluated by Dr. Emile Gomez in 2 weeks.    Pt has started FOLFOX chemotherapy and states has two spots on liver. Pt to have both chemo and radiation as well as more surgeries in future.  The ostomy will probably be permanent due to size and location.     Pouching/Skin Issues: No issues this past week and states an extra barrier ring at distal edge of stoma has been good to handle mucus drainage from distal stoma. Current Pouching System: Dixon one piece drainable.   History of Ostomy: 2022/Surgeon: Dr. ASHLEY Gar  Wound/Ulcer Pain Timing/Severity: none  Quality of pain: none  Severity: 0 / 10   Modifying Factors: none  Associated Signs/Symptoms: none    PAST MEDICAL HISTORY        Diagnosis Date    Diabetes mellitus [facility]     Difficulty voiding     History of blood transfusion 2022    Hypertension     Rectal cancer ([facility]        PAST SURGICAL HISTORY    Past Surgical History:   Procedure Laterality Date    ABDOMEN SURGERY  1975    Exploratory    COLOSTOMY N/A 2/28/2022    LAPAROSCOPIC COLOSTOMY CREATION, laparoscopic incarcerated hernia repair performed by Dasha Saldana MD at [facility] N/A 2/17/2022    PORT A CATH INSERTION performed by Rodriguez Juarez MD at [address] [facility] N/A 2/15/2022    SIGMOIDOSCOPY BIOPSY FLEXIBLE performed by Erika Lewis MD at [facility]  2/15/2022    SIGMOIDOSCOPY POLYP SNARE performed by Erika Lewis MD at [facility]    Family History   Problem Relation Age of Onset    Heart Failure Mother     Diabetes Father     Hypertension Father        SOCIAL HISTORY    Social History     Tobacco Use    Smoking status: Never Smoker    Smokeless tobacco: Never Used   Vaping Use    Vaping Use: Never used    Passive vaping exposure: Yes   Substance Use Topics    Alcohol use: Not Currently     Comment: not in last 18 months    Drug use: Never       ALLERGIES    No Known Allergies    MEDICATIONS    Current Outpatient Medications on File Prior to Encounter   Medication Sig Dispense Refill    ondansetron (ZOFRAN-ODT) 4 MG disintegrating tablet Take 1 tablet by mouth every 8 hours as needed for Nausea or Vomiting 20 tablet 0    oxyCODONE-acetaminophen (PERCOCET) 5-325 MG per tablet Take 1 tablet by mouth every 4 hours as needed for Pain.   (Patient not taking: Reported on 3/8/2022)      FLUoxetine (PROZAC) 10 MG capsule Take 1 capsule by mouth daily 90 capsule 0    insulin glargine (LANTUS;BASAGLAR) 100 UNIT/ML injection pen Inject 28 Units into the skin daily (Patient taking differently: Inject 20 Units into the skin daily ) 5 pen 3    insulin lispro, 1 Unit Dial, 100 UNIT/ML SOPN Inject 9 Units into the skin 3 times daily (with meals) (Patient taking differently: Inject 6 Units into the skin 3 times daily (with meals) ) 3 pen 0    tamsulosin (FLOMAX) 0.4 MG capsule Take 1 capsule by mouth daily 30 capsule 3    metFORMIN (GLUCOPHAGE) 500 MG tablet Take 1 tablet by mouth 2 times daily (with meals) 180 tablet 1    blood glucose monitor kit and supplies Dispense sufficient amount for indicated testing frequency plus additional to accommodate PRN testing needs. Dispense all needed supplies to include: monitor, strips, lancing device, lancets, control solutions, alcohol swabs. 1 kit 0    metoprolol succinate (TOPROL XL) 100 MG extended release tablet Take 50 mg by mouth daily        No current facility-administered medications on file prior to encounter. REVIEW OF SYSTEMS    Pertinent items are noted in HPI. Objective    /79   Pulse 59   Temp 97.3 °F (36.3 °C) (Infrared)   Resp 16     Wt Readings from Last 3 Encounters:   03/10/22 177 lb (80.3 kg)   03/08/22 181 lb 9.6 oz (82.4 kg)   02/28/22 166 lb (75.3 kg)       PHYSICAL EXAM    General Appearance: alert and oriented to person, place and time, well-developed and well-nourished, in no acute distress and frail-appearing  Skin: warm and dry, no rash or erythema  Head: normocephalic and atraumatic  Eyes: pupils equal, round, and reactive to light  Pulmonary/Chest:  normal air movement, no respiratory distress  Cardiovascular: normal rate and regular rhythm    Ostomy Type: colostomy with mixed stool thickness    Stoma Assessment:red, moist    Peristomal Skin Assessment:pouch just changed early this am, so left in place. Plan   Patient examined and evaluated and spent 30 minutes with pt with education related to current status; food intake and fluid intake. Discussed how to order supplies and pt will make appt in 3 weeks to discuss pouching options. Please see attached Discharge Instructions    Written patient dismissal instructions given to patient and signed by patient or POA.          Discharge Instructions       [facility]  [address] [facility], [address]  Telephone: [phone]        Name: Tamiko Sabillon  : 1960   AGE: 58 y.o. MRN: [id number]  Today's Date: 3/10/2022    Ostomy skin care  Cleanse skin prior to applying a new pouch/wafer with:  yes - Water    no - Cleanse skin with Mild Soap & Water  yes - May Shower    Other:       Topical Treatments to skin around stoma:  Do not apply lotions, creams, or ointments to wound bed unless directed. no - Apply barrier film/spray to skin around stoma and let it dry  no - Apply stoma powder to irritated skin around stoma, seal in with barrier film/spray and repeat x1  no - Apply antifungal cream to irritated skin surrounding stoma and work into skin until no longer able to feel cream.  no - Apply antifungal powder to irritated skin surrounding stoma, seal in with barrier film; and repeat x1  Other: Size- 2&1/4 x 2&1/5    Pouch/Wafer Application     - Change your pouch every 2 days or when leaking. yes - Appliance: 1 piece [place]    yes - Product [id number], Second choice [id number], Ring [id number]  yes - Other: Accessories: rings and barrier  Other:____________________________________    Application of Pouch  yes - Gather supplies needed to change pouch and wafer. yes - Assemble new pouch system before removing old one.  yes - Using the measuring guide or pattern, trace size of opening onto back new pouch system. yes - Cut out opening. yes - Remove the control backing  yes - Set aside assembled pouch  yes - Remove worn appliance by gently pulling away from skin. yes - Look at back of the pouch before throwing away to see how it is worn.   yes - Wash skin with warm water or _____________, rinse and pat dry. yes - Inspect  skin for redness or irritation. yes - Apply  barrier seals or rings around stoma or back of wafer. Can cut in half if easier to place. yes - Apply wafer/pouch system over stoma and press down firmly starting around stoma and working outward. yes - Remove control backing paper tape border if applicable and press to skin.    yes - Attach new pouch to wafer. yes - Secure bottom of pouch. no - Apply barrier extenders to outside edges of pouch. yes - Lay/Sit quietly for 15-20 min to allow adhesives to mold to skin. Other: _Can try Metamucil every other day  ____________________________________    Emptying Pouch; Colostomy or Urostomy  Colostomy:  yes - Empty  pouch when 1/3 full of gas, stool. Clean bottom edge with damp toilet paper or bathroom wipe and close pouch. yes - For non-drainable pouch - remove when 1/2 full and throw away. Clean wafer flange (ring) with toilet tissue or damp paper towel before reapplying new pouch  Urostomy:  N/A - Empty  pouch when 1/3 full of urine    N/A - Urostomy:  Attach bottom connector of urostomy pouch to a nighttime drainage system and switch spigot to the open position. N/A - Other: Urostomy: Care of nighttime drainage  ________________________________________________________________________    Other:  yes - Shower or Swim Care Pat pouch dry, Use hair dryer on cool setting to dry back of pouch and border and Reapply barrier extenders or tape. no - Odor Control with deodorizer into bottom of pouch after each emptying  no - Lubricating gel to pouch to help emptying of thick stool  Other: See additional instructions _______________________________________         Contact Ostomy Care Nurse Practitioner  leaking issues and skin irritation      Follow up in 3 weeks in the wound care center  Call if you need us.           Electronically signed by MELODY Nicolas CNP on 3/25/2022 at 10:40 AM

## 2022-04-01 ENCOUNTER — TELEPHONE (OUTPATIENT)
Dept: SURGERY | Age: 62
End: 2022-04-01

## 2022-04-01 NOTE — TELEPHONE ENCOUNTER
Meghann Quintanilla from [employer] called on behalf of the patient to verify that Dr. Betty Dougherty is the correct provider to send Plan of Care paperwork to. The patient also sees Dr. Iftikhar Amado, that's why there was some confusion as to who the documents should go to.     Please verify and call Meghann Quintanilla back at [phone]

## 2022-04-06 ENCOUNTER — OFFICE VISIT (OUTPATIENT)
Dept: INTERNAL MEDICINE CLINIC | Age: 62
End: 2022-04-06
Payer: COMMERCIAL

## 2022-04-06 VITALS
HEIGHT: 71 IN | OXYGEN SATURATION: 98 % | SYSTOLIC BLOOD PRESSURE: 135 MMHG | DIASTOLIC BLOOD PRESSURE: 70 MMHG | WEIGHT: 179.2 LBS | HEART RATE: 63 BPM | BODY MASS INDEX: 25.09 KG/M2

## 2022-04-06 DIAGNOSIS — F33.2 SEVERE EPISODE OF RECURRENT MAJOR DEPRESSIVE DISORDER, WITHOUT PSYCHOTIC FEATURES (HCC): ICD-10-CM

## 2022-04-06 DIAGNOSIS — D50.0 IRON DEFICIENCY ANEMIA DUE TO CHRONIC BLOOD LOSS: ICD-10-CM

## 2022-04-06 DIAGNOSIS — Z79.4 TYPE 2 DIABETES MELLITUS WITH HYPERGLYCEMIA, WITH LONG-TERM CURRENT USE OF INSULIN (HCC): Primary | ICD-10-CM

## 2022-04-06 DIAGNOSIS — I10 PRIMARY HYPERTENSION: ICD-10-CM

## 2022-04-06 DIAGNOSIS — C20 RECTAL CANCER (HCC): ICD-10-CM

## 2022-04-06 DIAGNOSIS — E11.65 TYPE 2 DIABETES MELLITUS WITH HYPERGLYCEMIA, WITH LONG-TERM CURRENT USE OF INSULIN (HCC): Primary | ICD-10-CM

## 2022-04-06 PROCEDURE — 3046F HEMOGLOBIN A1C LEVEL >9.0%: CPT | Performed by: INTERNAL MEDICINE

## 2022-04-06 PROCEDURE — 99214 OFFICE O/P EST MOD 30 MIN: CPT | Performed by: INTERNAL MEDICINE

## 2022-04-06 RX ORDER — FLUOXETINE 10 MG/1
10 CAPSULE ORAL DAILY
Qty: 90 CAPSULE | Refills: 0 | Status: SHIPPED | OUTPATIENT
Start: 2022-04-06 | End: 2022-05-18

## 2022-04-06 RX ORDER — TEMAZEPAM 15 MG/1
15 CAPSULE ORAL NIGHTLY PRN
COMMUNITY
Start: 2022-03-22 | End: 2022-08-15 | Stop reason: SDUPTHER

## 2022-04-06 RX ORDER — INSULIN LISPRO 200 [IU]/ML
INJECTION, SOLUTION SUBCUTANEOUS
Qty: 5 PEN | Refills: 5 | Status: SHIPPED | OUTPATIENT
Start: 2022-04-06

## 2022-04-06 ASSESSMENT — ENCOUNTER SYMPTOMS
WHEEZING: 0
SORE THROAT: 0
SHORTNESS OF BREATH: 0
RHINORRHEA: 0
DIARRHEA: 1
ABDOMINAL PAIN: 0
RECTAL PAIN: 0
COLOR CHANGE: 0

## 2022-04-06 NOTE — PROGRESS NOTES
ASSESSMENT/PLAN:  1. Type 2 diabetes mellitus with hyperglycemia, with long-term current use of insulin ([facility])  Assessment & Plan:   Home blood glucose log reviewed and discussed with patient, per glycemia noticed around the time he gets his chemotherapy due to concomitant use of steroids however tends to improve few days after. Advised patient to increase Basaglar dose to 20 units for 3 days after chemo cycle and then go back to 16 for the reminder of the 2 weeks. Switch Humalog from 6 units 3 times daily to the sliding scale, will continue current dose of Metformin as increasing it may result in worsening diarrhea complicating colostomy care. Encouraged to continue maintaining healthy low-carb diet, will reevaluate again in 6 weeks recheck hemoglobin A1c next visit  Orders:  -     insulin glargine (LANTUS;BASAGLAR) 100 UNIT/ML injection pen; Take 20 u for 3 days with  Every chemo cycle then 16 units daily, Disp-5 pen, R-3Normal  -     insulin lispro (HUMALOG KWIKPEN) 200 UNIT/ML SOPN pen; Take 4 units for blood sugar reading of 150-200, 6 units for 201-250, 8units for 251-300, 10 units for 301-350, Disp-5 pen, R-5Normal  -     Insulin Pen Needle 32G X 5 MM MISC; DAILY Starting Wed 4/6/2022, Disp-100 each, R-3, Normal  2. Severe episode of recurrent major depressive disorder, without psychotic features ([facility] [address]  Assessment & Plan:   Symptoms improved significantly with Prozac, will continue current dose, encouraged to call the office if any new problems or concerns  Orders:  -     FLUoxetine (PROZAC) 10 MG capsule; Take 1 capsule by mouth daily, Disp-90 capsule, R-0Normal  3. Iron deficiency anemia due to chronic blood loss  Assessment & Plan:   Last blood draw done on Monday by oncology prior to chemo cycle hemoglobin improved to 11.0, no need for lab work today since getting labs done by oncology  4.  Rectal cancer [facility])  Assessment & Plan:  Initial lesions shrinking per patient report, continue care and recommendations as per medical and surgical oncology  5. Primary hypertension  Assessment & Plan:  Initial readings elevated but repeat blood pressure check stable, will continue current dose of metoprolol. Return in about 6 weeks (around 5/18/2022). SUBJECTIVE  HPI:   Patient here for 6 weeks follow-up, he has since underwent colostomy placement and started chemotherapy. He has been following with both medical and surgical oncology and met with hospice/palliative care consultant about 2 weeks ago to focus on home care and comfort  He completed third cycle of chemo this Monday, so far tolerating well, his anemia improved following blood transfusion, currently not requiring pain meds, he believes Prozac is working really well with for the depression specially after temazepam was added for insomnia. His appetite improved and he gained few pounds  He has been taking 16 units of Basaglar insulin and doing mostly 6 units of Humalog with mealtime, he brought his logbook, he report 1 episode where his sugar dropped to the 20s and paramedics were called however he states he took insulin without eating that day. Review of Systems   Constitutional: Positive for fatigue. Negative for activity change, appetite change and unexpected weight change. HENT: Negative for congestion, hearing loss, rhinorrhea and sore throat. Eyes: Negative for visual disturbance. Respiratory: Negative for shortness of breath and wheezing. Gastrointestinal: Positive for diarrhea. Negative for abdominal pain and rectal pain. Endocrine: Negative for cold intolerance and heat intolerance. Musculoskeletal: Negative for arthralgias and joint swelling. Skin: Negative for color change. Allergic/Immunologic: Positive for immunocompromised state. Negative for environmental allergies. Neurological: Negative for dizziness and light-headedness. Psychiatric/Behavioral: Positive for dysphoric mood and sleep disturbance. OBJECTIVE:    /70   Pulse 63   Ht 5' 11\" (1.803 m)   Wt 179 lb 3.2 oz (81.3 kg)   SpO2 98%   BMI 24.99 kg/m²    Physical Exam  Constitutional:       General: He is not in acute distress. Appearance: Normal appearance. He is normal weight. He is not toxic-appearing. HENT:      Head: Normocephalic. Eyes:      Conjunctiva/sclera: Conjunctivae normal.   Cardiovascular:      Rate and Rhythm: Normal rate. Heart sounds: Normal heart sounds. Pulmonary:      Effort: Pulmonary effort is normal. No respiratory distress. Abdominal:      Palpations: Abdomen is soft. Comments: Colostomy bag in place   Musculoskeletal:         General: No tenderness. Normal range of motion. Cervical back: Neck supple. Skin:     General: Skin is warm and dry. Findings: No lesion. Neurological:      General: No focal deficit present. Mental Status: He is alert and oriented to person, place, and time. Cranial Nerves: No cranial nerve deficit. Psychiatric:         Mood and Affect: Mood normal.         Behavior: Behavior normal.         Thought Content: Thought content normal.           Electronically signed by Zain Cain MD on 4/6/2022 at 10:01 AM.    This dictation was generated by voice recognition computer software. Although all attempts are made to edit the dictation for accuracy, there may be errors in the transcription that are not intended.

## 2022-04-14 ENCOUNTER — HOSPITAL ENCOUNTER (OUTPATIENT)
Dept: WOUND CARE | Age: 62
Discharge: HOME | End: 2022-04-14
Payer: COMMERCIAL

## 2022-04-14 VITALS
WEIGHT: 182.7 LBS | HEART RATE: 51 BPM | TEMPERATURE: 97.5 F | HEIGHT: 71 IN | SYSTOLIC BLOOD PRESSURE: 131 MMHG | BODY MASS INDEX: 25.58 KG/M2 | RESPIRATION RATE: 16 BRPM | DIASTOLIC BLOOD PRESSURE: 70 MMHG

## 2022-04-14 DIAGNOSIS — Z43.3 COLOSTOMY CARE (HCC): Primary | ICD-10-CM

## 2022-04-14 DIAGNOSIS — C20 RECTAL CANCER (HCC): ICD-10-CM

## 2022-04-14 PROCEDURE — 99212 OFFICE O/P EST SF 10 MIN: CPT

## 2022-04-14 PROCEDURE — 99214 OFFICE O/P EST MOD 30 MIN: CPT | Performed by: NURSE PRACTITIONER

## 2022-04-14 NOTE — PROGRESS NOTES
[address] [place] [address] Dr    NAME:  Bettie Hinson  MEDICAL RECORD NUMBER:  [id number]  AGE: 58 y.o. GENDER:  male  :  1960  TODAY'S DATE:  2022    Subjective       Chief Complaint   Patient presents with    Wound Check     Ostomy check           HISTORY of PRESENT ILLNESS HPI  Meredith Diop is a 58 y. o. male returning patient referred by Dr. Kiarra Cunningham, who presents today for ostomy/stoma evaluation. Pt reports good support system of family and friends. Pt reports good appetite and noting firming up of stool. Today has on a one piece pouch getting 4-5 day wear time time. Discussed high A1C several weeks ago and was related to elevated blood sugars when he ended up having surgery, but currently blood sugars controlled. He is still having rectal mucus discharge. No rectal soreness or skin irritation     On 2022 pt had LAR diverting colostomy creation for rectal cancer.  Due to proximity of tumor to bladder pt has multani catheter in place.     Pt has started FOLFOX chemotherapy and states has two spots on liver. Pt to have both chemo and radiation as well as more surgeries in future.  The ostomy will probably be permanent due to size and location.     Pouching/Skin Issues: No issues this past week and states an extra barrier ring at distal edge of stoma has been good to handle mucus drainage from distal stoma.       Current Pouching System: [place] one piece drainable.   History of Ostomy: 2022/Surgeon: Dr. ASHLYE Gar  Wound/Ulcer Pain Timing/Severity: none  Quality of pain: none  Severity: 0 / 10   Modifying Factors: none  Associated Signs/Symptoms: none    PAST MEDICAL HISTORY        Diagnosis Date    Diabetes mellitus ([address]     Difficulty voiding     History of blood transfusion 2022    Hypertension     Rectal cancer ([facility] [address]        PAST SURGICAL HISTORY    Past Surgical History:   Procedure Laterality Date   [facility]    COLOSTOMY N/A 2/28/2022    LAPAROSCOPIC COLOSTOMY CREATION, laparoscopic incarcerated hernia repair performed by Octavia Mayo MD at [facility] N/A 2/17/2022    PORT A CATH INSERTION performed by Alysha Kevin MD at [id number] [facility] N/A 2/15/2022    SIGMOIDOSCOPY BIOPSY FLEXIBLE performed by Christy Salazar MD at [facility]  2/15/2022    SIGMOIDOSCOPY POLYP SNARE performed by Christy Salazar MD at [facility]    Family History   Problem Relation Age of Onset    Heart Failure Mother     Diabetes Father     Hypertension Father        SOCIAL HISTORY    Social History     Tobacco Use    Smoking status: Never Smoker    Smokeless tobacco: Never Used   Vaping Use    Vaping Use: Never used    Passive vaping exposure: Yes   Substance Use Topics    Alcohol use: Not Currently     Comment: not in last 18 months    Drug use: Never       ALLERGIES    No Known Allergies    MEDICATIONS    Current Outpatient Medications on File Prior to Encounter   Medication Sig Dispense Refill    temazepam (RESTORIL) 15 MG capsule Take 15 mg by mouth nightly as needed.  insulin glargine (LANTUS;BASAGLAR) 100 UNIT/ML injection pen Take 20 u for 3 days with  Every chemo cycle then 16 units daily 5 pen 3    insulin lispro (HUMALOG KWIKPEN) 200 UNIT/ML SOPN pen Take 4 units for blood sugar reading of 150-200, 6 units for 201-250, 8units for 251-300, 10 units for 301-350 5 pen 5    FLUoxetine (PROZAC) 10 MG capsule Take 1 capsule by mouth daily 90 capsule 0    Insulin Pen Needle 32G X 5 MM MISC 1 each by Does not apply route daily 100 each 3    ondansetron (ZOFRAN-ODT) 4 MG disintegrating tablet Take 1 tablet by mouth every 8 hours as needed for Nausea or Vomiting (Patient not taking: Reported on 4/6/2022) 20 tablet 0    oxyCODONE-acetaminophen (PERCOCET) 5-325 MG per tablet Take 1 tablet by mouth every 4 hours as needed for Pain.   (Patient not taking: Reported on 3/8/2022)      tamsulosin (FLOMAX) 0.4 MG capsule Take 1 capsule by mouth daily 30 capsule 3    metFORMIN (GLUCOPHAGE) 500 MG tablet Take 1 tablet by mouth 2 times daily (with meals) 180 tablet 1    blood glucose monitor kit and supplies Dispense sufficient amount for indicated testing frequency plus additional to accommodate PRN testing needs. Dispense all needed supplies to include: monitor, strips, lancing device, lancets, control solutions, alcohol swabs. 1 kit 0    metoprolol succinate (TOPROL XL) 100 MG extended release tablet Take 50 mg by mouth daily        No current facility-administered medications on file prior to encounter. REVIEW OF SYSTEMS    Pertinent items are noted in HPI. Objective    /70   Pulse 51   Temp 97.5 °F (36.4 °C) (Infrared)   Resp 16   Ht 5' 11\" (1.803 m)   Wt 182 lb 11.2 oz (82.9 kg)   BMI 25.48 kg/m²     Wt Readings from Last 3 Encounters:   04/14/22 182 lb 11.2 oz (82.9 kg)   04/06/22 179 lb 3.2 oz (81.3 kg)   03/10/22 177 lb (80.3 kg)       PHYSICAL EXAM    General Appearance: alert and oriented to person, place and time, well-developed and well-nourished, in no acute distress and pale  Skin: warm and dry, no rash or erythema  Head: normocephalic and atraumatic  Eyes: pupils equal, round, and reactive to light  Pulmonary/Chest: normal air movement, no respiratory distress  Cardiovascular: normal rate and regular rhythm    Ostomy Type: colostomy with mixed consistency stool, distal lumen still putting out foul, tan thin drainage. Stoma Assessment: loop stoma with decrease in size 1 3/8\" oval.     Peristomal Skin Assessment: skin intact    Colostomy LLQ (Active)   Number of days: 39       Plan   Patient examined and evaluated and extensive teaching about options for pouching and ordering options. Pt's insurance asking him to order through [employer], so pt will order own . Pt to call for needs, questions or pouching issues. Discharged from [address] [place] [address]. Treatment: No orders of the defined types were placed in this encounter. Please see attached Discharge Instructions    Written patient dismissal instructions given to patient and signed by patient or POA. Discharge Instructions        [place] [address] [address] [facility], [address] [place] [address]  Telephone: [phone]           Name: Darshana Soliz  : 1960   AGE: 58 y.o. MRN: [id number]  Today's Date: 3/10/2022     Ostomy skin care  Cleanse skin prior to applying a new pouch/wafer with:  yes - Water         no - Cleanse skin with Mild Soap & Water  yes - May Shower    Other:        Topical Treatments to skin around stoma:  Do not apply lotions, creams, or ointments to wound bed unless directed. no - Apply barrier film/spray to skin around stoma and let it dry  no - Apply stoma powder to irritated skin around stoma, seal in with barrier film/spray and repeat x1  no - Apply antifungal cream to irritated skin surrounding stoma and work into skin until no longer able to feel cream.  no - Apply antifungal powder to irritated skin surrounding stoma, seal in with barrier film; and repeat x1  Other: Size- 1 & 3/4     Pouch/Wafer Application     - Change your pouch every 2 days or when leaking. yes - Appliance: 1 piece Javier  Trying [employer] [id number]  yes - Product [id number], Second choice [id number], Ring [id number]  yes - Other: Accessories: rings and barrier  Other:____________________________________     Application of Pouch  yes - Gather supplies needed to change pouch and wafer. yes - Assemble new pouch system before removing old one.  yes - Using the measuring guide or pattern, trace size of opening onto back new pouch system. yes - Cut out opening. yes - Remove the control backing  yes - Set aside assembled pouch  yes - Remove worn appliance by gently pulling away from skin.   yes - Look at back of the pouch before throwing away to see how it is worn.   yes - Wash skin with warm water or _____________, rinse and pat dry. yes - Inspect  skin for redness or irritation. yes - Apply  barrier seals or rings around stoma or back of wafer. Can cut in half if easier to place. yes - Apply wafer/pouch system over stoma and press down firmly starting around stoma and working outward. yes - Remove control backing paper tape border if applicable and press to skin. yes - Attach new pouch to wafer. yes - Secure bottom of pouch. no - Apply barrier extenders to outside edges of pouch. yes - Lay/Sit quietly for 15-20 min to allow adhesives to mold to skin. Other: _Can try Metamucil every other day  ____________________________________     Emptying Pouch; Colostomy or Urostomy  Colostomy:  yes - Empty  pouch when 1/3 full of gas, stool. Clean bottom edge with damp toilet paper or bathroom wipe and close pouch. yes - For non-drainable pouch - remove when 1/2 full and throw away. Clean wafer flange (ring) with toilet tissue or damp paper towel before reapplying new pouch  Urostomy:  N/A - Empty  pouch when 1/3 full of urine     N/A - Urostomy:  Attach bottom connector of urostomy pouch to a nighttime drainage system and switch spigot to the open position. N/A - Other: Urostomy: Care of nighttime drainage  ________________________________________________________________________     Other:  yes - Shower or Swim Care Pat pouch dry, Use hair dryer on cool setting to dry back of pouch and border and Reapply barrier extenders or tape. no - Odor Control with deodorizer into bottom of pouch after each emptying  no - Lubricating gel to pouch to help emptying of thick stool  Other: See additional instructions _______________________________________            Contact Ostomy Care Nurse Practitioner  leaking issues and skin irritation       No Follow up in the wound care center  Call if you need us.         Electronically signed by MELODY Cabezas CNP on 4/14/2022 at 2:44 PM

## 2022-04-20 ENCOUNTER — TELEPHONE (OUTPATIENT)
Dept: INTERNAL MEDICINE CLINIC | Age: 62
End: 2022-04-20

## 2022-04-20 NOTE — TELEPHONE ENCOUNTER
Please advise patient controlled substances need to be prescribed by same provider, he needs to contact oncology for further refills

## 2022-04-20 NOTE — TELEPHONE ENCOUNTER
Pt  calling requesting refill of Temazepam     Last written   Previously by oncologist  Last OV 4/6/22  Next OV 5/18/22  Last recommended OV NA    Please send to [facility]

## 2022-04-29 ENCOUNTER — TELEPHONE (OUTPATIENT)
Dept: INTERNAL MEDICINE CLINIC | Age: 62
End: 2022-04-29

## 2022-04-29 NOTE — TELEPHONE ENCOUNTER
Pt called into the office to let the office know that Soumya Christie will be calling in regards to some items the patient ordered for his diabetes. The Provider needs to sign off on it for him to get those items. If any questions please call Pt.

## 2022-05-10 ENCOUNTER — HOSPITAL ENCOUNTER (OUTPATIENT)
Dept: CT IMAGING | Age: 62
Discharge: HOME | End: 2022-05-10
Payer: COMMERCIAL

## 2022-05-10 DIAGNOSIS — C20 RECTAL CANCER (HCC): ICD-10-CM

## 2022-05-10 PROCEDURE — 74177 CT ABD & PELVIS W/CONTRAST: CPT

## 2022-05-10 PROCEDURE — 6360000004 HC RX CONTRAST MEDICATION: Performed by: NURSE PRACTITIONER

## 2022-05-10 RX ADMIN — IOHEXOL 50 ML: 240 INJECTION, SOLUTION INTRATHECAL; INTRAVASCULAR; INTRAVENOUS; ORAL at 17:35

## 2022-05-10 RX ADMIN — IOPAMIDOL 75 ML: 755 INJECTION, SOLUTION INTRAVENOUS at 17:36

## 2022-05-16 DIAGNOSIS — F33.2 SEVERE EPISODE OF RECURRENT MAJOR DEPRESSIVE DISORDER, WITHOUT PSYCHOTIC FEATURES (HCC): ICD-10-CM

## 2022-05-18 ENCOUNTER — OFFICE VISIT (OUTPATIENT)
Dept: INTERNAL MEDICINE CLINIC | Age: 62
End: 2022-05-18
Payer: COMMERCIAL

## 2022-05-18 VITALS
WEIGHT: 191 LBS | SYSTOLIC BLOOD PRESSURE: 140 MMHG | HEART RATE: 52 BPM | DIASTOLIC BLOOD PRESSURE: 80 MMHG | HEIGHT: 71 IN | OXYGEN SATURATION: 99 % | BODY MASS INDEX: 26.74 KG/M2

## 2022-05-18 DIAGNOSIS — I10 PRIMARY HYPERTENSION: ICD-10-CM

## 2022-05-18 DIAGNOSIS — E11.65 TYPE 2 DIABETES MELLITUS WITH HYPERGLYCEMIA, WITH LONG-TERM CURRENT USE OF INSULIN (HCC): Primary | ICD-10-CM

## 2022-05-18 DIAGNOSIS — F33.2 SEVERE EPISODE OF RECURRENT MAJOR DEPRESSIVE DISORDER, WITHOUT PSYCHOTIC FEATURES (HCC): ICD-10-CM

## 2022-05-18 DIAGNOSIS — Z79.4 TYPE 2 DIABETES MELLITUS WITH HYPERGLYCEMIA, WITH LONG-TERM CURRENT USE OF INSULIN (HCC): Primary | ICD-10-CM

## 2022-05-18 LAB — HBA1C MFR BLD: 6.4 %

## 2022-05-18 PROCEDURE — 3044F HG A1C LEVEL LT 7.0%: CPT | Performed by: INTERNAL MEDICINE

## 2022-05-18 PROCEDURE — 83036 HEMOGLOBIN GLYCOSYLATED A1C: CPT | Performed by: INTERNAL MEDICINE

## 2022-05-18 PROCEDURE — 99214 OFFICE O/P EST MOD 30 MIN: CPT | Performed by: INTERNAL MEDICINE

## 2022-05-18 RX ORDER — FLUOXETINE 10 MG/1
10 CAPSULE ORAL DAILY
Qty: 90 CAPSULE | Refills: 1 | Status: SHIPPED | OUTPATIENT
Start: 2022-05-18 | End: 2022-11-03 | Stop reason: SDUPTHER

## 2022-05-18 RX ORDER — LISINOPRIL 20 MG/1
20 TABLET ORAL DAILY
Qty: 90 TABLET | Refills: 1 | Status: SHIPPED | OUTPATIENT
Start: 2022-05-18 | End: 2022-11-03 | Stop reason: SDUPTHER

## 2022-05-18 ASSESSMENT — ENCOUNTER SYMPTOMS
VOMITING: 0
SORE THROAT: 0
NAUSEA: 0
CHEST TIGHTNESS: 0
BACK PAIN: 0
COLOR CHANGE: 0
SHORTNESS OF BREATH: 0
WHEEZING: 0
CONSTIPATION: 0
ABDOMINAL PAIN: 0
COUGH: 0

## 2022-05-18 NOTE — ASSESSMENT & PLAN NOTE
Blood pressure continues to be borderline high and although patient is reporting he gets whitecoat hypertension advised will go ahead and add an ACE inhibitor therapy since he needs to be on that given longstanding history of diabetes. He was on lisinopril in the past with good tolerance, will start 20 mg daily along with current dose of metoprolol 50 mg, he does get BMP checked prior to chemotherapy and will notify oncology of medication change.   Encouraged maintaining low-salt diet, increase level of physical activity as tolerated, continue blood pressure monitoring at home and call the office if any new problems

## 2022-05-18 NOTE — PROGRESS NOTES
here, he continues to follow-up with oncology for ongoing treatment of rectal cancer, has 3 more rounds of chemotherapy than expected to start radiation therapy, doing okay with colostomy care and management, evaluated by urology for urine retention and had indwelling catheter for a while however currently doing okay with Flomax therapy and no longer needs catheterization. He is denying significant hypoglycemia, states blood sugar tends to be on the low side in the morning but has not had any readings less than 60. He is reporting an average blood sugar of 1 40-1 70      Lab Results   Component Value Date    LABA1C 6.4 05/18/2022    LABA1C 16.3 02/16/2022       Review of Systems   Constitutional: Negative for activity change, appetite change, fatigue and unexpected weight change. HENT: Negative for congestion, hearing loss, mouth sores and sore throat. Eyes: Negative for visual disturbance. Respiratory: Negative for cough, chest tightness, shortness of breath and wheezing. Cardiovascular: Negative for chest pain, palpitations and leg swelling. Gastrointestinal: Negative for abdominal pain, constipation, nausea and vomiting. Genitourinary: Negative for difficulty urinating, dysuria, frequency, hematuria and urgency. Musculoskeletal: Negative for arthralgias, back pain, gait problem and joint swelling. Skin: Negative for color change. Allergic/Immunologic: Negative for environmental allergies and immunocompromised state. Neurological: Negative for dizziness, speech difficulty, light-headedness and headaches. Psychiatric/Behavioral: Negative for behavioral problems and dysphoric mood. The patient is not nervous/anxious. OBJECTIVE:    BP (!) 140/80   Pulse 52   Ht 5' 11\" (1.803 m)   Wt 191 lb (86.6 kg)   SpO2 99%   BMI 26.64 kg/m²    Physical Exam  Constitutional:       General: He is not in acute distress. Appearance: Normal appearance. He is normal weight.  He is not toxic-appearing. HENT:      Mouth/Throat:      Mouth: Mucous membranes are moist.      Pharynx: Oropharynx is clear. Eyes:      Conjunctiva/sclera: Conjunctivae normal.   Cardiovascular:      Rate and Rhythm: Normal rate. Heart sounds: Normal heart sounds. Pulmonary:      Effort: Pulmonary effort is normal. No respiratory distress. Breath sounds: Normal breath sounds. No wheezing. Abdominal:      Palpations: Abdomen is soft. Comments: Colostomy in place   Musculoskeletal:      Cervical back: Neck supple. Skin:     General: Skin is warm and dry. Neurological:      General: No focal deficit present. Mental Status: He is alert. Psychiatric:         Mood and Affect: Mood normal.           Electronically signed by Ethan Hdez MD on 5/18/2022 at 9:09 AM.    This dictation was generated by voice recognition computer software. Although all attempts are made to edit the dictation for accuracy, there may be errors in the transcription that are not intended.

## 2022-05-18 NOTE — ASSESSMENT & PLAN NOTE
Continue to be stable and doing well on current dose of Prozac, will continue the same and reevaluate in 3 months

## 2022-05-18 NOTE — ASSESSMENT & PLAN NOTE
Appears to be well controlled and improved significantly with repeat hemoglobin A1c this visit is down to 6.4, advised patient ultimately the goal is to get him off insulin therapy and only oral agents however at this point since still ongoing active chemotherapy will recommend continuing same regimen along with metformin, continue maintaining low-carb diet and will reevaluate again in 3 months.

## 2022-06-09 NOTE — PROGRESS NOTES
[facility] SURGICAL ONCOLOGY  [address]  Dept: [phone]  Dept Fax: [phone]    Visit Date: 6/17/2022    HPI:   Cathy Cortes is a 58 y.o. male who is here today to discuss further management of his rectal cancer with questionable metastasis to liver. Patient is followed by Dr. Kimberly Porter and Dr. Ashlyn Franklin who is contemplating 4500 to 5400 cGy of Radiation once FOLFOX is completed. Patient has completed 7 of 8 cycles to be followed by Xeloda RT followed by definitive surgery. Last dose of Chemo due on or around 6/14/22. Overall, patient tolerating chemo very well. No jaundice or other liver issues. Past Medical History:   Diagnosis Date    Diabetes mellitus ([address]     Difficulty voiding     History of blood transfusion 02/2022    Hypertension     Rectal cancer [facility])      Past Surgical History:   Procedure Laterality Date    ABDOMEN SURGERY  1975    Exploratory    COLOSTOMY N/A 2/28/2022    LAPAROSCOPIC COLOSTOMY CREATION, laparoscopic incarcerated hernia repair performed by Truong Parrish MD at [facility] N/A 2/17/2022    PORT A CATH INSERTION performed by Mick Portillo MD at [address] [facility] N/A 2/15/2022    [facility] performed by Pino Najera MD at [address] [facility]  2/15/2022    SIGMOIDOSCOPY POLYP SNARE performed by Pino Najera MD at [facility]       Current Outpatient Medications:     FLUoxetine (PROZAC) 10 MG capsule, TAKE 1 CAPSULE BY MOUTH DAILY, Disp: 90 capsule, Rfl: 1    lisinopril (PRINIVIL;ZESTRIL) 20 MG tablet, Take 1 tablet by mouth daily, Disp: 90 tablet, Rfl: 1    temazepam (RESTORIL) 15 MG capsule, Take 15 mg by mouth nightly as needed. , Disp: , Rfl:     insulin glargine (LANTUS;BASAGLAR) 100 UNIT/ML injection pen, Take 20 u for 3 days with  Every chemo cycle then 16 units daily, Disp: 5 pen, Rfl: 3    insulin lispro (HUMALOG KWIKPEN) 200 UNIT/ML SOPN pen, Take 4 units for blood sugar reading of 150-200, 6 units for 201-250, 8units for 251-300, 10 units for 301-350, Disp: 5 pen, Rfl: 5    Insulin Pen Needle 32G X 5 MM MISC, 1 each by Does not apply route daily, Disp: 100 each, Rfl: 3    ondansetron (ZOFRAN-ODT) 4 MG disintegrating tablet, Take 1 tablet by mouth every 8 hours as needed for Nausea or Vomiting, Disp: 20 tablet, Rfl: 0    oxyCODONE-acetaminophen (PERCOCET) 5-325 MG per tablet, Take 1 tablet by mouth every 4 hours as needed for Pain. (Patient not taking: Reported on 5/18/2022), Disp: , Rfl:     tamsulosin (FLOMAX) 0.4 MG capsule, Take 1 capsule by mouth daily, Disp: 30 capsule, Rfl: 3    metFORMIN (GLUCOPHAGE) 500 MG tablet, Take 1 tablet by mouth 2 times daily (with meals), Disp: 180 tablet, Rfl: 1    blood glucose monitor kit and supplies, Dispense sufficient amount for indicated testing frequency plus additional to accommodate PRN testing needs.  Dispense all needed supplies to include: monitor, strips, lancing device, lancets, control solutions, alcohol swabs., Disp: 1 kit, Rfl: 0    metoprolol succinate (TOPROL XL) 100 MG extended release tablet, Take 50 mg by mouth daily , Disp: , Rfl:   No Known Allergies  Past Surgical History:   Procedure Laterality Date    ABDOMEN SURGERY  1975    Exploratory    COLOSTOMY N/A 2/28/2022    LAPAROSCOPIC COLOSTOMY CREATION, laparoscopic incarcerated hernia repair performed by José Miguel Benítez MD at [facility] N/A 2/17/2022    PORT A CATH INSERTION performed by Ruth English MD at [address] [facility] N/A 2/15/2022    SIGMOIDOSCOPY BIOPSY FLEXIBLE performed by Luis Alberto Esposito MD at [facility]  2/15/2022    SIGMOIDOSCOPY POLYP SNARE performed by Luis Alberto Esposito MD at [address]     Family History   Problem Relation Age of Onset    Heart Failure Mother     Diabetes Father     Hypertension Father        Social History:   Social History     Tobacco Use    Smoking status: Never Smoker    Smokeless tobacco: Never Used   Substance Use Topics    Alcohol use: Not Currently     Comment: not in last 18 months      Tobacco cessation counseling provided as appropriate. Review of Systems: See HPI, all other systems reviewed and are negative. Objective:     Physical Exam   /85 (Site: Left Upper Arm, Position: Sitting, Cuff Size: Medium Adult)   Pulse 59   Temp 97 °F (36.1 °C) (Temporal)   Resp 20   Ht 5' 11\" (1.803 m)   Wt 183 lb 12.8 oz (83.4 kg)   SpO2 99%   BMI 25.63 kg/m²   General:  Alert, oriented x 3, cooperative, no distress, appears stated age. Skin: Skin color, texture, turgor normal.    Lymph nodes: Cervical, supraclavicular, and axillary nodes normal.   HENT:  Normocephalic, without obvious abnormality. Moist mucus membranes. No icterus. Lungs: No respiratory distress. Heart:  Regular rate and rhythm. No murmur. Abdomen:  Soft, non-tender. No masses,  No organomegaly. Ostomy in place. Extremities: Extremities normal, atraumatic, no cyanosis or edema. Neurologic: Grossly intact sensory and motor exam.   Psychiatric: Appropriate mood and thought process       CEA 5/31/22: 1.62, previously 1.11 (5/16/22), High 22.4 (FEB 2022)      CT ABD/Pelvis 5/10/22:    Significant decrease in size of the previously noted rectal tumor as  described above.  There is still a no fat plane between the tumor and the the  right seminal vesicle and the the prostate.  The bladder does not appear to  be involved with tumor. Pennelope Free is a persistent enlarged presacral node  suspicious for metastasis.  There is minor inguinal and left para-aortic  lymphadenopathy, most likely reactive in nature.  Patient has undergone  colostomy in the left lower quadrant.  There is a sclerotic lesion involving  the right pedicle of L4, unchanged from prior examination.  This could  represent a metastasis or bone island and correlation with bone scan would be  of help.       Assessment/Plan:   A/P: Rectal cancer with liver mets:      I reviewed with Kings Bright about the diagnosis and management options. Based on the available information patient appears to have locally advanced rectal cancer and liver metastases. Natural course, prognosis and sequencing of treatments reviewed again today. He is tolerating chemo well. Discussed about going for liver surgery now vs after chemoradiation discussed. Any complications at liver surgery can delay his radiation and thus I recommended him to start radiation first. I explained him about laparoscopic and open liver resection briefly at patient's last visit and again today. Printed information was given. All the complications were explained. Risks, benefits and alternatives of surgery reviewed with the patient. All the questions of the patient were answered to his apparent satisfaction. Patient verbalized understanding of the management plan. Imaging studies and tumor marker reviewed with the patient. Will discuss at tumor board.   Follow up with Dr. Jamilah Solomon  Follow up with Dr. Elena Anton and Dr. Barbara Humphries  Follow up at the end of chemoradiation to discuss about timing of liver surgery     Yovana Figueroa MD  Surgery Attending

## 2022-06-17 ENCOUNTER — OFFICE VISIT (OUTPATIENT)
Dept: SURGERY | Age: 62
End: 2022-06-17
Payer: COMMERCIAL

## 2022-06-17 VITALS
HEIGHT: 71 IN | HEART RATE: 59 BPM | SYSTOLIC BLOOD PRESSURE: 136 MMHG | BODY MASS INDEX: 25.73 KG/M2 | TEMPERATURE: 97 F | RESPIRATION RATE: 20 BRPM | WEIGHT: 183.8 LBS | OXYGEN SATURATION: 99 % | DIASTOLIC BLOOD PRESSURE: 85 MMHG

## 2022-06-17 DIAGNOSIS — K76.9 LIVER LESION: Primary | ICD-10-CM

## 2022-06-17 PROCEDURE — 99215 OFFICE O/P EST HI 40 MIN: CPT | Performed by: SURGERY

## 2022-06-22 ENCOUNTER — HOSPITAL ENCOUNTER (OUTPATIENT)
Age: 62
Setting detail: SPECIMEN
Discharge: HOME | End: 2022-06-22

## 2022-06-24 ENCOUNTER — CLINICAL DOCUMENTATION (OUTPATIENT)
Dept: SURGERY | Age: 62
End: 2022-06-24

## 2022-06-24 ENCOUNTER — HOSPITAL ENCOUNTER (OUTPATIENT)
Age: 62
Setting detail: SPECIMEN
Discharge: HOME | End: 2022-06-24
Payer: COMMERCIAL

## 2022-06-24 PROCEDURE — 88341 IMHCHEM/IMCYTCHM EA ADD ANTB: CPT

## 2022-06-24 PROCEDURE — 88342 IMHCHEM/IMCYTCHM 1ST ANTB: CPT

## 2022-06-24 NOTE — PROGRESS NOTES
Patient known to tumor board. T4b N2 M1 disease with known liver metastases and locally very advanced rectal CA with bladder and prostate invasion. Is currently  finishing 8 cycles of FOLFOX. Consideration was given to proceeding with liver resection first before chemoradiation and proctectomy versus completing chemoradiation, then proceeding with surgical intervention. Tumor board concluded that we will proceed with chemoradiation, then plan for liver resection immediately following, then plan for complex proctectomy in the typical 6 to 10-week post radiation timeframe. At some point he will need consultation with Dr. Sher Frazier of urology for surgical planning for cystoprostatectomy. We will also plan for restaging imaging soon after completion of chemoradiation.

## 2022-07-14 DIAGNOSIS — C20 RECTAL CANCER (HCC): Primary | ICD-10-CM

## 2022-07-22 ENCOUNTER — TELEPHONE (OUTPATIENT)
Dept: SURGERY | Age: 62
End: 2022-07-22

## 2022-07-22 NOTE — TELEPHONE ENCOUNTER
LVM for patient to call the office to discuss care plan and to see if Dr. Mayda Winn office has contacted him about scheduling a referral visit.

## 2022-07-25 NOTE — TELEPHONE ENCOUNTER
Spoke to patient, he finished chemo 2 weeks ago but has not yet started radiation with Dr. Stephania Schirmer. Referral was placed for Dr. Roxy Ovalles however patient has not yet hear anything from Dr. Jin Court office so patient was given phone number for Dr. Katina Saenz and told to schedule office visit. Patient will call our office if he is unable to schedule office visit.

## 2022-08-02 ENCOUNTER — TELEPHONE (OUTPATIENT)
Dept: SURGERY | Age: 62
End: 2022-08-02

## 2022-08-02 ENCOUNTER — TELEPHONE (OUTPATIENT)
Dept: INTERNAL MEDICINE CLINIC | Age: 62
End: 2022-08-02

## 2022-08-02 DIAGNOSIS — F41.1 GAD (GENERALIZED ANXIETY DISORDER): Primary | ICD-10-CM

## 2022-08-02 RX ORDER — HYDROXYZINE HYDROCHLORIDE 25 MG/1
25 TABLET, FILM COATED ORAL EVERY 8 HOURS PRN
Qty: 30 TABLET | Refills: 0 | Status: SHIPPED | OUTPATIENT
Start: 2022-08-02 | End: 2022-08-12

## 2022-08-02 NOTE — TELEPHONE ENCOUNTER
Discussed extensively with patient about his cancer spread / stage / complexity in general. Explained about number of liver lesions and liver surgery. Most of his questions were around cystoprostatectomy and explained about reasons to do it if he needs one. Patient verbalized understanding and stated that this explanation will help him to live through next 6 weeks as he goes through radiation until next imaging done to decide on overall surgical plan. Will do CT chest / abd / pelvis at the end of radiation with plan to do liver surgery 1-2 weeks postradiation.

## 2022-08-02 NOTE — TELEPHONE ENCOUNTER
Patient is experiencing anxiety after receiving bad news from oncologist.  He has been diagnosed with rectal cancer. Patient is requesting medication that will help calm him.   Patient uses [address] in [facility]

## 2022-08-11 ENCOUNTER — HOSPITAL ENCOUNTER (OUTPATIENT)
Dept: WOUND CARE | Age: 62
Discharge: HOME | End: 2022-08-11
Payer: COMMERCIAL

## 2022-08-11 VITALS
HEART RATE: 55 BPM | WEIGHT: 193.1 LBS | TEMPERATURE: 96.8 F | BODY MASS INDEX: 26.93 KG/M2 | DIASTOLIC BLOOD PRESSURE: 78 MMHG | SYSTOLIC BLOOD PRESSURE: 126 MMHG | RESPIRATION RATE: 15 BRPM

## 2022-08-11 DIAGNOSIS — K94.19 INTESTINAL STOMA PROLAPSE (HCC): ICD-10-CM

## 2022-08-11 DIAGNOSIS — Z43.3 COLOSTOMY CARE (HCC): Primary | ICD-10-CM

## 2022-08-11 PROCEDURE — 99212 OFFICE O/P EST SF 10 MIN: CPT | Performed by: NURSE PRACTITIONER

## 2022-08-11 PROCEDURE — 99212 OFFICE O/P EST SF 10 MIN: CPT

## 2022-08-11 NOTE — PLAN OF CARE
Discharge instructions given. Patient verbalized understanding. Return to [facility] in 1 week(s).   Continue Ostomy Care

## 2022-08-11 NOTE — DISCHARGE INSTRUCTIONS
stoma and press down firmly starting around stoma and working outward. yes - Remove control backing paper tape border if applicable and press to skin. yes - Attach new pouch to wafer. yes - Secure bottom of pouch. no - Apply barrier extenders to outside edges of pouch. yes - Lay/Sit quietly for 15-20 min to allow adhesives to mold to skin. Other: _Can try Metamucil every other day  ____________________________________     Emptying Pouch; Colostomy or Urostomy  Colostomy:  yes - Empty  pouch when 1/3 full of gas, stool. Clean bottom edge with damp toilet paper or bathroom wipe and close pouch. yes - For non-drainable pouch - remove when 1/2 full and throw away. Clean wafer flange (ring) with toilet tissue or damp paper towel before reapplying new pouch  Urostomy:  N/A - Empty  pouch when 1/3 full of urine     N/A - Urostomy:  Attach bottom connector of urostomy pouch to a nighttime drainage system and switch spigot to the open position. N/A - Other: Urostomy: Care of nighttime drainage  ________________________________________________________________________     Other:  yes - Shower or Swim Care Pat pouch dry, Use hair dryer on cool setting to dry back of pouch and border and Reapply barrier extenders or tape.   no - Odor Control with deodorizer into bottom of pouch after each emptying  no - Lubricating gel to pouch to help emptying of thick stool  Other: See additional instructions _______________________________________            Contact Ostomy Care Nurse Practitioner  leaking issues and skin irritation        Follow up in the wound care center 2 weeks

## 2022-08-12 PROBLEM — K94.19 INTESTINAL STOMA PROLAPSE (HCC): Status: ACTIVE | Noted: 2022-08-12

## 2022-08-12 NOTE — PROGRESS NOTES
[facility]  [address] [place]  Telephone: [phone]      NAME:  Ender Griffith  MEDICAL RECORD NUMBER:  [id number]  AGE: 58 y.o. GENDER:  male  :  1960  TODAY'S DATE:  2022    Subjective       Chief Complaint   Patient presents with    Wound Check     Ostomy visit         HISTORY of PRESENT ILLNESS HPI   Ender Griffith is a 58 y.o. male returning patient referred by Dr. Jodie Vincent, who presents today for ostomy/stoma evaluation. Pt reports good support system of family and friends. Pt reports good appetite and noting firming up of stool. Today has on a one piece pouch getting 4-5 day wear time time. He is still having rectal mucus discharge. No rectal soreness or skin irritation     On 2022 pt had LAR diverting colostomy creation for rectal cancer. Due to proximity of tumor to bladder pt has multani catheter in place. No longer has multani catheter in place. Pt now getting chemotherapy daily here at [facility] so will schedule an educational visit related to information about a possible urostomy and a measure for abdominal binder with prolapse belt. He states has two spots on liver. Pt to have both chemo and radiation as well as more surgeries in future. The ostomy will probably be permanent due to size and location. Pouching/Skin Issues: No issues this past week and states an extra barrier ring at distal edge of stoma has been good to handle mucus drainage from distal stoma. Current Pouching System: Javier one piece drainable.   History of Ostomy: 2022/Surgeon: Dr. 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 N/A 2/15/2022    [address] performed by Nini Magdaleno MD at [address] [facility]  2/15/2022    SIGMOIDOSCOPY POLYP SNARE performed by Nini Magdaleno MD at [address] [facility] [address] HISTORY    Family History   Problem Relation Age of Onset    Heart Failure Mother     Diabetes Father     Hypertension Father        SOCIAL HISTORY    Social History     Tobacco Use    Smoking status: Never    Smokeless tobacco: Never   Vaping Use    Vaping Use: Never used    Passive vaping exposure: Yes   Substance Use Topics    Alcohol use: Not Currently     Comment: not in last 18 months    Drug use: Never       ALLERGIES    No Known Allergies    MEDICATIONS    Current Outpatient Medications on File Prior to Encounter   Medication Sig Dispense Refill    hydrOXYzine HCl (ATARAX) 25 MG tablet Take 1 tablet by mouth every 8 hours as needed for Itching 30 tablet 0    FLUoxetine (PROZAC) 10 MG capsule TAKE 1 CAPSULE BY MOUTH DAILY 90 capsule 1    lisinopril (PRINIVIL;ZESTRIL) 20 MG tablet Take 1 tablet by mouth daily 90 tablet 1    temazepam (RESTORIL) 15 MG capsule Take 15 mg by mouth nightly as needed.       insulin glargine (LANTUS;BASAGLAR) 100 UNIT/ML injection pen Take 20 u for 3 days with  Every chemo cycle then 16 units daily 5 pen 3    insulin lispro (HUMALOG KWIKPEN) 200 UNIT/ML SOPN pen Take 4 units for blood sugar reading of 150-200, 6 units for 201-250, 8units for 251-300, 10 units for 301-350 5 pen 5    Insulin Pen Needle 32G X 5 MM MISC 1 each by Does not apply route daily 100 each 3    ondansetron (ZOFRAN-ODT) 4 MG disintegrating tablet Take 1 tablet by mouth every 8 hours as needed for Nausea or Vomiting 20 tablet 0    oxyCODONE-acetaminophen (PERCOCET) 5-325 MG per tablet Take 1 tablet by mouth every 4 hours as needed for Pain. (Patient not taking: Reported on 5/18/2022)      tamsulosin (FLOMAX) 0.4 MG capsule Take 1 capsule by mouth daily 30 capsule 3    metFORMIN (GLUCOPHAGE) 500 MG tablet Take 1 tablet by mouth 2 times daily (with meals) 180 tablet 1    blood glucose monitor kit and supplies Dispense sufficient amount for indicated testing frequency plus additional to accommodate PRN testing needs. Dispense all needed supplies to include: monitor, strips, lancing device, lancets, control solutions, alcohol swabs. 1 kit 0    metoprolol succinate (TOPROL XL) 100 MG extended release tablet Take 50 mg by mouth daily        No current facility-administered medications on file prior to encounter. REVIEW OF SYSTEMS    Pertinent items are noted in HPI.     Objective    /78   Pulse 55   Temp 96.8 °F (36 °C) (Infrared)   Resp 15   Wt 193 lb 1.6 oz (87.6 kg)   BMI 26.93 kg/m²     Wt Readings from Last 3 Encounters:   08/11/22 193 lb 1.6 oz (87.6 kg)   06/17/22 183 lb 12.8 oz (83.4 kg)   05/18/22 191 lb (86.6 kg)       PHYSICAL EXAM    General Appearance: alert and oriented to person, place and time, well-developed and well-nourished, in no acute distress  Skin: warm and dry, no rash or erythema  Head: normocephalic and atraumatic  Eyes: pupils equal, round, and reactive to light  Pulmonary/Chest:  normal air movement, no respiratory distress  Cardiovascular: normal rate and regular rhythm    Ostomy Type: colostomy    Stoma Assessment: red, moist, enlarging but partially reducible when laying down    Peristomal Skin Assessment: intact           Colostomy LLQ (Active)   Number of days: 164     Plan   Patient examined and evaluated, no pouch change  Please see attached Discharge Instructions    Written patient dismissal instructions given to patient and signed by patient or POA. Discharge [address]  [address] [facility], [address] [place] [address]  Telephone: [phone]           Name: Angus Leyden  : 1960   AGE: 58 y.o. MRN: [id number]  Today's Date: 3/10/2022     Ostomy skin care  Cleanse skin prior to applying a new pouch/wafer with:  yes - Water         no - Cleanse skin with Mild Soap & Water  yes - May Shower    Other:        Topical Treatments to skin around stoma:  Do not apply lotions, creams, or ointments to wound bed unless directed. no - Apply barrier film/spray to skin around stoma and let it dry  no - Apply stoma powder to irritated skin around stoma, seal in with barrier film/spray and repeat x1  no - Apply antifungal cream to irritated skin surrounding stoma and work into skin until no longer able to feel cream.  no - Apply antifungal powder to irritated skin surrounding stoma, seal in with barrier film; and repeat x1  Other: Size- 1 & 3/4     Pouch/Wafer Application     - Change your pouch every 2 days or when leaking. yes - Appliance: 1 piece [place]  Trying Coloplast [id number]  yes - Product [id number], Second choice [id number], Ring 7805  yes - Other: Accessories: rings and barrier  Other:____________________________________     Application of Pouch  yes - Gather supplies needed to change pouch and wafer. yes - Assemble new pouch system before removing old one.  yes - Using the measuring guide or pattern, trace size of opening onto back new pouch system. yes - Cut out opening. yes - Remove the control backing  yes - Set aside assembled pouch  yes - Remove worn appliance by gently pulling away from skin. yes - Look at back of the pouch before throwing away to see how it is worn.  yes - Wash skin with warm water or _____________, rinse and pat dry.     yes - Inspect  skin for redness or irritation. yes - Apply  barrier seals or rings around stoma or back of wafer. Can cut in half if easier to place. yes - Apply wafer/pouch system over stoma and press down firmly starting around stoma and working outward. yes - Remove control backing paper tape border if applicable and press to skin. yes - Attach new pouch to wafer. yes - Secure bottom of pouch. no - Apply barrier extenders to outside edges of pouch. yes - Lay/Sit quietly for 15-20 min to allow adhesives to mold to skin. Other: _Can try Metamucil every other day  ____________________________________     Emptying Pouch; Colostomy or Urostomy  Colostomy:  yes - Empty  pouch when 1/3 full of gas, stool. Clean bottom edge with damp toilet paper or bathroom wipe and close pouch. yes - For non-drainable pouch - remove when 1/2 full and throw away. Clean wafer flange (ring) with toilet tissue or damp paper towel before reapplying new pouch  Urostomy:  N/A - Empty  pouch when 1/3 full of urine     N/A - Urostomy:  Attach bottom connector of urostomy pouch to a nighttime drainage system and switch spigot to the open position. N/A - Other: Urostomy: Care of nighttime drainage  ________________________________________________________________________     Other:  yes - Shower or Swim Care Pat pouch dry, Use hair dryer on cool setting to dry back of pouch and border and Reapply barrier extenders or tape.   no - Odor Control with deodorizer into bottom of pouch after each emptying  no - Lubricating gel to pouch to help emptying of thick stool  Other: See additional instructions _______________________________________            Contact Ostomy Care Nurse Practitioner  leaking issues and skin irritation        Follow up in the wound care center 2 weeks          Electronically signed by MELODY Avina CNP on 8/12/2022 at 11:32 AM

## 2022-08-15 ENCOUNTER — OFFICE VISIT (OUTPATIENT)
Dept: INTERNAL MEDICINE CLINIC | Age: 62
End: 2022-08-15
Payer: COMMERCIAL

## 2022-08-15 VITALS
HEART RATE: 53 BPM | SYSTOLIC BLOOD PRESSURE: 135 MMHG | OXYGEN SATURATION: 98 % | BODY MASS INDEX: 26.91 KG/M2 | WEIGHT: 192.2 LBS | HEIGHT: 71 IN | DIASTOLIC BLOOD PRESSURE: 80 MMHG

## 2022-08-15 DIAGNOSIS — F51.02 ADJUSTMENT INSOMNIA: ICD-10-CM

## 2022-08-15 DIAGNOSIS — Z79.4 TYPE 2 DIABETES MELLITUS WITH HYPERGLYCEMIA, WITH LONG-TERM CURRENT USE OF INSULIN (HCC): ICD-10-CM

## 2022-08-15 DIAGNOSIS — E11.65 TYPE 2 DIABETES MELLITUS WITH HYPERGLYCEMIA, WITH LONG-TERM CURRENT USE OF INSULIN (HCC): ICD-10-CM

## 2022-08-15 DIAGNOSIS — C20 RECTAL CANCER (HCC): ICD-10-CM

## 2022-08-15 DIAGNOSIS — I10 PRIMARY HYPERTENSION: Primary | ICD-10-CM

## 2022-08-15 DIAGNOSIS — I10 PRIMARY HYPERTENSION: ICD-10-CM

## 2022-08-15 DIAGNOSIS — F33.2 SEVERE EPISODE OF RECURRENT MAJOR DEPRESSIVE DISORDER, WITHOUT PSYCHOTIC FEATURES (HCC): ICD-10-CM

## 2022-08-15 LAB
A/G RATIO: 1.9 (ref 1.1–2.2)
ALBUMIN SERPL-MCNC: 4.3 G/DL (ref 3.4–5)
ALP BLD-CCNC: 84 U/L (ref 40–129)
ALT SERPL-CCNC: 32 U/L (ref 10–40)
ANION GAP SERPL CALCULATED.3IONS-SCNC: 10 MMOL/L (ref 3–16)
AST SERPL-CCNC: 31 U/L (ref 15–37)
BILIRUB SERPL-MCNC: 0.5 MG/DL (ref 0–1)
BUN BLDV-MCNC: 31 MG/DL (ref 7–20)
CALCIUM SERPL-MCNC: 9 MG/DL (ref 8.3–10.6)
CHLORIDE BLD-SCNC: 106 MMOL/L (ref 99–110)
CO2: 21 MMOL/L (ref 21–32)
CREAT SERPL-MCNC: 1.2 MG/DL (ref 0.8–1.3)
CREATININE URINE: 112.7 MG/DL (ref 39–259)
GFR AFRICAN AMERICAN: >60
GFR NON-AFRICAN AMERICAN: >60
GLUCOSE BLD-MCNC: 149 MG/DL (ref 70–99)
HCT VFR BLD CALC: 35.8 % (ref 40.5–52.5)
HEMOGLOBIN: 12.5 G/DL (ref 13.5–17.5)
MCH RBC QN AUTO: 31 PG (ref 26–34)
MCHC RBC AUTO-ENTMCNC: 35 G/DL (ref 31–36)
MCV RBC AUTO: 88.6 FL (ref 80–100)
MICROALBUMIN UR-MCNC: 1.8 MG/DL
MICROALBUMIN/CREAT UR-RTO: 16 MG/G (ref 0–30)
PDW BLD-RTO: 14.6 % (ref 12.4–15.4)
PLATELET # BLD: 131 K/UL (ref 135–450)
PMV BLD AUTO: 8.5 FL (ref 5–10.5)
POTASSIUM SERPL-SCNC: 4 MMOL/L (ref 3.5–5.1)
RBC # BLD: 4.04 M/UL (ref 4.2–5.9)
SODIUM BLD-SCNC: 137 MMOL/L (ref 136–145)
TOTAL PROTEIN: 6.6 G/DL (ref 6.4–8.2)
WBC # BLD: 4.2 K/UL (ref 4–11)

## 2022-08-15 PROCEDURE — 3044F HG A1C LEVEL LT 7.0%: CPT | Performed by: INTERNAL MEDICINE

## 2022-08-15 PROCEDURE — 99214 OFFICE O/P EST MOD 30 MIN: CPT | Performed by: INTERNAL MEDICINE

## 2022-08-15 RX ORDER — CAPECITABINE 500 MG/1
1500 TABLET, FILM COATED ORAL
Status: ON HOLD | COMMUNITY
Start: 2022-07-05 | End: 2022-10-19 | Stop reason: HOSPADM

## 2022-08-15 RX ORDER — TEMAZEPAM 15 MG/1
15 CAPSULE ORAL NIGHTLY PRN
Qty: 30 CAPSULE | Refills: 3 | Status: SHIPPED | OUTPATIENT
Start: 2022-08-15 | End: 2022-09-14

## 2022-08-15 ASSESSMENT — ENCOUNTER SYMPTOMS
SORE THROAT: 0
COUGH: 0
COLOR CHANGE: 0
NAUSEA: 0
CONSTIPATION: 0
VOMITING: 0
ABDOMINAL PAIN: 0
CHEST TIGHTNESS: 0
WHEEZING: 0
SHORTNESS OF BREATH: 0
BACK PAIN: 1

## 2022-08-15 NOTE — PROGRESS NOTES
ASSESSMENT/PLAN:  1. Primary hypertension  Assessment & Plan:   Blood pressure checked twice within acceptable range, advised we will hold on making any changes to his metoprolol or lisinopril doses however if needed will adjust.  He will continue ambulatory blood pressure checks, recommended to split medication and take lisinopril in a.m. and beta-blocker in the evening, continue healthy low-salt diet and reevaluate in 3 months or sooner if needed  Orders:  -     CBC; Future  -     Comprehensive Metabolic Panel; Future  2. Type 2 diabetes mellitus with hyperglycemia, with long-term current use of insulin ([facility]  Assessment & Plan: This appears to be fairly well controlled on current medications, will adjust if needed pending lab results otherwise continue same  Orders:  -     Hemoglobin A1C; Future  -     CBC; Future  -     Microalbumin / Creatinine Urine Ratio; Future  3. Severe episode of recurrent major depressive disorder, without psychotic features ([facility]  Assessment & Plan:   Doing well on current dose of Prozac, will continue same, temazepam added for insomnia  Orders:  -     temazepam (RESTORIL) 15 MG capsule; Take 1 capsule by mouth nightly as needed for Sleep for up to 30 days. , Disp-30 capsule, R-3Normal  4. Rectal cancer [facility])  Assessment & Plan:   He is to continue care and recommendations as per oncology, he is also following up with urology and surgical oncology. Colostomy care has been stable with no acute complications  5. Adjustment insomnia  Assessment & Plan:   Restart temazepam to use at bedtime as needed since has been having hard time sleeping and current cancer diagnosis worsening and anxiety, he is to avoid stimulants at bedtime, reevaluate in 3 months or sooner if needed and he will follow-up with his oncologist  Orders:  -     temazepam (RESTORIL) 15 MG capsule; Take 1 capsule by mouth nightly as needed for Sleep for up to 30 days. , Disp-30 capsule, R-3Normal      Return in about 3 months (around 11/15/2022). SUBJECTIVE  HPI:   Patient here to follow-up on hypertension, depression and anxiety. Was started recently on Xeloda as part of his ongoing cancer therapy, states ever since this was started about 2 weeks ago has been having high blood pressure readings. He has been monitoring blood pressure daily. He had some relapse in anxiety and depression following being told that he may have to have invasive surgery that will include removing of the bladder and the prostate and will end up having a urostomy in addition to the colostomy. He is feeling much better now, he feels Prozac has been helping significantly with his depression and anxiety however he continues to have hard time falling asleep, was on temazepam by oncology at [facility] then switched to lorazepam however has not had either 1 since June and he did call for refills but did not respond back to him yet. Far as his blood sugar he believes its been doing fairly well and his readings remain around baseline      Review of Systems   Constitutional:  Negative for activity change, appetite change and fatigue. HENT:  Negative for congestion, hearing loss, mouth sores and sore throat. Respiratory:  Negative for cough, chest tightness, shortness of breath and wheezing. Cardiovascular:  Negative for chest pain, palpitations and leg swelling. Gastrointestinal:  Negative for abdominal pain, constipation, nausea and vomiting. Genitourinary:  Negative for difficulty urinating, dysuria, frequency, hematuria and urgency. Musculoskeletal:  Positive for arthralgias and back pain. Negative for gait problem and joint swelling. Skin:  Negative for color change. Allergic/Immunologic: Negative for environmental allergies and immunocompromised state. Neurological:  Negative for dizziness, light-headedness and headaches. Psychiatric/Behavioral:  Positive for dysphoric mood and sleep disturbance. Negative for behavioral problems.  The patient is nervous/anxious. OBJECTIVE:    /80   Pulse 53   Ht 5' 11\" (1.803 m)   Wt 192 lb 3.2 oz (87.2 kg)   SpO2 98%   BMI 26.81 kg/m²    Physical Exam  Vitals and nursing note reviewed. Constitutional:       General: He is not in acute distress. Appearance: Normal appearance. He is normal weight. HENT:      Head: Normocephalic. Cardiovascular:      Rate and Rhythm: Normal rate and regular rhythm. Heart sounds: Normal heart sounds. Pulmonary:      Effort: Pulmonary effort is normal. No respiratory distress. Abdominal:      Palpations: Abdomen is soft. Musculoskeletal:      Cervical back: Neck supple. Skin:     General: Skin is warm and dry. Neurological:      General: No focal deficit present. Mental Status: He is alert. Psychiatric:         Mood and Affect: Mood normal.         Behavior: Behavior normal.         Electronically signed by Princess Rivas MD on 8/15/2022 at 2:23 PM.    This dictation was generated by voice recognition computer software. Although all attempts are made to edit the dictation for accuracy, there may be errors in the transcription that are not intended.

## 2022-08-15 NOTE — ASSESSMENT & PLAN NOTE
Blood pressure checked twice within acceptable range, advised we will hold on making any changes to his metoprolol or lisinopril doses however if needed will adjust.  He will continue ambulatory blood pressure checks, recommended to split medication and take lisinopril in a.m. and beta-blocker in the evening, continue healthy low-salt diet and reevaluate in 3 months or sooner if needed

## 2022-08-15 NOTE — ASSESSMENT & PLAN NOTE
He is to continue care and recommendations as per oncology, he is also following up with urology and surgical oncology.   Colostomy care has been stable with no acute complications

## 2022-08-16 LAB
ESTIMATED AVERAGE GLUCOSE: 134.1 MG/DL
HBA1C MFR BLD: 6.3 %

## 2022-09-16 ENCOUNTER — TELEPHONE (OUTPATIENT)
Dept: INTERNAL MEDICINE CLINIC | Age: 62
End: 2022-09-16

## 2022-09-16 NOTE — TELEPHONE ENCOUNTER
Candice Kumar from [facility] program calling---pt has had low hearts below 50 for the last week---need some guidance on what to do---suppose to be faxing over a log of this for the last week---please call her at [phone]. Thanks.

## 2022-09-19 NOTE — TELEPHONE ENCOUNTER
Please verify with patient and advised can hold metoprolol for few days to see if the heart rate will improve however if blood pressure goes up then needs to be seen sooner than November for medication adjustment

## 2022-09-19 NOTE — TELEPHONE ENCOUNTER
Returned call to Kavya Grace, no answer. LVM advising. Also, called patient- no answer and LVM advising.

## 2022-10-04 NOTE — ASSESSMENT & PLAN NOTE
Armand Edge is a 34 year old male presenting a physical    Additional Concerns:fatigue and  possible low T    Fasting Status: no    Blood Work requested:no    Denies latex allergy or sensitivity.    Advance Directive: not on     Patient would like communication of their results via:  [employer]    Medications reviewed and updated.    Any refills:Pt. not taking any meds at this time.      Any questions regarding medications today?:     Social History     Tobacco Use   Smoking Status Never Smoker   Smokeless Tobacco Never Used        Health Maintenance Summary     Depression Screening (Yearly)  Overdue - never done    Influenza Vaccine (1)  Due since 9/1/2022    DTaP/Tdap/Td Vaccine (3 - Td or Tdap)  Next due on 8/29/2029    Hepatitis B Vaccine   Completed    Meningococcal Vaccine   Completed    COVID-19 Vaccine   Completed    HPV Vaccine   Aged Out    Pneumococcal Vaccine 0-64   Aged Out          Health Maintenance Due   Topic Date Due   • Depression Screening  Never done   • Influenza Vaccine (1) 09/01/2022             Above listed discussed with provider.   This appears to be fairly well controlled on current medications, will adjust if needed pending lab results otherwise continue same

## 2022-10-18 ENCOUNTER — OFFICE VISIT (OUTPATIENT)
Dept: SURGERY | Age: 62
End: 2022-10-18
Payer: COMMERCIAL

## 2022-10-18 ENCOUNTER — ANESTHESIA EVENT (OUTPATIENT)
Dept: ENDOSCOPY | Age: 62
End: 2022-10-18
Payer: COMMERCIAL

## 2022-10-18 ENCOUNTER — TELEPHONE (OUTPATIENT)
Dept: SURGERY | Age: 62
End: 2022-10-18

## 2022-10-18 VITALS
HEART RATE: 67 BPM | HEIGHT: 71 IN | SYSTOLIC BLOOD PRESSURE: 184 MMHG | TEMPERATURE: 97.7 F | DIASTOLIC BLOOD PRESSURE: 83 MMHG | BODY MASS INDEX: 28.14 KG/M2 | OXYGEN SATURATION: 100 % | WEIGHT: 201 LBS

## 2022-10-18 DIAGNOSIS — C20 RECTAL CANCER (HCC): ICD-10-CM

## 2022-10-18 DIAGNOSIS — C20 RECTAL CANCER (HCC): Primary | ICD-10-CM

## 2022-10-18 DIAGNOSIS — R15.1 FECAL SMEARING: ICD-10-CM

## 2022-10-18 DIAGNOSIS — K43.5 PARASTOMAL HERNIA WITHOUT OBSTRUCTION OR GANGRENE: ICD-10-CM

## 2022-10-18 DIAGNOSIS — C78.7 LIVER METASTASES (HCC): Primary | ICD-10-CM

## 2022-10-18 PROCEDURE — 99214 OFFICE O/P EST MOD 30 MIN: CPT | Performed by: SURGERY

## 2022-10-18 PROCEDURE — 99215 OFFICE O/P EST HI 40 MIN: CPT | Performed by: SURGERY

## 2022-10-18 RX ORDER — POLYETHYLENE GLYCOL 3350, SODIUM CHLORIDE, SODIUM BICARBONATE, POTASSIUM CHLORIDE 420; 11.2; 5.72; 1.48 G/4L; G/4L; G/4L; G/4L
POWDER, FOR SOLUTION ORAL
Qty: 1 EACH | Refills: 0 | Status: ON HOLD | OUTPATIENT
Start: 2022-10-18 | End: 2022-10-19 | Stop reason: HOSPADM

## 2022-10-18 RX ORDER — ONDANSETRON 4 MG/1
4 TABLET, ORALLY DISINTEGRATING ORAL EVERY 8 HOURS PRN
Qty: 3 TABLET | Refills: 0 | Status: SHIPPED | OUTPATIENT
Start: 2022-10-18

## 2022-10-18 NOTE — LETTER
[facility]) Surgical Oncology and General Surgery  [address] [phone]  F [phone]   Noreen Miramontes MD    Date of Surgery: Monday November 14th                                                        Time of Surgery: 07:30  Arrival Time: 05:30                                                PATIENT NAME: Mike Landrum            YOB: 1960  SEX: male     PHONE: [phone] (home)     PCP: Guero Perez MD                                            Procedure Name and CPT Code(s): Robotic / laparoscopic partial liver resection x 2 (right and left lobe) possible open: 66996    Diagnosis:     ICD-10-CM   1. Liver metastases ([facility]  C78.7           Length of Procedure: 90 mins       Anesthesia:  General     Patient Status: Per Dr. Jung Cisneros to be done by: PCP    Other needs: I will be going first at 7.30     Pt Position:  lateral, right side up    Labs Needed: Draw day of surgery:   CBC, CMP, PT/PTT, INR and Type and screen     Other orders: EKG     Medications to be stopped 5 days before surgery: Per Rin    Preop Antibiotics: per Rin    Bowel Prep: Per Stacy Clements MD  10:40 AM

## 2022-10-18 NOTE — Clinical Note
I will order restaging workup tomorrow at [facility], then we will eval for possible combined surgery in the next 4-6 wks. Thanks!  Magalys Land

## 2022-10-18 NOTE — PROGRESS NOTES
[facility] SURGICAL ONCOLOGY  [facility] [facility]  Dept: [phone]  Dept Fax: [phone]    Visit Date: 10/25/2022    HPI:   Enmanuel Chowdhury is a 58 y.o. male who is here today to discuss further management of his rectal cancer with questionable metastasis to liver. Patient is followed by Dr. Elyse Hogue and Dr. Jose Angel Villanueva also and plan was for 4500 to 5400 cGy of Radiation once FOLFOX is completed followed by definitive surgery. He completed his chemoradiation and here to discuss about surgery. Seeing today Dr. Yazmin Cottrell to discuss about rectal surgery. Overall, patient tolerating chemo very well. No jaundice or other liver issues. Much less discomfort in pelvis. He does feels like cancer is responding to chemoradiation.     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    Rectal cancer [facility])      Past Surgical History:   Procedure Laterality Date    ABDOMEN SURGERY  1975    Exploratory    COLONOSCOPY N/A 10/19/2022    COLONOSCOPY DIAGNOSTIC/STOMA performed by Rivka Gloria MD at [facility] N/A 2/28/2022    LAPAROSCOPIC COLOSTOMY CREATION, laparoscopic incarcerated hernia repair performed by Rivka Gloria MD at [place] [address] 2/17/2022    PORT A CATH INSERTION performed by Yonathan Orr MD at [address] [place] [address] N/A 2/15/2022    [facility] performed by Ya Carranza MD at [address] [facility]  2/15/2022    SIGMOIDOSCOPY POLYP SNARE performed by Ya Carranza MD at [address] [place] [address]       Current Outpatient Medications:     metFORMIN (GLUCOPHAGE) 500 MG tablet, TAKE 1 TABLET BY MOUTH TWICE DAILY WITH MEALS, Disp: 180 tablet, Rfl: 0    FLUoxetine (PROZAC) 10 MG capsule, TAKE 1 CAPSULE BY MOUTH DAILY, Disp: 90 capsule, Rfl: 1    lisinopril (PRINIVIL;ZESTRIL) 20 MG tablet, Take 1 tablet by mouth daily, Disp: 90 tablet, Rfl: 1    insulin glargine (LANTUS;BASAGLAR) 100 UNIT/ML injection pen, Take 20 u for 3 days with  Every chemo cycle then 16 units daily, Disp: 5 pen, Rfl: 3    insulin lispro (HUMALOG KWIKPEN) 200 UNIT/ML SOPN pen, Take 4 units for blood sugar reading of 150-200, 6 units for 201-250, 8units for 251-300, 10 units for 301-350, Disp: 5 pen, Rfl: 5    Insulin Pen Needle 32G X 5 MM MISC, 1 each by Does not apply route daily, Disp: 100 each, Rfl: 3    blood glucose monitor kit and supplies, Dispense sufficient amount for indicated testing frequency plus additional to accommodate PRN testing needs.  Dispense all needed supplies to include: monitor, strips, lancing device, lancets, control solutions, alcohol swabs., Disp: 1 kit, Rfl: 0    LORazepam (ATIVAN) 0.5 MG tablet, Take 1 tablet by mouth., Disp: , Rfl:     ondansetron (ZOFRAN ODT) 4 MG disintegrating tablet, Place 1 tablet under the tongue every 8 hours as needed for Nausea or Vomiting, Disp: 3 tablet, Rfl: 0  No Known Allergies  Past Surgical History:   Procedure Laterality Date    ABDOMEN SURGERY  1975    Exploratory    COLONOSCOPY N/A 10/19/2022    COLONOSCOPY DIAGNOSTIC/STOMA performed by Ramona Mcadams MD at [facility] N/A 2/28/2022    LAPAROSCOPIC COLOSTOMY CREATION, laparoscopic incarcerated hernia repair performed by Ramona Mcadams MD at [address] N/A 2/17/2022    PORT A CATH INSERTION performed by Garrison Purdy MD at [facility] N/A 2/15/2022    SIGMOIDOSCOPY BIOPSY FLEXIBLE performed by Bishop Agapito MD at [address]  2/15/2022    SIGMOIDOSCOPY POLYP SNARE performed by Bishop Agapito MD at [address]     Family History   Problem Relation Age of Onset    Heart Failure Mother     Diabetes Father     Hypertension Father        Social History:   Social History     Tobacco Use    Smoking status: Never    Smokeless tobacco: Never   Substance Use Topics    Alcohol use: Not Currently     Comment: not in last 18 months      Tobacco cessation counseling provided as appropriate. Review of Systems: See HPI, all other systems reviewed and are negative. Objective:     Physical Exam     General:  Alert, oriented x 3, cooperative, no distress, appears stated age. Skin: Skin color, texture, turgor normal.    Lymph nodes: Cervical, supraclavicular, and axillary nodes normal.   HENT:  Normocephalic, without obvious abnormality. Moist mucus membranes. No icterus. Lungs: No respiratory distress. Heart:  Regular rate and rhythm. No murmur. Abdomen:  Soft, non-tender. No masses,  No organomegaly. Ostomy in place. Extremities: Extremities normal, atraumatic, no cyanosis or edema. Neurologic: Grossly intact sensory and motor exam.   Psychiatric: Appropriate mood and thought process       CEA 5/31/22: 0.58 (10/10/22), previously 1.11 (5/16/22), High 22.4 (FEB 2022)  CBC and CMP - reviewed and no worrisome values    CT ABD/Pelvis 5/10/22:    Significant decrease in size of the previously noted rectal tumor as  described above. There is still a no fat plane between the tumor and the the  right seminal vesicle and the the prostate. The bladder does not appear to  be involved with tumor. There is a persistent enlarged presacral node  suspicious for metastasis. There is minor inguinal and left para-aortic  lymphadenopathy, most likely reactive in nature. Patient has undergone  colostomy in the left lower quadrant. There is a sclerotic lesion involving  the right pedicle of L4, unchanged from prior examination. This could  represent a metastasis or bone island and correlation with bone scan would be  of help. Assessment/Plan:   A/P: Rectal cancer with liver mets:      I reviewed with Lacey Garcia about the diagnosis and management options. Based on the available information patient appears to have locally advanced rectal cancer and liver metastases.  He tolerated chemoradiation very well. Now he needs liver and rectal surgery. Will obtain restaging work up including CT, MRI and Flex sig. I discussed with Dr. Devonte Martinez. Will proceed with combined liver and rectal surgery. I discussed with the patient about surgery in detail. Based on the available information patient appears to have two liver metastatic lesions. I explained him about robotic / laparoscopic liver resection possible open with line drawings. The extent of resection will ultimately depend on intraoperative findings. Need of intraoperative monitoring and invasive lines placement explained. Postoperative course explained in detail. Option of watching for liver lesions as they responded well discussed. Possibility of doing ablation depending on intraoperative findings explained. All the complications including liver failure, bleeding, lung infection, heart attack and blood clots were explained. Risks, benefits and alternatives of surgery explained to the patient and patient wishes to proceed with surgery. All the questions of the patient are answered to his apparent satisfaction. Patient verbalized understanding of the management plan. My office will schedule the surgery and will inform the patient about preoperative instructions. High risk nature of surgery due to receiving chemo and multi-organ surgery explained. Imaging studies and tumor marker reviewed with the patient. [facility] records reviewed. Will discuss at tumor board. Follow up with Dr. Jacob Kelly and Dr. Devonte Martinez  Will follow imaging studies.      Cruz Burciaga MD  Surgery Attending

## 2022-10-18 NOTE — TELEPHONE ENCOUNTER
Patient has been scheduled for:    Procedure: c-scope  Date: 10/19/22  Time: 8:00AM  Arrival: 6:30AM  [facility] n[facility]a  Prep? Golytely, discussed in office, gave instructions     Pre-op? Post-op Appt? Patient advised they will need a . Orders routed to surgery scheduling. Instructions have been mailed/emailed to: All info given to patient while in office.

## 2022-10-18 NOTE — PROGRESS NOTES
[facility] COLORECTAL SURGERY  [address] [place] [address],[facility]  Dept: [phone]  Dept Fax: [phone]  Loc: [phone]    Visit Date: 10/18/2022    Gudelia Merchant is a 58 y.o. male who presents today for: New Patient (Rectal Cancer )      HPI:       Gudelia Merchant is a 58 y.o. male well-known to me for history of locally advanced rectal cancer with prostate and bladder invasion, as well as possible metastatic disease to liver. I performed a diverting colostomy on him earlier this year before he started chemotherapy and chemoradiation. He has finished his chemoradiation about 1 month ago and he is here for follow-up. He recently saw Dr. Yannick García of urology as well, who ordered an MRI of the pelvis at [address] [facility]. States overall he is feeling okay. Does feel like the tumor is shrinking. Managing his colostomy well but does feel a bulge around it.     Past Medical History:   Diagnosis Date    Diabetes mellitus ([address]     Difficulty voiding     History of blood transfusion 02/2022    Hypertension     Intestinal stoma prolapse ([address] 8/12/2022    Rectal cancer [facility])      Past Surgical History:   Procedure Laterality Date    ABDOMEN SURGERY  1975    Exploratory    COLOSTOMY N/A 2/28/2022    LAPAROSCOPIC COLOSTOMY CREATION, laparoscopic incarcerated hernia repair performed by Brannon George MD at [place] [address] 2/17/2022    PORT A CATH INSERTION performed by Anamaria Garcia MD at [address] [place] [address] N/A 2/15/2022    SIGMOIDOSCOPY BIOPSY FLEXIBLE performed by Diana Woodard MD at [address] [facility]  2/15/2022    SIGMOIDOSCOPY POLYP SNARE performed by Diana Woodard MD at [address] [place] [address]       Current Outpatient Medications:     metFORMIN (GLUCOPHAGE) 500 MG tablet, TAKE 1 TABLET BY MOUTH TWICE DAILY WITH MEALS, Disp: 180 tablet, Rfl: 0    capecitabine (XELODA) 500 MG chemo tablet, Take 1,500 mg by mouth, Disp: , Rfl:     FLUoxetine (PROZAC) 10 MG capsule, TAKE 1 CAPSULE BY MOUTH DAILY, Disp: 90 capsule, Rfl: 1    lisinopril (PRINIVIL;ZESTRIL) 20 MG tablet, Take 1 tablet by mouth daily, Disp: 90 tablet, Rfl: 1    insulin glargine (LANTUS;BASAGLAR) 100 UNIT/ML injection pen, Take 20 u for 3 days with  Every chemo cycle then 16 units daily, Disp: 5 pen, Rfl: 3    insulin lispro (HUMALOG KWIKPEN) 200 UNIT/ML SOPN pen, Take 4 units for blood sugar reading of 150-200, 6 units for 201-250, 8units for 251-300, 10 units for 301-350, Disp: 5 pen, Rfl: 5    Insulin Pen Needle 32G X 5 MM MISC, 1 each by Does not apply route daily, Disp: 100 each, Rfl: 3    blood glucose monitor kit and supplies, Dispense sufficient amount for indicated testing frequency plus additional to accommodate PRN testing needs. Dispense all needed supplies to include: monitor, strips, lancing device, lancets, control solutions, alcohol swabs., Disp: 1 kit, Rfl: 0  No Known Allergies  Past Surgical History:   Procedure Laterality Date    ABDOMEN SURGERY  1975    Exploratory    COLOSTOMY N/A 2/28/2022    LAPAROSCOPIC COLOSTOMY CREATION, laparoscopic incarcerated hernia repair performed by Melchor Orellana MD at [address] [place] [address] N/A 2/17/2022    PORT A CATH INSERTION performed by Talia Olivier MD at [address] [place] [address] N/A 2/15/2022    SIGMOIDOSCOPY BIOPSY FLEXIBLE performed by Lacy Booker MD at [address] [facility]  2/15/2022    SIGMOIDOSCOPY POLYP SNARE performed by Lacy Booker MD at [address] [place] [address]     Family History   Problem Relation Age of Onset    Heart Failure Mother     Diabetes Father     Hypertension Father        Social History:   Social History     Tobacco Use    Smoking status: Never    Smokeless tobacco: Never   Substance Use Topics    Alcohol use: Not Currently     Comment: not in last 18 months      Tobacco cessation counseling provided as appropriate.     REVIEW OF SYSTEMS:    Pertinent positives and negatives are mentioned in the HPI. Otherwise, all other systems were reviewed and negative. Objective:     Physical Exam   BP (!) 184/83   Pulse 67   Temp 97.7 °F (36.5 °C) (Infrared)   Ht 5' 11\" (1.803 m)   Wt 201 lb (91.2 kg)   SpO2 100%   BMI 28.03 kg/m²   Constitutional: Appears well-developed and well-nourished. Grooming appropriate. No gross deformities. Body mass index is 28.03 kg/m². Eyes: No scleral icterus. Conjunctiva/lids normal. Vision intact grossly. Pupils equal/symmetric, reactive bilaterally. ENT: External ears/nose without defect, scars, or masses. Hearing grossly intact. No facial deformity. Lips normal, normal dentition. Neck: No masses. Trachea midline. No crepitus. Thyroid not enlarged. Cardiovascular: Normal rate. No peripheral edema. Abdominal aorta normal size to palpation. Pulmonary/Chest: Effort normal. No respiratory distress. No wheezes. No use of accessory muscles. Musculoskeletal: Normal range of motion of head/neck, without deformity, pain, or crepitus, with normal strength and tone. Normal gait. Nails without clubbing or cyanosis. Neurological: Alert and oriented to person, place, and time. No gross deficits. Sensation intact. Skin: Skin is dry. No rashes noted. No pallor. No induration of nodules. Psychiatric: Normal mood and affect. Behavior normal. Oriented to person, place, and time. Judgment and insight reasonable.     Abdominal/wound: Soft, nontender, large parastomal hernia, reducible    Labs reviewed: CEA reviewed     Imaging reviewed: CT chest and pelvis, MRI pelvis ordered and rescheduled for [facility] [employer] tomorrow    Coordination discussion of care with Dr. Jania Banuelos of urology, coordination and discussion of care with Dr. Rosette Nuñez of surgical oncology    Assessment/Plan:       A/P:  New problem(s) with uncertain prognosis: Parastomal hernia, reducible  Established problem(s): Mid rectal cancer with local invasion, possible metastatic disease  Additional workup/treatment planned: Restaging work-up tomorrow including colonoscopy, CT scans, MRI pelvis, possible surgery in the next 4 to 6 weeks  Risk of complications/morbidity: High    At this point unfortunately we are a little bit behind [facility] he finished chemoradiation for possible liver resection. This is however typically the point in time when I would restage things, so we will have him do this. I have spoken with Dr. Laila Guerin as well as Dr. Mike Shahid. Plan will be to undergo colonoscopy via stoma, as well as flexible sigmoidoscopy to evaluate the tumor, CT scan chest, abdomen, pelvis, MRI pelvis tomorrow. After we have the restaging work-up we can decide on surgical planning. At this point, we may plan for a combined surgery with all 3 surgeons, including liver resection, low anterior resection, possible cystoprostatectomy depending on imaging findings tomorrow. Discussed all this with the patient and he agrees to proceed as planned. We will see him tomorrow. Discussed the colonoscopy prep, and need for enema. Discussed the risks of the procedure. DISPOSITION:  restaging workup tomorrow, surgery in 4-6 wks    My findings will be relayed to consulting practitioner or PCP via Epic note    Note completed using dictation software, please excuse any errors.     Electronically signed by Patricia Burt MD on 10/18/2022 at 8:58 AM

## 2022-10-19 ENCOUNTER — HOSPITAL ENCOUNTER (OUTPATIENT)
Dept: MRI IMAGING | Age: 62
Discharge: HOME | End: 2022-10-19
Payer: COMMERCIAL

## 2022-10-19 ENCOUNTER — HOSPITAL ENCOUNTER (OUTPATIENT)
Dept: CT IMAGING | Age: 62
Discharge: HOME | End: 2022-10-19
Payer: COMMERCIAL

## 2022-10-19 ENCOUNTER — HOSPITAL ENCOUNTER (OUTPATIENT)
Age: 62
Setting detail: OUTPATIENT SURGERY
Discharge: HOME | End: 2022-10-19
Attending: SURGERY | Admitting: SURGERY
Payer: COMMERCIAL

## 2022-10-19 ENCOUNTER — ANESTHESIA (OUTPATIENT)
Dept: ENDOSCOPY | Age: 62
End: 2022-10-19
Payer: COMMERCIAL

## 2022-10-19 VITALS
SYSTOLIC BLOOD PRESSURE: 128 MMHG | OXYGEN SATURATION: 99 % | DIASTOLIC BLOOD PRESSURE: 73 MMHG | BODY MASS INDEX: 27.3 KG/M2 | HEART RATE: 59 BPM | HEIGHT: 71 IN | WEIGHT: 195 LBS | RESPIRATION RATE: 18 BRPM | TEMPERATURE: 97 F

## 2022-10-19 DIAGNOSIS — C19 MALIGNANT NEOPLASM OF RECTOSIGMOID JUNCTION (HCC): ICD-10-CM

## 2022-10-19 DIAGNOSIS — R33.9 RETENTION OF URINE, UNSPECIFIED: ICD-10-CM

## 2022-10-19 DIAGNOSIS — C78.7 LIVER METASTASES (HCC): ICD-10-CM

## 2022-10-19 LAB
GFR SERPL CREATININE-BSD FRML MDRD: >60 ML/MIN/{1.73_M2}
GLUCOSE BLD-MCNC: 132 MG/DL (ref 70–99)
PERFORMED ON: ABNORMAL
PERFORMED ON: NORMAL
POC CREATININE: 0.9 MG/DL (ref 0.8–1.3)
POC SAMPLE TYPE: NORMAL

## 2022-10-19 PROCEDURE — 2709999900 HC NON-CHARGEABLE SUPPLY: Performed by: SURGERY

## 2022-10-19 PROCEDURE — 7100000011 HC PHASE II RECOVERY - ADDTL 15 MIN: Performed by: SURGERY

## 2022-10-19 PROCEDURE — 71260 CT THORAX DX C+: CPT

## 2022-10-19 PROCEDURE — 6360000004 HC RX CONTRAST MEDICATION: Performed by: NURSE PRACTITIONER

## 2022-10-19 PROCEDURE — 3700000001 HC ADD 15 MINUTES (ANESTHESIA): Performed by: SURGERY

## 2022-10-19 PROCEDURE — 3700000000 HC ANESTHESIA ATTENDED CARE: Performed by: SURGERY

## 2022-10-19 PROCEDURE — 3609009600 HC COLONOSCOPY STOMA DX INCLUDING COLLJ SPEC SPX: Performed by: SURGERY

## 2022-10-19 PROCEDURE — 72197 MRI PELVIS W/O & W/DYE: CPT

## 2022-10-19 PROCEDURE — 7100000010 HC PHASE II RECOVERY - FIRST 15 MIN: Performed by: SURGERY

## 2022-10-19 PROCEDURE — A9579 GAD-BASE MR CONTRAST NOS,1ML: HCPCS | Performed by: UROLOGY

## 2022-10-19 PROCEDURE — 45330 DIAGNOSTIC SIGMOIDOSCOPY: CPT | Performed by: SURGERY

## 2022-10-19 PROCEDURE — 2580000003 HC RX 258: Performed by: ANESTHESIOLOGY

## 2022-10-19 PROCEDURE — 44388 COLONOSCOPY THRU STOMA SPX: CPT | Performed by: SURGERY

## 2022-10-19 PROCEDURE — 6360000004 HC RX CONTRAST MEDICATION: Performed by: UROLOGY

## 2022-10-19 PROCEDURE — 2500000003 HC RX 250 WO HCPCS

## 2022-10-19 PROCEDURE — 82565 ASSAY OF CREATININE: CPT

## 2022-10-19 PROCEDURE — 6360000002 HC RX W HCPCS

## 2022-10-19 RX ORDER — LORAZEPAM 0.5 MG/1
1 TABLET ORAL DAILY PRN
COMMUNITY
Start: 2022-04-28 | End: 2022-11-11

## 2022-10-19 RX ORDER — PROPOFOL 10 MG/ML
INJECTION, EMULSION INTRAVENOUS PRN
Status: DISCONTINUED | OUTPATIENT
Start: 2022-10-19 | End: 2022-10-19 | Stop reason: SDUPTHER

## 2022-10-19 RX ORDER — SODIUM CHLORIDE, SODIUM LACTATE, POTASSIUM CHLORIDE, CALCIUM CHLORIDE 600; 310; 30; 20 MG/100ML; MG/100ML; MG/100ML; MG/100ML
INJECTION, SOLUTION INTRAVENOUS CONTINUOUS
Status: DISCONTINUED | OUTPATIENT
Start: 2022-10-19 | End: 2022-10-19 | Stop reason: HOSPADM

## 2022-10-19 RX ORDER — LIDOCAINE HYDROCHLORIDE 20 MG/ML
INJECTION, SOLUTION EPIDURAL; INFILTRATION; INTRACAUDAL; PERINEURAL PRN
Status: DISCONTINUED | OUTPATIENT
Start: 2022-10-19 | End: 2022-10-19 | Stop reason: SDUPTHER

## 2022-10-19 RX ORDER — PROPOFOL 10 MG/ML
INJECTION, EMULSION INTRAVENOUS CONTINUOUS PRN
Status: DISCONTINUED | OUTPATIENT
Start: 2022-10-19 | End: 2022-10-19 | Stop reason: SDUPTHER

## 2022-10-19 RX ADMIN — PROPOFOL 30 MG: 10 INJECTION, EMULSION INTRAVENOUS at 08:00

## 2022-10-19 RX ADMIN — SODIUM CHLORIDE, POTASSIUM CHLORIDE, SODIUM LACTATE AND CALCIUM CHLORIDE: 600; 310; 30; 20 INJECTION, SOLUTION INTRAVENOUS at 07:12

## 2022-10-19 RX ADMIN — GADOTERIDOL 19 ML: 279.3 INJECTION, SOLUTION INTRAVENOUS at 15:02

## 2022-10-19 RX ADMIN — IOPAMIDOL 75 ML: 755 INJECTION, SOLUTION INTRAVENOUS at 09:44

## 2022-10-19 RX ADMIN — PROPOFOL 50 MG: 10 INJECTION, EMULSION INTRAVENOUS at 08:03

## 2022-10-19 RX ADMIN — PROPOFOL 30 MG: 10 INJECTION, EMULSION INTRAVENOUS at 08:06

## 2022-10-19 RX ADMIN — LIDOCAINE HYDROCHLORIDE 5 ML: 20 INJECTION, SOLUTION EPIDURAL; INFILTRATION; INTRACAUDAL; PERINEURAL at 08:00

## 2022-10-19 RX ADMIN — PROPOFOL 150 MCG/KG/MIN: 10 INJECTION, EMULSION INTRAVENOUS at 08:00

## 2022-10-19 ASSESSMENT — PAIN - FUNCTIONAL ASSESSMENT: PAIN_FUNCTIONAL_ASSESSMENT: 0-10

## 2022-10-19 ASSESSMENT — PAIN SCALES - GENERAL: PAINLEVEL_OUTOF10: 0

## 2022-10-19 NOTE — H&P
PRE-ENDOSCOPY H&P    Visit Date: 10/19/2022    History:     Hamlet Burgess is a 58 y.o. male who presents today for endoscopy procedure. See A/P below for indications. Patient Active Problem List   Diagnosis    Rectal cancer ([facility]    Type 2 diabetes mellitus with hyperglycemia, with long-term current use of insulin ([facility]    Primary hypertension    Iron deficiency anemia due to chronic blood loss    Incontinence of feces    Severe episode of recurrent major depressive disorder, without psychotic features ([facility]    Eczematous skin lesions    Colostomy care [facility])    Incarcerated umbilical hernia    Intestinal stoma prolapse (HCC)    Adjustment insomnia     Scheduled Meds:  Continuous Infusions:   lactated ringers 100 mL/hr at 10/19/22 0712     PRN Meds:. Prior to Admission medications    Medication Sig Start Date End Date Taking?  Authorizing Provider   LORazepam (ATIVAN) 0.5 MG tablet Take 1 tablet by mouth. 4/28/22  Yes Historical Provider, MD   polyethylene glycol-electrolytes (NULYTELY) 420 g solution TAKE AS DIRECTED DAY PRIOR TO COLONOSCOPY 10/18/22   Bard Jenny MD   ondansetron (ZOFRAN ODT) 4 MG disintegrating tablet Place 1 tablet under the tongue every 8 hours as needed for Nausea or Vomiting 10/18/22   Bard Jenny MD   metFORMIN (GLUCOPHAGE) 500 MG tablet TAKE 1 TABLET BY MOUTH TWICE DAILY WITH MEALS 9/7/22   Munir Pierre DO   capecitabine (XELODA) 500 MG chemo tablet Take 1,500 mg by mouth  Patient not taking: Reported on 10/19/2022 7/5/22   Historical Provider, MD   FLUoxetine (PROZAC) 10 MG capsule TAKE 1 CAPSULE BY MOUTH DAILY 5/18/22   Altagracia Curtis MD   lisinopril (PRINIVIL;ZESTRIL) 20 MG tablet Take 1 tablet by mouth daily 5/18/22   Altagracia Curtis MD   insulin glargine (LANTUS;BASAGLAR) 100 UNIT/ML injection pen Take 20 u for 3 days with  Every chemo cycle then 16 units daily 4/6/22   Altagracia Curtis MD   insulin lispro (HUMALOG KWIKPEN) 200 UNIT/ML SOPN pen Take 4 units for blood sugar reading of 150-200, 6 units for 201-250, 8units for 251-300, 10 units for 301-350 4/6/22   Muriel Chu MD   Insulin Pen Needle 32G X 5 MM MISC 1 each by Does not apply route daily 4/6/22   Muriel Chu MD   blood glucose monitor kit and supplies Dispense sufficient amount for indicated testing frequency plus additional to accommodate PRN testing needs. Dispense all needed supplies to include: monitor, strips, lancing device, lancets, control solutions, alcohol swabs. 2/18/22   Gail Cooper MD     No Known Allergies  Past Medical History:   Diagnosis Date    Diabetes mellitus ([facility]     Difficulty voiding     History of blood transfusion 02/2022    Hypertension     Intestinal stoma prolapse ([facility] 8/12/2022    Rectal cancer [facility])      Past Surgical History:   Procedure Laterality Date    ABDOMEN SURGERY  1975    Exploratory    COLOSTOMY N/A 2/28/2022    LAPAROSCOPIC COLOSTOMY CREATION, laparoscopic incarcerated hernia repair performed by aDrlyn Chapman MD at [place] [address] 2/17/2022    PORT A CATH INSERTION performed by Smita Quinn MD at [address] [place] [address] N/A 2/15/2022    SIGMOIDOSCOPY BIOPSY FLEXIBLE performed by Helen Galloway MD at [address] [facility]  2/15/2022    SIGMOIDOSCOPY POLYP SNARE performed by Helen Galloway MD at [address] [employer] [employer] ENDOSCOPY       Physical Exam:     BP (!) 147/90   Pulse 70   Temp 97 °F (36.1 °C) (Temporal)   Resp 16   Ht 5' 11\" (1.803 m)   Wt 195 lb (88.5 kg)   SpO2 98%   BMI 27.20 kg/m²  Body mass index is 27.2 kg/m². Constitutional: Appears well-developed and well-nourished. Grooming appropriate. Head: Normocephalic, atraumatic. Eyes: No scleral icterus. Vision intact grossly. ENT: Hearing grossly intact. No facial deformity. Cardiovascular: Normal rate on monitor. Pulmonary/Chest: Effort normal. No respiratory distress. No wheezes. No use of accessory muscles. Musculoskeletal: Normal range of motion of UE. No gross deformity. Neurological: Alert and oriented to person, place, and time. No gross deficits. Psychiatric: Normal mood and affect. Behavior normal. Oriented to person, place, and time. Abdomen: soft, NTTP, non distended    Recent labs/radiology reviewed as appropriate    Assessment/Plan:     See office notes from yesterday    Anesthesia to provide sedation. Please see their documentation regarding airway and ASA classification. Proceed as planned for endoscopy, possible polypectomy    Risks/benefits/alternatives of procedure discussed with patient and any present family members. Risks including, but not limited to: bleeding, perforation, post polypectomy syndrome, splenic injury, need for additional procedures or surgery, risks of anesthesia. Patient understands it is their responsibility to call office for pathology results if they do not hear from my office within 1-2 weeks. All questions answered.     Electronically signed by Jessica Page MD on 10/19/2022 at 7:57 AM

## 2022-10-19 NOTE — ANESTHESIA POSTPROCEDURE EVALUATION
Department of Anesthesiology  Postprocedure Note    Patient: Jonathan Daniel  MRN: [id number]  YOB: 1960  Date of evaluation: 10/19/2022      Procedure Summary     Date: 10/19/22 Room / Location: [facility]    Anesthesia Start: 8563 Anesthesia Stop: 4679    Procedure: COLONOSCOPY DIAGNOSTIC/STOMA Diagnosis:       Rectal cancer ([address]      (Rectal cancer ([address] [C20])    Surgeons: Wellington Bey MD Responsible Provider: Colby Ruiz MD    Anesthesia Type: MAC ASA Status: 3          Anesthesia Type: MAC    Reema Phase I: Reema Score: 10    Reema Phase II:        Anesthesia Post Evaluation    Patient location during evaluation: PACU  Patient participation: complete - patient participated  Level of consciousness: awake and alert  Airway patency: patent  Nausea & Vomiting: no nausea and no vomiting  Complications: no  Cardiovascular status: hemodynamically stable  Respiratory status: acceptable  Hydration status: euvolemic  Multimodal analgesia pain management approach

## 2022-10-19 NOTE — ANESTHESIA PRE PROCEDURE
Department of Anesthesiology  Preprocedure Note       Name:  Marco People   Age:  58 y.o.  :  1960                                          MRN:  [id number]         Date:  10/19/2022      Surgeon: Jr Jaffe):  Page Steele MD    Procedure: Procedure(s):  COLONOSCOPY, POSSIBLE POLYPECTOMY    Medications prior to admission:   Prior to Admission medications    Medication Sig Start Date End Date Taking? Authorizing Provider   LORazepam (ATIVAN) 0.5 MG tablet Take 1 tablet by mouth. 22  Yes Historical Provider, MD   polyethylene glycol-electrolytes (NULYTELY) 420 g solution TAKE AS DIRECTED DAY PRIOR TO COLONOSCOPY 10/18/22   Page Steele MD   ondansetron (ZOFRAN ODT) 4 MG disintegrating tablet Place 1 tablet under the tongue every 8 hours as needed for Nausea or Vomiting 10/18/22   Page Steele MD   metFORMIN (GLUCOPHAGE) 500 MG tablet TAKE 1 TABLET BY MOUTH TWICE DAILY WITH MEALS 22   Lucy Guerrier DO   capecitabine (XELODA) 500 MG chemo tablet Take 1,500 mg by mouth  Patient not taking: Reported on 10/19/2022 7/5/22   Historical Provider, MD   FLUoxetine (PROZAC) 10 MG capsule TAKE 1 CAPSULE BY MOUTH DAILY 22   Donna Delaney MD   lisinopril (PRINIVIL;ZESTRIL) 20 MG tablet Take 1 tablet by mouth daily 22   Donna Delaney MD   insulin glargine (LANTUS;BASAGLAR) 100 UNIT/ML injection pen Take 20 u for 3 days with  Every chemo cycle then 16 units daily 22   Donna Delaney MD   insulin lispro (HUMALOG KWIKPEN) 200 UNIT/ML SOPN pen Take 4 units for blood sugar reading of 150-200, 6 units for 201-250, 8units for 251-300, 10 units for 301-350 22   Donna Delaney MD   Insulin Pen Needle 32G X 5 MM MISC 1 each by Does not apply route daily 22   Donna Delaney MD   blood glucose monitor kit and supplies Dispense sufficient amount for indicated testing frequency plus additional to accommodate PRN testing needs.  Dispense all needed supplies to include: monitor, strips, lancing device, lancets, control solutions, alcohol swabs.  2/18/22   Kirstie Gao MD       Current medications:    Current Facility-Administered Medications   Medication Dose Route Frequency Provider Last Rate Last Admin    lactated ringers infusion   IntraVENous Continuous Evette Crawley,  mL/hr at 10/19/22 1818 New Bag at 10/19/22 0712       Allergies:  No Known Allergies    Problem List:    Patient Active Problem List   Diagnosis Code    Rectal cancer ([facility] C20    Type 2 diabetes mellitus with hyperglycemia, with long-term current use of insulin ([facility]) E11.65, Z79.4    Primary hypertension I10    Iron deficiency anemia due to chronic blood loss D50.0    Incontinence of feces R15.9    Severe episode of recurrent major depressive disorder, without psychotic features ([facility] F33.2    Eczematous skin lesions L98.9    Colostomy care ([facility] Z43.3    Incarcerated umbilical hernia K52.1    Intestinal stoma prolapse ([facility]) K94.19    Adjustment insomnia F51.02       Past Medical History:        Diagnosis Date    Diabetes mellitus ([facility]     Difficulty voiding     History of blood transfusion 02/2022    Hypertension     Intestinal stoma prolapse ([facility] 8/12/2022    Rectal cancer ([facility]        Past Surgical History:        Procedure Laterality Date    ABDOMEN SURGERY  1975    Exploratory    COLOSTOMY N/A 2/28/2022    LAPAROSCOPIC COLOSTOMY CREATION, laparoscopic incarcerated hernia repair performed by Nani Avila MD at [facility] N/A 2/17/2022    PORT A CATH INSERTION performed by Tashi Velasquez MD at [address] [facility] N/A 2/15/2022    SIGMOIDOSCOPY BIOPSY FLEXIBLE performed by Monica Dey MD at [address] [facility]  2/15/2022    SIGMOIDOSCOPY POLYP SNARE performed by Monica Dey MD at [address] History:    Social History     Tobacco Use    Smoking status: Never    Smokeless tobacco: Never   Substance Use Topics    Alcohol use: Not Currently     Comment: not in last 18 months                                Counseling given: Not Answered      Vital Signs (Current):   Vitals:    10/19/22 0701   BP: (!) 147/90   Pulse: 70   Resp: 16   Temp: 97 °F (36.1 °C)   TempSrc: Temporal   SpO2: 98%   Weight: 195 lb (88.5 kg)   Height: 5' 11\" (1.803 m)                                              BP Readings from Last 3 Encounters:   10/19/22 (!) 147/90   10/18/22 (!) 184/83   08/15/22 135/80       NPO Status: Time of last liquid consumption: 1700                        Time of last solid consumption: 0800                        Date of last liquid consumption: 10/18/22                        Date of last solid food consumption: 10/18/22    BMI:   Wt Readings from Last 3 Encounters:   10/19/22 195 lb (88.5 kg)   10/18/22 201 lb (91.2 kg)   08/15/22 192 lb 3.2 oz (87.2 kg)     Body mass index is 27.2 kg/m².     CBC:   Lab Results   Component Value Date/Time    WBC 4.2 08/15/2022 02:42 PM    RBC 4.04 08/15/2022 02:42 PM    HGB 12.5 08/15/2022 02:42 PM    HCT 35.8 08/15/2022 02:42 PM    MCV 88.6 08/15/2022 02:42 PM    RDW 14.6 08/15/2022 02:42 PM     08/15/2022 02:42 PM       CMP:   Lab Results   Component Value Date/Time     08/15/2022 02:42 PM    K 4.0 08/15/2022 02:42 PM    K 4.1 02/28/2022 10:35 AM     08/15/2022 02:42 PM    CO2 21 08/15/2022 02:42 PM    BUN 31 08/15/2022 02:42 PM    CREATININE 1.2 08/15/2022 02:42 PM    GFRAA >60 08/15/2022 02:42 PM    AGRATIO 1.9 08/15/2022 02:42 PM    LABGLOM >60 08/15/2022 02:42 PM    GLUCOSE 149 08/15/2022 02:42 PM    PROT 6.6 08/15/2022 02:42 PM    CALCIUM 9.0 08/15/2022 02:42 PM    BILITOT 0.5 08/15/2022 02:42 PM    ALKPHOS 84 08/15/2022 02:42 PM    AST 31 08/15/2022 02:42 PM    ALT 32 08/15/2022 02:42 PM       POC Tests:   Recent Labs     10/19/22  0658   POCGLU 132*       Coags:   Lab Results   Component Value Date/Time    PROTIME 11.8 02/15/2022 01:49 PM    INR 1.04 02/15/2022 01:49 PM HCG (If Applicable): No results found for: PREGTESTUR, PREGSERUM, HCG, HCGQUANT     ABGs: No results found for: PHART, PO2ART, JWF6OIV, TIE9EYD, BEART, I4UXHVNW     Type & Screen (If Applicable):  No results found for: LABABO, LABRH    Drug/Infectious Status (If Applicable):  No results found for: HIV, HEPCAB    COVID-19 Screening (If Applicable):   Lab Results   Component Value Date/Time    COVID19 Not Detected 02/15/2022 06:30 AM           Anesthesia Evaluation  Patient summary reviewed and Nursing notes reviewed no history of anesthetic complications:   Airway: Mallampati: II  TM distance: >3 FB   Neck ROM: full  Mouth opening: > = 3 FB   Dental: normal exam         Pulmonary:Negative Pulmonary ROS and normal exam                               Cardiovascular:    (+) hypertension:,                   Neuro/Psych:   (+) psychiatric history:            GI/Hepatic/Renal: Neg GI/Hepatic/Renal ROS            Endo/Other:    (+) DiabetesType II DM, , .                 Abdominal:             Vascular: negative vascular ROS. Other Findings:           Anesthesia Plan      MAC     ASA 3       Induction: intravenous. Anesthetic plan and risks discussed with patient. Plan discussed with CRNA.     Attending anesthesiologist reviewed and agrees with Preprocedure content                Maximo Elaine MD   10/19/2022

## 2022-10-19 NOTE — PROGRESS NOTES
Pre-op and post-op expectations explained and pt verbalizes understanding. No new questions at this time.

## 2022-10-19 NOTE — DISCHARGE INSTRUCTIONS
ANESTHESIA DISCHARGE INSTRUCTIONS    You are under the influence of drugs- do not drink alcohol, drive a car, operate machinery(such as power tools, kitchen appliances, etc), sign legal documents, or make any important decisions for 24 hours (or while on pain medications). Children should not ride bikes or [place] or play on gym sets  for 24 hours after surgery. A responsible adult should be with you for 24 hours. Rest at home today- increase activity as tolerated. Progress slowly to a regular diet unless your physician has instructed you otherwise. Drink plenty of water. CALL YOUR DOCTOR IF YOU:  Have moderate to severe nausea or vomiting AND are unable to hold down fluids or prescribed medications. Have bright red bloody drainage from your dressing that won't stop oozing. Do not get relief with your pain medication    NORMAL (POSSIBLE) SIDE EFFECTS FROM ANESTHESIA:     Confusion, temporary memory loss, delayed reaction times in the first 24 hours  Lightheadedness, dizziness, difficulty focusing, blurred vision  Nausea/vomiting can happen  Shivering, feeling cold, sore throat, cough and muscle aches should stop within 24-48 hours  Trouble urinating - call your surgeon if it has been more than 8 hrs  Bruising or soreness at the IV site - call if it remains red, firm or there is drainage             FEMALES OF CHILDBEARING AGE WHO ARE TAKING BIRTH CONTROL PILLS:  You may have received a medication during your procedure that interferes with the   actions of birth control pills (Bridion or Emend). Use some other kind of birth control in addition to your pills, like a condom, for 1 month after your procedure to prevent unwanted pregnancy. The following instructions are to be followed if you have a known history or diagnosis of sleep apnea: For all sleep apnea patients:  ? Sleep on your side or sitting up in a chair whenever possible, especially the first 24 hours after surgery. ?  Use only medicines prescribed by your doctor. ? Do not drink alcohol. ? If you have a dental device to assist you while at rest, use it at all times for the first 24 hours. For patients using CPAP machines:  ? Use your CPAP machine during all periods of sleep as usual.  ? Use your CPAP machine during all periods of daytime rest while on pain medicines. ** Follow up with your primary care doctor for continued care. IF YOU DO NOT TAKE ALL OF YOUR NARCOTIC PAIN MEDICATION, please dispose of them responsibly. There are drop off boxes in the Emergency Departments 24/7 at both [facility] and [place]. If these locations are not convenient, other options for discarding them can be found at:  [email or web address]    Hospital or office staff may NOT accept any medications to drop off in the cabinet for you. WHAT TO EXPECT AFTER YOUR COLONOSCOPY - DR. BO          The nurses will watch you in the recovery area for 1 to 2 hours until the medicines wear off. Then you can go home. You will need a ride. You can resume a normal diet after the procedure. You are legally not allowed to drive or operate machinery after the procedure, as you will have received sedation. You should avoid alcohol until the next day. Do not plan on going back to work after your procedure. If you are on any blood thinners, such as aspirin, Plavix, Coumadin, Xarelto, Eliquis, etc., be sure to ask your physician when you may resume these. This will depend on the reason for the medication usage, and if any polyps were removed during the procedure. All of your other medications you can resume normally. Your doctor will talk to you about when you will need your next colonoscopy. This will depend on the results of the colonoscopy and your medical and family history. Complications:    Colonoscopy is generally a very safe procedure with low complication risk.  Common complaints after the procedure include bloating and excessive flatulence, and occasionally nausea. This is normal.     Other complications include: Anesthesia/sedation issues  Bleeding, especially if polyps are removed (uncommon)  Most bleeding will stop on its own, but occasionally can require a another colonoscopy, blood products, or hospital admission  This risk is slightly higher in patients on blood thinners. Perforation, or a hole in the colon, which can require hospital admission or emergency surgery (very rare)    Call the office [phone] or go to your nearest emergency room if you have fever greater than 101º, severe abdominal pain that does not get better after a few hours, or rectal bleeding that does not stop after a few hours. You may also call the office with any non-emergency questions or concerns. Follow-up care is a key part of your treatment and safety:    If polyps or other abnormalities are found, tissue samples will be sent to the pathology lab to be examined. Results are usually mailed to your address on file in 1 to 2 weeks. Occasionally, we may call you with results. If you do not hear from us, please CALL US and ask for your results: [phone]. Sometimes these results will determine when your next colonoscopy is recommended. Be sure to communicate with your primary care physician about the results of your colonoscopy as well.

## 2022-10-19 NOTE — PROGRESS NOTES
Pt DC inst reviewed with the pt and his friend Pippa Chania. Pt leaving now per w/c to go to Ct scan with his IV intact for a CT per MD orders. Pt inst only a cup of coffee after the CT before his scheduled MRI at noon. Verb understanding. No complaints.

## 2022-10-21 ENCOUNTER — PREP FOR PROCEDURE (OUTPATIENT)
Dept: SURGERY | Age: 62
End: 2022-10-21

## 2022-10-21 ENCOUNTER — TELEPHONE (OUTPATIENT)
Dept: SURGERY | Age: 62
End: 2022-10-21

## 2022-10-21 RX ORDER — SODIUM CHLORIDE 0.9 % (FLUSH) 0.9 %
5-40 SYRINGE (ML) INJECTION PRN
OUTPATIENT
Start: 2022-10-21

## 2022-10-21 RX ORDER — ACETAMINOPHEN 325 MG/1
1000 TABLET ORAL ONCE
OUTPATIENT
Start: 2022-10-21 | End: 2022-10-21

## 2022-10-21 RX ORDER — SODIUM CHLORIDE 9 MG/ML
INJECTION, SOLUTION INTRAVENOUS PRN
OUTPATIENT
Start: 2022-10-21

## 2022-10-21 RX ORDER — SODIUM CHLORIDE 0.9 % (FLUSH) 0.9 %
5-40 SYRINGE (ML) INJECTION EVERY 12 HOURS SCHEDULED
OUTPATIENT
Start: 2022-10-21

## 2022-10-21 RX ORDER — ENOXAPARIN SODIUM 100 MG/ML
40 INJECTION SUBCUTANEOUS ONCE
OUTPATIENT
Start: 2022-10-21 | End: 2022-10-21

## 2022-10-25 ENCOUNTER — TELEPHONE (OUTPATIENT)
Dept: SURGERY | Age: 62
End: 2022-10-25

## 2022-10-25 NOTE — TELEPHONE ENCOUNTER
Brannon Allen  from Dr. Hodan Gardner office called. He would like to speak about potential dates for a co-case in regards to this patient. Please give him a call at your best availability. His direct line is [phone]. If he doesn't answer, he stated that a voicemail can be left. Thanks!

## 2022-10-27 ENCOUNTER — TELEPHONE (OUTPATIENT)
Dept: SURGERY | Age: 62
End: 2022-10-27

## 2022-10-27 DIAGNOSIS — C20 RECTAL CANCER (HCC): Primary | ICD-10-CM

## 2022-10-27 RX ORDER — ONDANSETRON 4 MG/1
4 TABLET, ORALLY DISINTEGRATING ORAL EVERY 8 HOURS PRN
Qty: 3 TABLET | Refills: 0 | Status: SHIPPED | OUTPATIENT
Start: 2022-10-27 | End: 2022-11-03

## 2022-10-27 RX ORDER — METRONIDAZOLE 500 MG/1
TABLET ORAL
Qty: 3 TABLET | Refills: 0 | Status: ON HOLD | OUTPATIENT
Start: 2022-10-27 | End: 2022-11-16 | Stop reason: HOSPADM

## 2022-10-27 RX ORDER — NEOMYCIN SULFATE 500 MG/1
TABLET ORAL
Qty: 6 TABLET | Refills: 0 | Status: ON HOLD | OUTPATIENT
Start: 2022-10-27 | End: 2022-11-16 | Stop reason: HOSPADM

## 2022-10-27 NOTE — TELEPHONE ENCOUNTER
Patient has been scheduled for:    Procedure: Robotic lower abdominoperineal resection, colostomy reversal, diverting ileostomy, possible abdomino perineal resection with permanent colostomy (co-case with Marge Knapp)  Date: 11/14/22  Time: 7:15AM  Arrival: 5:30AM  [facility]  [facility] n/a  Prep? #2 emailed, my-chart    Pre-op? pcp    Post-op Appt? 11/29/22 at 11:15AM     Patient advised they will be admitted. Orders routed to surgery scheduling. Instructions have been mailed/emailed to:  [email or web address]

## 2022-10-28 ENCOUNTER — TELEPHONE (OUTPATIENT)
Dept: INTERNAL MEDICINE CLINIC | Age: 62
End: 2022-10-28

## 2022-10-28 NOTE — TELEPHONE ENCOUNTER
Please have patient restart metoprolol since heart rate improved, if starts having bradycardia again then will need office visit to adjust antihypertensive medications

## 2022-10-28 NOTE — TELEPHONE ENCOUNTER
Meli KENT called to advise Pts blood pressure has been elevated for the past few days. Metoprolol has been on hold due to his heart rate. Heart rate is doing well now.  Concerned about BP.     159/77, 161/76 and today is 162/79    Meli KENT with [facility] [phone]

## 2022-10-28 NOTE — TELEPHONE ENCOUNTER
Patient advised. He also needed pre-op scheduled for upcoming surgery on the 14th of next month. Patient scheduled for pre-op on 11/3.

## 2022-11-03 ENCOUNTER — OFFICE VISIT (OUTPATIENT)
Dept: INTERNAL MEDICINE CLINIC | Age: 62
End: 2022-11-03
Payer: COMMERCIAL

## 2022-11-03 VITALS
HEART RATE: 59 BPM | SYSTOLIC BLOOD PRESSURE: 138 MMHG | WEIGHT: 200.2 LBS | OXYGEN SATURATION: 99 % | TEMPERATURE: 97.4 F | HEIGHT: 71 IN | DIASTOLIC BLOOD PRESSURE: 78 MMHG | RESPIRATION RATE: 12 BRPM | BODY MASS INDEX: 28.03 KG/M2

## 2022-11-03 DIAGNOSIS — I10 PRIMARY HYPERTENSION: ICD-10-CM

## 2022-11-03 DIAGNOSIS — Z79.4 TYPE 2 DIABETES MELLITUS WITH HYPERGLYCEMIA, WITH LONG-TERM CURRENT USE OF INSULIN (HCC): ICD-10-CM

## 2022-11-03 DIAGNOSIS — E11.65 TYPE 2 DIABETES MELLITUS WITH HYPERGLYCEMIA, WITH LONG-TERM CURRENT USE OF INSULIN (HCC): ICD-10-CM

## 2022-11-03 DIAGNOSIS — F33.2 SEVERE EPISODE OF RECURRENT MAJOR DEPRESSIVE DISORDER, WITHOUT PSYCHOTIC FEATURES (HCC): ICD-10-CM

## 2022-11-03 DIAGNOSIS — Z01.818 PREOPERATIVE GENERAL PHYSICAL EXAMINATION: Primary | ICD-10-CM

## 2022-11-03 PROCEDURE — 3044F HG A1C LEVEL LT 7.0%: CPT | Performed by: INTERNAL MEDICINE

## 2022-11-03 PROCEDURE — 93000 ELECTROCARDIOGRAM COMPLETE: CPT | Performed by: INTERNAL MEDICINE

## 2022-11-03 PROCEDURE — 99214 OFFICE O/P EST MOD 30 MIN: CPT | Performed by: INTERNAL MEDICINE

## 2022-11-03 PROCEDURE — 3078F DIAST BP <80 MM HG: CPT | Performed by: INTERNAL MEDICINE

## 2022-11-03 PROCEDURE — 3074F SYST BP LT 130 MM HG: CPT | Performed by: INTERNAL MEDICINE

## 2022-11-03 RX ORDER — METOPROLOL SUCCINATE 25 MG/1
25 TABLET, EXTENDED RELEASE ORAL DAILY
Qty: 90 TABLET | Refills: 1 | Status: ON HOLD | OUTPATIENT
Start: 2022-11-03 | End: 2022-11-14 | Stop reason: CLARIF

## 2022-11-03 RX ORDER — LISINOPRIL 20 MG/1
20 TABLET ORAL DAILY
Qty: 90 TABLET | Refills: 1 | Status: SHIPPED | OUTPATIENT
Start: 2022-11-03

## 2022-11-03 RX ORDER — CLONIDINE HYDROCHLORIDE 0.1 MG/1
0.1 TABLET ORAL 2 TIMES DAILY
Qty: 60 TABLET | Refills: 3 | Status: ON HOLD | OUTPATIENT
Start: 2022-11-03 | End: 2022-11-14 | Stop reason: CLARIF

## 2022-11-03 RX ORDER — FLUOXETINE 10 MG/1
10 CAPSULE ORAL DAILY
Qty: 90 CAPSULE | Refills: 1 | Status: SHIPPED | OUTPATIENT
Start: 2022-11-03

## 2022-11-03 RX ORDER — METOPROLOL SUCCINATE 50 MG/1
TABLET, EXTENDED RELEASE ORAL
COMMUNITY
Start: 2022-10-03 | End: 2022-11-03 | Stop reason: SDUPTHER

## 2022-11-03 ASSESSMENT — ENCOUNTER SYMPTOMS
WHEEZING: 0
ABDOMINAL PAIN: 0
COLOR CHANGE: 0
SHORTNESS OF BREATH: 0
BACK PAIN: 0
NAUSEA: 0
CONSTIPATION: 0
VOMITING: 0
COUGH: 0
SORE THROAT: 0
CHEST TIGHTNESS: 0

## 2022-11-03 NOTE — ASSESSMENT & PLAN NOTE
This has been mostly stable on current dose of fluoxetine, continue the same, he has been using temazepam to help with insomnia, continue same meds and follow-up once recovers from surgery

## 2022-11-03 NOTE — PROGRESS NOTES
recommendation to maintaining low-carb diet  Orders:  -     lisinopril (PRINIVIL;ZESTRIL) 20 MG tablet; Take 1 tablet by mouth daily, Disp-90 tablet, R-1Normal  -     metFORMIN (GLUCOPHAGE) 500 MG tablet; TAKE 1 TABLET BY MOUTH TWICE DAILY WITH MEALS, Disp-180 tablet, R-1Normal  4. Severe episode of recurrent major depressive disorder, without psychotic features [facility])  Assessment & Plan: This has been mostly stable on current dose of fluoxetine, continue the same, he has been using temazepam to help with insomnia, continue same meds and follow-up once recovers from surgery  Orders:  -     FLUoxetine (PROZAC) 10 MG capsule; Take 1 capsule by mouth daily, Disp-90 capsule, R-1Normal      Return in about 3 months (around 2/3/2023). SUBJECTIVE  HPI:   Patient scheduled to have definitive colorectal surgery for rectal cancer after completing chemo and radiation therapy. Surgery is scheduled November 14. States it will be combined surgery between colorectal surgery, urology and hepatic oncology. Review of Systems   Constitutional:  Negative for activity change, appetite change, fatigue and unexpected weight change. HENT:  Negative for congestion, hearing loss, mouth sores and sore throat. Eyes:  Negative for visual disturbance. Respiratory:  Negative for cough, chest tightness, shortness of breath and wheezing. Cardiovascular:  Negative for chest pain, palpitations and leg swelling. Gastrointestinal:  Negative for abdominal pain, constipation, nausea and vomiting. Endocrine: Negative for cold intolerance and heat intolerance. Genitourinary:  Negative for difficulty urinating, dysuria, frequency, hematuria and urgency. Musculoskeletal:  Negative for arthralgias, back pain, gait problem and joint swelling. Skin:  Negative for color change. Allergic/Immunologic: Negative for environmental allergies and immunocompromised state.    Neurological:  Negative for dizziness, light-headedness and headaches. Psychiatric/Behavioral:  Positive for sleep disturbance. Negative for behavioral problems and dysphoric mood. The patient is nervous/anxious. OBJECTIVE:    /78   Pulse 59   Temp 97.4 °F (36.3 °C) (Temporal)   Resp 12   Ht 5' 11\" (1.803 m)   Wt 200 lb 3.2 oz (90.8 kg)   SpO2 99%   BMI 27.92 kg/m²    Physical Exam  Vitals and nursing note reviewed. Constitutional:       General: He is not in acute distress. Appearance: Normal appearance. He is not toxic-appearing. HENT:      Head: Normocephalic. Nose: Nose normal.      Mouth/Throat:      Mouth: Mucous membranes are moist.      Pharynx: Oropharynx is clear. No oropharyngeal exudate. Eyes:      Conjunctiva/sclera: Conjunctivae normal.      Pupils: Pupils are equal, round, and reactive to light. Neck:      Vascular: No carotid bruit. Cardiovascular:      Rate and Rhythm: Normal rate and regular rhythm. Heart sounds: Normal heart sounds. No murmur heard. Pulmonary:      Effort: Pulmonary effort is normal. No respiratory distress. Breath sounds: Normal breath sounds. No wheezing or rales. Abdominal:      Palpations: Abdomen is soft. Tenderness: There is no abdominal tenderness. Comments: Colostomy in place   Musculoskeletal:         General: No swelling or tenderness. Normal range of motion. Cervical back: Normal range of motion and neck supple. No tenderness. Right lower leg: No edema. Left lower leg: No edema. Lymphadenopathy:      Cervical: No cervical adenopathy. Skin:     General: Skin is warm and dry. Neurological:      General: No focal deficit present. Mental Status: He is alert. Mental status is at baseline. Cranial Nerves: No cranial nerve deficit. Motor: No weakness. Gait: Gait normal.   Psychiatric:         Mood and Affect: Mood normal.         Behavior: Behavior normal.         Thought Content:  Thought content normal.         Electronically signed by Josesito Celis MD on 11/3/2022 at 12:07 PM.    This dictation was generated by voice recognition computer software. Although all attempts are made to edit the dictation for accuracy, there may be errors in the transcription that are not intended.

## 2022-11-03 NOTE — ASSESSMENT & PLAN NOTE
This has been mostly stable on current insulin and medication regimen, will continue same, reinforced recommendation to maintaining low-carb diet

## 2022-11-03 NOTE — ASSESSMENT & PLAN NOTE
Blood pressure reading was borderline high, recheck was within target goal, home readings are borderline high, advised patient we will add low-dose clonidine at bedtime to make sure blood pressure remains under control preoperatively, unable to increase metoprolol dose due to bradycardia, continue current dose of lisinopril and adjust medications again postop if needed.   Patient will call on Monday 11/7/2022 with blood pressure and heart rate readings and will further adjust medications if needed

## 2022-11-03 NOTE — ASSESSMENT & PLAN NOTE
Physical exam within normal findings, medications reviewed and discussed with patient, he will change long acting basal insulin dose timing to evening instead of a.m., he is aware to hold Humalog dose the morning of the surgery since going to be n.p.o.. Blood pressure management as stated below, just dose of beta-blocker , continue with lisinopril and add low-dose clonidine  EKG done this visit showing sinus bradycardia, consistent with home readings, will cut back metoprolol dose to 25 and will add clonidine at bedtime.   pt will complete preop labs as ordered by colorectal surgery  Prior history of complications with general anesthesia  Patient is medically cleared to proceed with scheduled surgery

## 2022-11-09 ENCOUNTER — TELEPHONE (OUTPATIENT)
Dept: INTERNAL MEDICINE CLINIC | Age: 62
End: 2022-11-09

## 2022-11-09 NOTE — TELEPHONE ENCOUNTER
Pt calling saw you recently and you put him on Clonidine---told him to start with 1 a day can take 2---right now taking 1 and his BP has been 131/62 heart rate [id number]  ---he is concerned because he is having surgery on Monday---doesn't know if he should start taking the 2nd pill a day---please call the pt. Thanks.

## 2022-11-09 NOTE — TELEPHONE ENCOUNTER
The second pill was only if blood pressure continues to be running high ,with his current readings would recommend to continue with 1 a day at bedtime, stop metoprolol since heart rate is consistently under 50, if blood pressure starts going higher than 140/85 then add the second clonidine

## 2022-11-11 ENCOUNTER — TELEPHONE (OUTPATIENT)
Dept: SURGERY | Age: 62
End: 2022-11-11

## 2022-11-11 ENCOUNTER — ANESTHESIA EVENT (OUTPATIENT)
Dept: OPERATING ROOM | Age: 62
DRG: 406 | End: 2022-11-11
Payer: COMMERCIAL

## 2022-11-11 RX ORDER — TEMAZEPAM 15 MG/1
CAPSULE ORAL
COMMUNITY
Start: 2022-10-28 | End: 2023-01-04

## 2022-11-11 NOTE — PROGRESS NOTES
Place patient label inside box (if no patient label, complete below)  Name:  :  MR#:   Latia Manchester / PROCEDURE  I (we), Boyd Meka  (Patient Name) authorize DR. Eliseo Guzman (Provider / Zhen Russ) and/or such assistants as may be selected by him/her, to perform the following operation/procedure(s): ROBOTIC / LAPAROSCOPIC PARTIAL LIVER RESECTION X 2 (RIGHT AND LEFT LOBE), POSSIBLE OPEN Note: If unable to obtain consent prior to an emergent procedure, document the emergent reason in the medical record. This procedure has been explained to my (our) satisfaction and included in the explanation was: The intended benefit, nature, and extent of the procedure to be performed; The significant risks involved and the probability of success; Alternative procedures and methods of treatment; The dangers and probable consequences of such alternatives (including no procedure or treatment); The expected consequences of the procedure on my future health; Whether other qualified individuals would be performing important surgical tasks and/or whether  would be present to advise or support the procedure. I (we) understand that there are other risks of infection and other serious complications in the pre-operative/procedural and postoperative/procedural stages of my (our) care. I (we) have asked all of the questions which I (we) thought were important in deciding whether or not to undergo treatment or diagnosis. These questions have been answered to my (our) satisfaction. I (we) understand that no assurance can be given that the procedure will be a success, and no guarantee or warranty of success has been given to me (us).     It has been explained to me (us) that during the course of the operation/procedure, unforeseen conditions may be revealed that necessitate extension of the original procedure(s) or different procedure(s) than those set forth in Paragraph 1. I (we) authorize and request that the above-named physician, his/her assistants or his/her designees, perform procedures as necessary and desirable if deemed to be in my (our) best interest.     Revised 8/2/2021                                                                          Page 1 of 2       I acknowledge that health care personnel may be observing this procedure for the purpose of medical education or other specified purposes as may be necessary as requested and/or approved by my (our) physician. I (we) consent to the disposal by the hospital Pathologist of the removed tissue, parts or organs in accordance with hospital policy. I do ____ do not ____ consent to the use of a local infiltration pain blocking agent that will be used by my provider/surgical provider to help alleviate pain during my procedure. I do ____ do not ____ consent to an emergent blood transfusion in the case of a life-threatening situation that requires blood components to be administered. This consent is valid for 24 hours from the beginning of the procedure. This patient does ____ or does not ____ currently have a DNR status/order. If DNR order is in place, obtain Addendum to the Surgical Consent for ALL Patients with a DNR Order to address ed-operative status for limited intervention or DNR suspension.      I have read and fully understand the above Consent for Operation/Procedure and that all blanks were completed before I signed the consent.   _____________________________       _____________________      ____/____am/pm  Signature of Patient or legal representative      Printed Name / Relationship            Date / Time   ____________________________       _____________________      ____/____am/pm  Witness to Signature                                    Printed Name                    Date / Time    If patient is unable to sign or is a minor, complete the following)  Patient is a minor, ____ years of age, or unable to sign because:   ______________________________________________________________________________________________    If a phone consent is obtained, consent will be documented by using two health care professionals, each affirming that the consenting party has no questions and gives consent for the procedure discussed with the physician/provider.   _____________________          ____________________       _____/_____am/pm   2nd witness to phone consent        Printed name           Date / Time    Informed Consent:  I have provided the explanation described above in section 1 to the patient and/or legal representative.  I have provided the patient and/or legal representative with an opportunity to ask any questions about the proposed operation/procedure.   ___________________________          ____________________         ____/____am/pm  Provider / Proceduralist                            Printed name            Date / Time  Revised 8/2/2021                                                                      Page 2 of 2

## 2022-11-11 NOTE — PROGRESS NOTES
Place patient label inside box (if no patient label, complete below)  Name:  :  MR#:   Shun Rmo / PROCEDURE  I (we), Lyndsay Temlpe  (Patient Name) authorize DR. Karthik Mendoza  (Provider / Terrell Dumont) and/or such assistants as may be selected by him/her, to perform the following operation/procedure(s): ROBOTIC LOWER ABDOMINOPERINEAL RESECTION, COLOSTOMY REVERSAL, DIVERTING ILEOSTOMY, POSSIBLE, ABDOMINO PERINEAL RESECTION WITH PERMANENT COLOSTOMY       Note: If unable to obtain consent prior to an emergent procedure, document the emergent reason in the medical record. This procedure has been explained to my (our) satisfaction and included in the explanation was: The intended benefit, nature, and extent of the procedure to be performed; The significant risks involved and the probability of success; Alternative procedures and methods of treatment; The dangers and probable consequences of such alternatives (including no procedure or treatment); The expected consequences of the procedure on my future health; Whether other qualified individuals would be performing important surgical tasks and/or whether  would be present to advise or support the procedure. I (we) understand that there are other risks of infection and other serious complications in the pre-operative/procedural and postoperative/procedural stages of my (our) care. I (we) have asked all of the questions which I (we) thought were important in deciding whether or not to undergo treatment or diagnosis. These questions have been answered to my (our) satisfaction. I (we) understand that no assurance can be given that the procedure will be a success, and no guarantee or warranty of success has been given to me (us).     It has been explained to me (us) that during the course of the operation/procedure, unforeseen conditions may be revealed that necessitate extension of the original procedure(s) or different procedure(s) than those set forth in Paragraph 1. I (we) authorize and request that the above-named physician, his/her assistants or his/her designees, perform procedures as necessary and desirable if deemed to be in my (our) best interest.     Revised 8/2/2021                                                                          Page 1 of 2       I acknowledge that health care personnel may be observing this procedure for the purpose of medical education or other specified purposes as may be necessary as requested and/or approved by my (our) physician. I (we) consent to the disposal by the hospital Pathologist of the removed tissue, parts or organs in accordance with hospital policy. I do ____ do not ____ consent to the use of a local infiltration pain blocking agent that will be used by my provider/surgical provider to help alleviate pain during my procedure. I do ____ do not ____ consent to an emergent blood transfusion in the case of a life-threatening situation that requires blood components to be administered. This consent is valid for 24 hours from the beginning of the procedure. This patient does ____ or does not ____ currently have a DNR status/order. If DNR order is in place, obtain Addendum to the Surgical Consent for ALL Patients with a DNR Order to address ed-operative status for limited intervention or DNR suspension.      I have read and fully understand the above Consent for Operation/Procedure and that all blanks were completed before I signed the consent.   _____________________________       _____________________      ____/____am/pm  Signature of Patient or legal representative      Printed Name / Relationship            Date / Time   ____________________________       _____________________      ____/____am/pm  Witness to Signature                                    Printed Name                    Date / Time    If patient is unable to sign or is a minor, complete the following)  Patient is a minor, ____ years of age, or unable to sign because:   ______________________________________________________________________________________________    If a phone consent is obtained, consent will be documented by using two health care professionals, each affirming that the consenting party has no questions and gives consent for the procedure discussed with the physician/provider.   _____________________          ____________________       _____/_____am/pm   2nd witness to phone consent        Printed name           Date / Time    Informed Consent:  I have provided the explanation described above in section 1 to the patient and/or legal representative.  I have provided the patient and/or legal representative with an opportunity to ask any questions about the proposed operation/procedure.   ___________________________          ____________________         ____/____am/pm  Provider / Proceduralist                            Printed name            Date / Time  Revised 8/2/2021                                                                      Page 2 of 2

## 2022-11-11 NOTE — TELEPHONE ENCOUNTER
I have placed a reminder call to patient for upcoming procedure. Did you speak directly to patient or leave a voicemail? Spoke to patient     Prep? NPO 12AM  Prep #2    Patient will be admitted.     Arrive at the main entrance [facility] at 5:30AM

## 2022-11-14 ENCOUNTER — ANESTHESIA (OUTPATIENT)
Dept: OPERATING ROOM | Age: 62
DRG: 406 | End: 2022-11-14
Payer: COMMERCIAL

## 2022-11-14 ENCOUNTER — HOSPITAL ENCOUNTER (INPATIENT)
Age: 62
LOS: 2 days | Discharge: HOME | DRG: 406 | End: 2022-11-16
Attending: SURGERY | Admitting: SURGERY
Payer: COMMERCIAL

## 2022-11-14 DIAGNOSIS — C78.7 LIVER METASTASES (HCC): ICD-10-CM

## 2022-11-14 DIAGNOSIS — C20 RECTAL CANCER (HCC): Primary | ICD-10-CM

## 2022-11-14 LAB
ABO/RH: NORMAL
ALBUMIN SERPL-MCNC: 4.7 G/DL (ref 3.4–5)
ALP BLD-CCNC: 66 U/L (ref 40–129)
ALT SERPL-CCNC: 24 U/L (ref 10–40)
ANTIBODY SCREEN: NORMAL
APTT: 30.6 SEC (ref 23–34.3)
AST SERPL-CCNC: 53 U/L (ref 15–37)
BILIRUB SERPL-MCNC: 0.7 MG/DL (ref 0–1)
BILIRUBIN DIRECT: <0.2 MG/DL (ref 0–0.3)
BILIRUBIN, INDIRECT: ABNORMAL MG/DL (ref 0–1)
GLUCOSE BLD-MCNC: 140 MG/DL (ref 70–99)
GLUCOSE BLD-MCNC: 169 MG/DL (ref 70–99)
GLUCOSE BLD-MCNC: 182 MG/DL (ref 70–99)
HCT VFR BLD CALC: 38.8 % (ref 40.5–52.5)
HEMOGLOBIN: 13.8 G/DL (ref 13.5–17.5)
INR BLD: 1.16 (ref 0.87–1.14)
MCH RBC QN AUTO: 32.2 PG (ref 26–34)
MCHC RBC AUTO-ENTMCNC: 35.5 G/DL (ref 31–36)
MCV RBC AUTO: 90.7 FL (ref 80–100)
PDW BLD-RTO: 14.7 % (ref 12.4–15.4)
PERFORMED ON: ABNORMAL
PLATELET # BLD: 148 K/UL (ref 135–450)
PMV BLD AUTO: 8.6 FL (ref 5–10.5)
PROTHROMBIN TIME: 14.8 SEC (ref 11.7–14.5)
RBC # BLD: 4.28 M/UL (ref 4.2–5.9)
TOTAL PROTEIN: 7.9 G/DL (ref 6.4–8.2)
WBC # BLD: 5.8 K/UL (ref 4–11)

## 2022-11-14 PROCEDURE — 8E0W4CZ ROBOTIC ASSISTED PROCEDURE OF TRUNK REGION, PERCUTANEOUS ENDOSCOPIC APPROACH: ICD-10-PCS | Performed by: SURGERY

## 2022-11-14 PROCEDURE — 2500000003 HC RX 250 WO HCPCS: Performed by: ANESTHESIOLOGY

## 2022-11-14 PROCEDURE — C9290 INJ, BUPIVACAINE LIPOSOME: HCPCS | Performed by: ANESTHESIOLOGY

## 2022-11-14 PROCEDURE — 86901 BLOOD TYPING SEROLOGIC RH(D): CPT

## 2022-11-14 PROCEDURE — 2580000003 HC RX 258: Performed by: ANESTHESIOLOGY

## 2022-11-14 PROCEDURE — C1729 CATH, DRAINAGE: HCPCS | Performed by: SURGERY

## 2022-11-14 PROCEDURE — 6360000002 HC RX W HCPCS

## 2022-11-14 PROCEDURE — 2709999900 HC NON-CHARGEABLE SUPPLY: Performed by: SURGERY

## 2022-11-14 PROCEDURE — 3700000000 HC ANESTHESIA ATTENDED CARE: Performed by: SURGERY

## 2022-11-14 PROCEDURE — 88304 TISSUE EXAM BY PATHOLOGIST: CPT

## 2022-11-14 PROCEDURE — 2720000010 HC SURG SUPPLY STERILE: Performed by: SURGERY

## 2022-11-14 PROCEDURE — 3700000001 HC ADD 15 MINUTES (ANESTHESIA): Performed by: SURGERY

## 2022-11-14 PROCEDURE — 2580000003 HC RX 258: Performed by: SURGERY

## 2022-11-14 PROCEDURE — 88307 TISSUE EXAM BY PATHOLOGIST: CPT

## 2022-11-14 PROCEDURE — 0WQF4ZZ REPAIR ABDOMINAL WALL, PERCUTANEOUS ENDOSCOPIC APPROACH: ICD-10-PCS | Performed by: SURGERY

## 2022-11-14 PROCEDURE — 1200000000 HC SEMI PRIVATE

## 2022-11-14 PROCEDURE — 7100000000 HC PACU RECOVERY - FIRST 15 MIN: Performed by: SURGERY

## 2022-11-14 PROCEDURE — 3600000009 HC SURGERY ROBOT BASE: Performed by: SURGERY

## 2022-11-14 PROCEDURE — 0DBN4ZZ EXCISION OF SIGMOID COLON, PERCUTANEOUS ENDOSCOPIC APPROACH: ICD-10-PCS | Performed by: SURGERY

## 2022-11-14 PROCEDURE — 86850 RBC ANTIBODY SCREEN: CPT

## 2022-11-14 PROCEDURE — 3E0T3BZ INTRODUCTION OF ANESTHETIC AGENT INTO PERIPHERAL NERVES AND PLEXI, PERCUTANEOUS APPROACH: ICD-10-PCS | Performed by: SURGERY

## 2022-11-14 PROCEDURE — 80076 HEPATIC FUNCTION PANEL: CPT

## 2022-11-14 PROCEDURE — 88309 TISSUE EXAM BY PATHOLOGIST: CPT

## 2022-11-14 PROCEDURE — 6360000002 HC RX W HCPCS: Performed by: SURGERY

## 2022-11-14 PROCEDURE — 6360000002 HC RX W HCPCS: Performed by: ANESTHESIOLOGY

## 2022-11-14 PROCEDURE — 2500000003 HC RX 250 WO HCPCS: Performed by: SURGERY

## 2022-11-14 PROCEDURE — 44206 LAP PART COLECTOMY W/STOMA: CPT | Performed by: SURGERY

## 2022-11-14 PROCEDURE — 0DBP4ZZ EXCISION OF RECTUM, PERCUTANEOUS ENDOSCOPIC APPROACH: ICD-10-PCS | Performed by: SURGERY

## 2022-11-14 PROCEDURE — 44346 REVISION OF COLOSTOMY: CPT | Performed by: SURGERY

## 2022-11-14 PROCEDURE — 3600000019 HC SURGERY ROBOT ADDTL 15MIN: Performed by: SURGERY

## 2022-11-14 PROCEDURE — S2900 ROBOTIC SURGICAL SYSTEM: HCPCS | Performed by: SURGERY

## 2022-11-14 PROCEDURE — 0FB04ZZ EXCISION OF LIVER, PERCUTANEOUS ENDOSCOPIC APPROACH: ICD-10-PCS | Performed by: SURGERY

## 2022-11-14 PROCEDURE — 6370000000 HC RX 637 (ALT 250 FOR IP): Performed by: SURGERY

## 2022-11-14 PROCEDURE — 64488 TAP BLOCK BI INJECTION: CPT | Performed by: ANESTHESIOLOGY

## 2022-11-14 PROCEDURE — 2500000003 HC RX 250 WO HCPCS

## 2022-11-14 PROCEDURE — 6370000000 HC RX 637 (ALT 250 FOR IP): Performed by: ANESTHESIOLOGY

## 2022-11-14 PROCEDURE — 85610 PROTHROMBIN TIME: CPT

## 2022-11-14 PROCEDURE — 85027 COMPLETE CBC AUTOMATED: CPT

## 2022-11-14 PROCEDURE — 0D1N4Z4 BYPASS SIGMOID COLON TO CUTANEOUS, PERCUTANEOUS ENDOSCOPIC APPROACH: ICD-10-PCS | Performed by: SURGERY

## 2022-11-14 PROCEDURE — 2580000003 HC RX 258

## 2022-11-14 PROCEDURE — 85730 THROMBOPLASTIN TIME PARTIAL: CPT

## 2022-11-14 PROCEDURE — 7100000001 HC PACU RECOVERY - ADDTL 15 MIN: Performed by: SURGERY

## 2022-11-14 PROCEDURE — 86900 BLOOD TYPING SEROLOGIC ABO: CPT

## 2022-11-14 RX ORDER — SODIUM CHLORIDE 9 MG/ML
INJECTION, SOLUTION INTRAVENOUS PRN
Status: DISCONTINUED | OUTPATIENT
Start: 2022-11-14 | End: 2022-11-14 | Stop reason: HOSPADM

## 2022-11-14 RX ORDER — HYDRALAZINE HYDROCHLORIDE 20 MG/ML
10 INJECTION INTRAMUSCULAR; INTRAVENOUS
Status: DISCONTINUED | OUTPATIENT
Start: 2022-11-14 | End: 2022-11-14 | Stop reason: HOSPADM

## 2022-11-14 RX ORDER — DIPHENHYDRAMINE HYDROCHLORIDE 50 MG/ML
12.5 INJECTION INTRAMUSCULAR; INTRAVENOUS
Status: DISCONTINUED | OUTPATIENT
Start: 2022-11-14 | End: 2022-11-14 | Stop reason: HOSPADM

## 2022-11-14 RX ORDER — BUPIVACAINE HYDROCHLORIDE 2.5 MG/ML
INJECTION, SOLUTION EPIDURAL; INFILTRATION; INTRACAUDAL
Status: COMPLETED | OUTPATIENT
Start: 2022-11-14 | End: 2022-11-14

## 2022-11-14 RX ORDER — METOCLOPRAMIDE HYDROCHLORIDE 5 MG/ML
10 INJECTION INTRAMUSCULAR; INTRAVENOUS
Status: COMPLETED | OUTPATIENT
Start: 2022-11-14 | End: 2022-11-14

## 2022-11-14 RX ORDER — OXYCODONE HYDROCHLORIDE 5 MG/1
5 TABLET ORAL PRN
Status: DISCONTINUED | OUTPATIENT
Start: 2022-11-14 | End: 2022-11-14 | Stop reason: HOSPADM

## 2022-11-14 RX ORDER — SODIUM CHLORIDE 0.9 % (FLUSH) 0.9 %
5-40 SYRINGE (ML) INJECTION EVERY 12 HOURS SCHEDULED
Status: DISCONTINUED | OUTPATIENT
Start: 2022-11-14 | End: 2022-11-14 | Stop reason: HOSPADM

## 2022-11-14 RX ORDER — ROCURONIUM BROMIDE 10 MG/ML
INJECTION, SOLUTION INTRAVENOUS PRN
Status: DISCONTINUED | OUTPATIENT
Start: 2022-11-14 | End: 2022-11-14 | Stop reason: SDUPTHER

## 2022-11-14 RX ORDER — ONDANSETRON 4 MG/1
4 TABLET, ORALLY DISINTEGRATING ORAL EVERY 8 HOURS PRN
Status: DISCONTINUED | OUTPATIENT
Start: 2022-11-14 | End: 2022-11-16 | Stop reason: HOSPADM

## 2022-11-14 RX ORDER — SODIUM CHLORIDE, SODIUM LACTATE, POTASSIUM CHLORIDE, CALCIUM CHLORIDE 600; 310; 30; 20 MG/100ML; MG/100ML; MG/100ML; MG/100ML
INJECTION, SOLUTION INTRAVENOUS CONTINUOUS
Status: DISCONTINUED | OUTPATIENT
Start: 2022-11-14 | End: 2022-11-15

## 2022-11-14 RX ORDER — ACETAMINOPHEN 325 MG/1
650 TABLET ORAL EVERY 6 HOURS
Status: DISCONTINUED | OUTPATIENT
Start: 2022-11-14 | End: 2022-11-16 | Stop reason: HOSPADM

## 2022-11-14 RX ORDER — INSULIN GLARGINE 100 [IU]/ML
16 INJECTION, SOLUTION SUBCUTANEOUS NIGHTLY
COMMUNITY

## 2022-11-14 RX ORDER — LEVOFLOXACIN 5 MG/ML
500 INJECTION, SOLUTION INTRAVENOUS
Status: COMPLETED | OUTPATIENT
Start: 2022-11-14 | End: 2022-11-14

## 2022-11-14 RX ORDER — SODIUM CHLORIDE 0.9 % (FLUSH) 0.9 %
5-40 SYRINGE (ML) INJECTION PRN
Status: DISCONTINUED | OUTPATIENT
Start: 2022-11-14 | End: 2022-11-16 | Stop reason: HOSPADM

## 2022-11-14 RX ORDER — METRONIDAZOLE 500 MG/100ML
500 INJECTION, SOLUTION INTRAVENOUS
Status: COMPLETED | OUTPATIENT
Start: 2022-11-14 | End: 2022-11-14

## 2022-11-14 RX ORDER — HYDRALAZINE HYDROCHLORIDE 20 MG/ML
5 INJECTION INTRAMUSCULAR; INTRAVENOUS EVERY 6 HOURS PRN
Status: DISCONTINUED | OUTPATIENT
Start: 2022-11-14 | End: 2022-11-16 | Stop reason: HOSPADM

## 2022-11-14 RX ORDER — SODIUM CHLORIDE 9 MG/ML
25 INJECTION, SOLUTION INTRAVENOUS PRN
Status: DISCONTINUED | OUTPATIENT
Start: 2022-11-14 | End: 2022-11-14 | Stop reason: HOSPADM

## 2022-11-14 RX ORDER — OXYCODONE HYDROCHLORIDE 5 MG/1
10 TABLET ORAL PRN
Status: DISCONTINUED | OUTPATIENT
Start: 2022-11-14 | End: 2022-11-14 | Stop reason: HOSPADM

## 2022-11-14 RX ORDER — PHENYLEPHRINE HYDROCHLORIDE 10 MG/ML
INJECTION INTRAVENOUS PRN
Status: DISCONTINUED | OUTPATIENT
Start: 2022-11-14 | End: 2022-11-14 | Stop reason: SDUPTHER

## 2022-11-14 RX ORDER — LABETALOL HYDROCHLORIDE 5 MG/ML
10 INJECTION, SOLUTION INTRAVENOUS
Status: DISCONTINUED | OUTPATIENT
Start: 2022-11-14 | End: 2022-11-14 | Stop reason: HOSPADM

## 2022-11-14 RX ORDER — TEMAZEPAM 15 MG/1
15 CAPSULE ORAL NIGHTLY PRN
Status: DISCONTINUED | OUTPATIENT
Start: 2022-11-15 | End: 2022-11-16 | Stop reason: HOSPADM

## 2022-11-14 RX ORDER — METOPROLOL SUCCINATE 25 MG/1
25 TABLET, EXTENDED RELEASE ORAL DAILY
Status: DISCONTINUED | OUTPATIENT
Start: 2022-11-15 | End: 2022-11-16 | Stop reason: HOSPADM

## 2022-11-14 RX ORDER — SODIUM CHLORIDE 9 MG/ML
INJECTION, SOLUTION INTRAVENOUS PRN
Status: DISCONTINUED | OUTPATIENT
Start: 2022-11-14 | End: 2022-11-16 | Stop reason: HOSPADM

## 2022-11-14 RX ORDER — SODIUM CHLORIDE, SODIUM LACTATE, POTASSIUM CHLORIDE, CALCIUM CHLORIDE 600; 310; 30; 20 MG/100ML; MG/100ML; MG/100ML; MG/100ML
INJECTION, SOLUTION INTRAVENOUS CONTINUOUS
Status: DISCONTINUED | OUTPATIENT
Start: 2022-11-14 | End: 2022-11-14 | Stop reason: HOSPADM

## 2022-11-14 RX ORDER — PROPOFOL 10 MG/ML
INJECTION, EMULSION INTRAVENOUS PRN
Status: DISCONTINUED | OUTPATIENT
Start: 2022-11-14 | End: 2022-11-14 | Stop reason: SDUPTHER

## 2022-11-14 RX ORDER — MEPERIDINE HYDROCHLORIDE 25 MG/ML
12.5 INJECTION INTRAMUSCULAR; INTRAVENOUS; SUBCUTANEOUS EVERY 5 MIN PRN
Status: DISCONTINUED | OUTPATIENT
Start: 2022-11-14 | End: 2022-11-14 | Stop reason: HOSPADM

## 2022-11-14 RX ORDER — METOPROLOL SUCCINATE 50 MG/1
25 TABLET, EXTENDED RELEASE ORAL ONCE
Status: COMPLETED | OUTPATIENT
Start: 2022-11-14 | End: 2022-11-14

## 2022-11-14 RX ORDER — ENOXAPARIN SODIUM 100 MG/ML
40 INJECTION SUBCUTANEOUS ONCE
Status: COMPLETED | OUTPATIENT
Start: 2022-11-14 | End: 2022-11-14

## 2022-11-14 RX ORDER — SODIUM CHLORIDE 0.9 % (FLUSH) 0.9 %
5-40 SYRINGE (ML) INJECTION PRN
Status: DISCONTINUED | OUTPATIENT
Start: 2022-11-14 | End: 2022-11-14 | Stop reason: HOSPADM

## 2022-11-14 RX ORDER — ENOXAPARIN SODIUM 100 MG/ML
40 INJECTION SUBCUTANEOUS DAILY
Status: DISCONTINUED | OUTPATIENT
Start: 2022-11-15 | End: 2022-11-16 | Stop reason: HOSPADM

## 2022-11-14 RX ORDER — KETAMINE HCL IN NACL, ISO-OSM 20 MG/2 ML
SYRINGE (ML) INJECTION PRN
Status: DISCONTINUED | OUTPATIENT
Start: 2022-11-14 | End: 2022-11-14 | Stop reason: SDUPTHER

## 2022-11-14 RX ORDER — SODIUM CHLORIDE, SODIUM LACTATE, POTASSIUM CHLORIDE, CALCIUM CHLORIDE 600; 310; 30; 20 MG/100ML; MG/100ML; MG/100ML; MG/100ML
INJECTION, SOLUTION INTRAVENOUS CONTINUOUS PRN
Status: DISCONTINUED | OUTPATIENT
Start: 2022-11-14 | End: 2022-11-14 | Stop reason: SDUPTHER

## 2022-11-14 RX ORDER — MIDAZOLAM HYDROCHLORIDE 1 MG/ML
INJECTION INTRAMUSCULAR; INTRAVENOUS PRN
Status: DISCONTINUED | OUTPATIENT
Start: 2022-11-14 | End: 2022-11-14 | Stop reason: SDUPTHER

## 2022-11-14 RX ORDER — OXYCODONE HYDROCHLORIDE 5 MG/1
5 TABLET ORAL EVERY 4 HOURS PRN
Status: DISCONTINUED | OUTPATIENT
Start: 2022-11-14 | End: 2022-11-16 | Stop reason: HOSPADM

## 2022-11-14 RX ORDER — FENTANYL CITRATE 50 UG/ML
INJECTION, SOLUTION INTRAMUSCULAR; INTRAVENOUS PRN
Status: DISCONTINUED | OUTPATIENT
Start: 2022-11-14 | End: 2022-11-14 | Stop reason: SDUPTHER

## 2022-11-14 RX ORDER — SUCCINYLCHOLINE/SOD CL,ISO/PF 200MG/10ML
SYRINGE (ML) INTRAVENOUS PRN
Status: DISCONTINUED | OUTPATIENT
Start: 2022-11-14 | End: 2022-11-14 | Stop reason: SDUPTHER

## 2022-11-14 RX ORDER — ONDANSETRON 2 MG/ML
INJECTION INTRAMUSCULAR; INTRAVENOUS PRN
Status: DISCONTINUED | OUTPATIENT
Start: 2022-11-14 | End: 2022-11-14 | Stop reason: SDUPTHER

## 2022-11-14 RX ORDER — FLUOXETINE 10 MG/1
10 CAPSULE ORAL DAILY
Status: DISCONTINUED | OUTPATIENT
Start: 2022-11-15 | End: 2022-11-16 | Stop reason: HOSPADM

## 2022-11-14 RX ORDER — ONDANSETRON 2 MG/ML
4 INJECTION INTRAMUSCULAR; INTRAVENOUS EVERY 6 HOURS PRN
Status: DISCONTINUED | OUTPATIENT
Start: 2022-11-14 | End: 2022-11-16 | Stop reason: HOSPADM

## 2022-11-14 RX ORDER — DEXTROSE MONOHYDRATE 100 MG/ML
INJECTION, SOLUTION INTRAVENOUS CONTINUOUS PRN
Status: DISCONTINUED | OUTPATIENT
Start: 2022-11-14 | End: 2022-11-16 | Stop reason: HOSPADM

## 2022-11-14 RX ORDER — MAGNESIUM HYDROXIDE 1200 MG/15ML
LIQUID ORAL PRN
Status: DISCONTINUED | OUTPATIENT
Start: 2022-11-14 | End: 2022-11-14 | Stop reason: HOSPADM

## 2022-11-14 RX ORDER — OXYCODONE HYDROCHLORIDE 5 MG/1
10 TABLET ORAL EVERY 4 HOURS PRN
Status: DISCONTINUED | OUTPATIENT
Start: 2022-11-14 | End: 2022-11-16 | Stop reason: HOSPADM

## 2022-11-14 RX ORDER — EPHEDRINE SULFATE 50 MG/ML
INJECTION INTRAVENOUS PRN
Status: DISCONTINUED | OUTPATIENT
Start: 2022-11-14 | End: 2022-11-14 | Stop reason: SDUPTHER

## 2022-11-14 RX ORDER — DEXAMETHASONE SODIUM PHOSPHATE 4 MG/ML
INJECTION, SOLUTION INTRA-ARTICULAR; INTRALESIONAL; INTRAMUSCULAR; INTRAVENOUS; SOFT TISSUE PRN
Status: DISCONTINUED | OUTPATIENT
Start: 2022-11-14 | End: 2022-11-14 | Stop reason: SDUPTHER

## 2022-11-14 RX ORDER — CLONIDINE HYDROCHLORIDE 0.1 MG/1
0.1 TABLET ORAL NIGHTLY
Status: DISCONTINUED | OUTPATIENT
Start: 2022-11-14 | End: 2022-11-16 | Stop reason: HOSPADM

## 2022-11-14 RX ORDER — DEXTROSE MONOHYDRATE 25 G/50ML
12.5 INJECTION, SOLUTION INTRAVENOUS PRN
Status: DISCONTINUED | OUTPATIENT
Start: 2022-11-14 | End: 2022-11-14 | Stop reason: SDUPTHER

## 2022-11-14 RX ORDER — ACETAMINOPHEN 500 MG
1000 TABLET ORAL ONCE
Status: COMPLETED | OUTPATIENT
Start: 2022-11-14 | End: 2022-11-14

## 2022-11-14 RX ORDER — FIBRINOGEN HUMAN AND THROMBIN HUMAN 1 ML
KIT TOPICAL PRN
Status: DISCONTINUED | OUTPATIENT
Start: 2022-11-14 | End: 2022-11-14 | Stop reason: HOSPADM

## 2022-11-14 RX ORDER — SODIUM CHLORIDE 0.9 % (FLUSH) 0.9 %
5-40 SYRINGE (ML) INJECTION EVERY 12 HOURS SCHEDULED
Status: DISCONTINUED | OUTPATIENT
Start: 2022-11-14 | End: 2022-11-16 | Stop reason: HOSPADM

## 2022-11-14 RX ORDER — METOPROLOL SUCCINATE 50 MG/1
50 TABLET, EXTENDED RELEASE ORAL NIGHTLY
COMMUNITY

## 2022-11-14 RX ORDER — CLONIDINE HYDROCHLORIDE 0.1 MG/1
0.1 TABLET ORAL
COMMUNITY
End: 2023-02-03 | Stop reason: SDUPTHER

## 2022-11-14 RX ADMIN — PHENYLEPHRINE HYDROCHLORIDE 100 MCG: 10 INJECTION INTRAVENOUS at 08:03

## 2022-11-14 RX ADMIN — ROCURONIUM BROMIDE 10 MG: 10 INJECTION, SOLUTION INTRAVENOUS at 13:19

## 2022-11-14 RX ADMIN — ACETAMINOPHEN 1000 MG: 500 TABLET ORAL at 07:19

## 2022-11-14 RX ADMIN — HYDROMORPHONE HYDROCHLORIDE 0.5 MG: 1 INJECTION, SOLUTION INTRAMUSCULAR; INTRAVENOUS; SUBCUTANEOUS at 15:45

## 2022-11-14 RX ADMIN — METHOCARBAMOL 500 MG: 100 INJECTION, SOLUTION INTRAMUSCULAR; INTRAVENOUS at 16:38

## 2022-11-14 RX ADMIN — FENTANYL CITRATE 50 MCG: 50 INJECTION, SOLUTION INTRAMUSCULAR; INTRAVENOUS at 14:18

## 2022-11-14 RX ADMIN — ROCURONIUM BROMIDE 20 MG: 10 INJECTION, SOLUTION INTRAVENOUS at 10:37

## 2022-11-14 RX ADMIN — HYDROMORPHONE HYDROCHLORIDE 0.5 MG: 1 INJECTION, SOLUTION INTRAMUSCULAR; INTRAVENOUS; SUBCUTANEOUS at 15:18

## 2022-11-14 RX ADMIN — ROCURONIUM BROMIDE 10 MG: 10 INJECTION, SOLUTION INTRAVENOUS at 12:25

## 2022-11-14 RX ADMIN — CLONIDINE HYDROCHLORIDE 0.1 MG: 0.1 TABLET ORAL at 22:15

## 2022-11-14 RX ADMIN — ONDANSETRON 4 MG: 2 INJECTION INTRAMUSCULAR; INTRAVENOUS at 19:09

## 2022-11-14 RX ADMIN — LEVOFLOXACIN 500 MG: 5 INJECTION, SOLUTION INTRAVENOUS at 07:50

## 2022-11-14 RX ADMIN — METRONIDAZOLE 500 MG: 500 INJECTION, SOLUTION INTRAVENOUS at 07:58

## 2022-11-14 RX ADMIN — MIDAZOLAM HYDROCHLORIDE 2 MG: 2 INJECTION, SOLUTION INTRAMUSCULAR; INTRAVENOUS at 07:30

## 2022-11-14 RX ADMIN — ROCURONIUM BROMIDE 20 MG: 10 INJECTION, SOLUTION INTRAVENOUS at 09:35

## 2022-11-14 RX ADMIN — PROPOFOL 40 MG: 10 INJECTION, EMULSION INTRAVENOUS at 13:39

## 2022-11-14 RX ADMIN — FENTANYL CITRATE 50 MCG: 50 INJECTION, SOLUTION INTRAMUSCULAR; INTRAVENOUS at 09:10

## 2022-11-14 RX ADMIN — INSULIN GLARGINE 16 UNITS: 100 INJECTION, SOLUTION SUBCUTANEOUS at 22:15

## 2022-11-14 RX ADMIN — EPHEDRINE SULFATE 15 MG: 50 INJECTION INTRAVENOUS at 08:01

## 2022-11-14 RX ADMIN — ROCURONIUM BROMIDE 10 MG: 10 INJECTION, SOLUTION INTRAVENOUS at 12:59

## 2022-11-14 RX ADMIN — ROCURONIUM BROMIDE 20 MG: 10 INJECTION, SOLUTION INTRAVENOUS at 08:40

## 2022-11-14 RX ADMIN — Medication 20 MG: at 08:12

## 2022-11-14 RX ADMIN — ROCURONIUM BROMIDE 20 MG: 10 INJECTION, SOLUTION INTRAVENOUS at 11:26

## 2022-11-14 RX ADMIN — FENTANYL CITRATE 50 MCG: 50 INJECTION, SOLUTION INTRAMUSCULAR; INTRAVENOUS at 07:36

## 2022-11-14 RX ADMIN — SODIUM CHLORIDE, SODIUM LACTATE, POTASSIUM CHLORIDE, AND CALCIUM CHLORIDE: .6; .31; .03; .02 INJECTION, SOLUTION INTRAVENOUS at 07:33

## 2022-11-14 RX ADMIN — BUPIVACAINE HYDROCHLORIDE 30 ML: 2.5 INJECTION, SOLUTION EPIDURAL; INFILTRATION; INTRACAUDAL; PERINEURAL at 15:27

## 2022-11-14 RX ADMIN — METOCLOPRAMIDE HYDROCHLORIDE 10 MG: 5 INJECTION INTRAMUSCULAR; INTRAVENOUS at 15:13

## 2022-11-14 RX ADMIN — FENTANYL CITRATE 50 MCG: 50 INJECTION, SOLUTION INTRAMUSCULAR; INTRAVENOUS at 13:19

## 2022-11-14 RX ADMIN — PROPOFOL 30 MG: 10 INJECTION, EMULSION INTRAVENOUS at 10:45

## 2022-11-14 RX ADMIN — SODIUM CHLORIDE, POTASSIUM CHLORIDE, SODIUM LACTATE AND CALCIUM CHLORIDE: 600; 310; 30; 20 INJECTION, SOLUTION INTRAVENOUS at 13:03

## 2022-11-14 RX ADMIN — ROCURONIUM BROMIDE 10 MG: 10 INJECTION, SOLUTION INTRAVENOUS at 13:40

## 2022-11-14 RX ADMIN — METHOCARBAMOL 500 MG: 100 INJECTION, SOLUTION INTRAMUSCULAR; INTRAVENOUS at 22:19

## 2022-11-14 RX ADMIN — ROCURONIUM BROMIDE 50 MG: 10 INJECTION, SOLUTION INTRAVENOUS at 08:00

## 2022-11-14 RX ADMIN — Medication 100 MG: at 07:39

## 2022-11-14 RX ADMIN — ROCURONIUM BROMIDE 10 MG: 10 INJECTION, SOLUTION INTRAVENOUS at 09:15

## 2022-11-14 RX ADMIN — SODIUM CHLORIDE, POTASSIUM CHLORIDE, SODIUM LACTATE AND CALCIUM CHLORIDE: 600; 310; 30; 20 INJECTION, SOLUTION INTRAVENOUS at 06:45

## 2022-11-14 RX ADMIN — BUPIVACAINE 20 ML: 13.3 INJECTION, SUSPENSION, LIPOSOMAL INFILTRATION at 15:27

## 2022-11-14 RX ADMIN — ONDANSETRON 4 MG: 2 INJECTION INTRAMUSCULAR; INTRAVENOUS at 13:48

## 2022-11-14 RX ADMIN — SODIUM CHLORIDE, POTASSIUM CHLORIDE, SODIUM LACTATE AND CALCIUM CHLORIDE: 600; 310; 30; 20 INJECTION, SOLUTION INTRAVENOUS at 15:21

## 2022-11-14 RX ADMIN — HYDROMORPHONE HYDROCHLORIDE 0.5 MG: 1 INJECTION, SOLUTION INTRAMUSCULAR; INTRAVENOUS; SUBCUTANEOUS at 19:09

## 2022-11-14 RX ADMIN — ACETAMINOPHEN 650 MG: 325 TABLET ORAL at 20:13

## 2022-11-14 RX ADMIN — FENTANYL CITRATE 50 MCG: 50 INJECTION, SOLUTION INTRAMUSCULAR; INTRAVENOUS at 10:07

## 2022-11-14 RX ADMIN — SODIUM CHLORIDE, POTASSIUM CHLORIDE, SODIUM LACTATE AND CALCIUM CHLORIDE: 600; 310; 30; 20 INJECTION, SOLUTION INTRAVENOUS at 07:15

## 2022-11-14 RX ADMIN — DEXAMETHASONE SODIUM PHOSPHATE 4 MG: 4 INJECTION, SOLUTION INTRAMUSCULAR; INTRAVENOUS at 13:03

## 2022-11-14 RX ADMIN — METOPROLOL SUCCINATE 25 MG: 50 TABLET, EXTENDED RELEASE ORAL at 07:20

## 2022-11-14 RX ADMIN — Medication 10 MG: at 10:47

## 2022-11-14 RX ADMIN — SUGAMMADEX 200 MG: 100 INJECTION, SOLUTION INTRAVENOUS at 14:36

## 2022-11-14 RX ADMIN — ENOXAPARIN SODIUM 40 MG: 100 INJECTION SUBCUTANEOUS at 07:19

## 2022-11-14 RX ADMIN — SODIUM CHLORIDE, PRESERVATIVE FREE 10 ML: 5 INJECTION INTRAVENOUS at 20:14

## 2022-11-14 RX ADMIN — PROPOFOL 170 MG: 10 INJECTION, EMULSION INTRAVENOUS at 07:39

## 2022-11-14 RX ADMIN — FENTANYL CITRATE 50 MCG: 50 INJECTION, SOLUTION INTRAMUSCULAR; INTRAVENOUS at 08:15

## 2022-11-14 RX ADMIN — Medication 10 MG: at 13:04

## 2022-11-14 RX ADMIN — DEXAMETHASONE SODIUM PHOSPHATE 4 MG: 4 INJECTION, SOLUTION INTRAMUSCULAR; INTRAVENOUS at 08:08

## 2022-11-14 ASSESSMENT — PAIN DESCRIPTION - LOCATION
LOCATION: ABDOMEN

## 2022-11-14 ASSESSMENT — PAIN DESCRIPTION - PAIN TYPE
TYPE: SURGICAL PAIN
TYPE: SURGICAL PAIN

## 2022-11-14 ASSESSMENT — PAIN SCALES - GENERAL
PAINLEVEL_OUTOF10: 0
PAINLEVEL_OUTOF10: 7
PAINLEVEL_OUTOF10: 7
PAINLEVEL_OUTOF10: 4
PAINLEVEL_OUTOF10: 1
PAINLEVEL_OUTOF10: 0
PAINLEVEL_OUTOF10: 4

## 2022-11-14 ASSESSMENT — PAIN DESCRIPTION - DESCRIPTORS
DESCRIPTORS: ACHING
DESCRIPTORS: ACHING

## 2022-11-14 ASSESSMENT — PAIN DESCRIPTION - FREQUENCY: FREQUENCY: INTERMITTENT

## 2022-11-14 ASSESSMENT — PAIN - FUNCTIONAL ASSESSMENT: PAIN_FUNCTIONAL_ASSESSMENT: 0-10

## 2022-11-14 NOTE — BRIEF OP NOTE
Brief Postoperative Note      Patient: Mika Ga  YOB: 1960  MRN: [id number]    Date of Procedure: 11/14/2022    Pre-Op Diagnosis: Rectal cancer with prostate invasion / Liver metastases ([facility] [address] [C78.7]    Post-Op Diagnosis: Same    Procedure(s):  Robotic LAR, parastomal hernia repair, loop colostomy to end colostomy creation, partial liver resection x2 (right and left). Surgeon(s):  MD Erika Robbins MD Media Buss, MD    Assistant:  Surgical Assistant: Rich Reyes  Resident: Jose Jennings MD    Anesthesia: General    Estimated Blood Loss (mL): 608UF    Complications: None    Specimens:   ID Type Source Tests Collected by Time Destination   A : LEFT LIVER PARTIAL RESECTION  Tissue Tissue SURGICAL PATHOLOGY Erika Godoy MD 11/14/2022 3174    B : RIGHT LIVER PARTIAL RESECTION  Tissue Tissue SURGICAL PATHOLOGY Erika Godoy MD 11/14/2022 1658    C : LOW ANTERIOR RESECTION STATUS POST RADIATION  Tissue Tissue SURGICAL PATHOLOGY Erika Godoy MD 11/14/2022 1231    D : COLOSTOMY Tissue Tissue SURGICAL PATHOLOGY Erika Godoy MD 11/14/2022 1347      Drains:   Colostomy LLQ (Active)       Colostomy LLQ (Active)       Urinary Catheter 11/14/22 Quach (Active)       Findings:   Procedure as listed above. No complications. Further details to follow in full operative report. Plan:  Bilateral TAP block immediately post-op by Anesthesia. Transfer to the general surgical floor following recovery in PACU.       Electronically signed by Jose Jennings MD on 11/14/2022 at 2:31 PM

## 2022-11-14 NOTE — ANESTHESIA POSTPROCEDURE EVALUATION
Department of Anesthesiology  Postprocedure Note    Patient: Nawaf Bains  MRN: [id number]  YOB: 1960  Date of evaluation: 11/14/2022      Procedure Summary     Date: 11/14/22 Room / Location: [address] [place] [address]    Anesthesia Start: 6081 Anesthesia Stop: 1452    Procedures:       ROBOTIC LOWER ABDOMINOPERINEAL RESECTION, COLOSTOMY REVERSAL, DIVERTING ILEOSTOMY, , ABDOMINO PERINEAL RESECTION WITH PERMANENT COLOSTOMY      ROBOTIC / LAPAROSCOPIC PARTIAL LIVER RESECTION X 2 (RIGHT AND LEFT LOBE), POSSIBLE OPEN (Bilateral) Diagnosis:       Rectal cancer ([address]      Liver metastases ([address]      (Rectal cancer with prostate invasion / Liver metastases ([address] [C78.7])    Surgeons: Nicole Dewitt MD; Lupis Espinal MD Responsible Provider: Jelani Moreland MD    Anesthesia Type: General ASA Status: 3          Anesthesia Type: General    Reema Phase I: Reema Score: 9    Reema Phase II:        Anesthesia Post Evaluation    Patient location during evaluation: PACU  Patient participation: complete - patient participated  Level of consciousness: awake and alert  Airway patency: patent  Nausea & Vomiting: no nausea and no vomiting  Complications: no  Cardiovascular status: hemodynamically stable  Respiratory status: acceptable  Hydration status: euvolemic  Multimodal analgesia pain management approach

## 2022-11-14 NOTE — PROGRESS NOTES
Colorectal Surgery  Post-operative Note      Procedure(s) Performed: robotic LAR, parastomal hernia repair, loop colostomy to end colostomy creation, partial liver resection x2 (right and left)    Subjective:   Patient's pain is controlled. Complaining of some gassiness and bloating. Has not had anything to eat yet. Currently denies nausea or vomiting. Quach in place. Denies flatus or BM at this time. Objective:  Anesthesia type: General      I/O    Intra op    Post op     Fluids  2900 mL 0 mL      mL 0 mL     Urine 245 mL 155 mL     Vitals:   Vitals:    11/14/22 1615 11/14/22 1630 11/14/22 1645 11/14/22 1700   BP: (!) 165/84 (!) 169/87 (!) 164/87 (!) 155/82   Pulse: 62 65 63 60   Resp: 12 11 12 14   Temp:       TempSrc:       SpO2: 97% 96% 96% 96%   Weight:       Height:           Physical Exam:  Post-op vital signs: Stable   General appearance: Alert, no acute distress, grooming appropriate  Eyes: No scleral icterus, EOM grossly intact  Neck: Trachea midline, no JVD, no lymphadenopathy, neck supple  Chest/Lungs: Normal effort with no accessory muscle use on RA  Cardiovascular: RRR, well-perfused  Abdomen: Soft, appropriately-tender, incision c/d/i and well approximated with Dermabond; ostomy red, moist, well-perfused  Skin: Warm and dry, no rashes  Extremities: No edema, no cyanosis  Genitourinary: Quach in place with clear yellow urine  Neuro: A&Ox3, no focal deficits, sensation intact    Assessment and Plan  This is a 58y.o. year old male status post robotic LAR, parastomal hernia repair, loop colostomy to end colostomy creation, partial liver resection x2 (right and left) secondary to rectal cancer with prostate invasion and liver metastases. (11/14) POD0.     Pain management: Roxicodone pain panel, Dilaudid pain panel, PRN tylenol, and Robaxin  PRN  Cardiovasc: Hemodynamically stable, will continue to monitor  Respiratory: IS ordered to bedside, encourage hourly IS and deep breathing, wean oxygen as tolerated  Fluids: LR at 125 cc/hr, Diet: General diet 4-carb  : Quach to stay.  NOTHING PER RECTUM  Ambulation: OOB to chair, encourage ambulation  Prophylaxis: SCDs, Lovenox  Antibiotics: None  Wound: Local wound care      Ilir Sheets DO, [address] [facility]  PGY1, General Surgery  11/14/22  5:54 PM  [employer]  Pager: [phone]

## 2022-11-14 NOTE — PROGRESS NOTES
Patient admitted to PACU bed 11 from OR via bed s/p ROBOTIC LOWER ABDOMINOPERINEAL RESECTION, COLOSTOMY REVERSAL, DIVERTING ILEOSTOMY, ABDOMINO PERINEAL RESECTION WITH PERMANENT COLOSTOMY. Report received at bedside from JULIAN and Dr. Lisa Raoms at [phone]. No complications reported. Pt received TAP block with exparel. Pt connected to PACU monitoring equipment, IVF infusing, no pain noted. Patient arrived awake from anesthesia, on O2 @ 4L NC breathing easy and unlabored. Surgical lap incisions x 5 covered with surgical glue C/D/I. Colostomy in LLQ intact. Quach draining. Will continue to monitor.

## 2022-11-14 NOTE — PROGRESS NOTES
PACU Transfer Note    Vitals:    11/14/22 1830   BP: (!) 151/81   Pulse: 63   Resp: 16   Temp: 97.8 °F (36.6 °C)   SpO2: 99%       In: 2900 [I.V.:2900]  Out: 500 [Urine:400]    Pain assessment:  present - adequately treated  Pain Level: 4    Report given to receiving unit RN. Provided all medications given while in PACU. Bedside handoff of abdominal incisions. All questions answered. Transported to room with belongings.      11/14/2022 6:35 PM

## 2022-11-14 NOTE — ANESTHESIA PRE PROCEDURE
Department of Anesthesiology  Preprocedure Note       Name:  Osmany Aparicio   Age:  58 y.o.  :  1960                                          MRN:  [id number]         Date:  2022      Surgeon: Ary Lee):  MD Festus Daniel MD    Procedure: Procedure(s):  ROBOTIC LOWER ABDOMINOPERINEAL RESECTION, COLOSTOMY REVERSAL, DIVERTING ILEOSTOMY, POSSIBLE, ABDOMINO PERINEAL RESECTION WITH PERMANENT COLOSTOMY  ROBOTIC / LAPAROSCOPIC PARTIAL LIVER RESECTION X 2 (RIGHT AND LEFT LOBE), POSSIBLE OPEN    Medications prior to admission:   Prior to Admission medications    Medication Sig Start Date End Date Taking? Authorizing Provider   temazepam (RESTORIL) 15 MG capsule TAKE 1 CAPSULE BY MOUTH EVERY NIGHT AS NEEDED FOR SLEEP 10/28/22   Historical Provider, MD   cloNIDine (CATAPRES) 0.1 MG tablet Take 1 tablet by mouth 2 times daily  Patient taking differently: Take 0.1 mg by mouth at bedtime TAKE A SECOND DOSE IF NEEDED (>140/85) 11/3/22   Barbara Irvin MD   metoprolol succinate (TOPROL XL) 25 MG extended release tablet Take 1 tablet by mouth daily  Patient not taking: Reported on 2022 11/3/22   Barabra Irvin MD   FLUoxetine (PROZAC) 10 MG capsule Take 1 capsule by mouth daily 11/3/22   Barbara Irvin MD   lisinopril (PRINIVIL;ZESTRIL) 20 MG tablet Take 1 tablet by mouth daily 11/3/22   Barbara Irvin MD   metFORMIN (GLUCOPHAGE) 500 MG tablet TAKE 1 TABLET BY MOUTH TWICE DAILY WITH MEALS 11/3/22   Barbara Irvin MD   metroNIDAZOLE (FLAGYL) 500 MG tablet Take one tablet by mouth 3 times on the day prior to surgery. Take one tablet at 1pm, 2pm and 9pm. 10/27/22   Singh Galo MD   neomycin (MYCIFRADIN) 500 MG tablet Take two tablets 3 times the day before surgery.  Take two tablets at 1pm, two tablets at 2pm and two tablets at 9pm. 10/27/22   Singh Galo MD   ondansetron (ZOFRAN ODT) 4 MG disintegrating tablet Place 1 tablet under the tongue every 8 hours as needed for Nausea or Vomiting 10/18/22   Bora Camara MD   insulin glargine (LANTUS;BASAGLAR) 100 UNIT/ML injection pen Take 20 u for 3 days with  Every chemo cycle then 16 units daily  Patient taking differently: 16 Units Take 20 u for 3 days with  Every chemo cycle then 16 units daily 4/6/22   Juany Navarro MD   insulin lispro (HUMALOG KWIKPEN) 200 UNIT/ML SOPN pen Take 4 units for blood sugar reading of 150-200, 6 units for 201-250, 8units for 251-300, 10 units for 301-350 4/6/22   Juany Navarro MD   Insulin Pen Needle 32G X 5 MM MISC 1 each by Does not apply route daily 4/6/22   Juany Navarro MD   blood glucose monitor kit and supplies Dispense sufficient amount for indicated testing frequency plus additional to accommodate PRN testing needs. Dispense all needed supplies to include: monitor, strips, lancing device, lancets, control solutions, alcohol swabs.  2/18/22   Jony Del Rio MD       Current medications:    Current Facility-Administered Medications   Medication Dose Route Frequency Provider Last Rate Last Admin    lactated ringers infusion   IntraVENous Continuous Zenovia Hugo, DO        sodium chloride flush 0.9 % injection 5-40 mL  5-40 mL IntraVENous 2 times per day Zenovia Hugo, DO        sodium chloride flush 0.9 % injection 5-40 mL  5-40 mL IntraVENous PRN Zenovia Hugo, DO        0.9 % sodium chloride infusion   IntraVENous PRN Zenovia Huog, DO        sodium chloride flush 0.9 % injection 5-40 mL  5-40 mL IntraVENous 2 times per day Bora Camara MD        sodium chloride flush 0.9 % injection 5-40 mL  5-40 mL IntraVENous PRN Bora Camara MD        0.9 % sodium chloride infusion   IntraVENous PRN Bora Camara MD        acetaminophen (TYLENOL) tablet 1,000 mg  1,000 mg Oral Once Bora Camara MD        enoxaparin (LOVENOX) injection 40 mg  40 mg SubCUTAneous Once Bora Camara MD        metronidazole (FLAGYL) 500 mg in 0.9% NaCl 100 mL IVPB premix  500 mg IntraVENous On Call to OR Tasha Gay MD        And    levoFLOXacin (LEVAQUIN) 500 MG/100ML infusion 500 mg  500 mg IntraVENous On Call to [address] Jose G Tolliver MD        metoprolol succinate (TOPROL XL) extended release tablet 25 mg  25 mg Oral Once Jose M Williamson MD           Allergies:  No Known Allergies    Problem List:    Patient Active Problem List   Diagnosis Code    Rectal cancer ([facility] [address] C20    Type 2 diabetes mellitus with hyperglycemia, with long-term current use of insulin (HCC) E11.65, Z79.4    Primary hypertension I10    Iron deficiency anemia due to chronic blood loss D50.0    Incontinence of feces R15.9    Severe episode of recurrent major depressive disorder, without psychotic features ([facility] [address] F33.2    Eczematous skin lesions L98.9    Colostomy care ([facility] [address] [facility] Z43.3    Incarcerated umbilical hernia R21.0    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)        Past Surgical History:        Procedure Laterality Date    ABDOMEN SURGERY  1975    Exploratory    COLONOSCOPY N/A 10/19/2022    COLONOSCOPY DIAGNOSTIC/STOMA performed by Tasha Gay MD at [facility] N/A 2/28/2022    LAPAROSCOPIC COLOSTOMY CREATION, laparoscopic incarcerated hernia repair performed by Tasha Gay MD at [address] N/A 2/17/2022    PORT A CATH INSERTION performed by Sarah Watt MD at [address] [facility] N/A 2/15/2022    SIGMOIDOSCOPY BIOPSY FLEXIBLE performed by Deborah Foreman MD at [address] [facility]  2/15/2022    SIGMOIDOSCOPY POLYP SNARE performed by Deborah Foreman MD at [address] ENDOSCOPY       Social History:    Social History     Tobacco Use    Smoking status: Never    Smokeless tobacco: Never   Substance Use Topics    Alcohol use: Not Currently     Comment: not in last 18 months                                Counseling given: Not Answered      Vital Signs (Current):   Vitals:    11/11/22 0757 11/14/22 0634   Pulse:  72   Resp:  16   Temp:  98.3 °F (36.8 °C)   TempSrc:  Temporal   SpO2:  97%   Weight: 198 lb (89.8 kg) 190 lb 6.4 oz (86.4 kg)   Height: 5' 11\" (1.803 m) 5' 11\" (1.803 m)                                              BP Readings from Last 3 Encounters:   11/03/22 138/78   10/19/22 128/73   10/18/22 (!) 184/83       NPO Status: Time of last liquid consumption: 1900                        Time of last solid consumption: 2300                        Date of last liquid consumption: 11/13/22                        Date of last solid food consumption: 11/12/22    BMI:   Wt Readings from Last 3 Encounters:   11/14/22 190 lb 6.4 oz (86.4 kg)   11/03/22 200 lb 3.2 oz (90.8 kg)   10/19/22 195 lb (88.5 kg)     Body mass index is 26.56 kg/m².     CBC:   Lab Results   Component Value Date/Time    WBC 4.2 08/15/2022 02:42 PM    RBC 4.04 08/15/2022 02:42 PM    HGB 12.5 08/15/2022 02:42 PM    HCT 35.8 08/15/2022 02:42 PM    MCV 88.6 08/15/2022 02:42 PM    RDW 14.6 08/15/2022 02:42 PM     08/15/2022 02:42 PM       CMP:   Lab Results   Component Value Date/Time     08/15/2022 02:42 PM    K 4.0 08/15/2022 02:42 PM    K 4.1 02/28/2022 10:35 AM     08/15/2022 02:42 PM    CO2 21 08/15/2022 02:42 PM    BUN 31 08/15/2022 02:42 PM    CREATININE 0.9 10/19/2022 09:35 AM    CREATININE 1.2 08/15/2022 02:42 PM    GFRAA >60 08/15/2022 02:42 PM    AGRATIO 1.9 08/15/2022 02:42 PM    LABGLOM >60 10/19/2022 09:35 AM    GLUCOSE 149 08/15/2022 02:42 PM    PROT 6.6 08/15/2022 02:42 PM    CALCIUM 9.0 08/15/2022 02:42 PM    BILITOT 0.5 08/15/2022 02:42 PM    ALKPHOS 84 08/15/2022 02:42 PM    AST 31 08/15/2022 02:42 PM    ALT 32 08/15/2022 02:42 PM       POC Tests: No results for input(s): POCGLU, POCNA, POCK, POCCL, POCBUN, POCHEMO, POCHCT in the last 72 hours. Coags:   Lab Results   Component Value Date/Time    PROTIME 11.8 02/15/2022 01:49 PM    INR 1.04 02/15/2022 01:49 PM       HCG (If Applicable): No results found for: PREGTESTUR, PREGSERUM, HCG, HCGQUANT     ABGs: No results found for: PHART, PO2ART, KUV1QMW, KHV0UHR, BEART, H4QDDUYG     Type & Screen (If Applicable):  No results found for: LABABO, LABRH    Drug/Infectious Status (If Applicable):  No results found for: HIV, HEPCAB    COVID-19 Screening (If Applicable):   Lab Results   Component Value Date/Time    COVID19 Not Detected 02/15/2022 06:30 AM           Anesthesia Evaluation  Patient summary reviewed and Nursing notes reviewed no history of anesthetic complications:   Airway: Mallampati: II  TM distance: >3 FB   Neck ROM: full  Mouth opening: > = 3 FB   Dental:          Pulmonary:                              Cardiovascular:    (+) hypertension:,                   Neuro/Psych:   (+) depression/anxiety             GI/Hepatic/Renal:             Endo/Other:    (+) DiabetesType II DM, , malignancy/cancer. Abdominal:             Vascular: Other Findings:           Anesthesia Plan      general     ASA 1    (63-year-old male presents for ROBOTIC LOWER ABDOMINOPERINEAL RESECTION, COLOSTOMY REVERSAL, DIVERTING ILEOSTOMY, POSSIBLE, ABDOMINO PERINEAL RESECTION WITH PERMANENT COLOSTOMY. Plan general anesthesia with ASA standard monitors. TAP blocks for postoperative pain control as requested by the attending surgeon. Questions answered. Patient agreeable with anesthetic plan.  )  Induction: intravenous. Anesthetic plan and risks discussed with patient. Plan discussed with CRNA.     Attending anesthesiologist reviewed and agrees with Preprocedure content      Post-op pain plan if not by surgeon: single peripheral nerve block      Quinton Oquendo MD   11/14/2022

## 2022-11-14 NOTE — H&P
[address] Same Day Surgery Update H & P  Department of General Surgery   Surgical Service   Pre-operative History and Physical  Last H & P within the last 30 days. DIAGNOSIS:   Rectal cancer ([facility] [address] [C20]  Liver metastases (HCC) [C78.7]    Procedure(s):  ROBOTIC LOWER ABDOMINOPERINEAL RESECTION, COLOSTOMY REVERSAL, DIVERTING ILEOSTOMY, POSSIBLE, ABDOMINO PERINEAL RESECTION WITH PERMANENT COLOSTOMY  ROBOTIC / LAPAROSCOPIC PARTIAL LIVER RESECTION X 2 (RIGHT AND LEFT LOBE), POSSIBLE OPEN    History obtained from: Patient interview and EHR      HISTORY OF PRESENT ILLNESS:   The patient is a 58 y.o. male with rectal cancer with metastases to liver presents today for the above procedure with Sonal Gar and Ankur. Illness Screening: Patient denies fever, chills, worsening cough, or close contact with sick individuals. Past Medical History:        Diagnosis Date    Diabetes mellitus ([facility] [address]     Difficulty voiding     History of blood transfusion 02/2022    does with- no reactions    Hypertension     Intestinal stoma prolapse ([facility] [address] 08/12/2022    Rectal cancer [facility])      Past Surgical History:        Procedure Laterality Date    ABDOMEN SURGERY  1975    Exploratory    COLONOSCOPY N/A 10/19/2022    COLONOSCOPY DIAGNOSTIC/STOMA performed by Amina Bauer MD at [facility] N/A 2/28/2022    LAPAROSCOPIC COLOSTOMY CREATION, laparoscopic incarcerated hernia repair performed by Amina Bauer MD at [place] [address] 2/17/2022    PORT A CATH INSERTION performed by Marilyn Jha MD at [address] [place] [address] N/A 2/15/2022    SIGMOIDOSCOPY BIOPSY FLEXIBLE performed by Hunter Mckeon MD at [address] [facility]  2/15/2022    SIGMOIDOSCOPY POLYP SNARE performed by Hunter Mckeon MD at [address] [place] [address]       Medications Prior to Admission:      Prior to Admission medications    Medication Sig Start Date End Date Taking?  Authorizing Provider temazepam (RESTORIL) 15 MG capsule TAKE 1 CAPSULE BY MOUTH EVERY NIGHT AS NEEDED FOR SLEEP 10/28/22   Historical Provider, MD   cloNIDine (CATAPRES) 0.1 MG tablet Take 1 tablet by mouth 2 times daily  Patient taking differently: Take 0.1 mg by mouth at bedtime TAKE A SECOND DOSE IF NEEDED (>140/85) 11/3/22   Mendoza Maxwell MD   metoprolol succinate (TOPROL XL) 25 MG extended release tablet Take 1 tablet by mouth daily  Patient not taking: Reported on 11/14/2022 11/3/22   Mendoza Maxwell MD   FLUoxetine (PROZAC) 10 MG capsule Take 1 capsule by mouth daily 11/3/22   Mendoza Maxwell MD   lisinopril (PRINIVIL;ZESTRIL) 20 MG tablet Take 1 tablet by mouth daily 11/3/22   Mendoza Maxwell MD   metFORMIN (GLUCOPHAGE) 500 MG tablet TAKE 1 TABLET BY MOUTH TWICE DAILY WITH MEALS 11/3/22   Mendoza Maxwell MD   metroNIDAZOLE (FLAGYL) 500 MG tablet Take one tablet by mouth 3 times on the day prior to surgery. Take one tablet at 1pm, 2pm and 9pm. 10/27/22   Harper Alfredo MD   neomycin (MYCIFRADIN) 500 MG tablet Take two tablets 3 times the day before surgery.  Take two tablets at 1pm, two tablets at 2pm and two tablets at 9pm. 10/27/22   Harper Alfredo MD   ondansetron (ZOFRAN ODT) 4 MG disintegrating tablet Place 1 tablet under the tongue every 8 hours as needed for Nausea or Vomiting 10/18/22   Harper Alfredo MD   insulin glargine (LANTUS;BASAGLAR) 100 UNIT/ML injection pen Take 20 u for 3 days with  Every chemo cycle then 16 units daily  Patient taking differently: 16 Units Take 20 u for 3 days with  Every chemo cycle then 16 units daily 4/6/22   Mendoza Maxwell MD   insulin lispro (HUMALOG KWIKPEN) 200 UNIT/ML SOPN pen Take 4 units for blood sugar reading of 150-200, 6 units for 201-250, 8units for 251-300, 10 units for 301-350 4/6/22   Mendoza Maxwell MD   Insulin Pen Needle 32G X 5 MM MISC 1 each by Does not apply route daily 4/6/22   Mendoza Maxwell MD   blood glucose monitor kit and supplies Dispense sufficient amount for indicated testing frequency plus additional to accommodate PRN testing needs. Dispense all needed supplies to include: monitor, strips, lancing device, lancets, control solutions, alcohol swabs. 2/18/22   Mala Frost MD         Allergies:  Patient has no known allergies. PHYSICAL EXAM:      Pulse 72   Temp 98.3 °F (36.8 °C) (Temporal)   Resp 16   Ht 5' 11\" (1.803 m)   Wt 190 lb 6.4 oz (86.4 kg)   SpO2 97%   BMI 26.56 kg/m²      Airway:  Airway patent with no audible stridor    Heart:  Regular rate and rhythm, No murmur noted    Lungs:  No increased work of breathing, good air exchange, clear to auscultation bilaterally, no crackles or wheezing    Abdomen:  Soft, non-distended, non-tender, no rebound tenderness or guarding, and no masses palpated, colostomy in place    ASSESSMENT AND PLAN     Patient is a 58 y.o. male with above specified procedure planned. 1.  The patients history and physical was obtained and signed off by the pre-admission testing department. Patient seen and focused exam done today- no new changes since last physical exam on 11/3/22    2. Access to ancillary services are available per request of the provider.     MELODY Lozano - CNP     11/14/2022

## 2022-11-14 NOTE — PROGRESS NOTES
Patient placed on clinical discharge pending room availability. Patient is comfortable and able to verbalize needs. VS to be changed hourly and assessment Q 2.

## 2022-11-14 NOTE — ANESTHESIA PROCEDURE NOTES
Peripheral Block    Patient location during procedure: pre-op  Reason for block: post-op pain management and at surgeon's request  Start time: 11/14/2022 3:26 PM  End time: 11/14/2022 3:30 PM  Staffing  Performed: anesthesiologist   Anesthesiologist: Stefanie Whyte MD  Preanesthetic Checklist  Completed: patient identified, IV checked, site marked, risks and benefits discussed, surgical/procedural consents, equipment checked, pre-op evaluation, timeout performed, anesthesia consent given, oxygen available and monitors applied/VS acknowledged  Peripheral Block   Patient position: supine  Prep: ChloraPrep  Provider prep: mask and sterile gloves  Patient monitoring: cardiac monitor, continuous pulse ox, frequent blood pressure checks and IV access  Block type: TAP  Laterality: bilateral  Injection technique: single-shot  Guidance: ultrasound guided    Needle   Needle type: insulated echogenic nerve stimulator needle   Needle gauge: 22 G  Needle localization: ultrasound guidance  Needle length: 8 cm  Assessment   Injection assessment: negative aspiration for heme, no paresthesia on injection, local visualized surrounding nerve on ultrasound and no intravascular symptoms  Paresthesia pain: none  Slow fractionated injection: yes  Hemodynamics: stable  Real-time US image taken/store: yes  Outcomes: uncomplicated and patient tolerated procedure well    Additional Notes  Immediately prior to procedure a \"time out\" was called to verify the correct patient, allergies, laterality, procedure and equipment. Time out performed with RN. Local Anesthetic: See below    External Oblique muscle, Internal Oblique muscle, Transversus Abdominis muscle are identified; the tip of the needle and the spread of the local anesthetic between the Internal Oblique muscle and the Transversus Abdominis muscles are visualized. The muscles appeared to be anatomically normal and there were no abnormal pathologically findings seen.      Bilateral Ultrasound-Guided TAP Block Note    Indication: Postoperative analgesia upon request of the attending surgeon. Procedure: Informed consent obtained and pre-procedural timeout performed. Sterile prep and drape of both sides of the abdomen. A 22G 80mm insulated regional block needle was used to perform the blocks. The external oblique, internal oblique, and transversus abdominis muscles were identified using ultrasound at the level of the umbilicus on both sides. For each block, the needle was advanced in plane from medial to lateral under direct ultrasound visualization, with the needle traversing both the external and internal oblique muscles and the needle tip being placed just under the fascia overlying the transversus abdominis. A mixture of Bupivacaine 0.25% (30cc) and Exparel 1.3% (20cc) was used for the blocks, with injections being done under direct ultrasound visualization. 25cc used for each block. The patient tolerated the procedure well and there were no apparent complications at the time of the blocks. Medications Administered  bupivacaine (PF) 0.25 % - Infiltration   30 mL - 11/14/2022 3:27:00 PM  bupivacaine liposome 1.3 % - Perineural   20 mL - 11/14/2022 3:27:00 PM      Blade Parker MD  November 14, 2022 2:47 PM

## 2022-11-15 LAB
ALBUMIN SERPL-MCNC: 3.7 G/DL (ref 3.4–5)
ALP BLD-CCNC: 57 U/L (ref 40–129)
ALT SERPL-CCNC: 178 U/L (ref 10–40)
ANION GAP SERPL CALCULATED.3IONS-SCNC: 8 MMOL/L (ref 3–16)
AST SERPL-CCNC: 165 U/L (ref 15–37)
BASOPHILS ABSOLUTE: 0 K/UL (ref 0–0.2)
BASOPHILS RELATIVE PERCENT: 0.4 %
BILIRUB SERPL-MCNC: 0.5 MG/DL (ref 0–1)
BILIRUBIN DIRECT: <0.2 MG/DL (ref 0–0.3)
BILIRUBIN, INDIRECT: ABNORMAL MG/DL (ref 0–1)
BUN BLDV-MCNC: 18 MG/DL (ref 7–20)
CALCIUM SERPL-MCNC: 8.6 MG/DL (ref 8.3–10.6)
CHLORIDE BLD-SCNC: 104 MMOL/L (ref 99–110)
CO2: 27 MMOL/L (ref 21–32)
CREAT SERPL-MCNC: 1.1 MG/DL (ref 0.8–1.3)
EOSINOPHILS ABSOLUTE: 0 K/UL (ref 0–0.6)
EOSINOPHILS RELATIVE PERCENT: 0.1 %
GFR SERPL CREATININE-BSD FRML MDRD: >60 ML/MIN/{1.73_M2}
GLUCOSE BLD-MCNC: 105 MG/DL (ref 70–99)
GLUCOSE BLD-MCNC: 122 MG/DL (ref 70–99)
GLUCOSE BLD-MCNC: 123 MG/DL (ref 70–99)
GLUCOSE BLD-MCNC: 138 MG/DL (ref 70–99)
GLUCOSE BLD-MCNC: 161 MG/DL (ref 70–99)
GLUCOSE BLD-MCNC: 169 MG/DL (ref 70–99)
HCT VFR BLD CALC: 34.2 % (ref 40.5–52.5)
HEMOGLOBIN: 11.7 G/DL (ref 13.5–17.5)
LYMPHOCYTES ABSOLUTE: 0.2 K/UL (ref 1–5.1)
LYMPHOCYTES RELATIVE PERCENT: 3.4 %
MAGNESIUM: 1.7 MG/DL (ref 1.8–2.4)
MCH RBC QN AUTO: 32.1 PG (ref 26–34)
MCHC RBC AUTO-ENTMCNC: 34.1 G/DL (ref 31–36)
MCV RBC AUTO: 94.1 FL (ref 80–100)
MONOCYTES ABSOLUTE: 0.5 K/UL (ref 0–1.3)
MONOCYTES RELATIVE PERCENT: 6.8 %
NEUTROPHILS ABSOLUTE: 6.6 K/UL (ref 1.7–7.7)
NEUTROPHILS RELATIVE PERCENT: 89.3 %
PDW BLD-RTO: 14.9 % (ref 12.4–15.4)
PERFORMED ON: ABNORMAL
PHOSPHORUS: 2.4 MG/DL (ref 2.5–4.9)
PLATELET # BLD: 129 K/UL (ref 135–450)
PMV BLD AUTO: 8.3 FL (ref 5–10.5)
POTASSIUM SERPL-SCNC: 4.4 MMOL/L (ref 3.5–5.1)
RBC # BLD: 3.63 M/UL (ref 4.2–5.9)
SODIUM BLD-SCNC: 139 MMOL/L (ref 136–145)
TOTAL PROTEIN: 5.6 G/DL (ref 6.4–8.2)
WBC # BLD: 7.4 K/UL (ref 4–11)

## 2022-11-15 PROCEDURE — 99024 POSTOP FOLLOW-UP VISIT: CPT | Performed by: SURGERY

## 2022-11-15 PROCEDURE — 6370000000 HC RX 637 (ALT 250 FOR IP): Performed by: SURGERY

## 2022-11-15 PROCEDURE — 6360000002 HC RX W HCPCS: Performed by: STUDENT IN AN ORGANIZED HEALTH CARE EDUCATION/TRAINING PROGRAM

## 2022-11-15 PROCEDURE — 85025 COMPLETE CBC W/AUTO DIFF WBC: CPT

## 2022-11-15 PROCEDURE — 80069 RENAL FUNCTION PANEL: CPT

## 2022-11-15 PROCEDURE — 36415 COLL VENOUS BLD VENIPUNCTURE: CPT

## 2022-11-15 PROCEDURE — 6360000002 HC RX W HCPCS: Performed by: SURGERY

## 2022-11-15 PROCEDURE — 2580000003 HC RX 258: Performed by: SURGERY

## 2022-11-15 PROCEDURE — 6360000002 HC RX W HCPCS

## 2022-11-15 PROCEDURE — 6370000000 HC RX 637 (ALT 250 FOR IP): Performed by: STUDENT IN AN ORGANIZED HEALTH CARE EDUCATION/TRAINING PROGRAM

## 2022-11-15 PROCEDURE — 1200000000 HC SEMI PRIVATE

## 2022-11-15 PROCEDURE — 80076 HEPATIC FUNCTION PANEL: CPT

## 2022-11-15 PROCEDURE — 83735 ASSAY OF MAGNESIUM: CPT

## 2022-11-15 RX ORDER — METHOCARBAMOL 500 MG/1
500 TABLET, FILM COATED ORAL 4 TIMES DAILY
Status: DISCONTINUED | OUTPATIENT
Start: 2022-11-15 | End: 2022-11-16 | Stop reason: HOSPADM

## 2022-11-15 RX ORDER — MAGNESIUM SULFATE IN WATER 40 MG/ML
4000 INJECTION, SOLUTION INTRAVENOUS ONCE
Status: COMPLETED | OUTPATIENT
Start: 2022-11-15 | End: 2022-11-15

## 2022-11-15 RX ORDER — PROCHLORPERAZINE EDISYLATE 5 MG/ML
10 INJECTION INTRAMUSCULAR; INTRAVENOUS EVERY 6 HOURS PRN
Status: DISCONTINUED | OUTPATIENT
Start: 2022-11-15 | End: 2022-11-16 | Stop reason: HOSPADM

## 2022-11-15 RX ADMIN — METHOCARBAMOL 500 MG: 500 TABLET ORAL at 17:36

## 2022-11-15 RX ADMIN — FLUOXETINE 10 MG: 10 CAPSULE ORAL at 08:53

## 2022-11-15 RX ADMIN — METHOCARBAMOL 500 MG: 100 INJECTION, SOLUTION INTRAMUSCULAR; INTRAVENOUS at 03:42

## 2022-11-15 RX ADMIN — SODIUM CHLORIDE, POTASSIUM CHLORIDE, SODIUM LACTATE AND CALCIUM CHLORIDE: 600; 310; 30; 20 INJECTION, SOLUTION INTRAVENOUS at 16:08

## 2022-11-15 RX ADMIN — METHOCARBAMOL 500 MG: 500 TABLET ORAL at 13:34

## 2022-11-15 RX ADMIN — ACETAMINOPHEN 650 MG: 325 TABLET ORAL at 05:51

## 2022-11-15 RX ADMIN — INSULIN HUMAN 5 UNITS: 100 INJECTION, SOLUTION PARENTERAL at 20:36

## 2022-11-15 RX ADMIN — ACETAMINOPHEN 650 MG: 325 TABLET ORAL at 19:39

## 2022-11-15 RX ADMIN — ACETAMINOPHEN 650 MG: 325 TABLET ORAL at 13:34

## 2022-11-15 RX ADMIN — OXYCODONE 5 MG: 5 TABLET ORAL at 10:41

## 2022-11-15 RX ADMIN — INSULIN HUMAN 5 UNITS: 100 INJECTION, SOLUTION PARENTERAL at 17:41

## 2022-11-15 RX ADMIN — ENOXAPARIN SODIUM 40 MG: 100 INJECTION SUBCUTANEOUS at 08:52

## 2022-11-15 RX ADMIN — ONDANSETRON 4 MG: 2 INJECTION INTRAMUSCULAR; INTRAVENOUS at 20:31

## 2022-11-15 RX ADMIN — DIBASIC SODIUM PHOSPHATE, MONOBASIC POTASSIUM PHOSPHATE AND MONOBASIC SODIUM PHOSPHATE 2 TABLET: 852; 155; 130 TABLET ORAL at 17:36

## 2022-11-15 RX ADMIN — METHOCARBAMOL 500 MG: 500 TABLET ORAL at 08:52

## 2022-11-15 RX ADMIN — ACETAMINOPHEN 650 MG: 325 TABLET ORAL at 01:02

## 2022-11-15 RX ADMIN — SODIUM CHLORIDE, PRESERVATIVE FREE 10 ML: 5 INJECTION INTRAVENOUS at 08:53

## 2022-11-15 RX ADMIN — PROCHLORPERAZINE EDISYLATE 10 MG: 5 INJECTION, SOLUTION INTRAMUSCULAR; INTRAVENOUS at 23:02

## 2022-11-15 RX ADMIN — ONDANSETRON 4 MG: 4 TABLET, ORALLY DISINTEGRATING ORAL at 10:41

## 2022-11-15 RX ADMIN — METOPROLOL SUCCINATE 25 MG: 25 TABLET, EXTENDED RELEASE ORAL at 08:52

## 2022-11-15 RX ADMIN — DIBASIC SODIUM PHOSPHATE, MONOBASIC POTASSIUM PHOSPHATE AND MONOBASIC SODIUM PHOSPHATE 2 TABLET: 852; 155; 130 TABLET ORAL at 10:41

## 2022-11-15 RX ADMIN — MAGNESIUM SULFATE HEPTAHYDRATE 4000 MG: 40 INJECTION, SOLUTION INTRAVENOUS at 10:35

## 2022-11-15 RX ADMIN — DIBASIC SODIUM PHOSPHATE, MONOBASIC POTASSIUM PHOSPHATE AND MONOBASIC SODIUM PHOSPHATE 2 TABLET: 852; 155; 130 TABLET ORAL at 13:34

## 2022-11-15 RX ADMIN — INSULIN GLARGINE 16 UNITS: 100 INJECTION, SOLUTION SUBCUTANEOUS at 20:36

## 2022-11-15 RX ADMIN — OXYCODONE 10 MG: 5 TABLET ORAL at 19:39

## 2022-11-15 ASSESSMENT — PAIN DESCRIPTION - ORIENTATION
ORIENTATION: MID
ORIENTATION: MID

## 2022-11-15 ASSESSMENT — PAIN DESCRIPTION - LOCATION
LOCATION: ABDOMEN

## 2022-11-15 ASSESSMENT — PAIN DESCRIPTION - PAIN TYPE
TYPE: SURGICAL PAIN

## 2022-11-15 ASSESSMENT — PAIN SCALES - GENERAL
PAINLEVEL_OUTOF10: 3
PAINLEVEL_OUTOF10: 8
PAINLEVEL_OUTOF10: 1
PAINLEVEL_OUTOF10: 0
PAINLEVEL_OUTOF10: 4
PAINLEVEL_OUTOF10: 5

## 2022-11-15 ASSESSMENT — PAIN DESCRIPTION - ONSET
ONSET: ON-GOING
ONSET: ON-GOING

## 2022-11-15 ASSESSMENT — PAIN DESCRIPTION - DESCRIPTORS: DESCRIPTORS: ACHING

## 2022-11-15 ASSESSMENT — PAIN DESCRIPTION - FREQUENCY
FREQUENCY: CONTINUOUS
FREQUENCY: CONTINUOUS

## 2022-11-15 NOTE — BRIEF OP NOTE
Brief Postoperative Note      Patient: Teri Matthews  YOB: 1960  MRN: [id number]    Date of Procedure: 11/14/2022    Pre-Op Diagnosis: Rectal cancer with prostate invasion / Liver metastases ([facility] [address] [C78.7]    Post-Op Diagnosis: Same       PROCEDURE:  ISOLATION/PRESERVATION OF PROSTATE/SV/URETERS    Surgeon(s):  MD Bora Booth MD Delcie Lowing, MD    Assistant:  Surgical Assistant: Josue Olson  Resident: Michalene Severs, MD    Anesthesia: General    Estimated Blood Loss (mL): less than 50     Complications: None    Specimens:   ID Type Source Tests Collected by Time Destination   A : LEFT LIVER PARTIAL RESECTION  Tissue Tissue SURGICAL PATHOLOGY Bora Camara MD 11/14/2022 0850    B : RIGHT LIVER PARTIAL RESECTION  Tissue Tissue SURGICAL PATHOLOGY Bora Camara MD 11/14/2022 0936    C : LOW ANTERIOR RESECTION STATUS POST RADIATION  Tissue Tissue SURGICAL PATHOLOGY Bora Camara MD 11/14/2022 1231    D : COLOSTOMY Tissue Tissue SURGICAL PATHOLOGY Bora Camara MD 11/14/2022 1347        Implants:  * No implants in log *      Drains:   Colostomy LLQ (Active)       Colostomy LLQ (Active)   Stomal Appliance 1 piece 11/15/22 0845   Stoma  Assessment Pink 11/15/22 0845   Peristomal Assessment Clean, dry & intact 11/15/22 0845   Stool Appearance Bloody 11/15/22 0845   Stool Amount Small 11/15/22 0845   Output (mL) 0 ml 11/14/22 1450       Urinary Catheter 11/14/22 Quach (Active)   $ Urethral catheter insertion Inserted for procedure 11/15/22 0343   Catheter Indications Perioperative use for selected surgical procedures 11/15/22 0845   Site Assessment No urethral drainage 11/15/22 0845   Urine Color Yellow 11/15/22 0845   Urine Appearance Clear 11/15/22 0845   Collection Container Standard 11/15/22 0845   Securement Method Securing device (Describe) 11/15/22 0845   Catheter Best Practices  Catheter secured to thigh; Bag below bladder;Bag not on floor 11/15/22 0845   Status Draining 11/15/22 0845   Output (mL) 350 mL 11/15/22 0343       Findings: FIBROTIC TISSUE ISOLATED OFF BLADDER AND PROSTATE. NO EVIDENCE OF DIRECT INVASION.     PLEASE LEAVE LEONE X 3 DAYS  DICTATION TO FOLLOW    Electronically signed by Shade Laura MD on 11/15/2022 at 10:18 AM

## 2022-11-15 NOTE — CONSULTS
Ostomy Referral Progress Note      NAME:  Rachel Cheng  MEDICAL RECORD NUMBER:  [id number]  AGE: 58 y.o. GENDER:  male  :  1960  TODAY'S DATE:  11/15/2022    Subjective     Rachel Cheng is a 58 y.o. male referred by:   [x] Physician  [] Nursing  [] Other:     New End Colostomy (loop colostomy to end colostomy)  Stoma: not measured  Barrier: 2 piece flat (red) #[id number] with drainable pouch #[id number]    Surgeon Dr. David Vasquez 22 Robotic-assisted laparoscopic low anterior resection with end colostomy, revision of colostomy with parastomal hernia repair. PAST MEDICAL HISTORY:        Diagnosis Date    Diabetes mellitus ([facility] [address]     Difficulty voiding     History of blood transfusion 2022    does with- no reactions    Hypertension     Intestinal stoma prolapse ([facility] [address] 2022    Rectal cancer ([facility] [address]        MEDICATIONS:    No current facility-administered medications on file prior to encounter.      Current Outpatient Medications on File Prior to Encounter   Medication Sig Dispense Refill    cloNIDine (CATAPRES) 0.1 MG tablet Take 0.1 mg by mouth Take 0.1 mg at bedtime and if BP > 140/85, take a second dose      insulin glargine (SEMGLEE) 100 UNIT/ML injection vial Inject 16 Units into the skin nightly      metoprolol succinate (TOPROL XL) 50 MG extended release tablet Take 50 mg by mouth daily      temazepam (RESTORIL) 15 MG capsule TAKE 1 CAPSULE BY MOUTH EVERY NIGHT AS NEEDED FOR SLEEP      ondansetron (ZOFRAN ODT) 4 MG disintegrating tablet Place 1 tablet under the tongue every 8 hours as needed for Nausea or Vomiting 3 tablet 0    insulin lispro (HUMALOG KWIKPEN) 200 UNIT/ML SOPN pen Take 4 units for blood sugar reading of 150-200, 6 units for 201-250, 8units for 251-300, 10 units for 301-350 5 pen 5       ALLERGIES:    No Known Allergies    PAST SURGICAL HISTORY:    Past Surgical History:   Procedure Laterality Date     Exploratory    COLONOSCOPY N/A 10/19/2022    COLONOSCOPY DIAGNOSTIC/STOMA performed by Christie Burdick MD at [facility] N/A 2/28/2022    LAPAROSCOPIC COLOSTOMY CREATION, laparoscopic incarcerated hernia repair performed by Christie Burdick MD at [address] Bilateral 11/14/2022    ROBOTIC / LAPAROSCOPIC PARTIAL LIVER RESECTION X 2 (RIGHT AND LEFT LOBE), POSSIBLE OPEN performed by Saeid Rice MD at [place] [address] 2/17/2022    PORT A CATH INSERTION performed by Melanie Moeller MD at [facility] 23 N/A 11/14/2022    ROBOTIC LOWER ABDOMINOPERINEAL RESECTION, COLOSTOMY REVERSAL, DIVERTING ILEOSTOMY, , ABDOMINO PERINEAL RESECTION WITH PERMANENT COLOSTOMY performed by Christie Burdick MD at [place] [address] N/A 2/15/2022    SIGMOIDOSCOPY BIOPSY FLEXIBLE performed by Yang Carmen MD at [address] [facility]  2/15/2022    SIGMOIDOSCOPY POLYP SNARE performed by Yang Carmen MD at [facility]:    family history includes Diabetes in his father; Heart Failure in his mother; Hypertension in his father.     SOCIAL HISTORY:    Social History     Tobacco Use    Smoking status: Never    Smokeless tobacco: Never   Vaping Use    Vaping Use: Never used    Passive vaping exposure: Yes   Substance Use Topics    Alcohol use: Not Currently     Comment: not in last 18 months    Drug use: Never       LABS:  WBC:    Lab Results   Component Value Date/Time    WBC 7.4 11/15/2022 08:36 AM     H/H:    Lab Results   Component Value Date/Time    HGB 11.7 11/15/2022 08:36 AM    HCT 34.2 11/15/2022 08:36 AM     BMP:    Lab Results   Component Value Date/Time     11/15/2022 08:36 AM    K 4.4 11/15/2022 08:36 AM    K 4.1 02/28/2022 10:35 AM     11/15/2022 08:36 AM    CO2 27 11/15/2022 08:36 AM    BUN 18 11/15/2022 08:36 AM    LABALBU 3.7 11/15/2022 08:36 AM    CREATININE 1.1 11/15/2022 08:36 AM    CALCIUM 8.6 11/15/2022 08:36 AM GFRAA >60 08/15/2022 02:42 PM    LABGLOM >60 11/15/2022 08:36 AM    GLUCOSE 122 11/15/2022 08:36 AM     PTT:    Lab Results   Component Value Date/Time    APTT 30.6 11/14/2022 07:14 AM   [APTT}  PT/INR:    Lab Results   Component Value Date/Time    PROTIME 14.8 11/14/2022 07:14 AM    INR 1.16 11/14/2022 07:14 AM       Objective    /61   Pulse 54   Temp 98 °F (36.7 °C) (Oral)   Resp 16   Ht 5' 11\" (1.803 m)   Wt 196 lb 13.9 oz (89.3 kg)   SpO2 95%   BMI 27.46 kg/m²     Jericho Risk Score Jericho Scale Score: 21    Patient Active Problem List   Diagnosis Code    Rectal cancer ([facility] C20    Type 2 diabetes mellitus with hyperglycemia, with long-term current use of insulin ([facility]) E11.65, Z79.4    Primary hypertension I10    Iron deficiency anemia due to chronic blood loss D50.0    Incontinence of feces R15.9    Severe episode of recurrent major depressive disorder, without psychotic features ([facility]) F33.2    Eczematous skin lesions L98.9    Colostomy care ([facility] Z43.3    Incarcerated umbilical hernia P92.5    Intestinal stoma prolapse ([facility]) K94.19    Adjustment insomnia F51.02    Preoperative general physical examination Z01.818       Assessment: Stoma is pink, moist and protrudes. Unable to assess peristomal skin d/t appliance. Colostomy LLQ (Active)   Number of days: 259       Colostomy LLQ (Active)   Stomal Appliance 2 piece;Clean, dry & intact 11/15/22 1022   Flange Size (inches) 2.5 Inches 11/15/22 1022   Stoma  Assessment Pink;Protrudes; Moist 11/15/22 1022   Peristomal Assessment CHRISTINA 11/15/22 1022   Stool Appearance Bloody; Watery 11/15/22 1022   Stool Color Red 11/15/22 1022   Stool Amount Small 11/15/22 1022   Output (mL) 0 ml 11/14/22 1450   Number of days: 0       Intake/Output Summary (Last 24 hours) at 11/15/2022 1024  Last data filed at 11/15/2022 0343  Gross per 24 hour   Intake 1900 ml   Output 955 ml   Net 945 ml         Plan   Plan for Ostomy Care:  [address] - Patient to empty appliance when 1/3 to 1/2 full with assistance of the staff. Cleanse inside and outside of the drain spout prior to rolling closed. Change appliance every 3-5 days or 1-2 times a week. Call for problems with seal [phone]    Per pt does not need any supplies at this time. No concerns or questions. Will schedule appointment with Shruthi Bronson at [address] [facility]. Ostomy Plan of Care  [] Supplies/Instructions left in room  [x] Patient using home supplies  [x] Brand/supplies at bedside Coloplast  [] Current pouching system     Current Diet: ADULT DIET; Regular; 4 carb choices (60 gm/meal);  Low Fiber  Dietician consult:  No    Discharge Plan:  Placement for patient upon discharge: home with support    Outpatient visit plan No  Supplies given No   Samples requested N/A    Referrals:  [x]   [] [facility]  [] Supplies  [] Other      Patient/Caregiver Teaching:  Written Instructions given to patient/family  Teaching provided:  [] Reviewed GI and A&P        [] Supplies  [] Pouch emptying      [] Manipulate closure  [] Routine Care         [] Comment  [] Pouch maintenance           Level of patient/caregiver understanding able to:  [] Indicates understanding       [] Needs reinforcement  [] Unsuccessful      [] Verbal Understanding  [] Demonstrated understanding       [] No evidence of learning  [] Refused teaching         [] N/A    Electronically signed by Dennise Duncan RN, Jammie Proctor on 11/15/2022 at 10:24 AM

## 2022-11-15 NOTE — DISCHARGE INSTRUCTIONS
Discharge Instructions:    Diet:   You may resume a regular carb controlled diet    Wound Care:   Skin glue was used to cover your incision(s). Please note that this glue is tinted purple; therefore, a slight purple hue around your incisions is normal. The skin glue will fall off on its own in about 10 days. You may shower, but do not scrub the incision sites directly or soak (tub, pool, etc.). Activity:   No heavy lifting greater than a milk jug (~8lbs) for 4-6 weeks. This recommendation is made to reduce your risk of developing an incisional hernia. You will receive further instruction at your follow-up appointment. Pain management:   Unless informed of any restrictions by your primary care physician, please use your preferred over-the-counter pain reliever as your primary pain medication. If you have pain that persists despite over-the-counter pain medications, you have been provided with a prescription for an opioid/narcotic pain reliever (Percocet). Be aware that this medication is a combination of opioid/narcotic and acetaminophen (the main ingredient in Tylenol); therefore, it will contribute to your daily limit of 4,000mg acetaminophen. No driving or operating machinery while taking opioid/narcotic medications. If you are not taking the opioid pain medication, then you can drive when you feel as though you can sit comfortably behind the steering wheel and can slam on the brake or turn the wheel sharply without it hurting. We recommend that you practice this while sitting the car with it parked in your driveway before trying to drive on the road. Bowel Regimen:   Opioid/Narcotic pain relievers have a common side effect of constipation; therefore, you have been provided with a prescription for a stool softener, Docusate (Colace).  Please note that this medication is available over-the-counter at the same dose as that available with a prescription; therefore, we recommend you making an informed decision regarding purchasing the medication by filling the prescription or simply purchasing over-the-counter instead. This medication is  intended to help prevent you from experiencing this very common side effect and also help to regulate your bowels after surgery. Accordingly, if you do not experience constipation, then you do not need to take this medication. Bowel Regimen Instructions: Take one Colace pill two times each day while you are taking the narcotic pain reliever. If your stools become too loose and/or frequent, decrease the Colace to one pill one time each day. If your stools are still loose after this modification, stop taking this medication all together. This medicine was prescribed with the intention of being an as-needed medicine; accordingly, you do not have to finish the prescription or use at all if you find that you do not need it. Reasons to Return:   Some soreness and redness is common after surgery, especially for the first 24-48 hours. If, however, you experience for increasing redness, worsening pain, new and/or increasing drainage from wound, fever above 101.5 degrees Farenheit, bleeding that does not stop soon after discovery, or any other concerns about your incision or post op course, please either call the office or call/return to the Emergency Department for further evaluation. Follow up with Dr. Malia Davidson in 1-2 weeks. Please call [phone] to schedule your appointment. We recommend that you call your primary care physician within the first 3-5 days following discharge to inform them that you were recently hospitalized and potentially schedule a visit at their discretion. Stoma Care - New Ostomy  Empty appliance when 1/3 to 1/2 full with assistance of the staff. Cleanse inside and outside of the drain spout prior to rolling closed. Change appliance every 3-5 days or 1-2 times a week.   Call for problems with seal [phone]  schedule appointment with Delicia Kurtz at [address] [facility]. Quach Care  Self-remove Quach catheter on Friday, 11/18. Follow-up with urology office that afternoon. Call the urology office ([phone]) with any questions.

## 2022-11-15 NOTE — CARE COORDINATION
UPDATE: CM met with pt and his sister and brother at bedside. Pt reports the colostomy is not new to the pt and the pt feels comfortable and confident caring for the ostomy without [facility]. Pt reports he gets his ostomy supplies from [facility] which his insurance covers. Pt reports that siblings will provide transportation at discharge. Pt reports no CM needs for discharge. Electronically signed by DAVID Marcus on 11/15/2022 at 4:08 PM  [phone]    CM following: Pt was out of room when CM came to room. CM will attempt to attend the pt's room again later today as schedule allows. Pt was discussed with nursing team and chart review conducted. Pt is from home in a condo where he lives alone, independent pta. Pt has had a loop colostomy in the past and has expressed comfort caring for his new end colostomy. CM has reached out to Malcolm Rick with [facility] for [facility] to ensure pt access to ostomy supplies at discharge. CM will continue to follow for dc planning.   Electronically signed by DAVID Marcus on 11/15/2022 at 2:27 PM  [phone]

## 2022-11-15 NOTE — PROGRESS NOTES
Pharmacy  Note  - Admission Medication History    List of [id number] medications is complete. I reviewed Rx fill history via \"Complete Dispense Report\" in Epic, and spoke to patient      The following changes made to [id number] medication list:    Dose or Frequency CHANGE:  1) metoprolol succinate 25 mg daily--> 50 mg daily (currently on hold)    Please note:  1) patient is no longer taking metronidazole or neomycin due to completion of surgery       Current Outpatient Medications   Medication Instructions    cloNIDine (CATAPRES) 0.1 mg, Oral, Take 0.1 mg at bedtime and if BP > 140/85, take a second dose    FLUoxetine (PROZAC) 10 mg, Oral, DAILY    insulin glargine (SEMGLEE) 16 Units, SubCUTAneous, NIGHTLY    insulin lispro (HUMALOG KWIKPEN) 200 UNIT/ML SOPN pen Take 4 units for blood sugar reading of 150-200, 6 units for 201-250, 8units for 251-300, 10 units for 301-350    lisinopril (PRINIVIL;ZESTRIL) 20 mg, Oral, DAILY    metFORMIN (GLUCOPHAGE) 500 MG tablet TAKE 1 TABLET BY MOUTH TWICE DAILY WITH MEALS    metoprolol succinate (TOPROL XL) 50 mg, Oral, DAILY    metroNIDAZOLE (FLAGYL) 500 MG tablet Take one tablet by mouth 3 times on the day prior to surgery. Take one tablet at 1pm, 2pm and 9pm.    neomycin (MYCIFRADIN) 500 MG tablet Take two tablets 3 times the day before surgery.  Take two tablets at 1pm, two tablets at 2pm and two tablets at 9pm.    ondansetron (ZOFRAN ODT) 4 mg, SubLINGual, EVERY 8 HOURS PRN    temazepam (RESTORIL) 15 MG capsule TAKE 1 CAPSULE BY MOUTH EVERY NIGHT AS NEEDED FOR SLEEP      11/14/2022 7:38 PM  Aman Walsh  PharmD Candidate   Class of 2023

## 2022-11-15 NOTE — OP NOTE
[address], [address]                                OPERATIVE REPORT    PATIENT NAME: Tarik Barbosa                   :        1960  MED REC NO:   [id number]                          ROOM:       5323  ACCOUNT NO:   [de-identified]                           ADMIT DATE: 2022  PROVIDER:     Viet Martínez. Aurelia Triplett MD    DATE OF PROCEDURE:  2022    SURGEON:  Viet Martínez. Aurelia Triplett MD    ASSISTANT:  Daren Rebolledo, PGY-4, resident. PREOPERATIVE DIAGNOSES:  1.  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 2.  Parastomal hernia and previous colostomy in place. POSTOPERATIVE DIAGNOSES:  1.  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 2.  Parastomal hernia and previous colostomy in place. PROCEDURES PERFORMED:  1.  Robotic-assisted laparoscopic low anterior resection with end  colostomy. 2.  Revision of colostomy with parastomal hernia repair. MODIFIER:  A 22-modifier billed due to extreme fibrosis of the rectal  tumor as well as coordination and extra time regarding the possibility  of bladder and prostate invasion as well as dissection in coordination  with Urology making the procedure much more difficult and time  Consuming, tedious. ANESTHESIA:  General endotracheal.    COMPLICATIONS:  None. SPECIMEN:  1. Lower anterior resection. 2.  Colostomy. ESTIMATED BLOOD LOSS:  100 mL. INDICATIONS:  The patient is a 69-year-old male. He is well known to me  for history of mid rectal cancer. He initially had quite an extensive  malignancy, so he underwent diverting colostomy due to the symptoms as  well as near obstructing symptoms, as well as diarrhea. He was noted to  have a T4bN2 tumor and on additional staging had a possibility of  metastatic disease to the liver. He underwent total neoadjuvant  treatment with chemotherapy with FOLFOX followed by chemoradiation.   He  had a fairly good response of this tumor, but was noted to have  extensive fibrosis on his post-treatment restaging imaging. Regardless,  he had met with Dr. Iris Mclean of Hepatobiliary as well as myself and  Dr. Davey Flor of Urology and we planned for combination case with all three  of us. Discussed the plan for diagnostic laparoscopy with first liver  resection and then plan for lower anterior resection with possible need  for bladder resection or prostate resection, which could require  ileoconduit. I had a long discussion with him in the office and  discussed that there is a possibility of anastomosis versus APR versus  end colostomy. He actually decided given the fact that he had gotten  use his colostomy quite well and had no issues with it and did not want  to have any additional procedures as well as his fear of poor bowel  function that he would like to keep the colostomy. He was very  carefully counseled that there would be near impossible to go back and  reverse a colostomy. Regardless, he chose that and after careful  consideration, I discussed with him the other risk of the procedure  including but not limited to bleeding, infection, staple line breakdown,  need for additional operation, damage to intra-abdominal structures,  including but not limited to bowel or bladder, ureters as well as  neurovascular structures. He understood again the potential need for  open operation and the possibility of needing colostomy and ileoconduit  or temporary ileostomy. He understood potential non-resectability or  potential positive margins. He understood all the risks and agreed to  proceed. PROCEDURE DETAILS:  The patient was brought to the operating theater,  placed supine on the operating table. General endotracheal intubation  was performed by Anesthesiology. The patient was placed in the  lithotomy position. His rectum was washed out using Betadine solution.    His loop colostomy was closed with skin using a 2-0 silk suture in a  running fashion. The abdomen was prepped and draped in the usual  fashion using chlorhexidine prep solution. A timeout was performed  confirming the patient's identity as well as the operative site. Antibiotics were confirmed to be perfusing. All safety points were  followed. SCDs were on and functioning. Lovenox confirmed given. Dr. Bailee Sharp actually started the case including the placement of the  trocars as well as docking the robot. The liver resection was done  first by Dr. Bailee Sharp. After this was completed, I was then called  into the room, I was notified that there was minimal blood loss and two  separate wedges of liver had been resected and placed in a small  retrieval bag and that would need to be removed at later time. The  patient was then placed in a head down positioning and the robot was  then redocked. I started by taking down some adhesions of the omentum  to the abdominal wall. I then noted the colostomy and decided to go  ahead and separate just distal to the loop colostomy to make it easier  for manipulation intra-abdominally. The colon was circumferentially  dissected around and a green 45-mm staple load was used to transect. I  started taking down the mesentery using the vessel sealer device of this  piece of colon just distal to the loop colostomy. I then went lateral  to medial and  the mid and distal sigmoid colon. As soon as I  entered to the upper rectum, I noted a lot of fibrosis and scarring, a  lot of this was inflammatory appearing. There did not appear to be any  tumor. I then went medial and placed the BONITA pedicle on stretch. An  incision was made underneath the BONITA pedicle and posterior total  mesorectal excision plane was developed. Again, it was extremely  fibrotic over the sacrum and the planes were quite difficult to  ascertain, but I did feel incompetent that was I not in correct point.    I then came back and skeletonized the BONITA pedicle and noted the iliac  and the ureter were well out of harm's way. This was also confirmed at  a later time when Dr. Ruby Malloy, the urologist was in the room as well. I  skeletonized the BONITA and performed high ligation using the vessel sealer  device with good hemostasis noted. I then took the BONITA pedicle and continued mesenteric transection to meet the previous plane. IMV was also ligated with vessel sealer device. I then continued the pelvic dissection. Again, I started in the  posterior plane, even though it was extremely fibrotic, I was able to  maintain some progress. I then came up laterally finally anteriorly. Anteriorly, ironically, it was a bit less fibrotic, but this was the  area of the concern of possible seminal vesicle or prostate invasion. As I continued to actually taking it down, I actually involved Dr. Ruby Malloy  at this point of the operation. Dr. Ruby Malloy then came into the room. I assured him my ureteral dissection  on the left side, which he agreed with was completely intact without  damage. He then performed a little bit of dissection of the prostate,  but did not require any prostate resection itself. Once we had gotten  past this area, I then continued dissecting down to the pelvic floor as  best as possible,  The anterior peritoneum was extremely fibrotic and  was very difficult given the narrow male pelvis as well as the fibrosis  from the radiation to get good angles on the instruments. The camera  continued to fog up constantly again because of the very narrow working  space, which again why a 22 modifier was billed above. Eventually, I was able to get pass the area of the fibrosis. The colon  itself was still quite thick. A flexible sigmoidoscopy was then used to  confirm that we were at least 3 or 4 cm past the area intraluminally,  but externally on the serosa muscularis on serosal side, it was still  quite fibrotic up until about 5 cm distal to the tumor.   At this point,  I chose a transection in the area just past the fibrosis. I attempted  to use a robotic green staple load, but mid way through the firing of  the stapler, there was an error message as the tissue was quite thick  and edematous and fibrotic and the stapler would not complete the cycle. I tried once again and similar had happened. At this point, I decided  to go ahead and just transect the rectum with cautery and then to sew it  by hand. Cautery was then used to continue the transection site. This  was about 4 cm or 5 cm from the anal verge having continued transecting  full thickness through this area. At this point, I then used several  2-0 silks in interrupted running fashion to close the rectal stump in  Kylah's closure type fashion. It did come together with a little bit  difficulty. Digital rectal exam and flexible sigmoidoscopy confirmed  that the rectal stump was completely closed. I irrigated out the pelvis  as well thoroughly as there was some possibility of some spillage during  this part of this procedure. At this point, the specimen was completely . We went back in  laparoscopically and grasped the specimen as well as the previous liver  retrieval bag. I then used cut cautery to incise the skin around the  colostomy. I dissected through the subcutaneous tissue down through the  abdominal wall. There is a fairly good size parastomal hernia, so I  came to the hernia sac as well and excised some of this as well. Now,  that we had an entry into the abdominal cavity, the both specimens  including the LAR and the liver resection specimens were removed. I  brought the LAR/rectal resection to the back table, photo documentation  was obtained in rectal cancer accreditation protocol. I ensured that  there was actually fairly really distal margin more than expected  intraluminally despite the extreme fibrosis on the external side.   This  will passed off the labeled as lower anterior resection for permanent  examination, status post chemoradiation. I scrubbed back in and we chose an area of transection of the colostomy. Again, we were converting a loop colostomy to end colostomy. We took  down remaining mesentery and used a Kocher clamp and scissors to excise  the colostomy, which was sent labeled as colostomy for permanent  examination. We then performed a parastomal hernia repair around the  new colostomy. Several 0 Vicryls figure-of-eight were used to cinch  down the fascia on the either side, so that accommodated about two  fingerbreadths. We also pursestring'd the skin down as well to come  together quite nicely just around the colostomy itself. I went back in laparoscopically. The 12 mm port site was removed and 0  Vicryl was used to close the fascia. I brought the omentum down to sit  in the pelvis as best as possible. I ensured that there was no twisting  in the colostomy or any other abnormalities. The laparoscopic robotic  ports were then removed under direct visualization. No bleeding noted  at the abdominal wall. The abdomen was desufflated. All the incisions  were then closed using a 4-0 Monocryl and skin glue. After the incision  was then closed, we brought our attention to the rematuration of the  colostomy. Several 3-0 Vicryls were then used to mature the colostomy  in a slightly brooking fashion. The mucosa appeared to be pink and  viable, it was not twisted. The colostomy punch was then applied. The patient tolerated the procedure well. He was then extubated and  brought to PACU after undergoing TAP block by Anesthesia. The patient's  family was updated on the operative findings. RECTAL CANCER STANDARD OPERATIVE NOTE     [facility]  1960  [id number]    REQUIRED STANDARD ELEMENTS:  1. ASA Score: 3  2. Case Status: elective  3. Operation performed: LAR with end colostomy  4. Modality: robotic  5. Location in rectum: middle  6. Height of lower edge of tumor from anal verge: 7-8  7. Mobilization of Splenic Flexure: NO  8. Level of Arterial Ligation: BONIAT  9. Level of Venous Ligation: high  10. Level of Distal transection from anal verge: 5 cm  11. Type of reconstruction: none  12. Anastomotic testing method: N/A  13. Creation of stoma: end colostomy  14. En bloc resection of other organs: none  15. Metastectomy: yes - liver lesion x 2 performed by Dr Iris Mclean  16. Completeness of resection: R0  17. Intraoperative complications: none  18. Blood transfusion: no  19.  TME photographed: yes  20: Narrative: see above    Marianna Curtis MD    D: 11/14/2022 14:31:42       T: 11/14/2022 21:28:04     [email or web address]  Job#: [id number]     Doc#: [id number]    CC:

## 2022-11-15 NOTE — PROGRESS NOTES
Surgery Daily Progress Note  Benna Music  CC:  rectal cancer, parastomal hernia    Subjective : no overnight events, VSS, episode of vomiting last night, nausea controlled with antiemetics, nausea controlled this morning , pain controlled at this time. Voiding per multani, + flatus through ostomy     Objective    Infusions:   sodium chloride      lactated ringers 125 mL/hr at 11/14/22 1521    dextrose          I/O:I/O last 3 completed shifts: In: 2900 [I.V.:2900]  Out: 500 [Urine:400; Blood:100]           Wt Readings from Last 1 Encounters:   11/15/22 196 lb 13.9 oz (89.3 kg)          LABS:    Recent Labs     11/14/22 0714   WBC 5.8   HGB 13.8   HCT 38.8*   MCV 90.7         No results for input(s): NA, K, CL, CO2, PHOS, BUN, CREATININE, CA in the last 72 hours. Recent Labs     11/14/22 0714   AST 53*   ALT 24   BILIDIR <0.2   BILITOT 0.7   ALKPHOS 66      No results for input(s): LIPASE, AMYLASE in the last 72 hours. Recent Labs     11/14/22  0714   PROT 7.9   INR 1.16*   APTT 30.6      No results for input(s): CKTOTAL, CKMB, CKMBINDEX, TROPONINI in the last 72 hours. Exam:BP (!) 111/52   Pulse 58   Temp 98.2 °F (36.8 °C) (Oral)   Resp 19   Ht 5' 11\" (1.803 m)   Wt 196 lb 13.9 oz (89.3 kg)   SpO2 97%   BMI 27.46 kg/m²     General appearance: alert, appears stated age and cooperative  Neck: trachea midline  Heart: regular rate and rhythm,   Lungs: unlabored  Skin: warm and dry, no rashes  Extremities: no edema, no cyanosis  : multani   Abdomen: soft, appropriately-tender, non distended, incisions well approximated closed with skin glue.  Colostomy viable       ASSESSMENT/PLAN: Pt. is a 58 y.o. male s/p robotic LAR, parastomal hernia repair, loop colostomy to end colostomy creation, partial liver resection x2 (right and left) 11/14/2022    - Continue current pain regimen   - Diet: regular   - Saline lock when taking adequate PO   - Discuss removal of multani catheter with urology   - OOB, ambulate   - DVT prophylaxis: Lovenox   - Patient with hx ostomy, no need for further teaching   - Dispo: Once tolerating diet and ostomy functioning, tentative today vs tomorrow         MELODY Cruz - CNP 11/15/2022 6:37 AM   Available via [employer] [phone] Loan Tsai W, F this week      Attending Supervising Physician's Attestation Statement  I performed a history and physical examination on the patient and discussed the management with the nurse practitioner. I reviewed and agree with the findings and plan as documented in her note . Overall looks pretty good. Discussed intraoperative findings. Keep Quach per urology instructions. Dispo in the next 1 to 2 days.     Electronically signed by Jesusa Felty, MD on 11/15/22 at 8:29 AM EST

## 2022-11-15 NOTE — PROGRESS NOTES
4 Eyes Admission Assessment     I agree as the admission nurse that 2 RN's have performed a thorough Head to Toe Skin Assessment on the patient. ALL assessment sites listed below have been assessed on admission. Areas assessed by both nurses:   [x]   Head, Face, and Ears   [x]   Shoulders, Back, and Chest- bug bites scattered on back  [x]   Arms, Elbows, and Hands   [x]   Coccyx, Sacrum, and Ischium  [x]   Legs, Feet, and Heels - bug bites scattered on BLE         Does the Patient have Skin Breakdown?   No         Jericho Prevention initiated:  No   Wound Care Orders initiated:  No      [facility] nurse consulted for Pressure Injury (Stage 3,4, Unstageable, DTI, NWPT, and Complex wounds) or Jericho score 18 or lower:  No      Nurse 1 eSignature: Electronically signed by Jessica Mccartney RN on 11/14/22 at 7:52 PM EST    **SHARE this note so that the co-signing nurse is able to place an eSignature**    Nurse 2 eSignature: Electronically signed by Lian Irving RN on 11/14/22 at 7:52 PM EST

## 2022-11-15 NOTE — PROGRESS NOTES
Urology Attending Progress Note      Subjective: No  complaints    Vitals:  /61   Pulse 54   Temp 98 °F (36.7 °C) (Oral)   Resp 16   Ht 5' 11\" (1.803 m)   Wt 196 lb 13.9 oz (89.3 kg)   SpO2 95%   BMI 27.46 kg/m²   Temp  Av.7 °F (36.5 °C)  Min: 97.3 °F (36.3 °C)  Max: 98.2 °F (36.8 °C)    Intake/Output Summary (Last 24 hours) at 11/15/2022 1115  Last data filed at 11/15/2022 1044  Gross per 24 hour   Intake 1660 ml   Output 855 ml   Net 805 ml       Exam: Multani clear    Labs:  WBC:    Lab Results   Component Value Date/Time    WBC 7.4 11/15/2022 08:36 AM     Hemoglobin/Hematocrit:    Lab Results   Component Value Date/Time    HGB 11.7 11/15/2022 08:36 AM    HCT 34.2 11/15/2022 08:36 AM     BMP:    Lab Results   Component Value Date/Time     11/15/2022 08:36 AM    K 4.4 11/15/2022 08:36 AM    K 4.1 2022 10:35 AM     11/15/2022 08:36 AM    CO2 27 11/15/2022 08:36 AM    BUN 18 11/15/2022 08:36 AM    LABALBU 3.7 11/15/2022 08:36 AM    CREATININE 1.1 11/15/2022 08:36 AM    CALCIUM 8.6 11/15/2022 08:36 AM    GFRAA >60 08/15/2022 02:42 PM    LABGLOM >60 11/15/2022 08:36 AM     PT/INR:    Lab Results   Component Value Date/Time    PROTIME 14.8 2022 07:14 AM    INR 1.16 2022 07:14 AM     PTT:    Lab Results   Component Value Date/Time    APTT 30.6 2022 07:14 AM   [APTT    Impression/Plan: 59 yo M with rectal cancer POD#1 sp robotic LAR, parastomal hernia repair, colostomy creation and liver resection. We were asked to evaluate for invasion of rectal cancer into bladder. Now following for multani management.    -No  complaints, multani clear  -Multani to remain for 3 days. If he is discharged prior, we discussed him self removing catheter on Friday AM and seeing us in the afternoon for PVR check. He is agreeable to plan.   -Call with any further questions     DARIEL Cerna

## 2022-11-15 NOTE — PROGRESS NOTES
Patient admitted post Robotic LAR, parastomal hernia repair, loop colostomy and partial liver resection 11/14. With 5 dermabond incison all clean, dry, and intact. With colostomy, draining minimal amount of blood. With multani; draining yellow and clear urine. With stable VS on room air. Given RTC tylenol as ordered. With PIV on L arm running with LR 1L x 125ml/hr. Blood sugar check 4 times a day. With Scd as ordered. Daily weights checked. Needs attended. Handed off to incoming nurse.

## 2022-11-16 VITALS
RESPIRATION RATE: 16 BRPM | HEIGHT: 71 IN | WEIGHT: 196.87 LBS | HEART RATE: 62 BPM | SYSTOLIC BLOOD PRESSURE: 130 MMHG | OXYGEN SATURATION: 97 % | TEMPERATURE: 98.4 F | DIASTOLIC BLOOD PRESSURE: 65 MMHG | BODY MASS INDEX: 27.56 KG/M2

## 2022-11-16 LAB
ALBUMIN SERPL-MCNC: 3.7 G/DL (ref 3.4–5)
ALBUMIN SERPL-MCNC: 3.8 G/DL (ref 3.4–5)
ALP BLD-CCNC: 61 U/L (ref 40–129)
ALT SERPL-CCNC: 163 U/L (ref 10–40)
ANION GAP SERPL CALCULATED.3IONS-SCNC: 8 MMOL/L (ref 3–16)
AST SERPL-CCNC: 107 U/L (ref 15–37)
BASOPHILS ABSOLUTE: 0 K/UL (ref 0–0.2)
BASOPHILS RELATIVE PERCENT: 0.1 %
BILIRUB SERPL-MCNC: 0.5 MG/DL (ref 0–1)
BILIRUBIN DIRECT: <0.2 MG/DL (ref 0–0.3)
BILIRUBIN, INDIRECT: ABNORMAL MG/DL (ref 0–1)
BUN BLDV-MCNC: 13 MG/DL (ref 7–20)
CALCIUM SERPL-MCNC: 8.7 MG/DL (ref 8.3–10.6)
CHLORIDE BLD-SCNC: 102 MMOL/L (ref 99–110)
CO2: 27 MMOL/L (ref 21–32)
CREAT SERPL-MCNC: 1.1 MG/DL (ref 0.8–1.3)
EOSINOPHILS ABSOLUTE: 0 K/UL (ref 0–0.6)
EOSINOPHILS RELATIVE PERCENT: 0.5 %
GFR SERPL CREATININE-BSD FRML MDRD: >60 ML/MIN/{1.73_M2}
GLUCOSE BLD-MCNC: 116 MG/DL (ref 70–99)
GLUCOSE BLD-MCNC: 91 MG/DL (ref 70–99)
HCT VFR BLD CALC: 32.9 % (ref 40.5–52.5)
HEMOGLOBIN: 11.6 G/DL (ref 13.5–17.5)
LYMPHOCYTES ABSOLUTE: 0.3 K/UL (ref 1–5.1)
LYMPHOCYTES RELATIVE PERCENT: 4 %
MAGNESIUM: 2.5 MG/DL (ref 1.8–2.4)
MCH RBC QN AUTO: 32.4 PG (ref 26–34)
MCHC RBC AUTO-ENTMCNC: 35.3 G/DL (ref 31–36)
MCV RBC AUTO: 91.7 FL (ref 80–100)
MONOCYTES ABSOLUTE: 0.5 K/UL (ref 0–1.3)
MONOCYTES RELATIVE PERCENT: 7.3 %
NEUTROPHILS ABSOLUTE: 5.7 K/UL (ref 1.7–7.7)
NEUTROPHILS RELATIVE PERCENT: 88.1 %
PDW BLD-RTO: 14.7 % (ref 12.4–15.4)
PERFORMED ON: ABNORMAL
PHOSPHORUS: 2.6 MG/DL (ref 2.5–4.9)
PLATELET # BLD: 120 K/UL (ref 135–450)
PMV BLD AUTO: 7.9 FL (ref 5–10.5)
POTASSIUM SERPL-SCNC: 3.8 MMOL/L (ref 3.5–5.1)
RBC # BLD: 3.59 M/UL (ref 4.2–5.9)
SODIUM BLD-SCNC: 137 MMOL/L (ref 136–145)
TOTAL PROTEIN: 6.3 G/DL (ref 6.4–8.2)
WBC # BLD: 6.5 K/UL (ref 4–11)

## 2022-11-16 PROCEDURE — 6370000000 HC RX 637 (ALT 250 FOR IP): Performed by: STUDENT IN AN ORGANIZED HEALTH CARE EDUCATION/TRAINING PROGRAM

## 2022-11-16 PROCEDURE — 99024 POSTOP FOLLOW-UP VISIT: CPT | Performed by: SURGERY

## 2022-11-16 PROCEDURE — 6370000000 HC RX 637 (ALT 250 FOR IP): Performed by: SURGERY

## 2022-11-16 PROCEDURE — 6360000002 HC RX W HCPCS: Performed by: SURGERY

## 2022-11-16 PROCEDURE — 83735 ASSAY OF MAGNESIUM: CPT

## 2022-11-16 PROCEDURE — 80076 HEPATIC FUNCTION PANEL: CPT

## 2022-11-16 PROCEDURE — 85025 COMPLETE CBC W/AUTO DIFF WBC: CPT

## 2022-11-16 PROCEDURE — 36415 COLL VENOUS BLD VENIPUNCTURE: CPT

## 2022-11-16 PROCEDURE — 2580000003 HC RX 258: Performed by: SURGERY

## 2022-11-16 PROCEDURE — 80069 RENAL FUNCTION PANEL: CPT

## 2022-11-16 RX ORDER — DOCUSATE SODIUM 100 MG/1
100 CAPSULE, LIQUID FILLED ORAL 2 TIMES DAILY
Qty: 14 CAPSULE | Refills: 0 | Status: SHIPPED | OUTPATIENT
Start: 2022-11-16 | End: 2022-11-16 | Stop reason: SDUPTHER

## 2022-11-16 RX ORDER — DOCUSATE SODIUM 100 MG/1
100 CAPSULE, LIQUID FILLED ORAL 2 TIMES DAILY
Qty: 14 CAPSULE | Refills: 0 | Status: SHIPPED | OUTPATIENT
Start: 2022-11-16 | End: 2022-11-23

## 2022-11-16 RX ORDER — OXYCODONE HYDROCHLORIDE 5 MG/1
5 TABLET ORAL EVERY 6 HOURS PRN
Qty: 28 TABLET | Refills: 0 | Status: SHIPPED | OUTPATIENT
Start: 2022-11-16 | End: 2022-11-16 | Stop reason: SDUPTHER

## 2022-11-16 RX ORDER — OXYCODONE HYDROCHLORIDE 5 MG/1
5 TABLET ORAL EVERY 6 HOURS PRN
Qty: 28 TABLET | Refills: 0 | Status: SHIPPED | OUTPATIENT
Start: 2022-11-16 | End: 2022-11-23

## 2022-11-16 RX ADMIN — OXYCODONE 5 MG: 5 TABLET ORAL at 06:01

## 2022-11-16 RX ADMIN — ACETAMINOPHEN 650 MG: 325 TABLET ORAL at 08:19

## 2022-11-16 RX ADMIN — DIBASIC SODIUM PHOSPHATE, MONOBASIC POTASSIUM PHOSPHATE AND MONOBASIC SODIUM PHOSPHATE 2 TABLET: 852; 155; 130 TABLET ORAL at 11:10

## 2022-11-16 RX ADMIN — SODIUM CHLORIDE, PRESERVATIVE FREE 10 ML: 5 INJECTION INTRAVENOUS at 08:20

## 2022-11-16 RX ADMIN — TEMAZEPAM 15 MG: 15 CAPSULE ORAL at 00:43

## 2022-11-16 RX ADMIN — ACETAMINOPHEN 650 MG: 325 TABLET ORAL at 00:43

## 2022-11-16 RX ADMIN — METOPROLOL SUCCINATE 25 MG: 25 TABLET, EXTENDED RELEASE ORAL at 08:19

## 2022-11-16 RX ADMIN — METHOCARBAMOL 500 MG: 500 TABLET ORAL at 08:19

## 2022-11-16 RX ADMIN — FLUOXETINE 10 MG: 10 CAPSULE ORAL at 08:19

## 2022-11-16 RX ADMIN — ENOXAPARIN SODIUM 40 MG: 100 INJECTION SUBCUTANEOUS at 08:19

## 2022-11-16 ASSESSMENT — PAIN SCALES - GENERAL
PAINLEVEL_OUTOF10: 4
PAINLEVEL_OUTOF10: 0

## 2022-11-16 NOTE — PROGRESS NOTES
AVS reviewed with patient with all questions answered. IV removed, cannula intact. Educated on multani/ostomy care. Multani drainage bag exchanged for leg bag. Belongings packed up and at patient's bedside. Pt currently awaiting ride, will d/c via wheelchair to home.

## 2022-11-16 NOTE — PROGRESS NOTES
Pt alert and oriented. VSS. Pt reporting nausea at beginning of shift, PRN Zofran given with little benefit. Surgical residents contacted and added PRN Compazine. Pt reporting surgical pain that is being controlled with PRN Roxicodone and scheduled Tylenol, see MAR. Pt has multani in place with adequate urine output. Pt has call light within reach, bed in lowest position with wheels locked, 2/4 side rails up, and bed alarm on. Will continue to monitor.

## 2022-11-16 NOTE — PROGRESS NOTES
Surgery Daily Progress Note  Kathryn Thibodeaux  CC:  rectal cancer, parastomal hernia    Subjective :   No acute events overnight. Remains afebrile, hemodynamically stable on room air. Pain well-controlled on oral pain medications. Ambulating without issue. Tolerating diet. Ostomy functioning. Objective     Exam:/64   Pulse 55   Temp 97.5 °F (36.4 °C) (Oral)   Resp 18   Ht 5' 11\" (1.803 m)   Wt 196 lb 13.9 oz (89.3 kg)   SpO2 95%   BMI 27.46 kg/m²     General appearance: alert, appears stated age and cooperative  Neck: trachea midline  Heart: regular rate and rhythm,   Lungs: normal work of breathing, symmetric chest rise  Skin: warm and dry, no rashes  Extremities: no edema, no cyanosis  Abdomen: soft, appropriately-tender to palpation, non-distended, incisions well-approximated with overlying skin glue. Colostomy viable with small amount of stool in ostomy  : Quach catheter in-place     Infusions:   sodium chloride      dextrose          I/O:I/O last 3 completed shifts: In: 3080 [P.O.:180; I.V.:2900]  Out: 6243 [Urine:2125; Blood:100]           Wt Readings from Last 1 Encounters:   11/15/22 196 lb 13.9 oz (89.3 kg)          LABS:    Recent Labs     11/15/22  0836 11/16/22  0550   WBC 7.4 6.5   HGB 11.7* 11.6*   HCT 34.2* 32.9*   MCV 94.1 91.7   * 120*        Recent Labs     11/15/22  0836      K 4.4      CO2 27   PHOS 2.4*   BUN 18   CREATININE 1.1        Recent Labs     11/14/22  0714 11/15/22  0836   AST 53* 165*   ALT 24 178*   BILIDIR <0.2 <0.2   BILITOT 0.7 0.5   ALKPHOS 66 57      No results for input(s): LIPASE, AMYLASE in the last 72 hours. Recent Labs     11/14/22  0714 11/15/22  0836   PROT 7.9 5.6*   INR 1.16*  --    APTT 30.6  --       No results for input(s): CKTOTAL, CKMB, CKMBINDEX, TROPONINI in the last 72 hours.              ASSESSMENT/PLAN: Pt. is a 58 y.o. male s/p robotic LAR, parastomal hernia repair, loop colostomy to end colostomy creation, partial liver resection x2 (right and left) 11/14/2022, POD #2.    - Continue current pain regimen   - Diet: regular   - Quach catheter to remain in-place until Friday 11/18 per urology recommendations  - OOB, ambulate   - DVT prophylaxis: Lovenox   - Patient with history of prior ostomy, feels comfortable with management at home  - Dispo: Once tolerating diet and ostomy functioning, tentative discharge home today    Kenji Hoyos.  Brooke Keith MD  General Surgery, PGY-3  11/16/22  6:10 AM  [phone]

## 2022-11-17 ENCOUNTER — TELEPHONE (OUTPATIENT)
Dept: SURGERY | Age: 62
End: 2022-11-17

## 2022-11-17 NOTE — TELEPHONE ENCOUNTER
Post-op Call     Post-operative call completed: Yes   Has post-op appointment scheduled: Yes   Date/time: 11/29 @ 1130 (Immediately following post-op appt with Dr. Massiel Juarez). Aware of when to restart blood thinners: N/A  Pain controlled on current regimen:  yes  Tolerating diet:  yes    Montgomery Mark reports he is feeling well. Minimal pain, bloating has subsided. Has good appetite. Instructed to call with any questions or concerns. Verbalized understanding.

## 2022-11-18 ENCOUNTER — CLINICAL DOCUMENTATION (OUTPATIENT)
Dept: SURGERY | Age: 62
End: 2022-11-18

## 2022-11-18 NOTE — PROGRESS NOTES
Yadi Goss  [id number]  1960    Rectal Cancer Tumor Board Post Surgical Discussion Summary Report  Date of presentation: 11/18/22    - Clinical Stage: [id number]  - Pretreatment CEA: 18.6 (2/15/22)  - Neoadjuvant therapy before surgery: yes   - Type of neoadjuvant therapy: FOLFOX x 9 cycles, 4500 cGy/xeloda  - Neoadjuvant therapy date of completion: 9/21/22  - Surgical procedure: Robotic partial hepatectomy x2, robotic low anterior resection with end colostomy  - Date of surgery: 11/14/22  - Final path stage: [id number]  - Tumor regression Grade: partial response, score 2  - MSI status: intact  - CRM: neg - 1.5 cm  - Distal resection margin: neg - 4 cm  - Mesorectal grade: near complete  - Recommendation for adjuvant therapy and regimen: Consider BRAF/Caris testing, consider SBRT vs Y-90 for residual or recurrent liver metastases    Other:  -Liver resection x2 shows viable metastatic adenocarcinoma with an area of fibrosis. Resection margins negative on both specimens.

## 2022-11-18 NOTE — OP NOTE
[place] [id number]                                OPERATIVE REPORT    PATIENT NAME: PINO BRAND                   :        1960  MED REC NO:   [id number]                          ROOM:       5323  ACCOUNT NO:   [id number]                           ADMIT DATE: 2022  PROVIDER:     Erick Dobson MD    DATE OF PROCEDURE:  2022    PREOPERATIVE DIAGNOSES:  Rectal cancer with prostate invasion and liver  metastasis.    POSTOPERATIVE DIAGNOSES:  Rectal cancer with prostate invasion and liver  metastasis.    PROCEDURE PERFORMED:  Isolation and preservation of prostate/seminal  vesicle and ureters.    SURGEON:  Atilio Gar MD    CO-SURGEON:  Erick Dobson MD    INDICATION FOR PROCEDURE:  The patient is well known to me.  He is a  62-year-old gentleman with aggressive colorectal cancer.  Initially, the  cancer was invading the prostate and seminal vesicles.  He has now been  treated with neoadjuvant chemotherapy and radiation therapy.   Preoperative MRI shows possible prostatic involvement but on  close  inspection, I do believe there is a plane that preserved his bladder and  prostate.  I had been asked by Dr. Gar to assist with preservation of  urologic structures.    DESCRIPTION OF PROCEDURE:  The patient is already asleep and has  undergone extensive dissection with Dr. Gar.  I come in during the  extreme pelvic portion of the case.  I identified the ureters and  followed them into the pelvis.  We are way distal to this thankfully.  I  now hold the bladder cephalad and basically help carve fibrotic tissue  off of the bladder, prostate, and seminal vesicles.  We basically are  distal to the prostate and that gives us a clean margin.  Therefore we  are able to save his bladder and prostate at this time and I do believe  our margins look great from anterior rectal perspective and a  posterior  bladder wall/prostate/seminal vesicle perspective. I am still in the  room when we do a colonoscopy and see that the mass is proximal to where  we have dissected distally. I am quite happy with this. Dr. Clarke Hughes now  attempts to staple and all urologic structures were preserved without  any evidence of rectal injury. ESTIMATED BLOOD LOSS:  For my portion of the case is 25 mL. COMPLICATIONS:  None. PLAN:  We will leave Quach catheter x3 days.         Lorraine Prajapati MD    D: 11/17/2022 8:40:28       T: 11/17/2022 11:44:09     [email or web address]  Job#: [id number]     Doc#: [id number]    CC:

## 2022-11-18 NOTE — RESULT ENCOUNTER NOTE
Attempted to call him with pathology results. Left voicemail for him to call back at his convenience.

## 2022-11-20 ENCOUNTER — TELEPHONE (OUTPATIENT)
Dept: INTERNAL MEDICINE CLINIC | Age: 62
End: 2022-11-20

## 2022-11-20 RX ORDER — NIRMATRELVIR AND RITONAVIR 300-100 MG
KIT ORAL
Qty: 30 TABLET | Refills: 0 | Status: SHIPPED | OUTPATIENT
Start: 2022-11-20 | End: 2022-11-25

## 2022-11-21 PROBLEM — T50.905A DRUG-INDUCED THROMBOCYTOPENIA: Status: ACTIVE | Noted: 2022-11-21

## 2022-11-21 PROBLEM — D69.59 DRUG-INDUCED THROMBOCYTOPENIA: Status: ACTIVE | Noted: 2022-11-21

## 2022-11-28 NOTE — PROGRESS NOTES
[facility]  [facility] [address] [address] [facility], [address] [place] [address] Drive [address]  Phone: [phone]  Fax: [phone]    Visit Date: 11/29/2022    Subjective:     Gudelia Merchant is a 58 y.o. male here for postoperative visit after robotic/laparoscopic partial liver resection x 2 (left and right lobe) with Dr. Isidra Macario and robotic APR, colostomy reversal and diverting ileostomy with Dr. Katie Romo on 11/14/22. Overall feeling well today. He had Covid after surgery but symptoms were mild and have resolved. Today he complains of some intermittent rectal pain. Tolerating diet, having bowel movements. Has lost about 10 lbs since surgery which he attributes to lack of appetite, but states this is improving. Objective:     /72 (Site: Right Upper Arm)   Temp 97.8 °F (36.6 °C) (Temporal)   Ht 5' 11\" (1.803 m)   Wt 187 lb (84.8 kg)   BMI 26.08 kg/m²     General:  Alert, oriented x 3, cooperative. Skin: Skin color, texture, turgor normal.    Lymph nodes: Cervical, supraclavicular, and axillary nodes normal.   HENT:  Normocephalic, without obvious abnormality. Moist mucus membranes. No icterus. Lungs: No respiratory distress. Abdomen: Soft, non-tender. No masses,  No organomegaly. Ileostomy pink and viable. Lap incisions well healing, edges approximated. Path - FINAL DIAGNOSIS:     A. Resection, partial resection left liver lesion:      Residual viable metastatic adenocarcinoma identified within an area of fibrosis and scar measuring approximately       0.7 to 0.8 cm. Resection margins are negative for malignancy. B. Resection, partial resection right liver lesion:      Involved with metastatic adenocarcinoma with lesion approximately 2.5cm in greatest extent. Resection margins are negative for malignancy.      C. Low anterior resection:      Involved with residual viable moderately differentiated adenocarcinoma with viable adenocarcinoma identified within a polypoid focus measuring 2.8 cm. Residual invasive adenocarcinoma is focally seen involving fibrotic        perirectal adipose tissue in areas of prominent necrosis with small foci of residual viable carcinoma identified, no serosal involvement. Prominent fibrosis, extravasated blood and inflammation. Resection margins are negative for malignancy. Rare, microscopic focus of residual viable metastatic adenocarcinoma        identified in 2 enlarged, necrotic lymph nodes identified; remaining lymph nodes negative for metastatic carcinoma ( 2/13). Two additional lymph nodes with prominent fibrous scarring, however no evidence of viable carcinoma. D. Revision of colostomy tissue:      Colostomy revision tissue with nonspecific reactive and inflammatory changes. No evidence of malignancy. Assessment/Plan:      Diagnosis Orders   1. Liver metastases ([address]        2. 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 Encounter for post surgical wound check            Doing well with no post-operative complications. Pathology reviewed with patient. Following up with Dr. Qian Butler to discuss role of adjuvant chemotherapy. Discussed timing of imaging- recommend return in 3 months after next images. Can schedule to see Dr. Annabel Wililamson the same day as Dr. Annalise Kenny. Discussed ways to prevent hernia formation.      Electronically signed by MELODY Evangelista CNP on 11/29/2022 at 12:15 PM

## 2022-11-29 ENCOUNTER — OFFICE VISIT (OUTPATIENT)
Dept: SURGERY | Age: 62
End: 2022-11-29

## 2022-11-29 VITALS
HEIGHT: 71 IN | OXYGEN SATURATION: 98 % | HEART RATE: 79 BPM | RESPIRATION RATE: 16 BRPM | BODY MASS INDEX: 26.18 KG/M2 | WEIGHT: 187 LBS | SYSTOLIC BLOOD PRESSURE: 134 MMHG | DIASTOLIC BLOOD PRESSURE: 85 MMHG | TEMPERATURE: 97.8 F

## 2022-11-29 VITALS
SYSTOLIC BLOOD PRESSURE: 112 MMHG | HEIGHT: 71 IN | BODY MASS INDEX: 26.18 KG/M2 | WEIGHT: 187 LBS | DIASTOLIC BLOOD PRESSURE: 72 MMHG | TEMPERATURE: 97.8 F

## 2022-11-29 DIAGNOSIS — C78.7 LIVER METASTASES (HCC): Primary | ICD-10-CM

## 2022-11-29 DIAGNOSIS — C20 RECTAL CANCER (HCC): ICD-10-CM

## 2022-11-29 DIAGNOSIS — K94.19 INTESTINAL STOMA PROLAPSE (HCC): ICD-10-CM

## 2022-11-29 DIAGNOSIS — C20 RECTAL CANCER (HCC): Primary | ICD-10-CM

## 2022-11-29 DIAGNOSIS — Z48.89 ENCOUNTER FOR POST SURGICAL WOUND CHECK: ICD-10-CM

## 2022-11-29 PROCEDURE — 99024 POSTOP FOLLOW-UP VISIT: CPT | Performed by: NURSE PRACTITIONER

## 2022-11-29 PROCEDURE — 99024 POSTOP FOLLOW-UP VISIT: CPT | Performed by: SURGERY

## 2022-11-30 NOTE — DISCHARGE SUMMARY
Discharge Summary      Patient:  Will Barnett Date: 11/14/2022  5:36 AM    Discharge Date: 11/16/2022 11:43 AM    Admitting Physician: Patricia Burt MD     Discharge Physician: same    Admitting Diagnosis:  Rectal cancer [facility])     Discharge Diagnosis: same     Past Medical History:   Diagnosis Date    Diabetes mellitus ([facility]     Difficulty voiding     History of blood transfusion 02/2022    does with- no reactions    Hypertension     Intestinal stoma prolapse ([facility] 08/12/2022    Rectal cancer ([facility]         Indication for Admission:   Patient presents for scheduled elected surgery and was admitted for post op care. Hospital Course:   11/14: Patient underwent procedures listed below without complications. At post-op check, pain was controlled. Multani was in place. 11/15: Patient stable and rested well overnight. Had an episode of vomiting. Voiding per multani, had flatus through ostomy. 11/16: Stable, doing well. Ostomy functioning. Tolerating diet. Patient with history of prior ostomy, felt comfortable with management of ostomy at home. He was discharged with follow-up care and appointments with multani in place until 11/18 per urology. Procedures:  Robotic LAR, parastomal hernia repair, loop colostomy to end colostomy creation, partial liver resection x2 (right and left)    Consulting services:  Wound care for ostomy     Discharge physical exam:  General appearance: alert, appears stated age and cooperative  Neck: trachea midline  Heart: regular rate and rhythm,   Lungs: normal work of breathing, symmetric chest rise  Skin: warm and dry, no rashes  Extremities: no edema, no cyanosis  Abdomen: soft, appropriately-tender to palpation, non-distended, incisions well-approximated with overlying skin glue.  Colostomy viable with small amount of stool in ostomy  : Multani catheter in-place    Disposition:  home    Condition at discharge:  Stable    Discharge Instructions:  See separate form    Patient Instructions:      Medication List        CONTINUE taking these medications      cloNIDine 0.1 MG tablet  Commonly known as: CATAPRES     FLUoxetine 10 MG capsule  Commonly known as: PROZAC  Take 1 capsule by mouth daily     HumaLOG KwikPen 200 UNIT/ML Sopn pen  Generic drug: insulin lispro  Take 4 units for blood sugar reading of 150-200, 6 units for 201-250, 8units for 251-300, 10 units for 301-350     insulin glargine 100 UNIT/ML injection vial  Commonly known as: LANTUS     lisinopril 20 MG tablet  Commonly known as: PRINIVIL;ZESTRIL  Take 1 tablet by mouth daily     metFORMIN 500 MG tablet  Commonly known as: GLUCOPHAGE  TAKE 1 TABLET BY MOUTH TWICE DAILY WITH MEALS     metoprolol succinate 50 MG extended release tablet  Commonly known as: TOPROL XL     ondansetron 4 MG disintegrating tablet  Commonly known as: Zofran ODT  Place 1 tablet under the tongue every 8 hours as needed for Nausea or Vomiting     temazepam 15 MG capsule  Commonly known as: RESTORIL            STOP taking these medications      metroNIDAZOLE 500 MG tablet  Commonly known as: Flagyl     neomycin 500 MG tablet  Commonly known as: MYCIFRADIN            ASK your doctor about these medications      docusate sodium 100 MG capsule  Commonly known as: Colace  Take 1 capsule by mouth 2 times daily for 7 days Please take while taking narcotic pain medicine. If you develop loose or watery stools, then stop taking. Ask about: Should I take this medication?     oxyCODONE 5 MG immediate release tablet  Commonly known as: Roxicodone  Take 1 tablet by mouth every 6 hours as needed for Pain for up to 7 days. Intended supply: 7 days. Take lowest dose possible to manage pain  Ask about: Should I take this medication?                Where to Get Your Medications        You can get these medications from any pharmacy    Bring a paper prescription for each of these medications  docusate sodium 100 MG capsule  oxyCODONE 5 MG immediate release derrek Leach MD  11/30/22  11:45 AM

## 2022-12-03 PROBLEM — Z01.818 PREOPERATIVE GENERAL PHYSICAL EXAMINATION: Status: RESOLVED | Noted: 2022-11-03 | Resolved: 2022-12-03

## 2022-12-04 ENCOUNTER — HOSPITAL ENCOUNTER (EMERGENCY)
Age: 62
Discharge: HOME | End: 2022-12-04
Payer: COMMERCIAL

## 2022-12-04 ENCOUNTER — APPOINTMENT (OUTPATIENT)
Dept: CT IMAGING | Age: 62
End: 2022-12-04
Payer: COMMERCIAL

## 2022-12-04 ENCOUNTER — NURSE TRIAGE (OUTPATIENT)
Dept: OTHER | Facility: CLINIC | Age: 62
End: 2022-12-04

## 2022-12-04 VITALS
RESPIRATION RATE: 14 BRPM | HEART RATE: 63 BPM | DIASTOLIC BLOOD PRESSURE: 69 MMHG | TEMPERATURE: 97.9 F | WEIGHT: 193.8 LBS | BODY MASS INDEX: 27.13 KG/M2 | OXYGEN SATURATION: 100 % | HEIGHT: 71 IN | SYSTOLIC BLOOD PRESSURE: 145 MMHG

## 2022-12-04 DIAGNOSIS — K62.5 RECTAL BLEEDING: Primary | ICD-10-CM

## 2022-12-04 LAB
A/G RATIO: 1 (ref 1.1–2.2)
ALBUMIN SERPL-MCNC: 3.5 G/DL (ref 3.4–5)
ALP BLD-CCNC: 111 U/L (ref 40–129)
ALT SERPL-CCNC: 14 U/L (ref 10–40)
ANION GAP SERPL CALCULATED.3IONS-SCNC: 10 MMOL/L (ref 3–16)
APTT: 36.6 SEC (ref 23–34.3)
AST SERPL-CCNC: 13 U/L (ref 15–37)
BASOPHILS ABSOLUTE: 0.1 K/UL (ref 0–0.2)
BASOPHILS RELATIVE PERCENT: 0.6 %
BILIRUB SERPL-MCNC: <0.2 MG/DL (ref 0–1)
BUN BLDV-MCNC: 24 MG/DL (ref 7–20)
CALCIUM SERPL-MCNC: 9.1 MG/DL (ref 8.3–10.6)
CHLORIDE BLD-SCNC: 101 MMOL/L (ref 99–110)
CO2: 26 MMOL/L (ref 21–32)
CREAT SERPL-MCNC: 0.9 MG/DL (ref 0.8–1.3)
EOSINOPHILS ABSOLUTE: 0.2 K/UL (ref 0–0.6)
EOSINOPHILS RELATIVE PERCENT: 1.8 %
GFR SERPL CREATININE-BSD FRML MDRD: >60 ML/MIN/{1.73_M2}
GLUCOSE BLD-MCNC: 167 MG/DL (ref 70–99)
HCT VFR BLD CALC: 30.1 % (ref 40.5–52.5)
HEMOGLOBIN: 10.1 G/DL (ref 13.5–17.5)
INR BLD: 0.99 (ref 0.87–1.14)
LACTIC ACID, SEPSIS: 0.9 MMOL/L (ref 0.4–1.9)
LYMPHOCYTES ABSOLUTE: 0.4 K/UL (ref 1–5.1)
LYMPHOCYTES RELATIVE PERCENT: 5 %
MCH RBC QN AUTO: 29.6 PG (ref 26–34)
MCHC RBC AUTO-ENTMCNC: 33.5 G/DL (ref 31–36)
MCV RBC AUTO: 88.4 FL (ref 80–100)
MONOCYTES ABSOLUTE: 0.6 K/UL (ref 0–1.3)
MONOCYTES RELATIVE PERCENT: 6.9 %
NEUTROPHILS ABSOLUTE: 7.6 K/UL (ref 1.7–7.7)
NEUTROPHILS RELATIVE PERCENT: 85.7 %
PDW BLD-RTO: 14.9 % (ref 12.4–15.4)
PLATELET # BLD: 258 K/UL (ref 135–450)
PMV BLD AUTO: 8.3 FL (ref 5–10.5)
POTASSIUM REFLEX MAGNESIUM: 4.6 MMOL/L (ref 3.5–5.1)
PROTHROMBIN TIME: 13 SEC (ref 11.7–14.5)
RBC # BLD: 3.41 M/UL (ref 4.2–5.9)
SODIUM BLD-SCNC: 137 MMOL/L (ref 136–145)
TOTAL PROTEIN: 7 G/DL (ref 6.4–8.2)
WBC # BLD: 8.9 K/UL (ref 4–11)

## 2022-12-04 PROCEDURE — 74174 CTA ABD&PLVS W/CONTRAST: CPT

## 2022-12-04 PROCEDURE — 36415 COLL VENOUS BLD VENIPUNCTURE: CPT

## 2022-12-04 PROCEDURE — 80053 COMPREHEN METABOLIC PANEL: CPT

## 2022-12-04 PROCEDURE — 85610 PROTHROMBIN TIME: CPT

## 2022-12-04 PROCEDURE — 85730 THROMBOPLASTIN TIME PARTIAL: CPT

## 2022-12-04 PROCEDURE — 85025 COMPLETE CBC W/AUTO DIFF WBC: CPT

## 2022-12-04 PROCEDURE — 99285 EMERGENCY DEPT VISIT HI MDM: CPT

## 2022-12-04 PROCEDURE — 83605 ASSAY OF LACTIC ACID: CPT

## 2022-12-04 PROCEDURE — 6360000004 HC RX CONTRAST MEDICATION: Performed by: PHYSICIAN ASSISTANT

## 2022-12-04 RX ADMIN — IOPAMIDOL 100 ML: 612 INJECTION, SOLUTION INTRATHECAL at 09:17

## 2022-12-04 ASSESSMENT — PAIN - FUNCTIONAL ASSESSMENT: PAIN_FUNCTIONAL_ASSESSMENT: 0-10

## 2022-12-04 ASSESSMENT — PAIN SCALES - GENERAL: PAINLEVEL_OUTOF10: 3

## 2022-12-04 NOTE — ED PROVIDER NOTES
[facility]        Pt Name: Kathryn Thibodeaux  MRN: [id number]  [place] 1960  Date of evaluation: 12/4/2022  Provider: Lenore Angelo PA-C  PCP: Melissa Franco MD  Note Started: 8:54 AM EST 12/4/22          ARACELIS. I have evaluated this patient. My supervising physician was available for consultation. CHIEF COMPLAINT       Chief Complaint   Patient presents with    Rectal Bleeding     Had rectal cancer removed two weeks ago. Four days ago started passing blood. Also having abdominal pain. Blood is bright red. HISTORY OF PRESENT ILLNESS   (Location, Timing/Onset, Context/Setting, Quality, Duration, Modifying Factors, Severity, Associated Signs and Symptoms)  Note limiting factors. Chief Complaint: Rectal bleeding    Kathryn Thibodeaux is a 58 y.o. male who presents to the emergency department with a chief complaint of a 3-day history of bright red blood per rectum. States each time he goes he passes about 50 mL of bright red blood. States he is having the urge to defecate. Has history of colorectal cancer with recent surgery with robotic lower APR, colostomy reversal, diverting ileostomy. He states he is not passing any blood or black stools through his ostomy. He does have some pressure in his lower abdomen that he rates at a 2 out of 10 associated with this. Besides recent surgery only has other history of exploratory surgery when he was 6. States he is slightly nauseous but denies vomiting, fevers, dizziness, shortness of breath, chest pain, dysuria, hematuria, difficulty urinating or any other symptoms. Nursing Notes were all reviewed and agreed with or any disagreements were addressed in the HPI. REVIEW OF SYSTEMS    (2-9 systems for level 4, 10 or more for level 5)     Review of Systems    Positives and Pertinent negatives as per HPI.  Except as noted above in the ROS, all other systems were reviewed and negative.        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    Rectal cancer ([facility]          SURGICAL HISTORY     Past Surgical History:   Procedure Laterality Date    ABDOMEN SURGERY  1975    Exploratory    COLONOSCOPY N/A 10/19/2022    COLONOSCOPY DIAGNOSTIC/STOMA performed by Palmira Nevarez MD at [facility] N/A 2/28/2022    LAPAROSCOPIC COLOSTOMY CREATION, laparoscopic incarcerated hernia repair performed by Palmira Nevarez MD at [address] [facility] Bilateral 11/14/2022    ROBOTIC / LAPAROSCOPIC PARTIAL LIVER RESECTION X 2 (RIGHT AND LEFT LOBE), POSSIBLE OPEN performed by Iqra Davis MD at [address] [place] [address] N/A 2/17/2022    PORT A CATH INSERTION [place] [address] [place] [address] 11/14/2022    ROBOTIC LOWER ABDOMINOPERINEAL RESECTION, COLOSTOMY REVERSAL, DIVERTING ILEOSTOMY, , ABDOMINO PERINEAL RESECTION WITH PERMANENT COLOSTOMY performed by Palmira Nevarez MD at [address] [place] [address] 2/15/2022    SIGMOIDOSCOPY BIOPSY FLEXIBLE performed by Sahara Chaparro MD at [address] [facility]  2/15/2022    SIGMOIDOSCOPY POLYP SNARE performed by Sahara Chaparro MD at [address]       Discharge Medication List as of 12/4/2022 10:46 AM        CONTINUE these medications which have NOT CHANGED    Details   cloNIDine (CATAPRES) 0.1 MG tablet Take 0.1 mg by mouth Take 0.1 mg at bedtime and if BP > 140/85, take a second doseHistorical Med      insulin glargine (LANTUS) 100 UNIT/ML injection vial Inject 16 Units into the skin nightlyHistorical Med      metoprolol succinate (TOPROL XL) 50 MG extended release tablet Take 50 mg by mouth dailyHistorical Med      temazepam (RESTORIL) 15 MG capsule TAKE 1 CAPSULE BY MOUTH EVERY NIGHT AS NEEDED FOR SLEEPHistorical Med      FLUoxetine (PROZAC) 10 MG capsule Take 1 capsule by mouth daily, Disp-90 capsule, R-1Normal      lisinopril (PRINIVIL;ZESTRIL) 20 MG tablet Take 1 tablet by mouth daily, Disp-90 tablet, R-1Normal      metFORMIN (GLUCOPHAGE) 500 MG tablet TAKE 1 TABLET BY MOUTH TWICE DAILY WITH MEALS, Disp-180 tablet, R-1Normal      ondansetron (ZOFRAN ODT) 4 MG disintegrating tablet Place 1 tablet under the tongue every 8 hours as needed for Nausea or Vomiting, Disp-3 tablet, R-0Normal      insulin lispro (HUMALOG KWIKPEN) 200 UNIT/ML SOPN pen Take 4 units for blood sugar reading of 150-200, 6 units for 201-250, 8units for 251-300, 10 units for 301-350, Disp-5 pen, R-5Normal               ALLERGIES     Patient has no known allergies. FAMILYHISTORY       Family History   Problem Relation Age of Onset    Heart Failure Mother     Diabetes Father     Hypertension Father           SOCIAL HISTORY       Social History     Tobacco Use    Smoking status: Never    Smokeless tobacco: Never   Vaping Use    Vaping Use: Never used    Passive vaping exposure: Yes   Substance Use Topics    Alcohol use: Not Currently     Comment: not in last 18 months    Drug use: Never       SCREENINGS    [place] Coma Scale  Eye Opening: Spontaneous  Best Verbal Response: Oriented  Best Motor Response: Obeys commands  [place] Coma Scale Score: 15        PHYSICAL EXAM    (up to 7 for level 4, 8 or more for level 5)     ED Triage Vitals [12/04/22 0727]   BP Temp Temp Source Heart Rate Resp SpO2 Height Weight   (!) 145/69 97.9 °F (36.6 °C) Oral 63 14 100 % 5' 11\" (1.803 m) 193 lb 12.8 oz (87.9 kg)       Physical Exam  Vitals and nursing note reviewed. Constitutional:       Appearance: He is well-developed. He is not diaphoretic. HENT:      Head: Atraumatic. Nose: Nose normal.   Eyes:      General:         Right eye: No discharge. Left eye: No discharge.    Cardiovascular:      Rate and Rhythm: Normal rate and regular rhythm. Heart sounds: No murmur heard. No friction rub. No gallop. Pulmonary:      Effort: Pulmonary effort is normal. No respiratory distress. Breath sounds: No stridor. No wheezing, rhonchi or rales. Abdominal:      General: Bowel sounds are normal. There is no distension. Palpations: Abdomen is soft. There is no mass. Tenderness: There is no abdominal tenderness. There is no guarding or rebound. Hernia: No hernia is present. Comments: Light brown stool noted in ostomy. Genitourinary:     Comments: No obvious anal fissure, external hemorrhoid or external mass. Musculoskeletal:         General: No swelling. Normal range of motion. Cervical back: Normal range of motion. Skin:     General: Skin is warm and dry. Findings: No erythema or rash. Neurological:      Mental Status: He is alert and oriented to person, place, and time. Cranial Nerves: No cranial nerve deficit. Psychiatric:         Behavior: Behavior normal.       DIAGNOSTIC RESULTS   LABS:    Labs Reviewed   CBC WITH AUTO DIFFERENTIAL - Abnormal; Notable for the following components:       Result Value    RBC 3.41 (*)     Hemoglobin 10.1 (*)     Hematocrit 30.1 (*)     Lymphocytes Absolute 0.4 (*)     All other components within normal limits   COMPREHENSIVE METABOLIC PANEL W/ REFLEX TO MG FOR LOW K - Abnormal; Notable for the following components:    Glucose 167 (*)     BUN 24 (*)     Albumin/Globulin Ratio 1.0 (*)     AST 13 (*)     All other components within normal limits   APTT - Abnormal; Notable for the following components:    aPTT 36.6 (*)     All other components within normal limits   LACTATE, SEPSIS   PROTIME-INR   LACTATE, SEPSIS       When ordered only abnormal lab results are displayed. All other labs were within normal range or not returned as of this dictation. EKG:  When ordered, EKG's are interpreted by the Emergency Department Physician in the absence of cancer. He has an appointment tomorrow with his oncologist.  Currently not undergoing any radiation or chemotherapy. He is only had 1 episode today. Hemoglobin is stable at 10.1. It is slightly lower but his most recent hemoglobin was prior to his surgery. Repeat abdominal exam is benign without rebound, guarding or rigidity. There is no sign of erythema or active hemorrhage on rectal exam here. Patient is on anticoagulation. CTA reveals no active hemorrhage or obvious acute abnormalities. Do not believe emergent transfer antibiotics or other work-up is warranted at this time. He will call a surgeon tomorrow. Understood the strict return precautions were return back to the emergency department for lightheadedness, worsening bleeding or any worsening of symptoms or problems at home. He understood his instructions at discharge and was stable at discharge. FINAL IMPRESSION      1.  Rectal bleeding          DISPOSITION/PLAN   DISPOSITION Decision To Discharge 12/04/2022 10:45:08 AM      PATIENT REFERRED TO:  MD [address]  [address] [address]  [phone]    Call in 1 day  For re-check    [address] beds at the [facility] [address]  [facility] [address]  [phone]    As needed    [address]:  Discharge Medication List as of 12/4/2022 10:46 AM          DISCONTINUED MEDICATIONS:  Discharge Medication List as of 12/4/2022 10:46 AM                 (Please note that portions of this note were completed with a voice recognition program.  Efforts were made to edit the dictations but occasionally words are mis-transcribed.)    Ness Strickland PA-C (electronically signed)            Ness Strickland PA-C  12/04/22 1109

## 2022-12-04 NOTE — TELEPHONE ENCOUNTER
Location of patient: [place]    Subjective: Caller states woke up this morning not feeling well with trouble urinating, passing blood in stool, constant pain, intermittent nausea for a couple days, had surgery a month ago to remove a rectal tumor. Current Symptoms: lower abd pain, rectal bleeding, heaviness in lower abdomen, nausea, trouble urinating    Onset: a few days ago; gradual    Associated Symptoms: NA    Pain Severity: 3/10; aching; constant    Temperature: no fever reported by unknown method    What has been tried: left over oxycodone    LMP: NA Pregnant: NA    Recommended disposition: Go to ED Now    Care advice provided, patient verbalizes understanding; denies any other questions or concerns; instructed to call back for any new or worsening symptoms. Patient/caller agrees to proceed to closest appropriate Emergency Department    This triage is a result of a call to [address] [place] [address]. Please do not respond to the triage nurse through this encounter. Any subsequent communication should be directly with the patient.        Reason for Disposition   [1] MODERATE rectal bleeding (small blood clots, passing blood without stool, or toilet water turns red) AND [2] more than once a day    Protocols used: Rectal Bleeding-ADULT-

## 2022-12-05 ENCOUNTER — TELEPHONE (OUTPATIENT)
Dept: SURGERY | Age: 62
End: 2022-12-05

## 2022-12-05 NOTE — TELEPHONE ENCOUNTER
Patient called stating he is experiencing rectal bleeding, severe pain where tumor was and not being able to urinate, Patient went to ER 12/04/22 where they did a CT scan but could find anything causing issues.     Please call: [phone]

## 2022-12-06 DIAGNOSIS — E11.65 TYPE 2 DIABETES MELLITUS WITH HYPERGLYCEMIA, WITH LONG-TERM CURRENT USE OF INSULIN (HCC): ICD-10-CM

## 2022-12-06 DIAGNOSIS — Z79.4 TYPE 2 DIABETES MELLITUS WITH HYPERGLYCEMIA, WITH LONG-TERM CURRENT USE OF INSULIN (HCC): ICD-10-CM

## 2022-12-12 ENCOUNTER — HOSPITAL ENCOUNTER (EMERGENCY)
Age: 62
Discharge: HOME | End: 2022-12-12
Attending: EMERGENCY MEDICINE
Payer: COMMERCIAL

## 2022-12-12 ENCOUNTER — APPOINTMENT (OUTPATIENT)
Dept: CT IMAGING | Age: 62
End: 2022-12-12
Payer: COMMERCIAL

## 2022-12-12 VITALS
RESPIRATION RATE: 17 BRPM | WEIGHT: 188 LBS | TEMPERATURE: 98.8 F | SYSTOLIC BLOOD PRESSURE: 149 MMHG | DIASTOLIC BLOOD PRESSURE: 83 MMHG | HEIGHT: 71 IN | OXYGEN SATURATION: 100 % | HEART RATE: 66 BPM | BODY MASS INDEX: 26.32 KG/M2

## 2022-12-12 DIAGNOSIS — R33.9 RETENTION OF URINE: Primary | ICD-10-CM

## 2022-12-12 DIAGNOSIS — Z85.048 HISTORY OF COLORECTAL CANCER: ICD-10-CM

## 2022-12-12 DIAGNOSIS — Z98.890 HISTORY OF ABDOMINAL SURGERY: ICD-10-CM

## 2022-12-12 LAB
ALBUMIN SERPL-MCNC: 3.5 G/DL (ref 3.4–5)
ALP BLD-CCNC: 121 U/L (ref 40–129)
ALT SERPL-CCNC: 13 U/L (ref 10–40)
ANION GAP SERPL CALCULATED.3IONS-SCNC: 10 MMOL/L (ref 3–16)
AST SERPL-CCNC: 12 U/L (ref 15–37)
BACTERIA: ABNORMAL /HPF
BASOPHILS ABSOLUTE: 0 K/UL (ref 0–0.2)
BASOPHILS RELATIVE PERCENT: 0.3 %
BILIRUB SERPL-MCNC: <0.2 MG/DL (ref 0–1)
BILIRUBIN DIRECT: <0.2 MG/DL (ref 0–0.3)
BILIRUBIN URINE: ABNORMAL
BILIRUBIN, INDIRECT: ABNORMAL MG/DL (ref 0–1)
BLOOD, URINE: ABNORMAL
BUN BLDV-MCNC: 18 MG/DL (ref 7–20)
CALCIUM SERPL-MCNC: 9.4 MG/DL (ref 8.3–10.6)
CHLORIDE BLD-SCNC: 97 MMOL/L (ref 99–110)
CLARITY: ABNORMAL
CO2: 28 MMOL/L (ref 21–32)
COLOR: YELLOW
CREAT SERPL-MCNC: 1.1 MG/DL (ref 0.8–1.3)
EOSINOPHILS ABSOLUTE: 0.2 K/UL (ref 0–0.6)
EOSINOPHILS RELATIVE PERCENT: 2 %
GFR SERPL CREATININE-BSD FRML MDRD: >60 ML/MIN/{1.73_M2}
GLUCOSE BLD-MCNC: 145 MG/DL (ref 70–99)
GLUCOSE URINE: 250 MG/DL
HCT VFR BLD CALC: 29.5 % (ref 40.5–52.5)
HEMOGLOBIN: 10 G/DL (ref 13.5–17.5)
KETONES, URINE: ABNORMAL MG/DL
LEUKOCYTE ESTERASE, URINE: NEGATIVE
LIPASE: 14 U/L (ref 13–60)
LYMPHOCYTES ABSOLUTE: 0.3 K/UL (ref 1–5.1)
LYMPHOCYTES RELATIVE PERCENT: 4.5 %
MCH RBC QN AUTO: 29.4 PG (ref 26–34)
MCHC RBC AUTO-ENTMCNC: 33.9 G/DL (ref 31–36)
MCV RBC AUTO: 86.8 FL (ref 80–100)
MICROSCOPIC EXAMINATION: YES
MONOCYTES ABSOLUTE: 0.6 K/UL (ref 0–1.3)
MONOCYTES RELATIVE PERCENT: 7.6 %
NEUTROPHILS ABSOLUTE: 6.5 K/UL (ref 1.7–7.7)
NEUTROPHILS RELATIVE PERCENT: 85.6 %
NITRITE, URINE: NEGATIVE
PDW BLD-RTO: 15.3 % (ref 12.4–15.4)
PH UA: 6 (ref 5–8)
PLATELET # BLD: 249 K/UL (ref 135–450)
PMV BLD AUTO: 7.7 FL (ref 5–10.5)
POTASSIUM REFLEX MAGNESIUM: 4.3 MMOL/L (ref 3.5–5.1)
PROTEIN UA: 30 MG/DL
RBC # BLD: 3.4 M/UL (ref 4.2–5.9)
RBC UA: >100 /HPF (ref 0–4)
SODIUM BLD-SCNC: 135 MMOL/L (ref 136–145)
SPECIFIC GRAVITY UA: >=1.03 (ref 1–1.03)
TOTAL PROTEIN: 7 G/DL (ref 6.4–8.2)
URINE TYPE: ABNORMAL
UROBILINOGEN, URINE: 0.2 E.U./DL
WBC # BLD: 7.6 K/UL (ref 4–11)
WBC UA: ABNORMAL /HPF (ref 0–5)

## 2022-12-12 PROCEDURE — 80076 HEPATIC FUNCTION PANEL: CPT

## 2022-12-12 PROCEDURE — 99284 EMERGENCY DEPT VISIT MOD MDM: CPT

## 2022-12-12 PROCEDURE — 36415 COLL VENOUS BLD VENIPUNCTURE: CPT

## 2022-12-12 PROCEDURE — 74176 CT ABD & PELVIS W/O CONTRAST: CPT

## 2022-12-12 PROCEDURE — 96374 THER/PROPH/DIAG INJ IV PUSH: CPT

## 2022-12-12 PROCEDURE — 87086 URINE CULTURE/COLONY COUNT: CPT

## 2022-12-12 PROCEDURE — 96375 TX/PRO/DX INJ NEW DRUG ADDON: CPT

## 2022-12-12 PROCEDURE — 85025 COMPLETE CBC W/AUTO DIFF WBC: CPT

## 2022-12-12 PROCEDURE — 83690 ASSAY OF LIPASE: CPT

## 2022-12-12 PROCEDURE — 6360000002 HC RX W HCPCS: Performed by: PHYSICIAN ASSISTANT

## 2022-12-12 PROCEDURE — 51702 INSERT TEMP BLADDER CATH: CPT

## 2022-12-12 PROCEDURE — 80048 BASIC METABOLIC PNL TOTAL CA: CPT

## 2022-12-12 PROCEDURE — 81001 URINALYSIS AUTO W/SCOPE: CPT

## 2022-12-12 PROCEDURE — 96376 TX/PRO/DX INJ SAME DRUG ADON: CPT

## 2022-12-12 RX ORDER — OXYCODONE HYDROCHLORIDE 10 MG/1
1 TABLET ORAL 4 TIMES DAILY PRN
COMMUNITY
Start: 2022-12-09

## 2022-12-12 RX ORDER — MORPHINE SULFATE 4 MG/ML
4 INJECTION, SOLUTION INTRAMUSCULAR; INTRAVENOUS ONCE
Status: COMPLETED | OUTPATIENT
Start: 2022-12-12 | End: 2022-12-12

## 2022-12-12 RX ORDER — ONDANSETRON 4 MG/1
1 TABLET, FILM COATED ORAL EVERY 8 HOURS PRN
COMMUNITY
Start: 2022-12-11

## 2022-12-12 RX ORDER — LEVOFLOXACIN 500 MG/1
1 TABLET, FILM COATED ORAL DAILY
COMMUNITY
Start: 2022-12-07

## 2022-12-12 RX ORDER — ONDANSETRON 2 MG/ML
4 INJECTION INTRAMUSCULAR; INTRAVENOUS ONCE
Status: COMPLETED | OUTPATIENT
Start: 2022-12-12 | End: 2022-12-12

## 2022-12-12 RX ORDER — MORPHINE SULFATE 4 MG/ML
4 INJECTION, SOLUTION INTRAMUSCULAR; INTRAVENOUS
Status: COMPLETED | OUTPATIENT
Start: 2022-12-12 | End: 2022-12-12

## 2022-12-12 RX ADMIN — MORPHINE SULFATE 4 MG: 4 INJECTION INTRAVENOUS at 16:46

## 2022-12-12 RX ADMIN — MORPHINE SULFATE 4 MG: 4 INJECTION INTRAVENOUS at 19:15

## 2022-12-12 RX ADMIN — ONDANSETRON 4 MG: 2 INJECTION INTRAMUSCULAR; INTRAVENOUS at 16:46

## 2022-12-12 ASSESSMENT — ENCOUNTER SYMPTOMS
COUGH: 0
NAUSEA: 0
SHORTNESS OF BREATH: 0
COLOR CHANGE: 0
RHINORRHEA: 0
BLOOD IN STOOL: 0
VOMITING: 0
ABDOMINAL PAIN: 1
SORE THROAT: 0

## 2022-12-12 ASSESSMENT — PAIN DESCRIPTION - LOCATION
LOCATION: BACK
LOCATION: BACK

## 2022-12-12 ASSESSMENT — PAIN SCALES - GENERAL
PAINLEVEL_OUTOF10: 7
PAINLEVEL_OUTOF10: 7
PAINLEVEL_OUTOF10: 8

## 2022-12-12 ASSESSMENT — PAIN DESCRIPTION - DESCRIPTORS
DESCRIPTORS: ACHING
DESCRIPTORS: ACHING

## 2022-12-12 ASSESSMENT — PAIN - FUNCTIONAL ASSESSMENT: PAIN_FUNCTIONAL_ASSESSMENT: 0-10

## 2022-12-12 ASSESSMENT — PAIN DESCRIPTION - ORIENTATION
ORIENTATION: LOWER
ORIENTATION: LOWER

## 2022-12-12 NOTE — ED PROVIDER NOTES
ED Attending Attestation Note     Date of evaluation: 12/12/2022    This patient was seen by the advance practice provider. I have seen and examined the patient, agree with the workup, evaluation, management and diagnosis. The care plan has been discussed. My assessment reveals adult male with a complaint of some lower abdominal/rectal pain radiating to his back that was fairly severe yesterday, less severe today because of intermittent. Also has some urinary retention difficulty voiding has been self cathing and continues do so but feels like it might be getting a little more difficult to self cath. He does have red cells in his urine, but but also 2+ bacteria on a new cath sample, concerning for potential colonization of the bladder. We will send for culture. Is currently on Levaquin and will continue that pending culture driven changes. Given his recent surgical history and pain in the surgical area, will consult general surgery for evaluation.         Dyane Lennox, MD  12/12/22 5021

## 2022-12-12 NOTE — ED PROVIDER NOTES
[address] [facility] ENCOUNTER          PHYSICIAN ASSISTANT NOTE     Date of evaluation: 12/12/2022    ADDENDUM:      Care of this patient was assumed from [facility]. The patient was seen for Urinary Retention (Pt reports tumor removed from rectum in Nov, last week had some mild bleeding with bowel movements (colostomy) on Sunday had difficulty starting a urine stream, using a cath kit he had at home to self caht, states the rectal bleeding has stopped, still unable to urinate on his own, and has abd pain)  . The patient's initial evaluation and plan have been discussed with the prior provider who initially evaluated the patient. Nursing Notes, Past Medical Hx, Past Surgical Hx, Social Hx, Allergies, and Family Hx were all reviewed. Pending at turnover: CT imaging and Surgery/Urology recs. Diagnostic Results         RADIOLOGY:  CT ABDOMEN PELVIS WO CONTRAST Additional Contrast? Rectal   Final Result      Status post recent partial hepatic resection is. Small low-attenuation fluid collections with foci of air are seen in the right and left lobes, smaller in size than on the prior study. Nondistended bladder, with a Quach catheter in place. Marked diffuse bladder wall thickening, nonspecific in this setting. There is new significant right-sided hydronephrosis when compared with the prior study, with no point of obstruction or stone    identified. Colostomy in the left lower quadrant. Irregularly-shaped rectal stump, with diffuse wall thickening. No evidence of extravasation or leak.                 LABS:   Results for orders placed or performed during the hospital encounter of 12/12/22   CBC with Auto Differential   Result Value Ref Range    WBC 7.6 4.0 - 11.0 K/uL    RBC 3.40 (L) 4.20 - 5.90 M/uL    Hemoglobin 10.0 (L) 13.5 - 17.5 g/dL    Hematocrit 29.5 (L) 40.5 - 52.5 %    MCV 86.8 80.0 - 100.0 fL    MCH 29.4 26.0 - 34.0 pg    MCHC 33.9 31.0 - 36.0 g/dL    RDW 15.3 12.4 - 15.4 %    Platelets 048 346 - 907 K/uL    MPV 7.7 5.0 - 10.5 fL    Neutrophils % 85.6 %    Lymphocytes % 4.5 %    Monocytes % 7.6 %    Eosinophils % 2.0 %    Basophils % 0.3 %    Neutrophils Absolute 6.5 1.7 - 7.7 K/uL    Lymphocytes Absolute 0.3 (L) 1.0 - 5.1 K/uL    Monocytes Absolute 0.6 0.0 - 1.3 K/uL    Eosinophils Absolute 0.2 0.0 - 0.6 K/uL    Basophils Absolute 0.0 0.0 - 0.2 K/uL   Basic Metabolic Panel w/ Reflex to MG   Result Value Ref Range    Sodium 135 (L) 136 - 145 mmol/L    Potassium reflex Magnesium 4.3 3.5 - 5.1 mmol/L    Chloride 97 (L) 99 - 110 mmol/L    CO2 28 21 - 32 mmol/L    Anion Gap 10 3 - 16    Glucose 145 (H) 70 - 99 mg/dL    BUN 18 7 - 20 mg/dL    Creatinine 1.1 0.8 - 1.3 mg/dL    Est, Glom Filt Rate >60 >60    Calcium 9.4 8.3 - 10.6 mg/dL   Hepatic Function Panel   Result Value Ref Range    Total Protein 7.0 6.4 - 8.2 g/dL    Albumin 3.5 3.4 - 5.0 g/dL    Alkaline Phosphatase 121 40 - 129 U/L    ALT 13 10 - 40 U/L    AST 12 (L) 15 - 37 U/L    Total Bilirubin <0.2 0.0 - 1.0 mg/dL    Bilirubin, Direct <0.2 0.0 - 0.3 mg/dL    Bilirubin, Indirect see below 0.0 - 1.0 mg/dL   Lipase   Result Value Ref Range    Lipase 14.0 13.0 - 60.0 U/L   Urinalysis with Microscopic   Result Value Ref Range    Color, UA Yellow Straw/Yellow    Clarity, UA CLOUDY (A) Clear    Glucose, Ur 250 (A) Negative mg/dL    Bilirubin Urine SMALL (A) Negative    Ketones, Urine TRACE (A) Negative mg/dL    Specific Gravity, UA >=1.030 1.005 - 1.030    Blood, Urine LARGE (A) Negative    pH, UA 6.0 5.0 - 8.0    Protein, UA 30 (A) Negative mg/dL    Urobilinogen, Urine 0.2 <2.0 E.U./dL    Nitrite, Urine Negative Negative    Leukocyte Esterase, Urine Negative Negative    Microscopic Examination YES     Urine Type NotGiven     WBC, UA None seen 0 - 5 /HPF    RBC, UA >100 (A) 0 - 4 /HPF    Bacteria, UA 2+ (A) None Seen /HPF       RECENT VITALS:  BP: (!) 149/83, Temp: 98.8 °F (37.1 °C), Heart Rate: 66, Resp: 17, SpO2: 100 % Procedures         ED Course          The patient was given the following medications:  Orders Placed This Encounter   Medications    morphine injection 4 mg    ondansetron (ZOFRAN) injection 4 mg    morphine injection 4 mg       CONSULTS:  IP CONSULT TO COLORECTAL SURGERY  IP CONSULT TO UROLOGY  IP CONSULT TO [address] [place] [address] / ASSESSMENT / Amado Fair is a 58 y.o. male with urinary retention. Patient has multani catheter in place. Urinalysis with blood, no signs of infection. Stable creatinine and blood counts. CT shows post-op changes as well as bladder wall thickening and new right hydronephrosis. I spoke with Urology who recommends discharging with Multani and following up outpatient. Patient to finish his antibiotics. Surgery reviewed CT as well and cleared patient for outpatient follow-up. Patient agreeable with plan. Return precautions discussed. This patient was also evaluated by the attending physician. All care plans were discussed and agreed upon. Clinical Impression     1. Retention of urine    2. History of abdominal surgery    3.  History of colorectal cancer        Disposition     PATIENT REFERRED TO:  MD Arianna Cardo[facility]  [facility] [address] [place] [address]  [phone]    On 12/21/2022      [facility] Emergency Department  [address] [place] [address]  [phone]    As needed, If symptoms worsen    DISCHARGE MEDICATIONS:  Discharge Medication List as of 12/12/2022  7:52 PM          DISPOSITION Decision To Discharge 12/12/2022 07:48:24 PM          Obie Esparza PA-C  12/12/22 2027

## 2022-12-12 NOTE — CONSULTS
Colorectal Surgery   Resident Consult Note    Reason for Consult: Abdominal pain and urinary retention     History of Present Illness:   Nawaf Bains is a 58 y.o. male with Hx of rectal cancer, s/p robotic LAR, parastomal hernia repair, loop colostomy to end colostomy creation, and partial liver resection x2 (right and left) on 11/14/2022. He was recently seen at [facility] ED on 12/4 for rectal bleeding and was discharged after reassuring CT that showed no active GI bleeding. He then developed lower back pain and an inability to void freely approximately 10 days ago. He has been needing to straight cath 3-4 times per day. He also endorses decreased appetite and occasional nausea and vomiting over the past ten days.        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    Rectal cancer [facility])        Past Surgical History:        Procedure Laterality Date    ABDOMEN SURGERY  1975    Exploratory    COLONOSCOPY N/A 10/19/2022    COLONOSCOPY DIAGNOSTIC/STOMA performed by Nicole Dewitt MD at [facility] N/A 2/28/2022    LAPAROSCOPIC COLOSTOMY CREATION, laparoscopic incarcerated hernia repair performed by Nicole Dewitt MD at [address] [facility] Bilateral 11/14/2022    ROBOTIC / LAPAROSCOPIC PARTIAL LIVER RESECTION X 2 (RIGHT AND LEFT LOBE), POSSIBLE OPEN performed by Lupis Espinal MD at [place] [address] 2/17/2022    PORT A CATH INSERTION performed by Shola Munoz MD at [facility] N/A 11/14/2022    ROBOTIC LOWER ABDOMINOPERINEAL RESECTION, COLOSTOMY REVERSAL, DIVERTING ILEOSTOMY, , ABDOMINO PERINEAL RESECTION WITH PERMANENT COLOSTOMY performed by Nicole Dewitt MD at [facility] N/A 2/15/2022    SIGMOIDOSCOPY BIOPSY FLEXIBLE performed by Chet Castillo MD at [address] [facility] ENDOSCOPY    SIGMOIDOSCOPY  2/15/2022    SIGMOIDOSCOPY POLYP SNARE performed by Bishop Agapito MD at [address] [place] [address] St:  Patient has no known allergies. Medications:   Home Meds  No current facility-administered medications on file prior to encounter. Current Outpatient Medications on File Prior to Encounter   Medication Sig Dispense Refill    levoFLOXacin (LEVAQUIN) 500 MG tablet Take 1 tablet by mouth daily      ondansetron (ZOFRAN) 4 MG tablet Take 1 tablet by mouth every 8 hours as needed      oxyCODONE HCl (OXY-IR) 10 MG immediate release tablet Take 1 tablet by mouth 4 times daily as needed. metFORMIN (GLUCOPHAGE) 500 MG tablet TAKE 1 TABLET BY MOUTH TWICE DAILY WITH MEALS 180 tablet 1    cloNIDine (CATAPRES) 0.1 MG tablet Take 0.1 mg by mouth Take 0.1 mg at bedtime and if BP > 140/85, take a second dose (Patient not taking: No sig reported)      insulin glargine (LANTUS) 100 UNIT/ML injection vial Inject 16 Units into the skin nightly      metoprolol succinate (TOPROL XL) 50 MG extended release tablet Take 50 mg by mouth nightly      temazepam (RESTORIL) 15 MG capsule TAKE 1 CAPSULE BY MOUTH EVERY NIGHT AS NEEDED FOR SLEEP      FLUoxetine (PROZAC) 10 MG capsule Take 1 capsule by mouth daily 90 capsule 1    lisinopril (PRINIVIL;ZESTRIL) 20 MG tablet Take 1 tablet by mouth daily 90 tablet 1    insulin lispro (HUMALOG KWIKPEN) 200 UNIT/ML SOPN pen Take 4 units for blood sugar reading of 150-200, 6 units for 201-250, 8units for 251-300, 10 units for 301-350 5 pen 5       Current Meds  No current facility-administered medications for this encounter. Family History:   Family History   Problem Relation Age of Onset    Heart Failure Mother     Diabetes Father     Hypertension Father        Social History:   TOBACCO:   reports that he has never smoked. He has never used smokeless tobacco.  ETOH:   reports that he does not currently use alcohol.   DRUGS:   reports no history of drug use.    Review of Systems:   A 14 point review of systems was conducted, significant findings as noted in HPI. All other systems negative. Physical exam:    Vitals:    12/12/22 1141 12/12/22 1300 12/12/22 1330 12/12/22 1500   BP: 127/72 133/69 134/72 (!) 153/79   Pulse: 66      Resp: 17      Temp: 98.8 °F (37.1 °C)      SpO2: 98% 99% 100% 100%   Weight: 188 lb (85.3 kg)      Height: 5' 11\" (1.803 m)          General appearance: alert, appears uncomfortable   Eyes: No scleral icterus, EOM grossly intact  Neck: trachea midline,  neck supple  Chest/Lungs: normal effort with no accessory muscle use on RA  Cardiovascular: RRR, well perfused  Abdomen: Well healed surgical incisions. Stoma pink and moist. Appliance with loose stool in bag  Skin: warm and dry, no rashes  Extremities: no edema, no cyanosis  Genitourinary: Quach in place with dark, blood-tinged urine   Neuro: A&Ox3, no focal deficits, sensation intact    Labs:    CBC:   Recent Labs     12/12/22  1223   WBC 7.6   HGB 10.0*   HCT 29.5*   MCV 86.8        BMP:   Recent Labs     12/12/22  1223   *   K 4.3   CL 97*   CO2 28   BUN 18   CREATININE 1.1     PT/INR: No results for input(s): PROTIME, INR in the last 72 hours. APTT: No results for input(s): APTT in the last 72 hours.   Liver Profile:   Lab Results   Component Value Date/Time    AST 12 12/12/2022 12:23 PM    ALT 13 12/12/2022 12:23 PM    Royann Seller <0.2 12/12/2022 12:23 PM    BILITOT <0.2 12/12/2022 12:23 PM    ALKPHOS 121 12/12/2022 12:23 PM   No results found for: CHOL, HDL, TRIG  UA:   Lab Results   Component Value Date/Time    COLORU Yellow 12/12/2022 01:31 PM    PHUR 6.0 12/12/2022 01:31 PM    WBCUA None seen 12/12/2022 01:31 PM    RBCUA >100 12/12/2022 01:31 PM    BACTERIA 2+ 12/12/2022 01:31 PM    CLARITYU CLOUDY 12/12/2022 01:31 PM    SPECGRAV >=1.030 12/12/2022 01:31 PM    LEUKOCYTESUR Negative 12/12/2022 01:31 PM    UROBILINOGEN 0.2 12/12/2022 01:31 PM    BILIRUBINUR SMALL 12/12/2022 01:31 PM    BLOODU LARGE 12/12/2022 01:31 PM    GLUCOSEU 250 12/12/2022 01:31 PM       Imaging:   No orders to display         Assessment/Plan:  Kathryn Thibodeaux is a 58 y.o. male with Hx of rectal cancer, s/p robotic LAR, parastomal hernia repair, loop colostomy to end colostomy creation, and partial liver resection x2 (right and left) on 11/14/2022. He is here for lower back pain and an inability to void freely for approximately the past 10 days. Today in the ED he is HDS and afebrile with reassuring labs.  He is not in need of surgical intervention at this time.     - CT A/P with rectal contrast to evaluate rectal stump   - Recommend urology consult   - Pending results of CT, patient likely can be discharged home with close outpatient follow-up  - Patient seen with senior resident and staffed with Dr. Mnauel Crain MD  12/12/22  4:35 PM

## 2022-12-12 NOTE — ED PROVIDER NOTES
[address] [facility]          PHYSICIAN ASSISTANT NOTE       Date of evaluation: 12/12/2022    Chief Complaint     Urinary Retention (Pt reports tumor removed from rectum in Nov, last week had some mild bleeding with bowel movements (colostomy) on Sunday had difficulty starting a urine stream, using a cath kit he had at home to self caht, states the rectal bleeding has stopped, still unable to urinate on his own, and has abd pain)      History of Present Illness     Yadi Goss is a 58 y.o. male, with a history of hypertension, diabetes and rectal cancer with liver mets 1 month post parastomal hernia repair, loop colostomy to end colostomy creation and partial liver resection with Dr. Toribio Fernandez, who presents to the ED with complaints of inability to urinate over the last week. The patient has been self catheterizing every 6 hours at home to drain his bladder and states it is starting to become more difficult to pass the catheter as he is meeting some resistance. States he does not typically feel the urge to urinate. He also reports intermittent lower abdominal pain and low back pain, that varies in severity, he reports minimal discomfort at this time. He has not been able to identify any aggravating or alleviating factors. Patient states he did see urology last week and was started on Levaquin for possible UTI but otherwise had no testing or ideas regarding the urine retention were put forth. Dr. Bruce Harrell was involved in his surgery in order to assist with preservation of the urologic structures with no reported complication. He has otherwise been well without fever, chills, nausea and vomiting. Reports good output from his colostomy with no recent bleeding. He did have some bloody stool initially following the procedure that has resolved. He otherwise has no complaints. The patient was seen in the ED approximately 1 week ago regarding the intermittent abdominal pain. Labs were largely unremarkable. CT scan showed left lower quadrant colostomy with residual rectum, no active GI hemorrhage. Fluid within the rectum with possible extramural components, sterility indeterminant. Mild fat stranding in the pelvis and perirectal region, nonspecific, possibly postoperative or dependent edema. Perihepatic fluid or fluid/air collections consistent with recent partial resections, no residual mass evident. Review of Systems     Review of Systems   Constitutional:  Negative for chills and fever. HENT:  Negative for congestion, rhinorrhea and sore throat. Respiratory:  Negative for cough and shortness of breath. Cardiovascular:  Negative for chest pain and palpitations. Gastrointestinal:  Positive for abdominal pain. Negative for blood in stool, nausea and vomiting. Genitourinary:  Positive for difficulty urinating. Negative for dysuria, flank pain, hematuria and urgency. Musculoskeletal:  Negative for arthralgias and myalgias. Skin:  Negative for color change and rash. Neurological:  Negative for dizziness, light-headedness and headaches. All other systems reviewed and are negative. Past Medical, Surgical, Family, and Social History     He has a past medical history of Diabetes mellitus ([facility] [address] Difficulty voiding, History of blood transfusion, Hypertension, Intestinal stoma prolapse ([facility] [address] and Rectal cancer ([facility] [address] He has a past surgical history that includes sigmoidoscopy (N/A, 2/15/2022); sigmoidoscopy (2/15/2022); [address] Surgery (N/A, 2/17/2022); Abdomen surgery (1975); colostomy (N/A, 2/28/2022); Colonoscopy (N/A, 10/19/2022); Rectal surgery (N/A, 11/14/2022); and Liver Resection (Bilateral, 11/14/2022). His family history includes Diabetes in his father; Heart Failure in his mother; Hypertension in his father. He reports that he has never smoked. He has never used smokeless tobacco. He reports that he does not currently use alcohol.  He reports that he does not use drugs. Medications     Previous Medications    CLONIDINE (CATAPRES) 0.1 MG TABLET    Take 0.1 mg by mouth Take 0.1 mg at bedtime and if BP > 140/85, take a second dose    FLUOXETINE (PROZAC) 10 MG CAPSULE    Take 1 capsule by mouth daily    INSULIN GLARGINE (SEMGLEE) 100 UNIT/ML INJECTION VIAL    Inject 16 Units into the skin nightly    INSULIN LISPRO (HUMALOG KWIKPEN) 200 UNIT/ML SOPN PEN    Take 4 units for blood sugar reading of 150-200, 6 units for 201-250, 8units for 251-300, 10 units for 301-350    LEVOFLOXACIN (LEVAQUIN) 500 MG TABLET    Take 1 tablet by mouth daily    LISINOPRIL (PRINIVIL;ZESTRIL) 20 MG TABLET    Take 1 tablet by mouth daily    METFORMIN (GLUCOPHAGE) 500 MG TABLET    TAKE 1 TABLET BY MOUTH TWICE DAILY WITH MEALS    METOPROLOL SUCCINATE (TOPROL XL) 50 MG EXTENDED RELEASE TABLET    Take 50 mg by mouth nightly    ONDANSETRON (ZOFRAN) 4 MG TABLET    Take 1 tablet by mouth every 8 hours as needed    OXYCODONE HCL (OXY-IR) 10 MG IMMEDIATE RELEASE TABLET    Take 1 tablet by mouth 4 times daily as needed. TEMAZEPAM (RESTORIL) 15 MG CAPSULE    TAKE 1 CAPSULE BY MOUTH EVERY NIGHT AS NEEDED FOR SLEEP       Allergies     He has No Known Allergies. Physical Exam     INITIAL VITALS: BP: 127/72, Temp: 98.8 °F (37.1 °C), Heart Rate: 66, Resp: 17, SpO2: 98 %  Physical Exam  Vitals reviewed. Constitutional:       General: He is not in acute distress. Appearance: He is well-developed and normal weight. HENT:      Head: Normocephalic and atraumatic. Mouth/Throat:      Mouth: Mucous membranes are moist.   Eyes:      Extraocular Movements: Extraocular movements intact. Conjunctiva/sclera: Conjunctivae normal.   Neck:      Trachea: Phonation normal.   Cardiovascular:      Rate and Rhythm: Normal rate and regular rhythm. Heart sounds: No murmur heard. No friction rub. No gallop. Pulmonary:      Effort: Pulmonary effort is normal. No respiratory distress.       Breath sounds: No wheezing, rhonchi or rales. Abdominal:      General: There is no distension. Palpations: Abdomen is soft. Tenderness: There is no abdominal tenderness. Musculoskeletal:      Cervical back: Normal range of motion and neck supple. Skin:     General: Skin is warm and dry. Findings: No petechiae or rash. Neurological:      Mental Status: He is alert and oriented to person, place, and time. Psychiatric:         Mood and Affect: Mood and affect normal.         Behavior: Behavior normal.       RECENT VITALS:  BP: (!) 149/83, Temp: 98.8 °F (37.1 °C), Heart Rate: 66, Resp: 17, SpO2: 100 %     ED Course     Nursing Notes, Past Medical Hx,Past Surgical Hx, Social Hx, Allergies, and Family Hx were reviewed. The patient was given the following medications:  Orders Placed This Encounter   Medications    morphine injection 4 mg    ondansetron (ZOFRAN) injection 4 mg         CONSULTS:  IP CONSULT TO COLORECTAL SURGERY  IP CONSULT TO [address] [place] [address] / ASSESSMENT / Jeaneth Zan is a 58 y.o. male presenting with persistent urine retention for the last week following surgery 1 month ago for colostomy and partial liver resection. He has had intermittent lower abdominal pain with no apparent triggers, denies significant pain at this time. CT 1 week ago showed no concerning findings although colorectal surgery indicated that that was not the particular scan they would have recommended. A Quach was placed with approximately 4 to 500 cc of dark urine out. IV access was established. He initially declined medication for pain but was later given morphine and Zofran for more severe discomfort. Labs include a stable CBC. Renal panel results a sodium of 135, chloride 97, glucose 145, it is otherwise unremarkable. Liver panel and lipase are unremarkable.   Urinalysis results 250 glucose, small bilirubin, trace ketones, large blood and 30 protein with greater than 100 red cells, no white cells but 2+ bacteria. This was sent for culture. I spoke with surgery regarding the patient's presentation and a CT of the abdomen and pelvis without IV contrast but with rectal contrast was recommended and ordered by the resident. These results will be turned over to the oncoming provider. They also recommended consulting urology, this is pending as well. Please see the oncoming providers addendum for additional diagnostic results and recommendations. This patient was also evaluated by the attending physician. All care plans were discussed and agreed upon. Clinical Impression     1. Retention of urine    2. History of abdominal surgery    3.  History of colorectal cancer        Disposition       DISPOSITION   Pending     Manuel Barre, [place]  12/12/22 8380

## 2022-12-13 LAB — URINE CULTURE, ROUTINE: NORMAL

## 2022-12-13 NOTE — DISCHARGE INSTRUCTIONS
Keep multani catheter in place. Continue pain and nausea medicine as needed. Finish antibiotics. Follow-up with Urology and your Surgeon as planned. Return to the ER for any worsening symptoms or emergent concerns.

## 2022-12-15 LAB
Lab: NORMAL
REPORT: NORMAL
THIS TEST SENT TO: NORMAL

## 2022-12-19 ENCOUNTER — TELEPHONE (OUTPATIENT)
Dept: SURGERY | Age: 62
End: 2022-12-19

## 2022-12-19 DIAGNOSIS — C20 RECTAL CANCER (HCC): Primary | ICD-10-CM

## 2022-12-19 NOTE — TELEPHONE ENCOUNTER
Spoke to patient to give him  phone number for Dr. Alla Bello office number so he can can get in for a flex sig. Patient instructed to call our office if he has any issues getting in for procedure.

## 2022-12-19 NOTE — TELEPHONE ENCOUNTER
Spoke to Mateusz Simpson in Dr. Candice Fuller office who informed me that patient is having significant rectal pain (currently taking oxy), has a foul smelling rectal discharge, urinary retention (saw urology and had cath placed as well as antibiotics). The patient was recently in [facility] ER and had CT done on 12/12. Will speak to Dr. Katie Romo to see how he would like to proceed.

## 2022-12-19 NOTE — TELEPHONE ENCOUNTER
Patient called stating he is still having pain and rectal discharge. Patient had surgery 11/14/22.     Please call: [phone]

## 2023-01-04 DIAGNOSIS — F51.02 ADJUSTMENT INSOMNIA: Primary | ICD-10-CM

## 2023-01-04 RX ORDER — TEMAZEPAM 15 MG/1
CAPSULE ORAL
Qty: 30 CAPSULE | Refills: 0 | Status: SHIPPED | OUTPATIENT
Start: 2023-01-04 | End: 2023-02-03

## 2023-01-26 DIAGNOSIS — F51.02 ADJUSTMENT INSOMNIA: ICD-10-CM

## 2023-01-27 RX ORDER — TEMAZEPAM 15 MG/1
CAPSULE ORAL
Qty: 30 CAPSULE | OUTPATIENT
Start: 2023-01-27 | End: 2023-02-26

## 2023-02-03 ENCOUNTER — TELEPHONE (OUTPATIENT)
Dept: INTERNAL MEDICINE CLINIC | Age: 63
End: 2023-02-03

## 2023-02-03 ENCOUNTER — OFFICE VISIT (OUTPATIENT)
Dept: INTERNAL MEDICINE CLINIC | Age: 63
End: 2023-02-03
Payer: MEDICARE

## 2023-02-03 VITALS
HEIGHT: 71 IN | BODY MASS INDEX: 27.07 KG/M2 | WEIGHT: 193.4 LBS | OXYGEN SATURATION: 99 % | DIASTOLIC BLOOD PRESSURE: 70 MMHG | SYSTOLIC BLOOD PRESSURE: 140 MMHG | HEART RATE: 49 BPM

## 2023-02-03 DIAGNOSIS — F33.41 MDD (MAJOR DEPRESSIVE DISORDER), RECURRENT, IN PARTIAL REMISSION (HCC): ICD-10-CM

## 2023-02-03 DIAGNOSIS — E11.65 TYPE 2 DIABETES MELLITUS WITH HYPERGLYCEMIA, WITH LONG-TERM CURRENT USE OF INSULIN (HCC): ICD-10-CM

## 2023-02-03 DIAGNOSIS — C20 RECTAL CANCER (HCC): ICD-10-CM

## 2023-02-03 DIAGNOSIS — Z79.4 TYPE 2 DIABETES MELLITUS WITH HYPERGLYCEMIA, WITH LONG-TERM CURRENT USE OF INSULIN (HCC): Primary | ICD-10-CM

## 2023-02-03 DIAGNOSIS — I10 PRIMARY HYPERTENSION: ICD-10-CM

## 2023-02-03 DIAGNOSIS — E11.65 TYPE 2 DIABETES MELLITUS WITH HYPERGLYCEMIA, WITH LONG-TERM CURRENT USE OF INSULIN (HCC): Primary | ICD-10-CM

## 2023-02-03 DIAGNOSIS — F51.02 ADJUSTMENT INSOMNIA: ICD-10-CM

## 2023-02-03 DIAGNOSIS — Z79.4 TYPE 2 DIABETES MELLITUS WITH HYPERGLYCEMIA, WITH LONG-TERM CURRENT USE OF INSULIN (HCC): ICD-10-CM

## 2023-02-03 DIAGNOSIS — Z43.3 COLOSTOMY CARE (HCC): ICD-10-CM

## 2023-02-03 PROBLEM — K94.19 INTESTINAL STOMA PROLAPSE (HCC): Status: RESOLVED | Noted: 2022-08-12 | Resolved: 2023-02-03

## 2023-02-03 PROBLEM — F33.2 SEVERE EPISODE OF RECURRENT MAJOR DEPRESSIVE DISORDER, WITHOUT PSYCHOTIC FEATURES (HCC): Status: RESOLVED | Noted: 2022-02-23 | Resolved: 2023-02-03

## 2023-02-03 LAB
A/G RATIO: 1.7 (ref 1.1–2.2)
ALBUMIN SERPL-MCNC: 4 G/DL (ref 3.4–5)
ALP BLD-CCNC: 95 U/L (ref 40–129)
ALT SERPL-CCNC: 16 U/L (ref 10–40)
ANION GAP SERPL CALCULATED.3IONS-SCNC: 12 MMOL/L (ref 3–16)
AST SERPL-CCNC: 15 U/L (ref 15–37)
BILIRUB SERPL-MCNC: <0.2 MG/DL (ref 0–1)
BUN BLDV-MCNC: 20 MG/DL (ref 7–20)
CALCIUM SERPL-MCNC: 9.3 MG/DL (ref 8.3–10.6)
CHLORIDE BLD-SCNC: 100 MMOL/L (ref 99–110)
CHOLESTEROL, TOTAL: 193 MG/DL (ref 0–199)
CO2: 26 MMOL/L (ref 21–32)
CREAT SERPL-MCNC: 0.9 MG/DL (ref 0.8–1.3)
CREATININE URINE: 132.4 MG/DL (ref 39–259)
GFR SERPL CREATININE-BSD FRML MDRD: >60 ML/MIN/{1.73_M2}
GLUCOSE BLD-MCNC: 161 MG/DL (ref 70–99)
HCT VFR BLD CALC: 35.7 % (ref 40.5–52.5)
HDLC SERPL-MCNC: 38 MG/DL (ref 40–60)
HEMOGLOBIN: 11.6 G/DL (ref 13.5–17.5)
LDL CHOLESTEROL CALCULATED: 116 MG/DL
MCH RBC QN AUTO: 26.8 PG (ref 26–34)
MCHC RBC AUTO-ENTMCNC: 32.6 G/DL (ref 31–36)
MCV RBC AUTO: 82.4 FL (ref 80–100)
MICROALBUMIN UR-MCNC: 5.4 MG/DL
MICROALBUMIN/CREAT UR-RTO: 40.8 MG/G (ref 0–30)
PDW BLD-RTO: 18.6 % (ref 12.4–15.4)
PLATELET # BLD: 171 K/UL (ref 135–450)
PMV BLD AUTO: 8.8 FL (ref 5–10.5)
POTASSIUM SERPL-SCNC: 4.4 MMOL/L (ref 3.5–5.1)
RBC # BLD: 4.33 M/UL (ref 4.2–5.9)
SODIUM BLD-SCNC: 138 MMOL/L (ref 136–145)
TOTAL PROTEIN: 6.4 G/DL (ref 6.4–8.2)
TRIGL SERPL-MCNC: 195 MG/DL (ref 0–150)
VLDLC SERPL CALC-MCNC: 39 MG/DL
WBC # BLD: 4.3 K/UL (ref 4–11)

## 2023-02-03 PROCEDURE — 3077F SYST BP >= 140 MM HG: CPT | Performed by: INTERNAL MEDICINE

## 2023-02-03 PROCEDURE — 99214 OFFICE O/P EST MOD 30 MIN: CPT | Performed by: INTERNAL MEDICINE

## 2023-02-03 PROCEDURE — 3078F DIAST BP <80 MM HG: CPT | Performed by: INTERNAL MEDICINE

## 2023-02-03 RX ORDER — CLONIDINE HYDROCHLORIDE 0.1 MG/1
0.1 TABLET ORAL NIGHTLY
Qty: 60 TABLET | Refills: 3 | Status: SHIPPED | OUTPATIENT
Start: 2023-02-03

## 2023-02-03 RX ORDER — TEMAZEPAM 15 MG/1
15 CAPSULE ORAL NIGHTLY PRN
Qty: 30 CAPSULE | Refills: 2 | Status: SHIPPED | OUTPATIENT
Start: 2023-02-03 | End: 2023-03-05

## 2023-02-03 RX ORDER — TEMAZEPAM 15 MG/1
CAPSULE ORAL
Qty: 30 CAPSULE | Refills: 0 | Status: CANCELLED | OUTPATIENT
Start: 2023-02-03 | End: 2023-03-05

## 2023-02-03 SDOH — ECONOMIC STABILITY: INCOME INSECURITY: HOW HARD IS IT FOR YOU TO PAY FOR THE VERY BASICS LIKE FOOD, HOUSING, MEDICAL CARE, AND HEATING?: NOT HARD AT ALL

## 2023-02-03 SDOH — ECONOMIC STABILITY: HOUSING INSECURITY
IN THE LAST 12 MONTHS, WAS THERE A TIME WHEN YOU DID NOT HAVE A STEADY PLACE TO SLEEP OR SLEPT IN A SHELTER (INCLUDING NOW)?: NO

## 2023-02-03 SDOH — ECONOMIC STABILITY: FOOD INSECURITY: WITHIN THE PAST 12 MONTHS, THE FOOD YOU BOUGHT JUST DIDN'T LAST AND YOU DIDN'T HAVE MONEY TO GET MORE.: NEVER TRUE

## 2023-02-03 SDOH — ECONOMIC STABILITY: FOOD INSECURITY: WITHIN THE PAST 12 MONTHS, YOU WORRIED THAT YOUR FOOD WOULD RUN OUT BEFORE YOU GOT MONEY TO BUY MORE.: NEVER TRUE

## 2023-02-03 ASSESSMENT — PATIENT HEALTH QUESTIONNAIRE - PHQ9
9. THOUGHTS THAT YOU WOULD BE BETTER OFF DEAD, OR OF HURTING YOURSELF: 0
6. FEELING BAD ABOUT YOURSELF - OR THAT YOU ARE A FAILURE OR HAVE LET YOURSELF OR YOUR FAMILY DOWN: 0
SUM OF ALL RESPONSES TO PHQ QUESTIONS 1-9: 2
4. FEELING TIRED OR HAVING LITTLE ENERGY: 1
SUM OF ALL RESPONSES TO PHQ QUESTIONS 1-9: 2
7. TROUBLE CONCENTRATING ON THINGS, SUCH AS READING THE NEWSPAPER OR WATCHING TELEVISION: 0
10. IF YOU CHECKED OFF ANY PROBLEMS, HOW DIFFICULT HAVE THESE PROBLEMS MADE IT FOR YOU TO DO YOUR WORK, TAKE CARE OF THINGS AT HOME, OR GET ALONG WITH OTHER PEOPLE: 0
5. POOR APPETITE OR OVEREATING: 0
SUM OF ALL RESPONSES TO PHQ QUESTIONS 1-9: 2
1. LITTLE INTEREST OR PLEASURE IN DOING THINGS: 0
SUM OF ALL RESPONSES TO PHQ9 QUESTIONS 1 & 2: 1
8. MOVING OR SPEAKING SO SLOWLY THAT OTHER PEOPLE COULD HAVE NOTICED. OR THE OPPOSITE, BEING SO FIGETY OR RESTLESS THAT YOU HAVE BEEN MOVING AROUND A LOT MORE THAN USUAL: 0
2. FEELING DOWN, DEPRESSED OR HOPELESS: 1
SUM OF ALL RESPONSES TO PHQ QUESTIONS 1-9: 2
3. TROUBLE FALLING OR STAYING ASLEEP: 0

## 2023-02-03 ASSESSMENT — ENCOUNTER SYMPTOMS
VOMITING: 0
SORE THROAT: 0
SHORTNESS OF BREATH: 0
COUGH: 0
ABDOMINAL PAIN: 0
COLOR CHANGE: 0
CONSTIPATION: 0
DIARRHEA: 1
NAUSEA: 0
WHEEZING: 0
BACK PAIN: 0
CHEST TIGHTNESS: 0

## 2023-02-03 NOTE — ASSESSMENT & PLAN NOTE
Blood pressure borderline high this visit, patient does report whitecoat syndrome however he also has not been taking clonidine at bedtime which was added perioperatively, advised to restart and can do the extra dose as needed, patient verbalized understanding and will restart clonidine at bedtime along with lisinopril and metoprolol, will update labs, reinforced recommendations for maintaining healthy low-salt low-fat diet and increase level of physical activity as tolerated

## 2023-02-03 NOTE — TELEPHONE ENCOUNTER
Fax received from pharmacy stating temazepam (RESTORIL) 15 MG capsule is not covered by patient plan.  The preferred alternative is LORAZEPAM.

## 2023-02-03 NOTE — TELEPHONE ENCOUNTER
Please see if able to do prior Auth since this is not a new medication, this is a continuation of chronic medicine.   If still not covered then check if patient able to do out-of-pocket pay because I highly recommend to avoid lorazepam

## 2023-02-03 NOTE — PROGRESS NOTES
ASSESSMENT/PLAN:  1. Type 2 diabetes mellitus with hyperglycemia, with long-term current use of insulin ([facility] [address]  Assessment & Plan: We will update labs, advised patient given reported home blood sugar readings we will try and wean him off insulin, will start by discontinuing Humalog insulin since he currently takes 4 units 3 times a day and never needed the sliding scale, will continue with metformin and basal insulin at nighttime, he will maintain blood sugar log for the next 4 weeks and send the readings through [address], will decide on next step of discontinuing Lantus and adding a second oral agent pending readings without Humalog. Reinforced recommendations to adhere to low-carb diet, will refer to diabetes education for further assistance with diet and overall diabetes care  Orders:  -     CBC; Future  -     Hemoglobin A1C; Future  -     Lipid Panel; Future  -     Microalbumin / Creatinine Urine Ratio; Future  -     Comprehensive Metabolic Panel; Future  -     [facility] Individual Diabetes Education (Non Care Coord Patient), [facility]  2. Adjustment insomnia  Assessment & Plan:   Stable on nightly dose of temazepam, will refill and continue same, reported use consistent with NARx check, reinforced recommendations for sleep hygiene and need to avoid stimulants, reevaluate in 3 months  Orders:  -     temazepam (RESTORIL) 15 MG capsule; Take 1 capsule by mouth nightly as needed for Sleep for up to 30 days. Max Daily Amount: 15 mg, Disp-30 capsule, R-2Normal  3.  Primary hypertension  Assessment & Plan:   Blood pressure borderline high this visit, patient does report whitecoat syndrome however he also has not been taking clonidine at bedtime which was added perioperatively, advised to restart and can do the extra dose as needed, patient verbalized understanding and will restart clonidine at bedtime along with lisinopril and metoprolol, will update labs, reinforced recommendations for maintaining healthy low-salt low-fat diet and increase level of physical activity as tolerated  Orders:  -     CBC; Future  -     Microalbumin / Creatinine Urine Ratio; Future  -     Comprehensive Metabolic Panel; Future  -     cloNIDine (CATAPRES) 0.1 MG tablet; Take 1 tablet by mouth nightly Take 0.1 mg at bedtime and if BP > 140/85, take a second dose, Disp-60 tablet, R-3Normal  4. MDD (major depressive disorder), recurrent, in partial remission ([facility] [address]  Assessment & Plan:   Continues to be stable on current dose of fluoxetine, will continue same and reevaluate in 3 months  5. Rectal cancer [facility])  Assessment & Plan:   Symptoms improved following most recent surgery, he continues to have mild rectal discharge, he will continue to follow-up with colorectal surgery and oncology, currently not on any specific treatment, he chose not to have the colostomy reversed given significant risk of fecal incontinence  6. Colostomy care [facility])  Assessment & Plan:   Colostomy remains in place as noted above, continue care as per colorectal surgery      Return in about 3 months (around 5/3/2023). SUBJECTIVE  HPI:   Patient returns for follow-up on diabetes, hypertension and medical problems, he underwent colorectal surgery for resection of rectal cancer in November, this was complicated with symptoms of incontinence and rectal bleed and resulted in 2 more hospitalizations following the surgery. He is now finally doing better. He reports blood sugar readings are always under 200, he takes 4 units of Humalog with a sliding scale but never needed the sliding scale. He continues to have hard time breaking himself and doing daily glucose checks. He is no longer using Zofran or pain pills which he needed postoperatively      Review of Systems   Constitutional:  Negative for activity change, appetite change and fatigue. HENT:  Negative for congestion, hearing loss, mouth sores and sore throat.     Respiratory:  Negative for cough, chest tightness, shortness of breath and wheezing. Cardiovascular:  Negative for chest pain, palpitations and leg swelling. Gastrointestinal:  Positive for diarrhea. Negative for abdominal pain, constipation, nausea and vomiting. Genitourinary:  Negative for difficulty urinating, dysuria, frequency, hematuria and urgency. Musculoskeletal:  Negative for arthralgias, back pain, gait problem and joint swelling. Skin:  Negative for color change. Allergic/Immunologic: Negative for environmental allergies and immunocompromised state. Neurological:  Negative for dizziness, light-headedness and headaches. Psychiatric/Behavioral:  Positive for sleep disturbance. Negative for behavioral problems and dysphoric mood. The patient is nervous/anxious. OBJECTIVE:    BP (!) 140/70   Pulse (!) 49   Ht 5' 11\" (1.803 m)   Wt 193 lb 6.4 oz (87.7 kg)   SpO2 99%   BMI 26.97 kg/m²    Physical Exam  Constitutional:       General: He is not in acute distress. Appearance: Normal appearance. He is normal weight. He is not toxic-appearing. HENT:      Head: Normocephalic. Eyes:      Conjunctiva/sclera: Conjunctivae normal.   Cardiovascular:      Rate and Rhythm: Normal rate. Heart sounds: Normal heart sounds. Pulmonary:      Effort: Pulmonary effort is normal. No respiratory distress. Abdominal:      Palpations: Abdomen is soft. Tenderness: There is no abdominal tenderness. There is no rebound. Comments: Colostomy in place   Musculoskeletal:         General: Normal range of motion. Skin:     General: Skin is warm and dry. Neurological:      General: No focal deficit present. Mental Status: He is alert. Cranial Nerves: No cranial nerve deficit. Psychiatric:         Mood and Affect: Mood normal.         Electronically signed by Stephania Maldonado MD on 2/3/2023 at 3:46 PM.    This dictation was generated by voice recognition computer software.   Although all attempts are made to edit the dictation for accuracy, there may be errors in the transcription that are not intended.

## 2023-02-03 NOTE — ASSESSMENT & PLAN NOTE
Symptoms improved following most recent surgery, he continues to have mild rectal discharge, he will continue to follow-up with colorectal surgery and oncology, currently not on any specific treatment, he chose not to have the colostomy reversed given significant risk of fecal incontinence

## 2023-02-03 NOTE — ASSESSMENT & PLAN NOTE
We will update labs, advised patient given reported home blood sugar readings we will try and wean him off insulin, will start by discontinuing Humalog insulin since he currently takes 4 units 3 times a day and never needed the sliding scale, will continue with metformin and basal insulin at nighttime, he will maintain blood sugar log for the next 4 weeks and send the readings through [address], will decide on next step of discontinuing Lantus and adding a second oral agent pending readings without Humalog.   Reinforced recommendations to adhere to low-carb diet, will refer to diabetes education for further assistance with diet and overall diabetes care

## 2023-02-03 NOTE — ASSESSMENT & PLAN NOTE
Stable on nightly dose of temazepam, will refill and continue same, reported use consistent with NARx check, reinforced recommendations for sleep hygiene and need to avoid stimulants, reevaluate in 3 months

## 2023-02-04 LAB
ESTIMATED AVERAGE GLUCOSE: 131.2 MG/DL
HBA1C MFR BLD: 6.2 %

## 2023-02-07 NOTE — TELEPHONE ENCOUNTER
I submitted PA for Temazepam 15MG capsules, Key: [id number]. To [employer]. Per plan - Drug is covered by current benefit plan. No further PA activity needed. Please notify patient. Thank you.

## 2023-03-07 DIAGNOSIS — E11.65 TYPE 2 DIABETES MELLITUS WITH HYPERGLYCEMIA, WITH LONG-TERM CURRENT USE OF INSULIN (HCC): Primary | ICD-10-CM

## 2023-03-07 DIAGNOSIS — Z79.4 TYPE 2 DIABETES MELLITUS WITH HYPERGLYCEMIA, WITH LONG-TERM CURRENT USE OF INSULIN (HCC): Primary | ICD-10-CM

## 2023-03-07 NOTE — TELEPHONE ENCOUNTER
Semglee is long acting insulin and ordered once a day not 3 times daily, patient is currently listed as taking Lantus 16 units daily, okay to switch to [facility] if that is what is the formulary for his insurance but its once a day not 3 times daily

## 2023-03-07 NOTE — TELEPHONE ENCOUNTER
[facility] calling for refill of Semglee. I do not currently see this on patients medication list so previous directions are. Semglee 100unit/ml 3ml pen #15 pens    16 units TID.

## 2023-03-08 NOTE — TELEPHONE ENCOUNTER
Spoke with pharmacist. Insurance has changed the Lantus [employer] to [employer]. Semglee is the generic. Unable to authorize refills over the phone, since script was closed out by provider a few months ago. Please send new script with the following directions:    Use 20 units x 3 days with every chemo cycle and 16 units daily.

## 2023-03-09 RX ORDER — INSULIN GLARGINE-YFGN 100 [IU]/ML
16 INJECTION, SOLUTION SUBCUTANEOUS DAILY
Qty: 500 ML | Refills: 5 | Status: SHIPPED | OUTPATIENT
Start: 2023-03-09

## 2023-03-10 ENCOUNTER — HOSPITAL ENCOUNTER (OUTPATIENT)
Dept: CT IMAGING | Age: 63
Discharge: HOME | End: 2023-03-10
Payer: COMMERCIAL

## 2023-03-10 DIAGNOSIS — C20 MALIGNANT NEOPLASM OF RECTUM (HCC): ICD-10-CM

## 2023-03-10 LAB
GFR SERPL CREATININE-BSD FRML MDRD: >60 ML/MIN/{1.73_M2}
PERFORMED ON: NORMAL
POC CREATININE: 1 MG/DL (ref 0.8–1.3)
POC SAMPLE TYPE: NORMAL

## 2023-03-10 PROCEDURE — 82565 ASSAY OF CREATININE: CPT

## 2023-03-10 PROCEDURE — 6360000004 HC RX CONTRAST MEDICATION: Performed by: STUDENT IN AN ORGANIZED HEALTH CARE EDUCATION/TRAINING PROGRAM

## 2023-03-10 PROCEDURE — 71260 CT THORAX DX C+: CPT

## 2023-03-10 RX ADMIN — IOPAMIDOL 75 ML: 755 INJECTION, SOLUTION INTRAVENOUS at 16:05

## 2023-04-23 DIAGNOSIS — E11.65 TYPE 2 DIABETES MELLITUS WITH HYPERGLYCEMIA, WITH LONG-TERM CURRENT USE OF INSULIN (HCC): ICD-10-CM

## 2023-04-23 DIAGNOSIS — I10 PRIMARY HYPERTENSION: ICD-10-CM

## 2023-04-23 DIAGNOSIS — Z79.4 TYPE 2 DIABETES MELLITUS WITH HYPERGLYCEMIA, WITH LONG-TERM CURRENT USE OF INSULIN (HCC): ICD-10-CM

## 2023-04-23 DIAGNOSIS — F33.2 SEVERE EPISODE OF RECURRENT MAJOR DEPRESSIVE DISORDER, WITHOUT PSYCHOTIC FEATURES (HCC): ICD-10-CM

## 2023-04-24 RX ORDER — FLUOXETINE 10 MG/1
10 CAPSULE ORAL DAILY
Qty: 90 CAPSULE | Refills: 1 | Status: SHIPPED | OUTPATIENT
Start: 2023-04-24

## 2023-04-24 RX ORDER — LISINOPRIL 20 MG/1
20 TABLET ORAL DAILY
Qty: 90 TABLET | Refills: 1 | Status: SHIPPED | OUTPATIENT
Start: 2023-04-24

## 2023-04-26 DIAGNOSIS — F51.02 ADJUSTMENT INSOMNIA: ICD-10-CM

## 2023-04-27 RX ORDER — TEMAZEPAM 15 MG/1
CAPSULE ORAL
Qty: 30 CAPSULE | Refills: 2 | Status: SHIPPED | OUTPATIENT
Start: 2023-04-27 | End: 2023-05-27

## 2023-04-27 NOTE — TELEPHONE ENCOUNTER
Jonahnell Jauregui, patient's sister, called upset. She had just spoken with Kevin Merritt who is very anxious. Kevin Merritt had looked at his MyChart and found a note dated 6/22 stating he is going to have surgery on his liver and bladder to take it out. She would like for Dr Luis Manuel Roca to call to discuss the treatment plan going forward as the patient was unaware of this and is upset at finding it.     Please call: [phone] [facility]

## 2023-05-03 ENCOUNTER — OFFICE VISIT (OUTPATIENT)
Dept: INTERNAL MEDICINE CLINIC | Age: 63
End: 2023-05-03
Payer: COMMERCIAL

## 2023-05-03 VITALS
BODY MASS INDEX: 29.26 KG/M2 | HEART RATE: 49 BPM | WEIGHT: 209 LBS | HEIGHT: 71 IN | DIASTOLIC BLOOD PRESSURE: 80 MMHG | SYSTOLIC BLOOD PRESSURE: 160 MMHG | OXYGEN SATURATION: 97 %

## 2023-05-03 DIAGNOSIS — Z79.4 TYPE 2 DIABETES MELLITUS WITH HYPERGLYCEMIA, WITH LONG-TERM CURRENT USE OF INSULIN (HCC): Primary | ICD-10-CM

## 2023-05-03 DIAGNOSIS — I10 PRIMARY HYPERTENSION: ICD-10-CM

## 2023-05-03 DIAGNOSIS — F33.41 MDD (MAJOR DEPRESSIVE DISORDER), RECURRENT, IN PARTIAL REMISSION (HCC): ICD-10-CM

## 2023-05-03 DIAGNOSIS — B88.0 CHIGGER BITES: ICD-10-CM

## 2023-05-03 DIAGNOSIS — E11.65 TYPE 2 DIABETES MELLITUS WITH HYPERGLYCEMIA, WITH LONG-TERM CURRENT USE OF INSULIN (HCC): Primary | ICD-10-CM

## 2023-05-03 DIAGNOSIS — R21 SKIN RASH: ICD-10-CM

## 2023-05-03 PROCEDURE — 3044F HG A1C LEVEL LT 7.0%: CPT | Performed by: INTERNAL MEDICINE

## 2023-05-03 PROCEDURE — 3078F DIAST BP <80 MM HG: CPT | Performed by: INTERNAL MEDICINE

## 2023-05-03 PROCEDURE — 99214 OFFICE O/P EST MOD 30 MIN: CPT | Performed by: INTERNAL MEDICINE

## 2023-05-03 PROCEDURE — 3077F SYST BP >= 140 MM HG: CPT | Performed by: INTERNAL MEDICINE

## 2023-05-03 RX ORDER — CLONIDINE HYDROCHLORIDE 0.1 MG/1
0.1 TABLET ORAL 2 TIMES DAILY
Qty: 60 TABLET | Refills: 3
Start: 2023-05-03

## 2023-05-03 RX ORDER — INSULIN LISPRO 200 [IU]/ML
INJECTION, SOLUTION SUBCUTANEOUS
Qty: 5 ADJUSTABLE DOSE PRE-FILLED PEN SYRINGE | Refills: 3 | Status: SHIPPED | OUTPATIENT
Start: 2023-05-03

## 2023-05-03 ASSESSMENT — ENCOUNTER SYMPTOMS
COUGH: 0
SHORTNESS OF BREATH: 0
WHEEZING: 0
COLOR CHANGE: 0
BACK PAIN: 0
SORE THROAT: 0
VOMITING: 0
NAUSEA: 0
CONSTIPATION: 0
CHEST TIGHTNESS: 0
ABDOMINAL PAIN: 0

## 2023-05-03 NOTE — ASSESSMENT & PLAN NOTE
Blood pressure remained borderline elevated throughout the visit, advised patient to increase clonidine dose to twice daily, continue current dose of metoprolol, unable to increase dose due to borderline bradycardia, continue current dose of lisinopril, if still no improvement in readings can increase clonidine to 3 times daily.   Continue to avoid salt and try to increase level of physical activity to a regular exercise to further help control blood pressure

## 2023-05-03 NOTE — PROGRESS NOTES
ASSESSMENT/PLAN:  1. Type 2 diabetes mellitus with hyperglycemia, with long-term current use of insulin ([facility])  Assessment & Plan:   Home spreadsheet showing readings continue to fluctuate, although not as bad in the morning time still not at target goal, occasional readings in excess of 250 mostly lunchtime and evening. Advised patient giving readings not at target goal would recommend he goes back to mealtime insulin, restart blood sugar check 3 times a day, complete diabetes education for further help understanding diet management, reevaluate with home log in 3 months  Orders:  -     insulin lispro (HUMALOG KWIKPEN) 200 UNIT/ML SOPN pen; Take 4 units for blood sugar reading of 150-200, 6 units for 201-250, 8units for 251-300, 10 units for 301-350, Disp-5 Adjustable Dose Pre-filled Pen Syringe, R-3Normal  2. Primary hypertension  Assessment & Plan:  Blood pressure remained borderline elevated throughout the visit, advised patient to increase clonidine dose to twice daily, continue current dose of metoprolol, unable to increase dose due to borderline bradycardia, continue current dose of lisinopril, if still no improvement in readings can increase clonidine to 3 times daily. Continue to avoid salt and try to increase level of physical activity to a regular exercise to further help control blood pressure  Orders:  -     cloNIDine (CATAPRES) 0.1 MG tablet; Take 1 tablet by mouth 2 times daily Take 0.1 mg at bedtime and if BP > 140/85, take a second dose, Disp-60 tablet, R-3Adjust Sig  3. Skin rash  -     Rosario Reddy MD, Dermatology, [facility]  -     hydrocortisone 2.5 % ointment; Apply topically 2 times daily. , Disp-60 g, R-1, Normal  4.  Chigger bites  Assessment & Plan:  Skin rash bilateral lower extremities worse on left side, can still resemble chigger bites although its been several months since onset, advised will refer to dermatology for further evaluation since this can also be secondary to

## 2023-05-03 NOTE — ASSESSMENT & PLAN NOTE
Skin rash bilateral lower extremities worse on left side, can still resemble chigger bites although its been several months since onset, advised will refer to dermatology for further evaluation since this can also be secondary to diabetic dermatopathy or chemo induced dermatopathic. Can do a trial of topical steroids until able to see Derm.

## 2023-05-03 NOTE — ASSESSMENT & PLAN NOTE
Home spreadsheet showing readings continue to fluctuate, although not as bad in the morning time still not at target goal, occasional readings in excess of 250 mostly lunchtime and evening.   Advised patient giving readings not at target goal would recommend he goes back to mealtime insulin, restart blood sugar check 3 times a day, complete diabetes education for further help understanding diet management, reevaluate with home log in 3 months

## 2023-05-12 ENCOUNTER — OFFICE VISIT (OUTPATIENT)
Dept: ENDOCRINOLOGY | Age: 63
End: 2023-05-12

## 2023-05-12 DIAGNOSIS — Z79.4 TYPE 2 DIABETES MELLITUS WITH HYPERGLYCEMIA, WITH LONG-TERM CURRENT USE OF INSULIN (HCC): Primary | ICD-10-CM

## 2023-05-12 DIAGNOSIS — E11.65 TYPE 2 DIABETES MELLITUS WITH HYPERGLYCEMIA, WITH LONG-TERM CURRENT USE OF INSULIN (HCC): Primary | ICD-10-CM

## 2023-05-12 NOTE — PROGRESS NOTES
Plan: 3 months    Referring Provider: Jennifer Liriano MD     Time spent with patient: 45 minutes  8:45-9:30 am    Marsha Cunningham RD

## 2023-05-22 DIAGNOSIS — Z79.4 TYPE 2 DIABETES MELLITUS WITH HYPERGLYCEMIA, WITH LONG-TERM CURRENT USE OF INSULIN (HCC): ICD-10-CM

## 2023-05-22 DIAGNOSIS — I10 PRIMARY HYPERTENSION: ICD-10-CM

## 2023-05-22 DIAGNOSIS — E11.65 TYPE 2 DIABETES MELLITUS WITH HYPERGLYCEMIA, WITH LONG-TERM CURRENT USE OF INSULIN (HCC): ICD-10-CM

## 2023-05-22 RX ORDER — CLONIDINE HYDROCHLORIDE 0.1 MG/1
TABLET ORAL
Qty: 60 TABLET | Refills: 0 | Status: SHIPPED | OUTPATIENT
Start: 2023-05-22

## 2023-05-22 NOTE — TELEPHONE ENCOUNTER
Last OV: 5/3/2023  Next OV: 8/3/2023    Next appointment due:around 8/3/2023    Last fill:12/6/22  Refills:1

## 2023-06-25 DIAGNOSIS — I10 PRIMARY HYPERTENSION: ICD-10-CM

## 2023-06-26 RX ORDER — CLONIDINE HYDROCHLORIDE 0.1 MG/1
TABLET ORAL
Qty: 60 TABLET | Refills: 2 | Status: SHIPPED | OUTPATIENT
Start: 2023-06-26

## 2023-07-13 ENCOUNTER — HOSPITAL ENCOUNTER (OUTPATIENT)
Dept: CT IMAGING | Age: 63
Discharge: HOME | End: 2023-07-13
Attending: STUDENT IN AN ORGANIZED HEALTH CARE EDUCATION/TRAINING PROGRAM
Payer: COMMERCIAL

## 2023-07-13 ENCOUNTER — OFFICE VISIT (OUTPATIENT)
Dept: INTERNAL MEDICINE CLINIC | Age: 63
End: 2023-07-13
Payer: COMMERCIAL

## 2023-07-13 VITALS
SYSTOLIC BLOOD PRESSURE: 128 MMHG | HEART RATE: 73 BPM | DIASTOLIC BLOOD PRESSURE: 76 MMHG | WEIGHT: 201.2 LBS | OXYGEN SATURATION: 98 % | HEIGHT: 71 IN | BODY MASS INDEX: 28.17 KG/M2

## 2023-07-13 DIAGNOSIS — N39.0 URINARY TRACT INFECTION WITHOUT HEMATURIA, SITE UNSPECIFIED: Primary | ICD-10-CM

## 2023-07-13 DIAGNOSIS — I10 PRIMARY HYPERTENSION: ICD-10-CM

## 2023-07-13 DIAGNOSIS — C20 RECTAL CANCER (HCC): ICD-10-CM

## 2023-07-13 LAB
BILIRUBIN, POC: NEGATIVE
BLOOD URINE, POC: NORMAL
BUN SERPL-MCNC: 23 MG/DL (ref 7–20)
CLARITY, POC: NORMAL
COLOR, POC: YELLOW
CREAT SERPL-MCNC: 1.5 MG/DL (ref 0.8–1.3)
GFR SERPLBLD CREATININE-BSD FMLA CKD-EPI: 52 ML/MIN/{1.73_M2}
GLUCOSE URINE, POC: NORMAL
KETONES, POC: NEGATIVE
LEUKOCYTE EST, POC: NORMAL
NITRITE, POC: NEGATIVE
PH, POC: 7
PROTEIN, POC: NORMAL
SPECIFIC GRAVITY, POC: 1.01
UROBILINOGEN, POC: NORMAL

## 2023-07-13 PROCEDURE — 6360000004 HC RX CONTRAST MEDICATION: Performed by: STUDENT IN AN ORGANIZED HEALTH CARE EDUCATION/TRAINING PROGRAM

## 2023-07-13 PROCEDURE — 71260 CT THORAX DX C+: CPT

## 2023-07-13 PROCEDURE — 3078F DIAST BP <80 MM HG: CPT | Performed by: INTERNAL MEDICINE

## 2023-07-13 PROCEDURE — 84520 ASSAY OF UREA NITROGEN: CPT

## 2023-07-13 PROCEDURE — 99213 OFFICE O/P EST LOW 20 MIN: CPT | Performed by: INTERNAL MEDICINE

## 2023-07-13 PROCEDURE — 3074F SYST BP LT 130 MM HG: CPT | Performed by: INTERNAL MEDICINE

## 2023-07-13 PROCEDURE — 81002 URINALYSIS NONAUTO W/O SCOPE: CPT | Performed by: INTERNAL MEDICINE

## 2023-07-13 PROCEDURE — 82565 ASSAY OF CREATININE: CPT

## 2023-07-13 PROCEDURE — 36415 COLL VENOUS BLD VENIPUNCTURE: CPT

## 2023-07-13 RX ORDER — SULFAMETHOXAZOLE AND TRIMETHOPRIM 800; 160 MG/1; MG/1
1 TABLET ORAL 2 TIMES DAILY
Qty: 14 TABLET | Refills: 0 | Status: SHIPPED | OUTPATIENT
Start: 2023-07-13 | End: 2023-07-20

## 2023-07-13 RX ADMIN — IOPAMIDOL 75 ML: 755 INJECTION, SOLUTION INTRAVENOUS at 09:17

## 2023-07-13 RX ADMIN — IOHEXOL 50 ML: 240 INJECTION, SOLUTION INTRATHECAL; INTRAVASCULAR; INTRAVENOUS; ORAL at 09:17

## 2023-07-13 ASSESSMENT — ENCOUNTER SYMPTOMS
CONSTIPATION: 0
ABDOMINAL PAIN: 0
NAUSEA: 1
SORE THROAT: 0
CHEST TIGHTNESS: 0
BACK PAIN: 0
COUGH: 0
WHEEZING: 0
VOMITING: 1
COLOR CHANGE: 0
SHORTNESS OF BREATH: 0

## 2023-07-13 NOTE — PROGRESS NOTES
ASSESSMENT/PLAN:  1. Urinary tract infection without hematuria, site unspecified  Assessment & Plan:    urinalysis showing trace leukocyte esterase with large blood, although symptoms improved slightly since onset we will go ahead and treat with antibiotics since residual infection may result in rebound symptoms. Will send sample for culture and adjust if needed otherwise he will finish a course of Bactrim, recommended to increase water intake, follow-up as scheduled in 3 weeks  Orders:  -     sulfamethoxazole-trimethoprim (BACTRIM DS;SEPTRA DS) 800-160 MG per tablet; Take 1 tablet by mouth 2 times daily for 7 days, Disp-14 tablet, R-0Normal  -     Culture, Urine  -     POCT Urinalysis no Micro  2. Primary hypertension  Assessment & Plan:    patient reporting not taking the morning dose of clonidine as it was making him very tired, he continues to monitor blood pressure readings and so far been stable, advised continue current regimen since blood pressure has been stable, will reevaluate again at his upcoming appointment next months      Return for as scheduled. SUBJECTIVE  HPI:   Patient complaining of dysuria and urinary frequency of almost 1 week duration, reports urine color is very concentrated, he had chills for 2 days in a row before they finally subsided, he could not come in due to his work schedule, he tried to increase water intake, he feels symptoms are better since yesterday      Review of Systems   Constitutional:  Positive for chills. Negative for activity change, appetite change, fatigue and fever. HENT:  Negative for congestion, hearing loss, mouth sores and sore throat. Respiratory:  Negative for cough, chest tightness, shortness of breath and wheezing. Cardiovascular:  Negative for chest pain, palpitations and leg swelling. Gastrointestinal:  Positive for nausea and vomiting. Negative for abdominal pain and constipation. Genitourinary:  Positive for dysuria and frequency.  Negative

## 2023-07-13 NOTE — ASSESSMENT & PLAN NOTE
urinalysis showing trace leukocyte esterase with large blood, although symptoms improved slightly since onset we will go ahead and treat with antibiotics since residual infection may result in rebound symptoms.   Will send sample for culture and adjust if needed otherwise he will finish a course of Bactrim, recommended to increase water intake, follow-up as scheduled in 3 weeks

## 2023-07-13 NOTE — ASSESSMENT & PLAN NOTE
patient reporting not taking the morning dose of clonidine as it was making him very tired, he continues to monitor blood pressure readings and so far been stable, advised continue current regimen since blood pressure has been stable, will reevaluate again at his upcoming appointment next months

## 2023-07-14 ENCOUNTER — TELEPHONE (OUTPATIENT)
Dept: INTERNAL MEDICINE CLINIC | Age: 63
End: 2023-07-14

## 2023-07-14 NOTE — TELEPHONE ENCOUNTER
He should start the Bactrim as recommended, increase water intake, if hematuria worsen then he needs to be evaluated at the hospital

## 2023-07-15 LAB — BACTERIA UR CULT: NORMAL

## 2023-07-30 DIAGNOSIS — F51.02 ADJUSTMENT INSOMNIA: ICD-10-CM

## 2023-07-31 RX ORDER — TEMAZEPAM 15 MG/1
CAPSULE ORAL
Qty: 30 CAPSULE | Refills: 2 | Status: SHIPPED | OUTPATIENT
Start: 2023-07-31 | End: 2023-08-30

## 2023-07-31 NOTE — PROGRESS NOTES
[facility] SURGICAL ONCOLOGY  [facility] [facility] [address]  Dept: [phone]  Dept Fax: [phone]    Visit Date: 8/4/2023    HPI:   Abby Harding is a 61 y.o. male who is here today to follow up on his metastatic rectal cancer ([id number]). Patient is followed by Dr. Zuleima Wing and Dr. Puma Green. Finished 9/9 of treatments of FOLFOX. Completed chemo RT with Xeloda. S/P Robotic LAR, parastomal hernia repair, loop colostomy to end colostomy creation, partial liver resection x2 (right and left) 11/14/22. Last night pt started with his legs itching that spread to his face with facial swelling. Also endorsed nausea and vomiting. Patient also states he typically does not eay red meat but had an [employer] sandwich prior to this reaction.     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    Rectal cancer [facility])      Past Surgical History:   Procedure Laterality Date    ABDOMEN SURGERY  1975    Exploratory    COLONOSCOPY N/A 10/19/2022    COLONOSCOPY DIAGNOSTIC/STOMA performed by Refugio Freedman MD at [address] [facility] N/A 2/28/2022    LAPAROSCOPIC COLOSTOMY CREATION, laparoscopic incarcerated hernia repair performed by Refugio Freedman MD at [address] Bilateral 11/14/2022    ROBOTIC / LAPAROSCOPIC PARTIAL LIVER RESECTION X 2 (RIGHT AND LEFT LOBE), POSSIBLE OPEN performed by Gerry Walters MD at [address] [facility] 2/17/2022    PORT A CATH INSERTION performed by Miriam Carlson MD at [address] N/A 11/14/2022    ROBOTIC LOWER ABDOMINOPERINEAL RESECTION, COLOSTOMY REVERSAL, DIVERTING ILEOSTOMY, , ABDOMINO PERINEAL RESECTION WITH PERMANENT COLOSTOMY performed by Refugio Freedman MD at [address]  N/A 2/15/2022    SIGMOIDOSCOPY BIOPSY FLEXIBLE performed by Homer Bhatt MD at

## 2023-08-04 ENCOUNTER — OFFICE VISIT (OUTPATIENT)
Dept: SURGERY | Age: 63
End: 2023-08-04
Payer: COMMERCIAL

## 2023-08-04 ENCOUNTER — TELEPHONE (OUTPATIENT)
Dept: INTERNAL MEDICINE CLINIC | Age: 63
End: 2023-08-04

## 2023-08-04 VITALS
TEMPERATURE: 96.5 F | WEIGHT: 196.8 LBS | RESPIRATION RATE: 18 BRPM | OXYGEN SATURATION: 99 % | BODY MASS INDEX: 38.64 KG/M2 | HEIGHT: 60 IN | DIASTOLIC BLOOD PRESSURE: 86 MMHG | HEART RATE: 105 BPM | SYSTOLIC BLOOD PRESSURE: 138 MMHG

## 2023-08-04 DIAGNOSIS — K76.9 LIVER LESION: ICD-10-CM

## 2023-08-04 DIAGNOSIS — C20 RECTAL CANCER (HCC): ICD-10-CM

## 2023-08-04 DIAGNOSIS — C79.9 METASTATIC ADENOCARCINOMA (HCC): Primary | ICD-10-CM

## 2023-08-04 PROCEDURE — 3078F DIAST BP <80 MM HG: CPT | Performed by: SURGERY

## 2023-08-04 PROCEDURE — 3074F SYST BP LT 130 MM HG: CPT | Performed by: SURGERY

## 2023-08-04 PROCEDURE — 99215 OFFICE O/P EST HI 40 MIN: CPT | Performed by: SURGERY

## 2023-08-04 NOTE — TELEPHONE ENCOUNTER
Pt called in regards to tick bite that he got yesterday. He states that it was red and swollen and that he had allergic reaction symptoms. He thinks that it could be alpha gal and would like to be tested for this. He states that he needs to schedule his MRI and would to get that and this appt in the same day so he will call back to schedule. But was wondering what Dr. Laurence Mcrae recommends in the meantime. Please advise. [phone].

## 2023-08-04 NOTE — TELEPHONE ENCOUNTER
PCP said if it is a tick bite needs to be seen at least at an Urgent Care for Lyme Dz. 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 Has potential bite zoë that he saw. Patient feels he might have alpha gal because he went out last night to [employer] and then swelled and vomited, not complaints of swelling or N/V at this time. Patient instructed to go to Urgent Care today and should not wait to be seen, and should follow up with PCP as directed. Patient confirms understanding of instructions.

## 2023-08-08 ENCOUNTER — TELEPHONE (OUTPATIENT)
Dept: SURGERY | Age: 63
End: 2023-08-08

## 2023-08-08 NOTE — TELEPHONE ENCOUNTER
Patient said at his last appointment with Dr. José Luis Koroma that there would be an order for an MRI submitted, but he hasn't heard anything yet.     Please call back at [phone]

## 2023-08-09 DIAGNOSIS — K76.9 LIVER LESION: ICD-10-CM

## 2023-08-09 DIAGNOSIS — C79.9 METASTATIC ADENOCARCINOMA (HCC): Primary | ICD-10-CM

## 2023-08-09 NOTE — TELEPHONE ENCOUNTER
MA contacted patient to schedule his MRI and he said he does his own scheduling and would like to this time, MA told him that was fine and the number to central scheduling was provided.

## 2023-08-15 ENCOUNTER — HOSPITAL ENCOUNTER (OUTPATIENT)
Dept: MRI IMAGING | Age: 63
Discharge: HOME | End: 2023-08-15
Attending: SURGERY
Payer: COMMERCIAL

## 2023-08-15 ENCOUNTER — OFFICE VISIT (OUTPATIENT)
Dept: SURGERY | Age: 63
End: 2023-08-15
Payer: COMMERCIAL

## 2023-08-15 VITALS
TEMPERATURE: 97.8 F | HEIGHT: 60 IN | DIASTOLIC BLOOD PRESSURE: 77 MMHG | SYSTOLIC BLOOD PRESSURE: 175 MMHG | HEART RATE: 55 BPM | WEIGHT: 197 LBS | BODY MASS INDEX: 38.68 KG/M2

## 2023-08-15 DIAGNOSIS — K76.9 LIVER LESION: ICD-10-CM

## 2023-08-15 DIAGNOSIS — K43.5 PARASTOMAL HERNIA WITHOUT OBSTRUCTION OR GANGRENE: ICD-10-CM

## 2023-08-15 DIAGNOSIS — R15.1 FECAL SMEARING: ICD-10-CM

## 2023-08-15 DIAGNOSIS — C79.9 METASTATIC ADENOCARCINOMA (HCC): ICD-10-CM

## 2023-08-15 DIAGNOSIS — C20 RECTAL CANCER (HCC): Primary | ICD-10-CM

## 2023-08-15 PROCEDURE — 3078F DIAST BP <80 MM HG: CPT | Performed by: SURGERY

## 2023-08-15 PROCEDURE — 3077F SYST BP >= 140 MM HG: CPT | Performed by: SURGERY

## 2023-08-15 PROCEDURE — 74183 MRI ABD W/O CNTR FLWD CNTR: CPT

## 2023-08-15 PROCEDURE — 99215 OFFICE O/P EST HI 40 MIN: CPT | Performed by: SURGERY

## 2023-08-15 PROCEDURE — A9581 GADOXETATE DISODIUM INJ: HCPCS | Performed by: SURGERY

## 2023-08-15 PROCEDURE — 6360000004 HC RX CONTRAST MEDICATION: Performed by: SURGERY

## 2023-08-15 RX ADMIN — GADOXETATE DISODIUM 10 ML: 181.43 INJECTION, SOLUTION INTRAVENOUS at 21:45

## 2023-08-15 NOTE — PROGRESS NOTES
[id number] [facility] COLORECTAL SURGERY  [facility] [facility] [address]  Dept: [phone]  Dept Fax: [phone]  Loc: [phone]    Visit Date: 8/15/2023    Chino Gomez is a 61 y.o. male who presents today for: Follow-up (Rectal cancer,  additional consult and treatment plan)      HPI:       Chino Gomez is a 61 y.o. male who is known to me for complex rectal cancer requiring low anterior section with end colostomy with combination liver resection done by Dr. Bambi Ho. Recently, unfortunately he developed a new liver metastasis which is currently being worked up by Dr. Jose Alejandro Hidalgo. He also has increasing bulging around his colostomy but seems to be tolerating it well and not having too much discomfort. Several months back he did have some rectal discomfort and pain, but currently has no issues in that regard.     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    Rectal cancer [facility])      Past Surgical History:   Procedure Laterality Date    ABDOMEN SURGERY  1975    Exploratory    COLONOSCOPY N/A 10/19/2022    COLONOSCOPY DIAGNOSTIC/STOMA performed by Binta Carcamo MD at [address] [facility] N/A 2/28/2022    LAPAROSCOPIC COLOSTOMY CREATION, laparoscopic incarcerated hernia repair performed by Binta Carcamo MD at [address] Bilateral 11/14/2022    ROBOTIC / LAPAROSCOPIC PARTIAL LIVER RESECTION X 2 (RIGHT AND LEFT LOBE), POSSIBLE OPEN performed by Vikki Velasquez MD at [address] [facility] 2/17/2022    PORT A CATH INSERTION performed by Rock Arevalo MD at [address] [facility] 11/14/2022    ROBOTIC LOWER ABDOMINOPERINEAL RESECTION, COLOSTOMY REVERSAL, DIVERTING ILEOSTOMY, , ABDOMINO PERINEAL RESECTION WITH

## 2023-08-24 NOTE — PROGRESS NOTES
[facility] SURGICAL ONCOLOGY  [address] [facility] [address]  Dept: [phone]  Dept Fax: [phone]    Visit Date: 8/29/2023    HPI:   Bernard Calvo is a 61 y.o. male who returns today to discuss further management of his metastatic rectal cancer ([id number]) and to review recent imaging studies. Patient is followed by Dr. Brad Parker who he last saw on June 16th. Celi Ferraro completed 9/9 of treatments of FOLFOX. Completed chemo RT with Xeloda. S/P Robotic LAR, parastomal hernia repair, loop colostomy to end colostomy creation, partial liver resection x2 (right and left) 11/14/22. Recent development of a parastomal hernia. States overall he is feeling well today.      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    Rectal cancer [facility])      Past Surgical History:   Procedure Laterality Date    ABDOMEN SURGERY  1975    Exploratory    COLONOSCOPY N/A 10/19/2022    COLONOSCOPY DIAGNOSTIC/STOMA performed by Eda Cardenas MD at [address] [facility] N/A 2/28/2022    LAPAROSCOPIC COLOSTOMY CREATION, laparoscopic incarcerated hernia repair performed by Eda Cardenas MD at [address] Bilateral 11/14/2022    ROBOTIC / LAPAROSCOPIC PARTIAL LIVER RESECTION X 2 (RIGHT AND LEFT LOBE), POSSIBLE OPEN performed by William Farr MD at [address] [facility] 2/17/2022    PORT A CATH INSERTION performed by Slava Hayden MD at [address] N/A 11/14/2022    ROBOTIC LOWER ABDOMINOPERINEAL RESECTION, COLOSTOMY REVERSAL, DIVERTING ILEOSTOMY, , ABDOMINO PERINEAL RESECTION WITH PERMANENT COLOSTOMY performed by Eda Cardenas MD at [address] [facility] 2/15/2022    SIGMOIDOSCOPY BIOPSY FLEXIBLE performed by Alesha Silva MD at [address] [facility]  2/15/2022    SIGMOIDOSCOPY

## 2023-08-28 RX ORDER — TAMSULOSIN HYDROCHLORIDE 0.4 MG/1
CAPSULE ORAL
COMMUNITY
End: 2023-09-26 | Stop reason: ALTCHOICE

## 2023-08-28 RX ORDER — OXYCODONE HYDROCHLORIDE AND ACETAMINOPHEN 5; 325 MG/1; MG/1
TABLET ORAL
COMMUNITY
End: 2023-09-26 | Stop reason: ALTCHOICE

## 2023-08-28 RX ORDER — CEPHALEXIN 500 MG/1
CAPSULE ORAL
COMMUNITY
Start: 2023-08-09 | End: 2023-09-26 | Stop reason: ALTCHOICE

## 2023-08-29 ENCOUNTER — OFFICE VISIT (OUTPATIENT)
Dept: SURGERY | Age: 63
End: 2023-08-29
Payer: COMMERCIAL

## 2023-08-29 VITALS
SYSTOLIC BLOOD PRESSURE: 143 MMHG | HEART RATE: 49 BPM | TEMPERATURE: 97.5 F | BODY MASS INDEX: 27.5 KG/M2 | DIASTOLIC BLOOD PRESSURE: 76 MMHG | WEIGHT: 196.4 LBS | HEIGHT: 71 IN

## 2023-08-29 DIAGNOSIS — C20 RECTAL CANCER (HCC): ICD-10-CM

## 2023-08-29 DIAGNOSIS — K76.9 LIVER LESION: ICD-10-CM

## 2023-08-29 DIAGNOSIS — C79.9 METASTATIC ADENOCARCINOMA (HCC): Primary | ICD-10-CM

## 2023-08-29 PROCEDURE — 3078F DIAST BP <80 MM HG: CPT | Performed by: SURGERY

## 2023-08-29 PROCEDURE — 99215 OFFICE O/P EST HI 40 MIN: CPT | Performed by: SURGERY

## 2023-08-29 PROCEDURE — 3074F SYST BP LT 130 MM HG: CPT | Performed by: SURGERY

## 2023-08-30 NOTE — ASSESSMENT & PLAN NOTE
Blood pressure stable and well-controlled on current dose of metoprolol, advised patient will transition to lisinopril in the near future however for now we will not change any medication since anticipating to start chemotherapy in the near future and will await until he is more stable.   Continue to maintain healthy low-salt low-fat low carbohydrate diet and increase level of physical activity as tolerated DISPLAY PLAN FREE TEXT oral

## 2023-09-02 DIAGNOSIS — E11.65 TYPE 2 DIABETES MELLITUS WITH HYPERGLYCEMIA, WITH LONG-TERM CURRENT USE OF INSULIN (HCC): ICD-10-CM

## 2023-09-02 DIAGNOSIS — Z79.4 TYPE 2 DIABETES MELLITUS WITH HYPERGLYCEMIA, WITH LONG-TERM CURRENT USE OF INSULIN (HCC): ICD-10-CM

## 2023-09-26 ENCOUNTER — OFFICE VISIT (OUTPATIENT)
Dept: INTERNAL MEDICINE CLINIC | Age: 63
End: 2023-09-26
Payer: COMMERCIAL

## 2023-09-26 VITALS
SYSTOLIC BLOOD PRESSURE: 138 MMHG | DIASTOLIC BLOOD PRESSURE: 74 MMHG | BODY MASS INDEX: 28.19 KG/M2 | HEIGHT: 71 IN | OXYGEN SATURATION: 98 % | WEIGHT: 201.4 LBS | HEART RATE: 53 BPM

## 2023-09-26 DIAGNOSIS — I10 PRIMARY HYPERTENSION: Primary | ICD-10-CM

## 2023-09-26 DIAGNOSIS — E11.65 TYPE 2 DIABETES MELLITUS WITH HYPERGLYCEMIA, WITH LONG-TERM CURRENT USE OF INSULIN (HCC): ICD-10-CM

## 2023-09-26 DIAGNOSIS — R21 SKIN RASH: ICD-10-CM

## 2023-09-26 DIAGNOSIS — F33.41 MDD (MAJOR DEPRESSIVE DISORDER), RECURRENT, IN PARTIAL REMISSION (HCC): ICD-10-CM

## 2023-09-26 DIAGNOSIS — Z79.4 TYPE 2 DIABETES MELLITUS WITH HYPERGLYCEMIA, WITH LONG-TERM CURRENT USE OF INSULIN (HCC): ICD-10-CM

## 2023-09-26 DIAGNOSIS — C20 RECTAL CANCER (HCC): ICD-10-CM

## 2023-09-26 PROBLEM — N39.0 URINARY TRACT INFECTION WITHOUT HEMATURIA: Status: RESOLVED | Noted: 2023-07-13 | Resolved: 2023-09-26

## 2023-09-26 PROBLEM — E53.8 COBALAMIN DEFICIENCY: Status: ACTIVE | Noted: 2023-09-26

## 2023-09-26 PROCEDURE — 3078F DIAST BP <80 MM HG: CPT | Performed by: INTERNAL MEDICINE

## 2023-09-26 PROCEDURE — 3075F SYST BP GE 130 - 139MM HG: CPT | Performed by: INTERNAL MEDICINE

## 2023-09-26 PROCEDURE — 99214 OFFICE O/P EST MOD 30 MIN: CPT | Performed by: INTERNAL MEDICINE

## 2023-09-26 PROCEDURE — 3044F HG A1C LEVEL LT 7.0%: CPT | Performed by: INTERNAL MEDICINE

## 2023-09-26 ASSESSMENT — ENCOUNTER SYMPTOMS
SHORTNESS OF BREATH: 0
CHEST TIGHTNESS: 0
ABDOMINAL PAIN: 0
BACK PAIN: 0
COUGH: 0
CONSTIPATION: 0
WHEEZING: 0
NAUSEA: 0
SORE THROAT: 0
VOMITING: 0
COLOR CHANGE: 0

## 2023-09-26 NOTE — ASSESSMENT & PLAN NOTE
continues to be fairly manageable and stable with current dose of fluoxetine although he recently noticed some worsening anxiety with the situational stress from having to undergo chemo again, he is scheduled to have a second opinion at [facility] but reports so far managing okay

## 2023-09-26 NOTE — ASSESSMENT & PLAN NOTE
reports home blood sugar readings remain fairly well controlled and has not been needing to use mealtime insulin, will continue current dose of Lantus along with metformin, encouraged adherence to low-carb diet, will update labs at his follow-up appointment in 3 months

## 2023-09-26 NOTE — ASSESSMENT & PLAN NOTE
still has postinflammatory hyperpigmentation however reports hydrocortisone cream helped significantly, will refill in case he gets new lesions

## 2023-09-26 NOTE — ASSESSMENT & PLAN NOTE
with liver metastasis, currently undergoing new round of chemotherapy and is scheduled to follow-up with surgical oncology next month, continue care and recommendation as per oncology

## 2023-09-26 NOTE — PROGRESS NOTES
ASSESSMENT/PLAN:  1. Primary hypertension  Assessment & Plan:    blood pressure is stable today, home blood pressure readings generally under control, explained to patient since yesterday high reading was an isolated reading most likely secondary to anxiety about chemo and whitecoat hypertension we will continue current medication regimen, advised if taking extra clonidine at daytime causing excessive sedation then can try hydralazine instead however since he has not been needing to do that we will continue to monitor for now on daily lisinopril with metoprolol and nighttime clonidine. Patient verbalized understanding and will call the office if readings starts fluctuating again  2. Skin rash  Assessment & Plan:    still has postinflammatory hyperpigmentation however reports hydrocortisone cream helped significantly, will refill in case he gets new lesions  Orders:  -     hydrocortisone 2.5 % ointment; Apply topically 2 times daily. , Disp-60 g, R-1, Normal  3. Type 2 diabetes mellitus with hyperglycemia, with long-term current use of insulin (HCC)  Assessment & Plan:    reports home blood sugar readings remain fairly well controlled and has not been needing to use mealtime insulin, will continue current dose of Lantus along with metformin, encouraged adherence to low-carb diet, will update labs at his follow-up appointment in 3 months  4. MDD (major depressive disorder), recurrent, in partial remission ([address])  Assessment & Plan:    continues to be fairly manageable and stable with current dose of fluoxetine although he recently noticed some worsening anxiety with the situational stress from having to undergo chemo again, he is scheduled to have a second opinion at [id number] but reports so far managing okay  5.  Rectal cancer [facility])  Assessment & Plan:    with liver metastasis, currently undergoing new round of chemotherapy and is scheduled to follow-up with surgical oncology next month, continue care and

## 2023-09-26 NOTE — ASSESSMENT & PLAN NOTE
blood pressure is stable today, home blood pressure readings generally under control, explained to patient since yesterday high reading was an isolated reading most likely secondary to anxiety about chemo and whitecoat hypertension we will continue current medication regimen, advised if taking extra clonidine at daytime causing excessive sedation then can try hydralazine instead however since he has not been needing to do that we will continue to monitor for now on daily lisinopril with metoprolol and nighttime clonidine.   Patient verbalized understanding and will call the office if readings starts fluctuating again

## 2023-09-27 DIAGNOSIS — I10 PRIMARY HYPERTENSION: ICD-10-CM

## 2023-09-27 RX ORDER — CLONIDINE HYDROCHLORIDE 0.1 MG/1
TABLET ORAL
Qty: 60 TABLET | Refills: 2 | Status: SHIPPED | OUTPATIENT
Start: 2023-09-27

## 2023-10-18 ENCOUNTER — TELEPHONE (OUTPATIENT)
Dept: SURGERY | Age: 63
End: 2023-10-18

## 2023-10-18 DIAGNOSIS — C20 RECTAL CANCER (HCC): Primary | ICD-10-CM

## 2023-10-18 DIAGNOSIS — K76.9 LIVER LESION: ICD-10-CM

## 2023-10-18 NOTE — TELEPHONE ENCOUNTER
MA called to let patient know that Dr. Javad Nelson would like him to have a CT Scan prior to his appointment with us on 10/31/23 and he said he would but he would schedule it, MA told him that was fine and if he needed any help or would like MA to schedule it MA would schedule  for him, and he stated he can do it

## 2023-10-25 ENCOUNTER — TELEPHONE (OUTPATIENT)
Dept: SURGERY | Age: 63
End: 2023-10-25

## 2023-10-25 NOTE — TELEPHONE ENCOUNTER
RN contacted patient regarding the cancellation of his CT yesterday. Patient states that Dr. Esa Joseph recommended this as he want's to wait until after the 8th cycle to scan. This will be in 8 weeks. Patient will reschedule his appointment with Dr. Domo Mcgowan after his imaging studies.

## 2023-10-31 DIAGNOSIS — F51.02 ADJUSTMENT INSOMNIA: ICD-10-CM

## 2023-11-01 RX ORDER — TEMAZEPAM 15 MG/1
CAPSULE ORAL
Qty: 30 CAPSULE | Refills: 3 | Status: SHIPPED | OUTPATIENT
Start: 2023-11-01 | End: 2023-12-01

## 2023-11-01 NOTE — TELEPHONE ENCOUNTER
Last OV: 9/26/2023  Next OV: Visit date not found    Next appointment due:   12/26/2023    Last fill: 04/27/2023  Refills:2

## 2023-11-07 DIAGNOSIS — Z79.4 TYPE 2 DIABETES MELLITUS WITH HYPERGLYCEMIA, WITH LONG-TERM CURRENT USE OF INSULIN (HCC): ICD-10-CM

## 2023-11-07 DIAGNOSIS — E11.65 TYPE 2 DIABETES MELLITUS WITH HYPERGLYCEMIA, WITH LONG-TERM CURRENT USE OF INSULIN (HCC): ICD-10-CM

## 2023-11-07 NOTE — TELEPHONE ENCOUNTER
Last OV: 9/26/2023  Next OV: Visit date not found    Next appointment due:    Last fill: 09/05/2023  Refills:0

## 2023-12-01 DIAGNOSIS — I10 PRIMARY HYPERTENSION: ICD-10-CM

## 2023-12-01 DIAGNOSIS — E11.65 TYPE 2 DIABETES MELLITUS WITH HYPERGLYCEMIA, WITH LONG-TERM CURRENT USE OF INSULIN (HCC): ICD-10-CM

## 2023-12-01 DIAGNOSIS — Z79.4 TYPE 2 DIABETES MELLITUS WITH HYPERGLYCEMIA, WITH LONG-TERM CURRENT USE OF INSULIN (HCC): ICD-10-CM

## 2023-12-01 DIAGNOSIS — F33.2 SEVERE EPISODE OF RECURRENT MAJOR DEPRESSIVE DISORDER, WITHOUT PSYCHOTIC FEATURES (HCC): ICD-10-CM

## 2023-12-01 RX ORDER — LISINOPRIL 20 MG/1
20 TABLET ORAL DAILY
Qty: 30 TABLET | Refills: 5 | Status: SHIPPED | OUTPATIENT
Start: 2023-12-01

## 2023-12-01 RX ORDER — FLUOXETINE 10 MG/1
10 CAPSULE ORAL DAILY
Qty: 30 CAPSULE | Refills: 5 | Status: SHIPPED | OUTPATIENT
Start: 2023-12-01

## 2023-12-01 NOTE — TELEPHONE ENCOUNTER
Last OV: 9/26/2023  Next OV: Visit date not found    Next appointment due: 12/26/2023    Last fill: 10/25/2023  Refills: 0

## 2023-12-07 ENCOUNTER — TELEPHONE (OUTPATIENT)
Dept: ADMINISTRATIVE | Age: 63
End: 2023-12-07

## 2023-12-07 DIAGNOSIS — I10 PRIMARY HYPERTENSION: ICD-10-CM

## 2023-12-07 RX ORDER — CLONIDINE HYDROCHLORIDE 0.1 MG/1
TABLET ORAL
Qty: 60 TABLET | Refills: 2 | Status: SHIPPED | OUTPATIENT
Start: 2023-12-07 | End: 2023-12-07 | Stop reason: SDUPTHER

## 2023-12-07 RX ORDER — CLONIDINE HYDROCHLORIDE 0.1 MG/1
TABLET ORAL
Qty: 60 TABLET | Refills: 2 | Status: SHIPPED | OUTPATIENT
Start: 2023-12-07

## 2023-12-07 NOTE — TELEPHONE ENCOUNTER
This PA team received a refill request VIA fax from [facility]. The patient needs a refill of CLONIDINE TABS. The refill request is available in Media. If this requires a response please respond to the pool ( [phone]). Thank you please advise patient.

## 2023-12-07 NOTE — TELEPHONE ENCOUNTER
Last OV: 09/26/2023  Next OV: not found    Next appointment due:12/26/2023    Last fill:09/27/2023  Refills:2

## 2023-12-08 DIAGNOSIS — Z79.4 TYPE 2 DIABETES MELLITUS WITH HYPERGLYCEMIA, WITH LONG-TERM CURRENT USE OF INSULIN (HCC): ICD-10-CM

## 2023-12-08 DIAGNOSIS — E11.65 TYPE 2 DIABETES MELLITUS WITH HYPERGLYCEMIA, WITH LONG-TERM CURRENT USE OF INSULIN (HCC): ICD-10-CM

## 2023-12-08 RX ORDER — INSULIN LISPRO 200 [IU]/ML
INJECTION, SOLUTION SUBCUTANEOUS
Qty: 5 ADJUSTABLE DOSE PRE-FILLED PEN SYRINGE | Refills: 3 | Status: SHIPPED | OUTPATIENT
Start: 2023-12-08

## 2023-12-08 NOTE — TELEPHONE ENCOUNTER
Pt calling requesting refill of insulin lispro (HUMALOG KWIKPEN) 200 UNIT/ML SOPN pen     Last written 5/3/23  Last OV 9/26/23  Next OV N/A    Please send to [facility].

## 2023-12-14 ENCOUNTER — OFFICE VISIT (OUTPATIENT)
Dept: INTERNAL MEDICINE CLINIC | Age: 63
End: 2023-12-14
Payer: COMMERCIAL

## 2023-12-14 VITALS
SYSTOLIC BLOOD PRESSURE: 134 MMHG | BODY MASS INDEX: 26.92 KG/M2 | HEART RATE: 62 BPM | DIASTOLIC BLOOD PRESSURE: 82 MMHG | OXYGEN SATURATION: 99 % | WEIGHT: 193 LBS

## 2023-12-14 DIAGNOSIS — I10 PRIMARY HYPERTENSION: ICD-10-CM

## 2023-12-14 DIAGNOSIS — Z79.4 TYPE 2 DIABETES MELLITUS WITH HYPERGLYCEMIA, WITH LONG-TERM CURRENT USE OF INSULIN (HCC): Primary | ICD-10-CM

## 2023-12-14 DIAGNOSIS — E11.65 TYPE 2 DIABETES MELLITUS WITH HYPERGLYCEMIA, WITH LONG-TERM CURRENT USE OF INSULIN (HCC): Primary | ICD-10-CM

## 2023-12-14 DIAGNOSIS — C78.7 METASTASES TO THE LIVER (HCC): ICD-10-CM

## 2023-12-14 PROBLEM — R21 SKIN RASH: Status: RESOLVED | Noted: 2023-05-03 | Resolved: 2023-12-14

## 2023-12-14 PROBLEM — B88.0 CHIGGER BITES: Status: RESOLVED | Noted: 2023-05-03 | Resolved: 2023-12-14

## 2023-12-14 LAB — HBA1C MFR BLD: 9.6 %

## 2023-12-14 PROCEDURE — 83036 HEMOGLOBIN GLYCOSYLATED A1C: CPT | Performed by: INTERNAL MEDICINE

## 2023-12-14 PROCEDURE — 3075F SYST BP GE 130 - 139MM HG: CPT | Performed by: INTERNAL MEDICINE

## 2023-12-14 PROCEDURE — 3079F DIAST BP 80-89 MM HG: CPT | Performed by: INTERNAL MEDICINE

## 2023-12-14 PROCEDURE — 3044F HG A1C LEVEL LT 7.0%: CPT | Performed by: INTERNAL MEDICINE

## 2023-12-14 PROCEDURE — 99214 OFFICE O/P EST MOD 30 MIN: CPT | Performed by: INTERNAL MEDICINE

## 2023-12-14 RX ORDER — INSULIN GLARGINE-YFGN 100 [IU]/ML
20 INJECTION, SOLUTION SUBCUTANEOUS DAILY
Qty: 500 ML | Refills: 5 | Status: SHIPPED | OUTPATIENT
Start: 2023-12-14 | End: 2023-12-15 | Stop reason: SDUPTHER

## 2023-12-14 NOTE — ASSESSMENT & PLAN NOTE
unfortunately control significantly deteriorated with hemoglobin A1c of 9.6 this visit most likely triggered by stress from recent chemotherapy and patient not checking blood sugars and using mealtime insulin. Advised continue Humalog sliding scale same however will increase basal insulin to 20 units from 16, in 2 weeks if blood sugar levels still not at target goal he will increase dose by 2 units every 1 to 2 weeks to reach a goal of fasting blood sugars below 120 and random below 180. Continue current dose of metformin, reinforced recommendations to adhere to low-carb diet, reevaluate in 12 weeks or sooner if needed.

## 2023-12-14 NOTE — PROGRESS NOTES
ASSESSMENT/PLAN:  1. Type 2 diabetes mellitus with hyperglycemia, with long-term current use of insulin ([facility])  Assessment & Plan:    unfortunately control significantly deteriorated with hemoglobin A1c of 9.6 this visit most likely triggered by stress from recent chemotherapy and patient not checking blood sugars and using mealtime insulin. Advised continue Humalog sliding scale same however will increase basal insulin to 20 units from 16, in 2 weeks if blood sugar levels still not at target goal he will increase dose by 2 units every 1 to 2 weeks to reach a goal of fasting blood sugars below 120 and random below 180. Continue current dose of metformin, reinforced recommendations to adhere to low-carb diet, reevaluate in 12 weeks or sooner if needed. Orders:  -     Insulin Glargine-yfgn (SEMGLEE, YFGN,) 100 UNIT/ML SOPN; Inject 20 Units into the skin daily 20 units for 3 days for every chemo cycle and 16 units daily for other days, Disp-500 mL, R-5Normal  -     POCT glycosylated hemoglobin (Hb A1C)  2. Primary hypertension  Assessment & Plan:    blood pressure stable, continue current medications and reevaluate in 3 months or sooner if needed  3. Metastases to the liver [facility])  Assessment & Plan:    finished chemo and scheduled for surgical resection January 12, will continue to follow along with oncology and surgical oncology      Return in about 3 months (around 3/14/2024). SUBJECTIVE  HPI:   Patient here because oncologist office called him and told him his blood sugars were very high, reports he has been on chemotherapy for liver mets and ever since his home readings were high, admits he has not been taking the Humalog insulin up till recently after the oncologist office called.   He has been getting readings as high as 354 postprandial and average 170 fasting      Lab Results   Component Value Date/Time    LABA1C 6.2 02/03/2023 09:41 AM    LABA1C 6.3 08/15/2022 02:42 PM    LABA1C 6.4 05/18/2022 09:07 AM

## 2023-12-28 DIAGNOSIS — E11.65 TYPE 2 DIABETES MELLITUS WITH HYPERGLYCEMIA, WITH LONG-TERM CURRENT USE OF INSULIN (HCC): ICD-10-CM

## 2023-12-28 DIAGNOSIS — Z79.4 TYPE 2 DIABETES MELLITUS WITH HYPERGLYCEMIA, WITH LONG-TERM CURRENT USE OF INSULIN (HCC): ICD-10-CM

## 2023-12-28 RX ORDER — INSULIN GLARGINE 100 [IU]/ML
20 INJECTION, SOLUTION SUBCUTANEOUS NIGHTLY
Qty: 5 ADJUSTABLE DOSE PRE-FILLED PEN SYRINGE | Refills: 3 | Status: SHIPPED | OUTPATIENT
Start: 2023-12-28

## 2023-12-28 NOTE — TELEPHONE ENCOUNTER
Patient calling in about Abigail. Original rx sent to [facility] but he never did get that filled d/t that pharmacy is not in his insurance network. Patient is requesting rx sent to [facility] in [place]. Med pended with pharmacy updated.

## 2024-03-08 ENCOUNTER — OFFICE VISIT (OUTPATIENT)
Dept: INTERNAL MEDICINE CLINIC | Age: 64
End: 2024-03-08
Payer: COMMERCIAL

## 2024-03-08 VITALS
HEIGHT: 71 IN | WEIGHT: 199.6 LBS | BODY MASS INDEX: 27.94 KG/M2 | OXYGEN SATURATION: 98 % | DIASTOLIC BLOOD PRESSURE: 80 MMHG | SYSTOLIC BLOOD PRESSURE: 124 MMHG | HEART RATE: 78 BPM

## 2024-03-08 DIAGNOSIS — F51.02 ADJUSTMENT INSOMNIA: ICD-10-CM

## 2024-03-08 DIAGNOSIS — C78.7 METASTASES TO THE LIVER (HCC): ICD-10-CM

## 2024-03-08 DIAGNOSIS — I10 PRIMARY HYPERTENSION: Primary | ICD-10-CM

## 2024-03-08 DIAGNOSIS — E11.65 TYPE 2 DIABETES MELLITUS WITH HYPERGLYCEMIA, WITH LONG-TERM CURRENT USE OF INSULIN (HCC): ICD-10-CM

## 2024-03-08 DIAGNOSIS — Z79.4 TYPE 2 DIABETES MELLITUS WITH HYPERGLYCEMIA, WITH LONG-TERM CURRENT USE OF INSULIN (HCC): ICD-10-CM

## 2024-03-08 PROCEDURE — 3074F SYST BP LT 130 MM HG: CPT | Performed by: INTERNAL MEDICINE

## 2024-03-08 PROCEDURE — 3079F DIAST BP 80-89 MM HG: CPT | Performed by: INTERNAL MEDICINE

## 2024-03-08 PROCEDURE — 99214 OFFICE O/P EST MOD 30 MIN: CPT | Performed by: INTERNAL MEDICINE

## 2024-03-08 RX ORDER — INSULIN GLARGINE 100 [IU]/ML
16 INJECTION, SOLUTION SUBCUTANEOUS NIGHTLY
Qty: 5 ADJUSTABLE DOSE PRE-FILLED PEN SYRINGE | Refills: 3 | Status: SHIPPED | OUTPATIENT
Start: 2024-03-08

## 2024-03-08 SDOH — ECONOMIC STABILITY: FOOD INSECURITY: WITHIN THE PAST 12 MONTHS, YOU WORRIED THAT YOUR FOOD WOULD RUN OUT BEFORE YOU GOT MONEY TO BUY MORE.: NEVER TRUE

## 2024-03-08 SDOH — ECONOMIC STABILITY: FOOD INSECURITY: WITHIN THE PAST 12 MONTHS, THE FOOD YOU BOUGHT JUST DIDN'T LAST AND YOU DIDN'T HAVE MONEY TO GET MORE.: NEVER TRUE

## 2024-03-08 SDOH — ECONOMIC STABILITY: INCOME INSECURITY: HOW HARD IS IT FOR YOU TO PAY FOR THE VERY BASICS LIKE FOOD, HOUSING, MEDICAL CARE, AND HEATING?: NOT HARD AT ALL

## 2024-03-08 ASSESSMENT — ENCOUNTER SYMPTOMS
WHEEZING: 0
SORE THROAT: 0
BACK PAIN: 0
SHORTNESS OF BREATH: 0
ABDOMINAL PAIN: 0
CONSTIPATION: 0
CHEST TIGHTNESS: 0
COUGH: 0
VOMITING: 0
COLOR CHANGE: 0
NAUSEA: 0

## 2024-03-08 ASSESSMENT — PATIENT HEALTH QUESTIONNAIRE - PHQ9
4. FEELING TIRED OR HAVING LITTLE ENERGY: 3
SUM OF ALL RESPONSES TO PHQ QUESTIONS 1-9: 4
1. LITTLE INTEREST OR PLEASURE IN DOING THINGS: 0
10. IF YOU CHECKED OFF ANY PROBLEMS, HOW DIFFICULT HAVE THESE PROBLEMS MADE IT FOR YOU TO DO YOUR WORK, TAKE CARE OF THINGS AT HOME, OR GET ALONG WITH OTHER PEOPLE: 0
7. TROUBLE CONCENTRATING ON THINGS, SUCH AS READING THE NEWSPAPER OR WATCHING TELEVISION: 0
SUM OF ALL RESPONSES TO PHQ QUESTIONS 1-9: 4
6. FEELING BAD ABOUT YOURSELF - OR THAT YOU ARE A FAILURE OR HAVE LET YOURSELF OR YOUR FAMILY DOWN: 0
SUM OF ALL RESPONSES TO PHQ QUESTIONS 1-9: 4
2. FEELING DOWN, DEPRESSED OR HOPELESS: 1
SUM OF ALL RESPONSES TO PHQ QUESTIONS 1-9: 4
3. TROUBLE FALLING OR STAYING ASLEEP: 0
8. MOVING OR SPEAKING SO SLOWLY THAT OTHER PEOPLE COULD HAVE NOTICED. OR THE OPPOSITE, BEING SO FIGETY OR RESTLESS THAT YOU HAVE BEEN MOVING AROUND A LOT MORE THAN USUAL: 0
SUM OF ALL RESPONSES TO PHQ9 QUESTIONS 1 & 2: 1
5. POOR APPETITE OR OVEREATING: 0
9. THOUGHTS THAT YOU WOULD BE BETTER OFF DEAD, OR OF HURTING YOURSELF: 0

## 2024-03-08 NOTE — ASSESSMENT & PLAN NOTE
blood pressure is normal this visit and recheck actually was even lower of 120/70 most likely secondary to him taking extra clonidine this morning.  Advised patient since typically gets some drowsiness with clonidine would recommend he takes metoprolol in the morning along with the lisinopril and then take 2 tablets of clonidine in the evening.  Not sure if recent fluctuation in blood pressure secondary to him starting chemotherapy again with a new agent, advised he can always take an extra clonidine for elevated blood pressure readings and can scale back once controlled back to baseline.  Patient verbalized understanding, encouraged to call the office if any concerns and will follow-up in 3 months or sooner if needed

## 2024-03-08 NOTE — ASSESSMENT & PLAN NOTE
had surgical resection and currently on a new chemotherapy agent, he continues to follow-up with  surgical oncology and [facility], has 2 more rounds to go.  So far t although with side effects but tolerable, will continue to follow along with oncology and surgical oncology

## 2024-03-08 NOTE — ASSESSMENT & PLAN NOTE
stable on current dose of temazepam, will continue same, clonidine has been causing excessive sleepiness however not overly sedated in the morning.  Will continue same, reinforced sleep hygiene recommendations

## 2024-03-08 NOTE — ASSESSMENT & PLAN NOTE
reports readings are much higher and has been doing well, currently he cut back Lantus insulin from 20 units to 16 units and not experiencing any hypoglycemia.  Continue same, will update lab work at follow-up visit in 3 months

## 2024-03-08 NOTE — PROGRESS NOTES
ASSESSMENT/PLAN:  1. Primary hypertension  Assessment & Plan:    blood pressure is normal this visit and recheck actually was even lower of 120/70 most likely secondary to him taking extra clonidine this morning.  Advised patient since typically gets some drowsiness with clonidine would recommend he takes metoprolol in the morning along with the lisinopril and then take 2 tablets of clonidine in the evening.  Not sure if recent fluctuation in blood pressure secondary to him starting chemotherapy again with a new agent, advised he can always take an extra clonidine for elevated blood pressure readings and can scale back once controlled back to baseline.  Patient verbalized understanding, encouraged to call the office if any concerns and will follow-up in 3 months or sooner if needed  2. Type 2 diabetes mellitus with hyperglycemia, with long-term current use of insulin ([facility])  Assessment & Plan:    reports readings are much higher and has been doing well, currently he cut back Lantus insulin from 20 units to 16 units and not experiencing any hypoglycemia.  Continue same, will update lab work at follow-up visit in 3 months  Orders:  -     insulin glargine (LANTUS SOLOSTAR) 100 UNIT/ML injection pen; Inject 16 Units into the skin nightly, Disp-5 Adjustable Dose Pre-filled Pen Syringe, R-3Normal  3. Adjustment insomnia  Assessment & Plan:    stable on current dose of temazepam, will continue same, clonidine has been causing excessive sleepiness however not overly sedated in the morning.  Will continue same, reinforced sleep hygiene recommendations  4. Metastases to the liver (HCC)  Assessment & Plan:    had surgical resection and currently on a new chemotherapy agent, he continues to follow-up with  surgical oncology and [facility], has 2 more rounds to go.  So far t although with side effects but tolerable, will continue to follow along with oncology and surgical oncology      Return in about 3 months (around 6/8/2024).

## 2024-04-01 ENCOUNTER — HOSPITAL ENCOUNTER (OUTPATIENT)
Dept: CT IMAGING | Age: 64
Discharge: HOME | End: 2024-04-01
Attending: STUDENT IN AN ORGANIZED HEALTH CARE EDUCATION/TRAINING PROGRAM
Payer: COMMERCIAL

## 2024-04-01 DIAGNOSIS — C20 MALIGNANT NEOPLASM OF RECTUM (HCC): ICD-10-CM

## 2024-04-01 LAB
CREAT SERPL-MCNC: 1.3 MG/DL (ref 0.8–1.3)
GFR SERPLBLD CREATININE-BSD FMLA CKD-EPI: 61 ML/MIN/{1.73_M2}

## 2024-04-01 PROCEDURE — 6360000004 HC RX CONTRAST MEDICATION: Performed by: STUDENT IN AN ORGANIZED HEALTH CARE EDUCATION/TRAINING PROGRAM

## 2024-04-01 PROCEDURE — 36415 COLL VENOUS BLD VENIPUNCTURE: CPT

## 2024-04-01 PROCEDURE — 82565 ASSAY OF CREATININE: CPT

## 2024-04-01 PROCEDURE — 74177 CT ABD & PELVIS W/CONTRAST: CPT

## 2024-04-01 RX ADMIN — DIATRIZOATE MEGLUMINE AND DIATRIZOATE SODIUM 20 ML: 660; 100 LIQUID ORAL; RECTAL at 08:23

## 2024-04-01 RX ADMIN — IOPAMIDOL 75 ML: 755 INJECTION, SOLUTION INTRAVENOUS at 08:23

## 2024-04-03 ENCOUNTER — TELEPHONE (OUTPATIENT)
Dept: INTERNAL MEDICINE CLINIC | Age: 64
End: 2024-04-03

## 2024-04-03 DIAGNOSIS — F51.02 ADJUSTMENT INSOMNIA: ICD-10-CM

## 2024-04-03 RX ORDER — TEMAZEPAM 15 MG/1
CAPSULE ORAL
Qty: 30 CAPSULE | Refills: 3 | Status: SHIPPED | OUTPATIENT
Start: 2024-04-03 | End: 2024-07-03

## 2024-04-03 NOTE — TELEPHONE ENCOUNTER
Pt  calling requesting refill of Temazepam    Last written 12/18/23  Last OV 3/8/24  Next OV 6/14/24  Last recommended OV NA     Please send to     [facility]      Pt wants this medication sent to [facility] instead of his mail order----can you cx what's left of this from ---Thanks

## 2024-04-12 DIAGNOSIS — I10 PRIMARY HYPERTENSION: ICD-10-CM

## 2024-04-12 DIAGNOSIS — Z79.4 TYPE 2 DIABETES MELLITUS WITH HYPERGLYCEMIA, WITH LONG-TERM CURRENT USE OF INSULIN (HCC): ICD-10-CM

## 2024-04-12 DIAGNOSIS — E11.65 TYPE 2 DIABETES MELLITUS WITH HYPERGLYCEMIA, WITH LONG-TERM CURRENT USE OF INSULIN (HCC): ICD-10-CM

## 2024-04-12 RX ORDER — METOPROLOL SUCCINATE 50 MG/1
50 TABLET, EXTENDED RELEASE ORAL NIGHTLY
Qty: 3 TABLET | Refills: 0 | Status: SHIPPED | OUTPATIENT
Start: 2024-04-12

## 2024-04-12 RX ORDER — CLONIDINE HYDROCHLORIDE 0.1 MG/1
TABLET ORAL
Qty: 6 TABLET | Refills: 0 | Status: SHIPPED | OUTPATIENT
Start: 2024-04-12

## 2024-04-12 RX ORDER — LISINOPRIL 20 MG/1
20 TABLET ORAL DAILY
Qty: 3 TABLET | Refills: 0 | Status: SHIPPED | OUTPATIENT
Start: 2024-04-12

## 2024-04-12 NOTE — TELEPHONE ENCOUNTER
Pt left for a trip this morning and he forgot his medications at home. He is requesting a temporary prescription for lisinopril, clonidine, and metoprolol for the weekend. Please send to [facility] at [address] [place] [address] in [facility].

## 2024-06-18 ENCOUNTER — OFFICE VISIT (OUTPATIENT)
Dept: INTERNAL MEDICINE CLINIC | Age: 64
End: 2024-06-18
Payer: COMMERCIAL

## 2024-06-18 VITALS — BODY MASS INDEX: 28.12 KG/M2 | WEIGHT: 201.6 LBS | HEART RATE: 65 BPM | OXYGEN SATURATION: 97 %

## 2024-06-18 DIAGNOSIS — F33.41 MDD (MAJOR DEPRESSIVE DISORDER), RECURRENT, IN PARTIAL REMISSION (HCC): ICD-10-CM

## 2024-06-18 DIAGNOSIS — I10 PRIMARY HYPERTENSION: ICD-10-CM

## 2024-06-18 DIAGNOSIS — E11.65 TYPE 2 DIABETES MELLITUS WITH HYPERGLYCEMIA, WITH LONG-TERM CURRENT USE OF INSULIN (HCC): Primary | ICD-10-CM

## 2024-06-18 DIAGNOSIS — M25.551 ARTHRALGIA OF RIGHT HIP: ICD-10-CM

## 2024-06-18 DIAGNOSIS — F51.02 ADJUSTMENT INSOMNIA: ICD-10-CM

## 2024-06-18 DIAGNOSIS — Z79.4 TYPE 2 DIABETES MELLITUS WITH HYPERGLYCEMIA, WITH LONG-TERM CURRENT USE OF INSULIN (HCC): Primary | ICD-10-CM

## 2024-06-18 DIAGNOSIS — C78.7 METASTASES TO THE LIVER (HCC): ICD-10-CM

## 2024-06-18 PROCEDURE — 2022F DILAT RTA XM EVC RTNOPTHY: CPT | Performed by: INTERNAL MEDICINE

## 2024-06-18 PROCEDURE — 3017F COLORECTAL CA SCREEN DOC REV: CPT | Performed by: INTERNAL MEDICINE

## 2024-06-18 PROCEDURE — 99214 OFFICE O/P EST MOD 30 MIN: CPT | Performed by: INTERNAL MEDICINE

## 2024-06-18 PROCEDURE — 3046F HEMOGLOBIN A1C LEVEL >9.0%: CPT | Performed by: INTERNAL MEDICINE

## 2024-06-18 PROCEDURE — 1036F TOBACCO NON-USER: CPT | Performed by: INTERNAL MEDICINE

## 2024-06-18 PROCEDURE — G8417 CALC BMI ABV UP PARAM F/U: HCPCS | Performed by: INTERNAL MEDICINE

## 2024-06-18 PROCEDURE — G8427 DOCREV CUR MEDS BY ELIG CLIN: HCPCS | Performed by: INTERNAL MEDICINE

## 2024-06-18 RX ORDER — TEMAZEPAM 7.5 MG/1
7.5 CAPSULE ORAL NIGHTLY PRN
Qty: 30 CAPSULE | Refills: 0 | Status: SHIPPED | OUTPATIENT
Start: 2024-06-18 | End: 2024-07-18

## 2024-06-18 ASSESSMENT — ENCOUNTER SYMPTOMS
ABDOMINAL PAIN: 0
COUGH: 0
COLOR CHANGE: 0
CHEST TIGHTNESS: 0
SHORTNESS OF BREATH: 0
WHEEZING: 0
VOMITING: 0
CONSTIPATION: 0
SORE THROAT: 0
NAUSEA: 0
BACK PAIN: 0

## 2024-06-18 NOTE — ASSESSMENT & PLAN NOTE
will update labs and make adjustment to medication regimen pending results, patient reports home blood sugar readings are generally okay and remains at target goal.  Reinforced recommendations to adhere to low-carb diet, updated ophthalmology exam

## 2024-06-18 NOTE — ASSESSMENT & PLAN NOTE
has been stable, currently not on any medications and reportedly in remission, he is scheduled to have updated imaging next month.  Will continue to follow along with oncology

## 2024-06-18 NOTE — ASSESSMENT & PLAN NOTE
patient has been sleeping well with current dose of temazepam however noticed with the combination of clonidine point 2 mg it is making him overly sedated in the morning.  Explained to patient unable to cut back on clonidine due to blood pressure control however recommended trying a lower dose of temazepam to see if this will work for him.  Patient agreeable and will try 7.5 mg temazepam instead of 15 mg along with continuing the 0.2 mg of clonidine and even possibly 0.3 mg if blood pressure remains persistently elevated.  He will send [employer] message with update after trying the low-dose

## 2024-06-18 NOTE — ASSESSMENT & PLAN NOTE
symptoms appear to be arthritic in nature, there is no morning stiffness however symptoms worsen throughout the day especially with walking and certain positions.  Recommended Tylenol or as needed ibuprofen for analgesia, can update x-rays to assess for arthritic changes however at this point patient reports no functional limitation but will call the office if any worsening pain or discomfort

## 2024-06-18 NOTE — ASSESSMENT & PLAN NOTE
blood pressure borderline elevated today which may be secondary to just having to deal with the ruptured colostomy bag leaving him upset, patient also reports some fluctuation with home readings but much less than today's reading.  Advised to continue monitoring, if needed will adjust clonidine dose or metoprolol however he is noticing excessive sleepiness with the clonidine specially with him taking temazepam at the same time.  Reinforced recommendations to adhere to low-salt diet and attempt regular level of physical activity

## 2024-06-18 NOTE — PROGRESS NOTES
ASSESSMENT/PLAN:  1. Type 2 diabetes mellitus with hyperglycemia, with long-term current use of insulin ([facility])  Assessment & Plan:    will update labs and make adjustment to medication regimen pending results, patient reports home blood sugar readings are generally okay and remains at target goal.  Reinforced recommendations to adhere to low-carb diet, updated ophthalmology exam  Orders:  -     CBC; Future  -     Comprehensive Metabolic Panel; Future  -     Hemoglobin A1C; Future  -     Lipid Panel; Future  -     Microalbumin / Creatinine Urine Ratio; Future  -     TSH with Reflex to FT4; Future  2. Primary hypertension  Assessment & Plan:    blood pressure borderline elevated today which may be secondary to just having to deal with the ruptured colostomy bag leaving him upset, patient also reports some fluctuation with home readings but much less than today's reading.  Advised to continue monitoring, if needed will adjust clonidine dose or metoprolol however he is noticing excessive sleepiness with the clonidine specially with him taking temazepam at the same time.  Reinforced recommendations to adhere to low-salt diet and attempt regular level of physical activity  Orders:  -     CBC; Future  -     Comprehensive Metabolic Panel; Future  -     Hemoglobin A1C; Future  -     TSH with Reflex to FT4; Future  3. MDD (major depressive disorder), recurrent, in partial remission ([facility])  Assessment & Plan:    reports symptoms are well-controlled on current dose of Loxitane, will continue same  4. Adjustment insomnia  Assessment & Plan:    patient has been sleeping well with current dose of temazepam however noticed with the combination of clonidine point 2 mg it is making him overly sedated in the morning.  Explained to patient unable to cut back on clonidine due to blood pressure control however recommended trying a lower dose of temazepam to see if this will work for him.  Patient agreeable and will try 7.5 mg temazepam

## 2024-06-27 DIAGNOSIS — Z79.4 TYPE 2 DIABETES MELLITUS WITH HYPERGLYCEMIA, WITH LONG-TERM CURRENT USE OF INSULIN (HCC): ICD-10-CM

## 2024-06-27 DIAGNOSIS — E11.65 TYPE 2 DIABETES MELLITUS WITH HYPERGLYCEMIA, WITH LONG-TERM CURRENT USE OF INSULIN (HCC): ICD-10-CM

## 2024-06-27 DIAGNOSIS — C20 MALIGNANT NEOPLASM OF RECTUM (HCC): ICD-10-CM

## 2024-06-27 DIAGNOSIS — I10 PRIMARY HYPERTENSION: ICD-10-CM

## 2024-06-27 LAB
ALBUMIN SERPL-MCNC: 4 G/DL (ref 3.4–5)
ALBUMIN/GLOB SERPL: 1.7 {RATIO} (ref 1.1–2.2)
ALP SERPL-CCNC: 80 U/L (ref 40–129)
ALT SERPL-CCNC: 6 U/L (ref 10–40)
ANION GAP SERPL CALCULATED.3IONS-SCNC: 10 MMOL/L (ref 3–16)
AST SERPL-CCNC: 9 U/L (ref 15–37)
BILIRUB SERPL-MCNC: 0.3 MG/DL (ref 0–1)
BUN SERPL-MCNC: 19 MG/DL (ref 7–20)
CALCIUM SERPL-MCNC: 8.8 MG/DL (ref 8.3–10.6)
CHLORIDE SERPL-SCNC: 102 MMOL/L (ref 99–110)
CHOLEST SERPL-MCNC: 188 MG/DL (ref 0–199)
CO2 SERPL-SCNC: 26 MMOL/L (ref 21–32)
CREAT SERPL-MCNC: 1 MG/DL (ref 0.8–1.3)
CREAT UR-MCNC: 238.7 MG/DL (ref 39–259)
DEPRECATED RDW RBC AUTO: 18.4 % (ref 12.4–15.4)
GFR SERPLBLD CREATININE-BSD FMLA CKD-EPI: 84 ML/MIN/{1.73_M2}
GLUCOSE SERPL-MCNC: 117 MG/DL (ref 70–99)
HCT VFR BLD AUTO: 33.6 % (ref 40.5–52.5)
HDLC SERPL-MCNC: 44 MG/DL (ref 40–60)
HGB BLD-MCNC: 10.8 G/DL (ref 13.5–17.5)
LDLC SERPL CALC-MCNC: 121 MG/DL
MCH RBC QN AUTO: 25.5 PG (ref 26–34)
MCHC RBC AUTO-ENTMCNC: 32.1 G/DL (ref 31–36)
MCV RBC AUTO: 79.6 FL (ref 80–100)
MICROALBUMIN UR DL<=1MG/L-MCNC: 149 MG/DL
MICROALBUMIN/CREAT UR: 624.2 MG/G (ref 0–30)
PLATELET # BLD AUTO: 236 K/UL (ref 135–450)
PMV BLD AUTO: 9.1 FL (ref 5–10.5)
POTASSIUM SERPL-SCNC: 4.3 MMOL/L (ref 3.5–5.1)
PROT SERPL-MCNC: 6.4 G/DL (ref 6.4–8.2)
RBC # BLD AUTO: 4.21 M/UL (ref 4.2–5.9)
SODIUM SERPL-SCNC: 138 MMOL/L (ref 136–145)
TRIGL SERPL-MCNC: 116 MG/DL (ref 0–150)
TSH SERPL DL<=0.005 MIU/L-ACNC: 1.69 UIU/ML (ref 0.27–4.2)
VLDLC SERPL CALC-MCNC: 23 MG/DL
WBC # BLD AUTO: 6.9 K/UL (ref 4–11)

## 2024-06-28 DIAGNOSIS — R80.1 PERSISTENT PROTEINURIA: Primary | ICD-10-CM

## 2024-06-28 LAB
EST. AVERAGE GLUCOSE BLD GHB EST-MCNC: 154.2 MG/DL
HBA1C MFR BLD: 7 %

## 2024-07-08 ENCOUNTER — HOSPITAL ENCOUNTER (OUTPATIENT)
Dept: CT IMAGING | Age: 64
Discharge: HOME | End: 2024-07-08
Attending: STUDENT IN AN ORGANIZED HEALTH CARE EDUCATION/TRAINING PROGRAM
Payer: COMMERCIAL

## 2024-07-08 PROCEDURE — 6360000004 HC RX CONTRAST MEDICATION: Performed by: STUDENT IN AN ORGANIZED HEALTH CARE EDUCATION/TRAINING PROGRAM

## 2024-07-08 PROCEDURE — 74177 CT ABD & PELVIS W/CONTRAST: CPT

## 2024-07-08 RX ADMIN — IOPAMIDOL 75 ML: 755 INJECTION, SOLUTION INTRAVENOUS at 14:46

## 2024-07-08 RX ADMIN — IOHEXOL 50 ML: 240 INJECTION, SOLUTION INTRATHECAL; INTRAVASCULAR; INTRAVENOUS; ORAL at 14:47

## 2024-08-03 DIAGNOSIS — F51.02 ADJUSTMENT INSOMNIA: ICD-10-CM

## 2024-08-05 RX ORDER — TEMAZEPAM 7.5 MG/1
CAPSULE ORAL
Qty: 30 CAPSULE | Refills: 0 | Status: SHIPPED | OUTPATIENT
Start: 2024-08-05 | End: 2024-09-04

## 2024-08-05 NOTE — TELEPHONE ENCOUNTER
Last OV:   6/18/2024  Next OV: 12/18/2024    Next appointment due:12/18/2024    Last fill: 6/18/2024  Refills:   0

## 2024-09-04 DIAGNOSIS — F51.02 ADJUSTMENT INSOMNIA: ICD-10-CM

## 2024-09-04 RX ORDER — TEMAZEPAM 7.5 MG/1
CAPSULE ORAL
Qty: 30 CAPSULE | Refills: 3 | Status: SHIPPED | OUTPATIENT
Start: 2024-09-04 | End: 2024-10-04

## 2024-09-12 ENCOUNTER — TELEMEDICINE (OUTPATIENT)
Age: 64
End: 2024-09-12
Payer: COMMERCIAL

## 2024-09-12 DIAGNOSIS — U07.1 COVID: Primary | ICD-10-CM

## 2024-09-12 PROCEDURE — G8417 CALC BMI ABV UP PARAM F/U: HCPCS | Performed by: NURSE PRACTITIONER

## 2024-09-12 PROCEDURE — 99213 OFFICE O/P EST LOW 20 MIN: CPT | Performed by: NURSE PRACTITIONER

## 2024-09-12 PROCEDURE — G8427 DOCREV CUR MEDS BY ELIG CLIN: HCPCS | Performed by: NURSE PRACTITIONER

## 2024-09-12 PROCEDURE — 1036F TOBACCO NON-USER: CPT | Performed by: NURSE PRACTITIONER

## 2024-09-12 PROCEDURE — 3017F COLORECTAL CA SCREEN DOC REV: CPT | Performed by: NURSE PRACTITIONER

## 2024-09-12 ASSESSMENT — ENCOUNTER SYMPTOMS
TROUBLE SWALLOWING: 0
SHORTNESS OF BREATH: 0
VOMITING: 0
COUGH: 1
NAUSEA: 0
DIARRHEA: 0
SORE THROAT: 1
CHEST TIGHTNESS: 0
WHEEZING: 0

## 2024-10-02 ENCOUNTER — ANESTHESIA EVENT (OUTPATIENT)
Dept: ENDOSCOPY | Age: 64
End: 2024-10-02
Payer: COMMERCIAL

## 2024-10-02 ENCOUNTER — HOSPITAL ENCOUNTER (OUTPATIENT)
Age: 64
Setting detail: OUTPATIENT SURGERY
Discharge: HOME | End: 2024-10-02
Attending: INTERNAL MEDICINE | Admitting: INTERNAL MEDICINE
Payer: COMMERCIAL

## 2024-10-02 ENCOUNTER — ANESTHESIA (OUTPATIENT)
Dept: ENDOSCOPY | Age: 64
End: 2024-10-02
Payer: COMMERCIAL

## 2024-10-02 VITALS
BODY MASS INDEX: 28 KG/M2 | RESPIRATION RATE: 16 BRPM | OXYGEN SATURATION: 98 % | HEART RATE: 54 BPM | HEIGHT: 71 IN | TEMPERATURE: 97.9 F | WEIGHT: 200 LBS | DIASTOLIC BLOOD PRESSURE: 78 MMHG | SYSTOLIC BLOOD PRESSURE: 124 MMHG

## 2024-10-02 DIAGNOSIS — Z85.038 HISTORY OF COLON CANCER: ICD-10-CM

## 2024-10-02 LAB
GLUCOSE BLD-MCNC: 127 MG/DL (ref 70–99)
GLUCOSE BLD-MCNC: 137 MG/DL (ref 70–99)
PERFORMED ON: ABNORMAL
PERFORMED ON: ABNORMAL

## 2024-10-02 PROCEDURE — 2709999900 HC NON-CHARGEABLE SUPPLY: Performed by: INTERNAL MEDICINE

## 2024-10-02 PROCEDURE — 88305 TISSUE EXAM BY PATHOLOGIST: CPT

## 2024-10-02 PROCEDURE — 7100000011 HC PHASE II RECOVERY - ADDTL 15 MIN: Performed by: INTERNAL MEDICINE

## 2024-10-02 PROCEDURE — 7100000000 HC PACU RECOVERY - FIRST 15 MIN: Performed by: INTERNAL MEDICINE

## 2024-10-02 PROCEDURE — 2500000003 HC RX 250 WO HCPCS: Performed by: NURSE ANESTHETIST, CERTIFIED REGISTERED

## 2024-10-02 PROCEDURE — 3700000001 HC ADD 15 MINUTES (ANESTHESIA): Performed by: INTERNAL MEDICINE

## 2024-10-02 PROCEDURE — 3609010600 HC COLONOSCOPY POLYPECTOMY SNARE/COLD BIOPSY: Performed by: INTERNAL MEDICINE

## 2024-10-02 PROCEDURE — 7100000010 HC PHASE II RECOVERY - FIRST 15 MIN: Performed by: INTERNAL MEDICINE

## 2024-10-02 PROCEDURE — 2580000003 HC RX 258: Performed by: NURSE ANESTHETIST, CERTIFIED REGISTERED

## 2024-10-02 PROCEDURE — 6360000002 HC RX W HCPCS: Performed by: NURSE ANESTHETIST, CERTIFIED REGISTERED

## 2024-10-02 PROCEDURE — 3700000000 HC ANESTHESIA ATTENDED CARE: Performed by: INTERNAL MEDICINE

## 2024-10-02 RX ORDER — LIDOCAINE HYDROCHLORIDE 20 MG/ML
INJECTION, SOLUTION EPIDURAL; INFILTRATION; INTRACAUDAL; PERINEURAL
Status: DISCONTINUED | OUTPATIENT
Start: 2024-10-02 | End: 2024-10-02 | Stop reason: SDUPTHER

## 2024-10-02 RX ORDER — SODIUM CHLORIDE 9 MG/ML
INJECTION, SOLUTION INTRAVENOUS
Status: DISCONTINUED | OUTPATIENT
Start: 2024-10-02 | End: 2024-10-02 | Stop reason: SDUPTHER

## 2024-10-02 RX ORDER — PROPOFOL 10 MG/ML
INJECTION, EMULSION INTRAVENOUS
Status: DISCONTINUED | OUTPATIENT
Start: 2024-10-02 | End: 2024-10-02 | Stop reason: SDUPTHER

## 2024-10-02 RX ADMIN — PROPOFOL 50 MG: 10 INJECTION, EMULSION INTRAVENOUS at 10:23

## 2024-10-02 RX ADMIN — PROPOFOL 120 MCG/KG/MIN: 10 INJECTION, EMULSION INTRAVENOUS at 10:24

## 2024-10-02 RX ADMIN — PROPOFOL 20 MG: 10 INJECTION, EMULSION INTRAVENOUS at 10:32

## 2024-10-02 RX ADMIN — SODIUM CHLORIDE: 9 INJECTION, SOLUTION INTRAVENOUS at 10:20

## 2024-10-02 RX ADMIN — LIDOCAINE HYDROCHLORIDE 20 MG: 20 INJECTION, SOLUTION EPIDURAL; INFILTRATION; INTRACAUDAL; PERINEURAL at 10:23

## 2024-10-02 ASSESSMENT — PAIN - FUNCTIONAL ASSESSMENT
PAIN_FUNCTIONAL_ASSESSMENT: NONE - DENIES PAIN
PAIN_FUNCTIONAL_ASSESSMENT: NONE - DENIES PAIN
PAIN_FUNCTIONAL_ASSESSMENT: 0-10
PAIN_FUNCTIONAL_ASSESSMENT: NONE - DENIES PAIN
PAIN_FUNCTIONAL_ASSESSMENT: NONE - DENIES PAIN

## 2024-10-02 NOTE — BRIEF OP NOTE
Brief Postoperative Note      Patient: Juan Diop  YOB: 1960  MRN: [id number]    Date of Procedure: 10/2/2024    Pre-Op Diagnosis Codes:      * History of colon cancer [Z85.038]    Procedure(s):  COLONOSCOPY POLYPECTOMY SNARE/BIOPSY    Surgeon(s):  Jigar Johns MD    Anesthesia: Monitor Anesthesia Care    Estimated Blood Loss (mL): Minimal    Complications: None    Specimens:   ID Type Source Tests Collected by Time Destination   A : transverse colon polyp x1 Tissue Colon SURGICAL PATHOLOGY Jennifer Germain RN 10/2/2024 1034      Findings:  Stoma endoscopy revealed diminutive transverse colon polyp, endoscopically removed.   Rectal stump endoscopy showed patchy inflammation and small hemorrhoids    Plans:  -PATHOLOGY RESULTS: will be available in the [employer] portal or [employer] phone mya within 2 weeks. Please go to [email or web address]. There are instructions there how to access your medical records online and how to download the [employer] phone mya. You can also call the GI office at  to get your pathology results.  -Recall colonoscopy in 5 years for colon cancer/colon polyp surveillance  Increase fiber intake to 30 g/day.  May take OTC Benefiber or Metamucil, per packet instructions.    Electronically signed by Jigar Johns MD on 10/2/2024 at 10:49 AM

## 2024-10-02 NOTE — PROGRESS NOTES
Teaching/ education completed for home care including pain management, activity,safety precautions  and infection control. Patient  and David verbalized understanding.

## 2024-10-02 NOTE — PROGRESS NOTES
PHASE 2 - Awake, room air 98%, consuming po intake, tolerated well, Dr. Johns at bedside at bedside talking to patient vu.

## 2024-10-02 NOTE — DISCHARGE INSTRUCTIONS
ENDOSCOPY DISCHARGE INSTRUCTIONS    You may experience some lightheadedness for the next several hours.  Plan on quiet relaxation for the rest of today.  A responsible adult needs to stay with you today.  Because of the medications you received today-do not drive,operate machinery,or sign any contractual agreement for the next 24 hours.  Do not drink any alcoholic beverages or take any unprescribed medications tonight.  Eat bland food and avoid anything greasy or spicy initially-progress to your normal diet gradually.  Diet restrictions as instructed.  You may resume home medications as instructed.  It is not unusual to experience some mild cramping or gas pains, and you may not have a bowel movement for several days.  If you have any of the following problems, notify your physician or return to the hospital emergency room : fever, chills, excessive bleeding, excessive vomiting, difficulty swallowing, uncontrolled pain, increased abdominal distention, shortness of breath or any other problems.  If you had a polyp removed, avoid strenuous activity for 48 hours.Avoid the use of aspirin or related compounds for one week, unless otherwise instructed by your physician.  You may notice a small amount of blood in your next few bowel movements, but if a large amount passes, call your physician.  If you have a sore throat, you may use lozenges or salt water gargles.    ANESTHESIA DISCHARGE INSTRUCTIONS    Wear your seatbelt home.  You are under the influence of drugs-do not drink alcohol, drive, operate machinery, make any important decisions or sign any legal documents for 24 hours.  A responsible adult needs to be with you for 24 hours.  You may experience lightheadedness, dizziness, or sleepiness following surgery.  Rest at home today- increase activity as tolerated.  Progress slowly to a regular diet unless your physician has instructed you otherwise. Drink plenty of water.  If persistent nausea and vomiting becomes a

## 2024-10-02 NOTE — PROGRESS NOTES
Discharge instructions reviewed with patient/responsible adult. All home medications have been reviewed, questions answered and patient verbalized understanding.  Discharge instructions signed and copies given. Patient discharged  per with belongings.

## 2024-10-02 NOTE — ANESTHESIA POSTPROCEDURE EVALUATION
Department of Anesthesiology  Postprocedure Note    Patient: Juan Diop  MRN: [id number]  YOB: 1960  Date of evaluation: 10/2/2024    Procedure Summary       Date: 10/02/24 Room / Location: [place] [address] / [facility]    Anesthesia Start: 1020 Anesthesia Stop: 1051    Procedure: COLONOSCOPY POLYPECTOMY SNARE/BIOPSY Diagnosis:       History of colon cancer      (History of colon cancer [Z85.038])    Surgeons: Jigar Johns MD Responsible Provider: Ivan Kinney MD    Anesthesia Type: MAC ASA Status: 3            Anesthesia Type: MAC    Reema Phase I: Reema Score: 9    Reema Phase II:      Anesthesia Post Evaluation    Patient location during evaluation: PACU  Patient participation: complete - patient participated  Level of consciousness: awake and alert  Airway patency: patent  Nausea & Vomiting: no nausea and no vomiting  Cardiovascular status: hemodynamically stable  Respiratory status: acceptable  Hydration status: euvolemic  Multimodal analgesia pain management approach  Pain management: satisfactory to patient    No notable events documented.

## 2024-10-02 NOTE — H&P
NIGHTLY AS NEEDED FOR SLEEP 30 capsule 3    lisinopril (PRINIVIL;ZESTRIL) 20 MG tablet Take 1 tablet by mouth daily 3 tablet 0    cloNIDine (CATAPRES) 0.1 MG tablet TAKE ONE TABLET BY MOUTH NIGHTLY. IF BLOOD PRESSURE GREATER THEN 140/85 TAKE A SECOND DOSE 6 tablet 0    metoprolol succinate (TOPROL XL) 50 MG extended release tablet Take 1 tablet by mouth nightly (Patient taking differently: Take 1 tablet by mouth daily) 3 tablet 0    insulin glargine (LANTUS SOLOSTAR) 100 UNIT/ML injection pen Inject 16 Units into the skin nightly 5 Adjustable Dose Pre-filled Pen Syringe 3    insulin lispro (HUMALOG KWIKPEN) 200 UNIT/ML SOPN pen Take 4 units for blood sugar reading of 150-200, 6 units for 201-250, 8units for 251-300, 10 units for 301-350 5 Adjustable Dose Pre-filled Pen Syringe 3    FLUoxetine (PROZAC) 10 MG capsule TAKE 1 CAPSULE BY MOUTH DAILY 30 capsule 5    metFORMIN (GLUCOPHAGE) 500 MG tablet TAKE 1 TABLET BY MOUTH TWICE DAILY WITH MEALS 180 tablet 0     Allergies:  Patient has no known allergies.    History of allergic reaction to anesthesia:  No    Social History:   U/R  Family History:       Problem Relation Age of Onset    Heart Failure Mother     Diabetes Father     Hypertension Father        PHYSICAL EXAM:      /84   Pulse 56   Temp 97.5 °F (36.4 °C) (Temporal)   Resp 12   Ht 1.803 m (5' 11\")   Wt 90.7 kg (200 lb)   SpO2 98%   BMI 27.89 kg/m²  I        Heart:  Normal apical impulse, regular rate and rhythm, normal S1 and S2, no S3 or S4, and no murmur noted    Lungs:  No increased work of breathing, good air exchange, clear to auscultation bilaterally, no crackles or wheezing    Abdomen:  scarring and parastomal hernia      ASA Grade:  ASA 2 - Patient with mild systemic disease with no functional limitations    Mallampati Class:  Class I: Soft palate, uvula, fauces, pillars visible  __________  Class II: Soft palate, uvula, fauces visible  __________   Class III: Soft palate, base of uvula

## 2024-10-02 NOTE — PROGRESS NOTES
Received from Endo - Drowsy room air 98%, abdomen soft stoma pink moist  ,  colostomy bag empty, vss

## 2024-10-02 NOTE — PROGRESS NOTES
Reviewed patient's medical and surgical history in electronic record and with patient at the bedside. All questions regarding procedure answered.   Teaching / education initiated regarding perioperative experience, expectations, and pain management during stay. Patient verbalized understanding.

## 2024-10-02 NOTE — ANESTHESIA PRE PROCEDURE
Department of Anesthesiology  Preprocedure Note       Name:  Juan Diop   Age:  64 y.o.  :  1960                                          MRN:  [id number]         Date:  10/2/2024      Surgeon: Surgeon(s):  Jigar Johns MD    Procedure: Procedure(s):  COLONOSCOPY DIAGNOSTIC    Medications prior to admission:   Prior to Admission medications    Medication Sig Start Date End Date Taking? Authorizing Provider   temazepam (RESTORIL) 7.5 MG capsule TAKE 1 CAPSULE BY MOUTH NIGHTLY AS NEEDED FOR SLEEP 9/4/24 10/4/24  Tim Troy MD   lisinopril (PRINIVIL;ZESTRIL) 20 MG tablet Take 1 tablet by mouth daily 24   Tim Troy MD   cloNIDine (CATAPRES) 0.1 MG tablet TAKE ONE TABLET BY MOUTH NIGHTLY. IF BLOOD PRESSURE GREATER THEN 140/85 TAKE A SECOND DOSE 24   Tim Troy MD   metoprolol succinate (TOPROL XL) 50 MG extended release tablet Take 1 tablet by mouth nightly  Patient taking differently: Take 1 tablet by mouth daily 24   Tim Troy MD   insulin glargine (LANTUS SOLOSTAR) 100 UNIT/ML injection pen Inject 16 Units into the skin nightly 3/8/24   Tim Troy MD   insulin lispro (HUMALOG KWIKPEN) 200 UNIT/ML SOPN pen Take 4 units for blood sugar reading of 150-200, 6 units for 201-250, 8units for 251-300, 10 units for 301-350 23   Tim Troy MD   FLUoxetine (PROZAC) 10 MG capsule TAKE 1 CAPSULE BY MOUTH DAILY 23   Tim Troy MD   metFORMIN (GLUCOPHAGE) 500 MG tablet TAKE 1 TABLET BY MOUTH TWICE DAILY WITH MEALS 23   Tim Troy MD       Current medications:    No current facility-administered medications for this encounter.       Allergies:  No Known Allergies    Problem List:    Patient Active Problem List   Diagnosis Code   • Rectal cancer (HCC) C20   • Type 2 diabetes mellitus with hyperglycemia, with long-term current use of insulin (HCC) E11.65, Z79.4   • Primary hypertension I10   • Iron deficiency anemia due to chronic blood loss

## 2024-10-24 RX ORDER — METOPROLOL SUCCINATE 50 MG/1
50 TABLET, EXTENDED RELEASE ORAL DAILY
Qty: 90 TABLET | Refills: 3 | Status: SHIPPED | OUTPATIENT
Start: 2024-10-24

## 2024-11-08 DIAGNOSIS — I10 PRIMARY HYPERTENSION: ICD-10-CM

## 2024-11-08 DIAGNOSIS — Z79.4 TYPE 2 DIABETES MELLITUS WITH HYPERGLYCEMIA, WITH LONG-TERM CURRENT USE OF INSULIN (HCC): ICD-10-CM

## 2024-11-08 DIAGNOSIS — E11.65 TYPE 2 DIABETES MELLITUS WITH HYPERGLYCEMIA, WITH LONG-TERM CURRENT USE OF INSULIN (HCC): ICD-10-CM

## 2024-11-08 RX ORDER — LISINOPRIL 20 MG/1
20 TABLET ORAL DAILY
Qty: 90 TABLET | Refills: 1 | Status: SHIPPED | OUTPATIENT
Start: 2024-11-08

## 2024-11-08 RX ORDER — CLONIDINE HYDROCHLORIDE 0.1 MG/1
TABLET ORAL
Qty: 180 TABLET | Refills: 1 | Status: SHIPPED | OUTPATIENT
Start: 2024-11-08

## 2024-11-14 DIAGNOSIS — E11.65 TYPE 2 DIABETES MELLITUS WITH HYPERGLYCEMIA, WITH LONG-TERM CURRENT USE OF INSULIN (HCC): ICD-10-CM

## 2024-11-14 DIAGNOSIS — Z79.4 TYPE 2 DIABETES MELLITUS WITH HYPERGLYCEMIA, WITH LONG-TERM CURRENT USE OF INSULIN (HCC): ICD-10-CM

## 2024-11-14 RX ORDER — INSULIN GLARGINE-YFGN 100 [IU]/ML
20 INJECTION, SOLUTION SUBCUTANEOUS NIGHTLY
Qty: 15 ML | Refills: 0 | OUTPATIENT
Start: 2024-11-14

## 2024-11-15 ENCOUNTER — HOSPITAL ENCOUNTER (OUTPATIENT)
Dept: CT IMAGING | Age: 64
Discharge: HOME | End: 2024-11-15
Attending: STUDENT IN AN ORGANIZED HEALTH CARE EDUCATION/TRAINING PROGRAM
Payer: COMMERCIAL

## 2024-11-15 DIAGNOSIS — C20 RECTAL CANCER (HCC): ICD-10-CM

## 2024-11-15 PROCEDURE — 82565 ASSAY OF CREATININE: CPT

## 2024-11-15 PROCEDURE — 36415 COLL VENOUS BLD VENIPUNCTURE: CPT

## 2024-11-15 PROCEDURE — 74177 CT ABD & PELVIS W/CONTRAST: CPT

## 2024-11-15 PROCEDURE — 6360000004 HC RX CONTRAST MEDICATION: Performed by: STUDENT IN AN ORGANIZED HEALTH CARE EDUCATION/TRAINING PROGRAM

## 2024-11-15 RX ORDER — IOPAMIDOL 755 MG/ML
85 INJECTION, SOLUTION INTRAVASCULAR
Status: COMPLETED | OUTPATIENT
Start: 2024-11-15 | End: 2024-11-15

## 2024-11-15 RX ADMIN — IOHEXOL 25 ML: 350 INJECTION, SOLUTION INTRAVENOUS at 12:16

## 2024-11-15 RX ADMIN — IOPAMIDOL 85 ML: 755 INJECTION, SOLUTION INTRAVENOUS at 12:15

## 2024-12-18 ENCOUNTER — OFFICE VISIT (OUTPATIENT)
Dept: INTERNAL MEDICINE CLINIC | Age: 64
End: 2024-12-18
Payer: COMMERCIAL

## 2024-12-18 VITALS
DIASTOLIC BLOOD PRESSURE: 90 MMHG | OXYGEN SATURATION: 100 % | SYSTOLIC BLOOD PRESSURE: 160 MMHG | HEART RATE: 52 BPM | BODY MASS INDEX: 29.54 KG/M2 | WEIGHT: 211.8 LBS

## 2024-12-18 DIAGNOSIS — E11.65 TYPE 2 DIABETES MELLITUS WITH HYPERGLYCEMIA, WITH LONG-TERM CURRENT USE OF INSULIN (HCC): Primary | ICD-10-CM

## 2024-12-18 DIAGNOSIS — I10 PRIMARY HYPERTENSION: ICD-10-CM

## 2024-12-18 DIAGNOSIS — Z43.3 COLOSTOMY CARE (HCC): ICD-10-CM

## 2024-12-18 DIAGNOSIS — Z79.4 TYPE 2 DIABETES MELLITUS WITH HYPERGLYCEMIA, WITH LONG-TERM CURRENT USE OF INSULIN (HCC): ICD-10-CM

## 2024-12-18 DIAGNOSIS — Z79.4 TYPE 2 DIABETES MELLITUS WITH HYPERGLYCEMIA, WITH LONG-TERM CURRENT USE OF INSULIN (HCC): Primary | ICD-10-CM

## 2024-12-18 DIAGNOSIS — E11.65 TYPE 2 DIABETES MELLITUS WITH HYPERGLYCEMIA, WITH LONG-TERM CURRENT USE OF INSULIN (HCC): ICD-10-CM

## 2024-12-18 DIAGNOSIS — F33.41 MDD (MAJOR DEPRESSIVE DISORDER), RECURRENT, IN PARTIAL REMISSION (HCC): ICD-10-CM

## 2024-12-18 DIAGNOSIS — Z23 NEED FOR STREPTOCOCCUS PNEUMONIAE VACCINATION: ICD-10-CM

## 2024-12-18 DIAGNOSIS — D50.0 IRON DEFICIENCY ANEMIA DUE TO CHRONIC BLOOD LOSS: ICD-10-CM

## 2024-12-18 DIAGNOSIS — F51.02 ADJUSTMENT INSOMNIA: ICD-10-CM

## 2024-12-18 PROBLEM — D69.59 DRUG-INDUCED THROMBOCYTOPENIA: Status: RESOLVED | Noted: 2022-11-21 | Resolved: 2024-12-18

## 2024-12-18 PROBLEM — T50.905A DRUG-INDUCED THROMBOCYTOPENIA: Status: RESOLVED | Noted: 2022-11-21 | Resolved: 2024-12-18

## 2024-12-18 PROBLEM — F33.2 SEVERE EPISODE OF RECURRENT MAJOR DEPRESSIVE DISORDER, WITHOUT PSYCHOTIC FEATURES (HCC): Status: RESOLVED | Noted: 2022-02-23 | Resolved: 2024-12-18

## 2024-12-18 PROBLEM — L98.9 ECZEMATOUS SKIN LESIONS: Status: RESOLVED | Noted: 2022-02-23 | Resolved: 2024-12-18

## 2024-12-18 LAB
ALBUMIN SERPL-MCNC: 4.3 G/DL (ref 3.4–5)
ALBUMIN/GLOB SERPL: 2 {RATIO} (ref 1.1–2.2)
ALP SERPL-CCNC: 65 U/L (ref 40–129)
ALT SERPL-CCNC: 17 U/L (ref 10–40)
ANION GAP SERPL CALCULATED.3IONS-SCNC: 11 MMOL/L (ref 3–16)
AST SERPL-CCNC: 18 U/L (ref 15–37)
BILIRUB SERPL-MCNC: 0.3 MG/DL (ref 0–1)
BUN SERPL-MCNC: 30 MG/DL (ref 7–20)
CALCIUM SERPL-MCNC: 9.6 MG/DL (ref 8.3–10.6)
CHLORIDE SERPL-SCNC: 105 MMOL/L (ref 99–110)
CHOLEST SERPL-MCNC: 222 MG/DL (ref 0–199)
CO2 SERPL-SCNC: 24 MMOL/L (ref 21–32)
CREAT SERPL-MCNC: 1.2 MG/DL (ref 0.8–1.3)
CREAT UR-MCNC: 156 MG/DL (ref 39–259)
DEPRECATED RDW RBC AUTO: 16.2 % (ref 12.4–15.4)
EST. AVERAGE GLUCOSE BLD GHB EST-MCNC: 157.1 MG/DL
GFR SERPLBLD CREATININE-BSD FMLA CKD-EPI: 67 ML/MIN/{1.73_M2}
GLUCOSE SERPL-MCNC: 144 MG/DL (ref 70–99)
HBA1C MFR BLD: 7.1 %
HCT VFR BLD AUTO: 41.5 % (ref 40.5–52.5)
HDLC SERPL-MCNC: 35 MG/DL (ref 40–60)
HGB BLD-MCNC: 13.7 G/DL (ref 13.5–17.5)
LDLC SERPL CALC-MCNC: 137 MG/DL
MCH RBC QN AUTO: 27.8 PG (ref 26–34)
MCHC RBC AUTO-ENTMCNC: 32.9 G/DL (ref 31–36)
MCV RBC AUTO: 84.4 FL (ref 80–100)
MICROALBUMIN UR DL<=1MG/L-MCNC: 25.1 MG/DL
MICROALBUMIN/CREAT UR: 160.9 MG/G (ref 0–30)
PLATELET # BLD AUTO: 157 K/UL (ref 135–450)
PMV BLD AUTO: 9.6 FL (ref 5–10.5)
POTASSIUM SERPL-SCNC: 5.3 MMOL/L (ref 3.5–5.1)
PROT SERPL-MCNC: 6.5 G/DL (ref 6.4–8.2)
RBC # BLD AUTO: 4.91 M/UL (ref 4.2–5.9)
SODIUM SERPL-SCNC: 140 MMOL/L (ref 136–145)
TRIGL SERPL-MCNC: 251 MG/DL (ref 0–150)
VLDLC SERPL CALC-MCNC: 50 MG/DL
WBC # BLD AUTO: 5.1 K/UL (ref 4–11)

## 2024-12-18 PROCEDURE — 3051F HG A1C>EQUAL 7.0%<8.0%: CPT | Performed by: INTERNAL MEDICINE

## 2024-12-18 PROCEDURE — 90677 PCV20 VACCINE IM: CPT | Performed by: INTERNAL MEDICINE

## 2024-12-18 PROCEDURE — 3080F DIAST BP >= 90 MM HG: CPT | Performed by: INTERNAL MEDICINE

## 2024-12-18 PROCEDURE — 3077F SYST BP >= 140 MM HG: CPT | Performed by: INTERNAL MEDICINE

## 2024-12-18 PROCEDURE — 2022F DILAT RTA XM EVC RTNOPTHY: CPT | Performed by: INTERNAL MEDICINE

## 2024-12-18 PROCEDURE — 99214 OFFICE O/P EST MOD 30 MIN: CPT | Performed by: INTERNAL MEDICINE

## 2024-12-18 PROCEDURE — G8427 DOCREV CUR MEDS BY ELIG CLIN: HCPCS | Performed by: INTERNAL MEDICINE

## 2024-12-18 PROCEDURE — 90471 IMMUNIZATION ADMIN: CPT | Performed by: INTERNAL MEDICINE

## 2024-12-18 PROCEDURE — G8417 CALC BMI ABV UP PARAM F/U: HCPCS | Performed by: INTERNAL MEDICINE

## 2024-12-18 PROCEDURE — 1036F TOBACCO NON-USER: CPT | Performed by: INTERNAL MEDICINE

## 2024-12-18 PROCEDURE — G8484 FLU IMMUNIZE NO ADMIN: HCPCS | Performed by: INTERNAL MEDICINE

## 2024-12-18 PROCEDURE — 3017F COLORECTAL CA SCREEN DOC REV: CPT | Performed by: INTERNAL MEDICINE

## 2024-12-18 RX ORDER — TEMAZEPAM 7.5 MG/1
7.5 CAPSULE ORAL NIGHTLY PRN
COMMUNITY
Start: 2024-12-02 | End: 2024-12-18 | Stop reason: DRUGHIGH

## 2024-12-18 RX ORDER — TEMAZEPAM 15 MG/1
15 CAPSULE ORAL NIGHTLY PRN
Qty: 90 CAPSULE | Refills: 0 | Status: SHIPPED | OUTPATIENT
Start: 2024-12-18 | End: 2025-03-18

## 2024-12-18 RX ORDER — FLUOXETINE 10 MG/1
10 CAPSULE ORAL DAILY
Qty: 30 CAPSULE | Refills: 5 | Status: SHIPPED | OUTPATIENT
Start: 2024-12-18

## 2024-12-18 RX ORDER — INSULIN GLARGINE 100 [IU]/ML
16 INJECTION, SOLUTION SUBCUTANEOUS NIGHTLY
Qty: 5 ADJUSTABLE DOSE PRE-FILLED PEN SYRINGE | Refills: 3 | Status: SHIPPED | OUTPATIENT
Start: 2024-12-18

## 2024-12-18 RX ORDER — CLONIDINE HYDROCHLORIDE 0.3 MG/1
TABLET ORAL
Qty: 30 TABLET | Refills: 0 | Status: SHIPPED | OUTPATIENT
Start: 2024-12-18

## 2024-12-18 ASSESSMENT — ENCOUNTER SYMPTOMS
CHEST TIGHTNESS: 0
NAUSEA: 0
SHORTNESS OF BREATH: 0
BACK PAIN: 0
CONSTIPATION: 0
COLOR CHANGE: 0
VOMITING: 0
WHEEZING: 0
COUGH: 0
SORE THROAT: 0
ABDOMINAL PAIN: 0

## 2024-12-18 NOTE — PROGRESS NOTES
stimulants  Orders:  -     temazepam (RESTORIL) 15 MG capsule; Take 1 capsule by mouth nightly as needed for Sleep for up to 90 days. Max Daily Amount: 15 mg, Disp-90 capsule, R-0Normal  4. Need for Streptococcus pneumoniae vaccination  -     Pneumococcal, PCV20, PREVNAR 20, (age 6w+), IM, PF  5. MDD (major depressive disorder), recurrent, in partial remission ([facility])  Assessment & Plan:    stable and fairly well-controlled on current dose of fluoxetine, continue same  Orders:  -     FLUoxetine (PROZAC) 10 MG capsule; Take 1 capsule by mouth daily, Disp-30 capsule, R-5Normal  6. Colostomy care ([facility])  Assessment & Plan:    stable, continue current care  7. Iron deficiency anemia due to chronic blood loss  Assessment & Plan:    update labs, he did complete colonoscopy recently that was negative and no longer on chemo.      Return in about 3 months (around 3/18/2025) for AWV.     SUBJECTIVE  HPI:   Here for 6 months follow-up on chronic medical problems, reports he has been feeling significantly better since last visit, he is not on any chemo therapy at this point, had colonoscopy done recently that was negative.  Fatigue is better however noticing his insomnia is worsening again over the past 2 months, he used to be on temazepam 15 mg which was reduced to 7.5 mg 6 months ago        Review of Systems   Constitutional:  Negative for activity change, appetite change and fatigue.   HENT:  Negative for congestion, hearing loss, mouth sores and sore throat.    Eyes:  Negative for visual disturbance.   Respiratory:  Negative for cough, chest tightness, shortness of breath and wheezing.    Cardiovascular:  Negative for chest pain, palpitations and leg swelling.   Gastrointestinal:  Negative for abdominal pain, constipation, nausea and vomiting.   Endocrine: Negative for cold intolerance.   Genitourinary:  Negative for difficulty urinating, dysuria, frequency, hematuria and urgency.   Musculoskeletal:  Negative for arthralgias,

## 2024-12-18 NOTE — ASSESSMENT & PLAN NOTE
blood pressure checked twice remained borderline elevated, patient does report whitecoat hypertension, advised to start ambulatory blood pressure log for the next 2 weeks and send the readings through [facility], will continue current dose of lisinopril and metoprolol however will increased clonidine dose from 0.2 mg to 0.3 mg at bedtime.  Reinforced recommendations to adhere to healthy low-salt diet and active lifestyle as tolerated  
  reporting occasionally checking blood sugar readings with numbers at acceptable range, reinforced recommendations to diet and exercise, will continue current insulin dose, update labs and make recommendations pending results, he is inquiring about continuous glucose monitor, advised can order the beginning of new year in 2 weeks as he will have Medicare then.  Continue metformin, he is up-to-date with ophthalmology exam  
  stable and fairly well-controlled on current dose of fluoxetine, continue same  
  stable, continue current care  
  symptoms relapsing although all in all has been feeling better but lower dose of temazepam no longer effective.  Will go back to the 15 mg nightly dose, advised can try again and wean down to 7.5 after a while specially that we are also going to be increasing bedtime clonidine dose.  Reinforced recommendations for sleep hygiene and need to avoid stimulants  
  update labs, he did complete colonoscopy recently that was negative and no longer on chemo.  
show

## 2025-01-19 DIAGNOSIS — I10 PRIMARY HYPERTENSION: ICD-10-CM

## 2025-01-20 RX ORDER — CLONIDINE HYDROCHLORIDE 0.3 MG/1
TABLET ORAL
Qty: 90 TABLET | Refills: 0 | Status: SHIPPED | OUTPATIENT
Start: 2025-01-20

## 2025-01-20 NOTE — TELEPHONE ENCOUNTER
Medication:   Requested Prescriptions     Pending Prescriptions Disp Refills    cloNIDine (CATAPRES) 0.3 MG tablet [Pharmacy Med Name: cloNIDine HCl 0.3 MG Oral Tablet] 90 tablet 0     Sig: TAKE 1 TABLET BY MOUTH NIGHTLY. IF BLOOD PRESSURE GREATER THAN 140/85, TAKE A SECOND DOSE        Last Filled:  12/18/24    Patient Phone Number: [phone] (home)     Last appt: 12/18/2024   Next appt: 3/18/2025    Last OARRS:        No data to display

## 2025-01-22 DIAGNOSIS — F33.41 MDD (MAJOR DEPRESSIVE DISORDER), RECURRENT, IN PARTIAL REMISSION (HCC): ICD-10-CM

## 2025-01-22 RX ORDER — FLUOXETINE 10 MG/1
10 CAPSULE ORAL DAILY
Qty: 90 CAPSULE | Refills: 3 | Status: SHIPPED | OUTPATIENT
Start: 2025-01-22

## 2025-01-29 ENCOUNTER — TELEPHONE (OUTPATIENT)
Dept: INTERNAL MEDICINE CLINIC | Age: 65
End: 2025-01-29

## 2025-01-29 DIAGNOSIS — I10 PRIMARY HYPERTENSION: Primary | ICD-10-CM

## 2025-01-29 RX ORDER — HYDRALAZINE HYDROCHLORIDE 25 MG/1
25 TABLET, FILM COATED ORAL 3 TIMES DAILY
Qty: 90 TABLET | Refills: 3 | Status: SHIPPED | OUTPATIENT
Start: 2025-01-29

## 2025-01-29 NOTE — TELEPHONE ENCOUNTER
Patient is calling due to his BP , he states it has been very high the past few days. Yesterday it was 193/100 and this morning it was 181/93 . Patient thinks It might be the medication   cloNIDine (CATAPRES) 0.3 MG tablet  that is making it higher than normal. Patient also states he has a slight headache of a morning but it usually goes away after he has his cup of coffee. Patient just wanted some advice on how to bring it down or if he can take a different dose of his medications . Please advise

## 2025-01-29 NOTE — TELEPHONE ENCOUNTER
Please advise patient clonidine should bring blood pressure down not up, he was previously recommended to take an extra 1 if readings are high, please verify if patient has been doing that, if not and he is concerned about side effects will add hydralazine.  I did send that to his pharmacy, he can start it as once a day and titrate up to 3 times a day until blood pressure is under control, keep appointment in March to follow-up on that

## 2025-03-05 ENCOUNTER — HOSPITAL ENCOUNTER (OUTPATIENT)
Dept: CT IMAGING | Age: 65
Discharge: HOME | End: 2025-03-05
Attending: STUDENT IN AN ORGANIZED HEALTH CARE EDUCATION/TRAINING PROGRAM
Payer: MEDICARE

## 2025-03-05 DIAGNOSIS — C20 RECTAL CANCER (HCC): ICD-10-CM

## 2025-03-05 PROCEDURE — 6360000004 HC RX CONTRAST MEDICATION: Performed by: STUDENT IN AN ORGANIZED HEALTH CARE EDUCATION/TRAINING PROGRAM

## 2025-03-05 PROCEDURE — 82565 ASSAY OF CREATININE: CPT

## 2025-03-05 PROCEDURE — 71260 CT THORAX DX C+: CPT

## 2025-03-05 PROCEDURE — 36415 COLL VENOUS BLD VENIPUNCTURE: CPT

## 2025-03-05 RX ORDER — IOPAMIDOL 755 MG/ML
75 INJECTION, SOLUTION INTRAVASCULAR
Status: COMPLETED | OUTPATIENT
Start: 2025-03-05 | End: 2025-03-05

## 2025-03-05 RX ADMIN — IOPAMIDOL 75 ML: 755 INJECTION, SOLUTION INTRAVENOUS at 08:28

## 2025-03-05 RX ADMIN — IOHEXOL 25 ML: 350 INJECTION, SOLUTION INTRAVENOUS at 08:28

## 2025-03-18 ENCOUNTER — OFFICE VISIT (OUTPATIENT)
Dept: INTERNAL MEDICINE CLINIC | Age: 65
End: 2025-03-18
Payer: MEDICARE

## 2025-03-18 VITALS
OXYGEN SATURATION: 100 % | HEART RATE: 58 BPM | DIASTOLIC BLOOD PRESSURE: 90 MMHG | WEIGHT: 210.6 LBS | BODY MASS INDEX: 29.37 KG/M2 | SYSTOLIC BLOOD PRESSURE: 165 MMHG

## 2025-03-18 DIAGNOSIS — E78.2 MIXED HYPERLIPIDEMIA: ICD-10-CM

## 2025-03-18 DIAGNOSIS — C78.7 METASTASES TO THE LIVER (HCC): ICD-10-CM

## 2025-03-18 DIAGNOSIS — Z00.00 MEDICARE ANNUAL WELLNESS VISIT, SUBSEQUENT: Primary | ICD-10-CM

## 2025-03-18 DIAGNOSIS — I10 PRIMARY HYPERTENSION: ICD-10-CM

## 2025-03-18 DIAGNOSIS — Z79.4 TYPE 2 DIABETES MELLITUS WITH HYPERGLYCEMIA, WITH LONG-TERM CURRENT USE OF INSULIN (HCC): ICD-10-CM

## 2025-03-18 DIAGNOSIS — Z43.3 COLOSTOMY CARE (HCC): ICD-10-CM

## 2025-03-18 DIAGNOSIS — F51.02 ADJUSTMENT INSOMNIA: ICD-10-CM

## 2025-03-18 DIAGNOSIS — E11.65 TYPE 2 DIABETES MELLITUS WITH HYPERGLYCEMIA, WITH LONG-TERM CURRENT USE OF INSULIN (HCC): ICD-10-CM

## 2025-03-18 LAB — HBA1C MFR BLD: 7.9 %

## 2025-03-18 PROCEDURE — 2022F DILAT RTA XM EVC RTNOPTHY: CPT | Performed by: INTERNAL MEDICINE

## 2025-03-18 PROCEDURE — 83036 HEMOGLOBIN GLYCOSYLATED A1C: CPT | Performed by: INTERNAL MEDICINE

## 2025-03-18 PROCEDURE — 99214 OFFICE O/P EST MOD 30 MIN: CPT | Performed by: INTERNAL MEDICINE

## 2025-03-18 PROCEDURE — 1036F TOBACCO NON-USER: CPT | Performed by: INTERNAL MEDICINE

## 2025-03-18 PROCEDURE — 3077F SYST BP >= 140 MM HG: CPT | Performed by: INTERNAL MEDICINE

## 2025-03-18 PROCEDURE — 3017F COLORECTAL CA SCREEN DOC REV: CPT | Performed by: INTERNAL MEDICINE

## 2025-03-18 PROCEDURE — 3080F DIAST BP >= 90 MM HG: CPT | Performed by: INTERNAL MEDICINE

## 2025-03-18 PROCEDURE — G0439 PPPS, SUBSEQ VISIT: HCPCS | Performed by: INTERNAL MEDICINE

## 2025-03-18 PROCEDURE — G8417 CALC BMI ABV UP PARAM F/U: HCPCS | Performed by: INTERNAL MEDICINE

## 2025-03-18 PROCEDURE — 3051F HG A1C>EQUAL 7.0%<8.0%: CPT | Performed by: INTERNAL MEDICINE

## 2025-03-18 PROCEDURE — G8427 DOCREV CUR MEDS BY ELIG CLIN: HCPCS | Performed by: INTERNAL MEDICINE

## 2025-03-18 PROCEDURE — 1123F ACP DISCUSS/DSCN MKR DOCD: CPT | Performed by: INTERNAL MEDICINE

## 2025-03-18 RX ORDER — ATORVASTATIN CALCIUM 10 MG/1
10 TABLET, FILM COATED ORAL DAILY
Qty: 90 TABLET | Refills: 3 | Status: SHIPPED | OUTPATIENT
Start: 2025-03-18

## 2025-03-18 RX ORDER — INSULIN GLARGINE 100 [IU]/ML
18 INJECTION, SOLUTION SUBCUTANEOUS NIGHTLY
Qty: 5 ADJUSTABLE DOSE PRE-FILLED PEN SYRINGE | Refills: 3 | Status: SHIPPED | OUTPATIENT
Start: 2025-03-18

## 2025-03-18 RX ORDER — HYDRALAZINE HYDROCHLORIDE 25 MG/1
50 TABLET, FILM COATED ORAL 3 TIMES DAILY
Qty: 90 TABLET | Refills: 3
Start: 2025-03-18

## 2025-03-18 RX ORDER — KETOROLAC TROMETHAMINE 30 MG/ML
INJECTION, SOLUTION INTRAMUSCULAR; INTRAVENOUS
Qty: 1 EACH | Refills: 0 | Status: SHIPPED | OUTPATIENT
Start: 2025-03-18

## 2025-03-18 RX ORDER — TEMAZEPAM 15 MG/1
15 CAPSULE ORAL NIGHTLY PRN
Qty: 90 CAPSULE | Refills: 0 | Status: SHIPPED | OUTPATIENT
Start: 2025-03-18 | End: 2025-06-16

## 2025-03-18 RX ORDER — FLASH GLUCOSE SENSOR
KIT MISCELLANEOUS
Qty: 6 EACH | Refills: 1 | Status: SHIPPED | OUTPATIENT
Start: 2025-03-18

## 2025-03-18 SDOH — ECONOMIC STABILITY: FOOD INSECURITY: WITHIN THE PAST 12 MONTHS, YOU WORRIED THAT YOUR FOOD WOULD RUN OUT BEFORE YOU GOT MONEY TO BUY MORE.: NEVER TRUE

## 2025-03-18 SDOH — ECONOMIC STABILITY: FOOD INSECURITY: WITHIN THE PAST 12 MONTHS, THE FOOD YOU BOUGHT JUST DIDN'T LAST AND YOU DIDN'T HAVE MONEY TO GET MORE.: NEVER TRUE

## 2025-03-18 ASSESSMENT — PATIENT HEALTH QUESTIONNAIRE - PHQ9
SUM OF ALL RESPONSES TO PHQ QUESTIONS 1-9: 0
10. IF YOU CHECKED OFF ANY PROBLEMS, HOW DIFFICULT HAVE THESE PROBLEMS MADE IT FOR YOU TO DO YOUR WORK, TAKE CARE OF THINGS AT HOME, OR GET ALONG WITH OTHER PEOPLE: NOT DIFFICULT AT ALL
2. FEELING DOWN, DEPRESSED OR HOPELESS: NOT AT ALL
SUM OF ALL RESPONSES TO PHQ QUESTIONS 1-9: 0
9. THOUGHTS THAT YOU WOULD BE BETTER OFF DEAD, OR OF HURTING YOURSELF: NOT AT ALL
SUM OF ALL RESPONSES TO PHQ QUESTIONS 1-9: 0
7. TROUBLE CONCENTRATING ON THINGS, SUCH AS READING THE NEWSPAPER OR WATCHING TELEVISION: NOT AT ALL
SUM OF ALL RESPONSES TO PHQ QUESTIONS 1-9: 0
4. FEELING TIRED OR HAVING LITTLE ENERGY: NOT AT ALL
3. TROUBLE FALLING OR STAYING ASLEEP: NOT AT ALL
1. LITTLE INTEREST OR PLEASURE IN DOING THINGS: NOT AT ALL
8. MOVING OR SPEAKING SO SLOWLY THAT OTHER PEOPLE COULD HAVE NOTICED. OR THE OPPOSITE, BEING SO FIGETY OR RESTLESS THAT YOU HAVE BEEN MOVING AROUND A LOT MORE THAN USUAL: NOT AT ALL
5. POOR APPETITE OR OVEREATING: NOT AT ALL
6. FEELING BAD ABOUT YOURSELF - OR THAT YOU ARE A FAILURE OR HAVE LET YOURSELF OR YOUR FAMILY DOWN: NOT AT ALL

## 2025-03-18 ASSESSMENT — ENCOUNTER SYMPTOMS
SORE THROAT: 0
COLOR CHANGE: 0
SHORTNESS OF BREATH: 0
CONSTIPATION: 0
NAUSEA: 0
WHEEZING: 0
COUGH: 0
VOMITING: 0
BACK PAIN: 0
ABDOMINAL PAIN: 0
CHEST TIGHTNESS: 0

## 2025-03-18 ASSESSMENT — LIFESTYLE VARIABLES
HOW MANY STANDARD DRINKS CONTAINING ALCOHOL DO YOU HAVE ON A TYPICAL DAY: 1 OR 2
HOW OFTEN DO YOU HAVE A DRINK CONTAINING ALCOHOL: MONTHLY OR LESS

## 2025-03-18 NOTE — ASSESSMENT & PLAN NOTE
S/p ablation and reports subsequent imaging studies showing remission, he will continue periodic surveillance with oncology Dr. Max at [facility]

## 2025-03-18 NOTE — PROGRESS NOTES
Medicare Annual Wellness Visit  Name: Juan Diop Today’s Date: 3/18/2025   MRN: [id number] Sex: Male   Age: 65 y.o. Ethnicity: Non- / Non    : 1960 Race: White (non-)      Juan Diop is here for Medicare AWV     Screenings for behavioral, psychosocial and functional/safety risks, and cognitive dysfunction are all negative except as indicated below. These results, as well as other patient data from the Health Risk Assessment form, are documented in Flowsheets linked to this Encounter.    No Known Allergies    Prior to Visit Medications    Medication Sig Taking? Authorizing Provider   insulin glargine (LANTUS SOLOSTAR) 100 UNIT/ML injection pen Inject 18 Units into the skin nightly Yes Tim Troy MD   temazepam (RESTORIL) 15 MG capsule Take 1 capsule by mouth nightly as needed for Sleep for up to 90 days. Max Daily Amount: 15 mg Yes Tim Troy MD   hydrALAZINE (APRESOLINE) 25 MG tablet Take 2 tablets by mouth 3 times daily Yes Tim Troy MD   atorvastatin (LIPITOR) 10 MG tablet Take 1 tablet by mouth daily Yes Tim Troy MD   Continuous Glucose  (FREESTYLE SHILA 3 READER) CHEPE Use as diredted Yes Tim Troy MD   Continuous Glucose Sensor (FREESTYLE SHILA 14 DAY SENSOR) MISC Use as directed Yes Tim Troy MD   FLUoxetine (PROZAC) 10 MG capsule TAKE 1 CAPSULE DAILY Yes Tim Troy MD   cloNIDine (CATAPRES) 0.3 MG tablet TAKE 1 TABLET BY MOUTH NIGHTLY. IF BLOOD PRESSURE GREATER THAN 140/85, TAKE A SECOND DOSE Yes Tim Troy MD   metFORMIN (GLUCOPHAGE) 500 MG tablet TAKE 1 TABLET TWICE A DAY WITH MEALS Yes Tim Troy MD   lisinopril (PRINIVIL;ZESTRIL) 20 MG tablet TAKE 1 TABLET DAILY Yes Tim Troy MD   metoprolol succinate (TOPROL XL) 50 MG extended release tablet TAKE 1 TABLET DAILY Yes Tim Troy MD   insulin lispro (HUMALOG KWIKPEN) 200 UNIT/ML SOPN pen Take 4 units for blood sugar reading of 150-200, 6

## 2025-03-18 NOTE — ASSESSMENT & PLAN NOTE
Blood pressure checked twice remained higher than target goal, patient reporting lately readings at home were also elevated, will increase hydralazine dose to 50 mg daily, continue lisinopril, clonidine and metoprolol, advised can also try switching metoprolol to carvedilol.  He will continue ambulatory blood pressure monitoring and will notify office if no improvement.

## 2025-03-18 NOTE — ASSESSMENT & PLAN NOTE
Most recent blood sugar readings are higher than target goal, advised patient increase Lantus dose to 18 units, if continues to have readings higher than 180 then increase further to 20 units, will continue short acting sliding scale along with metformin.  Hemoglobin A1c done this visit showing some deterioration in control.  Diet recommendations reinforced

## 2025-03-18 NOTE — ASSESSMENT & PLAN NOTE
Discussed with patient recommendation for statin therapy, orders placed, diet and exercise recommendations reinforced

## 2025-03-18 NOTE — ASSESSMENT & PLAN NOTE
Stable on temazepam, refill and continue same, reinforced recommendations for sleep hygiene and avoid stimulants

## 2025-03-31 ENCOUNTER — TELEPHONE (OUTPATIENT)
Dept: ADMINISTRATIVE | Age: 65
End: 2025-03-31

## 2025-03-31 NOTE — TELEPHONE ENCOUNTER
[facility] calling---on the Temazepam his insurance only allow  60 pills every 271 days---will need a PA done ---can you please send to PA dept.  Thanks

## 2025-04-01 NOTE — TELEPHONE ENCOUNTER
The medication is APPROVED THRU 04/1/2026    If this requires a response please respond to the pool ( [facility] MEDICATION PRE-AUTH).      Thank you please advise patient.

## 2025-04-01 NOTE — TELEPHONE ENCOUNTER
Submitted PA for temazepam (RESTORIL) 15 MG capsule   [facility]ia [facility] (Key: [id number]) STATUS: PENDING.    Follow up done daily; if no decision with in three days we will refax.  If another three days goes by with no decision will call the insurance for status.

## 2025-04-16 DIAGNOSIS — I10 PRIMARY HYPERTENSION: ICD-10-CM

## 2025-04-16 RX ORDER — CLONIDINE HYDROCHLORIDE 0.3 MG/1
TABLET ORAL
Qty: 90 TABLET | Refills: 1 | Status: SHIPPED | OUTPATIENT
Start: 2025-04-16

## 2025-04-30 ENCOUNTER — OFFICE VISIT (OUTPATIENT)
Dept: INTERNAL MEDICINE CLINIC | Age: 65
End: 2025-04-30
Payer: MEDICARE

## 2025-04-30 VITALS
OXYGEN SATURATION: 97 % | SYSTOLIC BLOOD PRESSURE: 136 MMHG | HEART RATE: 68 BPM | DIASTOLIC BLOOD PRESSURE: 72 MMHG | BODY MASS INDEX: 28.51 KG/M2 | WEIGHT: 204.4 LBS

## 2025-04-30 DIAGNOSIS — R09.81 CONGESTION OF PARANASAL SINUS: Primary | ICD-10-CM

## 2025-04-30 DIAGNOSIS — E11.65 TYPE 2 DIABETES MELLITUS WITH HYPERGLYCEMIA, WITH LONG-TERM CURRENT USE OF INSULIN (HCC): ICD-10-CM

## 2025-04-30 DIAGNOSIS — I10 PRIMARY HYPERTENSION: ICD-10-CM

## 2025-04-30 DIAGNOSIS — Z79.4 TYPE 2 DIABETES MELLITUS WITH HYPERGLYCEMIA, WITH LONG-TERM CURRENT USE OF INSULIN (HCC): ICD-10-CM

## 2025-04-30 DIAGNOSIS — H93.8X3 CONGESTION OF BOTH EARS: ICD-10-CM

## 2025-04-30 PROCEDURE — 3075F SYST BP GE 130 - 139MM HG: CPT | Performed by: INTERNAL MEDICINE

## 2025-04-30 PROCEDURE — 1036F TOBACCO NON-USER: CPT | Performed by: INTERNAL MEDICINE

## 2025-04-30 PROCEDURE — 3017F COLORECTAL CA SCREEN DOC REV: CPT | Performed by: INTERNAL MEDICINE

## 2025-04-30 PROCEDURE — 3078F DIAST BP <80 MM HG: CPT | Performed by: INTERNAL MEDICINE

## 2025-04-30 PROCEDURE — G8417 CALC BMI ABV UP PARAM F/U: HCPCS | Performed by: INTERNAL MEDICINE

## 2025-04-30 PROCEDURE — 99214 OFFICE O/P EST MOD 30 MIN: CPT | Performed by: INTERNAL MEDICINE

## 2025-04-30 PROCEDURE — 1123F ACP DISCUSS/DSCN MKR DOCD: CPT | Performed by: INTERNAL MEDICINE

## 2025-04-30 PROCEDURE — 3051F HG A1C>EQUAL 7.0%<8.0%: CPT | Performed by: INTERNAL MEDICINE

## 2025-04-30 PROCEDURE — G8427 DOCREV CUR MEDS BY ELIG CLIN: HCPCS | Performed by: INTERNAL MEDICINE

## 2025-04-30 PROCEDURE — 2022F DILAT RTA XM EVC RTNOPTHY: CPT | Performed by: INTERNAL MEDICINE

## 2025-04-30 RX ORDER — LORATADINE 10 MG/1
10 TABLET ORAL DAILY
Qty: 30 TABLET | Refills: 0 | Status: SHIPPED | OUTPATIENT
Start: 2025-04-30

## 2025-04-30 RX ORDER — FLUTICASONE PROPIONATE 50 MCG
1 SPRAY, SUSPENSION (ML) NASAL DAILY
Qty: 16 G | Refills: 0 | Status: SHIPPED | OUTPATIENT
Start: 2025-04-30

## 2025-04-30 RX ORDER — HYDRALAZINE HYDROCHLORIDE 25 MG/1
50 TABLET, FILM COATED ORAL 3 TIMES DAILY
Qty: 90 TABLET | Refills: 3 | Status: SHIPPED | OUTPATIENT
Start: 2025-04-30

## 2025-04-30 ASSESSMENT — ENCOUNTER SYMPTOMS
SORE THROAT: 1
SINUS PRESSURE: 1
ABDOMINAL PAIN: 0
NAUSEA: 0
CHEST TIGHTNESS: 0
WHEEZING: 0
BACK PAIN: 0
COLOR CHANGE: 0
COUGH: 0
VOMITING: 0
SINUS PAIN: 1
CONSTIPATION: 0
SHORTNESS OF BREATH: 0

## 2025-04-30 NOTE — ASSESSMENT & PLAN NOTE
Discussed with patient no need for antibiotic therapy at this point, symptoms may have started as viral sinus infection however current symptoms suggestive of possibly seasonal and environmental allergies.  Advised will treat with daily antihistamine and Flonase for the next week or so, if not noticing any improvement then can do empiric Z-Jesus although at this point doubt this will be helpful.  Continue to avoid smoke and known irritants

## 2025-04-30 NOTE — PROGRESS NOTES
ASSESSMENT/PLAN:  1. Congestion of paranasal sinus  Assessment & Plan:  Discussed with patient no need for antibiotic therapy at this point, symptoms may have started as viral sinus infection however current symptoms suggestive of possibly seasonal and environmental allergies.  Advised will treat with daily antihistamine and Flonase for the next week or so, if not noticing any improvement then can do empiric Z-Jesus although at this point doubt this will be helpful.  Continue to avoid smoke and known irritants   Orders:  -     loratadine (CLARITIN) 10 MG tablet; Take 1 tablet by mouth daily, Disp-30 tablet, R-0Normal  -     fluticasone (FLONASE) 50 MCG/ACT nasal spray; 1 spray by Each Nostril route daily, Disp-16 g, R-0Normal  2. Congestion of both ears  Assessment & Plan:  Exam with normal ear canal, left side unable to visualize due to excessive cerumen but no impaction, advised patient to use Flonase and antihistamine as noted above, use of decongestants discussed with patient at length, can try phenylephrine or Sudafed but need to be very cautious when using it because it may increase blood pressure, if that is the case should discontinue, and attempt possibly Coricidin HBP or just plain antihistamine.   Orders:  -     loratadine (CLARITIN) 10 MG tablet; Take 1 tablet by mouth daily, Disp-30 tablet, R-0Normal  -     fluticasone (FLONASE) 50 MCG/ACT nasal spray; 1 spray by Each Nostril route daily, Disp-16 g, R-0Normal  3. Primary hypertension  Assessment & Plan:  Blood pressure stable and well-controlled on current medication regimen, there has been few pounds weight loss from last visit which probably is helping further controlling blood pressure and diabetes, encouraged to continue attempts to adhere to healthy diet and active lifestyle.  As noted above use caution when using decongestants and monitor blood pressure, if reading starts going up then discontinue use of decongestants   Orders:  -     hydrALAZINE

## 2025-04-30 NOTE — ASSESSMENT & PLAN NOTE
Reports home readings recently improved with weight loss, continue same medication regimen and continue attempts for further weight loss and adhere to low-carb diet, follow-up as scheduled in [place]

## 2025-04-30 NOTE — ASSESSMENT & PLAN NOTE
Exam with normal ear canal, left side unable to visualize due to excessive cerumen but no impaction, advised patient to use Flonase and antihistamine as noted above, use of decongestants discussed with patient at length, can try phenylephrine or Sudafed but need to be very cautious when using it because it may increase blood pressure, if that is the case should discontinue, and attempt possibly Coricidin HBP or just plain antihistamine.

## 2025-04-30 NOTE — ASSESSMENT & PLAN NOTE
Blood pressure stable and well-controlled on current medication regimen, there has been few pounds weight loss from last visit which probably is helping further controlling blood pressure and diabetes, encouraged to continue attempts to adhere to healthy diet and active lifestyle.  As noted above use caution when using decongestants and monitor blood pressure, if reading starts going up then discontinue use of decongestants

## 2025-05-07 DIAGNOSIS — I10 PRIMARY HYPERTENSION: ICD-10-CM

## 2025-05-07 DIAGNOSIS — E11.65 TYPE 2 DIABETES MELLITUS WITH HYPERGLYCEMIA, WITH LONG-TERM CURRENT USE OF INSULIN (HCC): ICD-10-CM

## 2025-05-07 DIAGNOSIS — Z79.4 TYPE 2 DIABETES MELLITUS WITH HYPERGLYCEMIA, WITH LONG-TERM CURRENT USE OF INSULIN (HCC): ICD-10-CM

## 2025-05-07 RX ORDER — LISINOPRIL 20 MG/1
20 TABLET ORAL DAILY
Qty: 90 TABLET | Refills: 3 | Status: SHIPPED | OUTPATIENT
Start: 2025-05-07

## 2025-05-27 NOTE — ASSESSMENT & PLAN NOTE
Home blood glucose log reviewed and discussed with patient, per glycemia noticed around the time he gets his chemotherapy due to concomitant use of steroids however tends to improve few days after. Advised patient to increase Basaglar dose to 20 units for 3 days after chemo cycle and then go back to 16 for the reminder of the 2 weeks. Switch Humalog from 6 units 3 times daily to the sliding scale, will continue current dose of Metformin as increasing it may result in worsening diarrhea complicating colostomy care.   Encouraged to continue maintaining healthy low-carb diet, will reevaluate again in 6 weeks recheck hemoglobin A1c next visit
Initial lesions shrinking per patient report, continue care and recommendations as per medical and surgical oncology
Initial readings elevated but repeat blood pressure check stable, will continue current dose of metoprolol.
Last blood draw done on Monday by oncology prior to chemo cycle hemoglobin improved to 11.0, no need for lab work today since getting labs done by oncology
Symptoms improved significantly with Prozac, will continue current dose, encouraged to call the office if any new problems or concerns
Detail Level: Simple
Detail Level: Detailed
Detail Level: Generalized

## 2025-06-18 ENCOUNTER — OFFICE VISIT (OUTPATIENT)
Dept: INTERNAL MEDICINE CLINIC | Age: 65
End: 2025-06-18
Payer: MEDICARE

## 2025-06-18 VITALS
WEIGHT: 208.4 LBS | OXYGEN SATURATION: 98 % | DIASTOLIC BLOOD PRESSURE: 64 MMHG | HEART RATE: 58 BPM | BODY MASS INDEX: 29.07 KG/M2 | SYSTOLIC BLOOD PRESSURE: 110 MMHG

## 2025-06-18 DIAGNOSIS — I10 PRIMARY HYPERTENSION: Primary | ICD-10-CM

## 2025-06-18 DIAGNOSIS — F33.41 MDD (MAJOR DEPRESSIVE DISORDER), RECURRENT, IN PARTIAL REMISSION: ICD-10-CM

## 2025-06-18 DIAGNOSIS — F51.02 ADJUSTMENT INSOMNIA: ICD-10-CM

## 2025-06-18 DIAGNOSIS — D50.0 IRON DEFICIENCY ANEMIA DUE TO CHRONIC BLOOD LOSS: ICD-10-CM

## 2025-06-18 DIAGNOSIS — Z79.4 TYPE 2 DIABETES MELLITUS WITH HYPERGLYCEMIA, WITH LONG-TERM CURRENT USE OF INSULIN (HCC): ICD-10-CM

## 2025-06-18 DIAGNOSIS — E11.65 TYPE 2 DIABETES MELLITUS WITH HYPERGLYCEMIA, WITH LONG-TERM CURRENT USE OF INSULIN (HCC): ICD-10-CM

## 2025-06-18 PROBLEM — H93.8X3 CONGESTION OF BOTH EARS: Status: RESOLVED | Noted: 2025-04-30 | Resolved: 2025-06-18

## 2025-06-18 PROCEDURE — 3051F HG A1C>EQUAL 7.0%<8.0%: CPT | Performed by: INTERNAL MEDICINE

## 2025-06-18 PROCEDURE — 3074F SYST BP LT 130 MM HG: CPT | Performed by: INTERNAL MEDICINE

## 2025-06-18 PROCEDURE — G8417 CALC BMI ABV UP PARAM F/U: HCPCS | Performed by: INTERNAL MEDICINE

## 2025-06-18 PROCEDURE — 3078F DIAST BP <80 MM HG: CPT | Performed by: INTERNAL MEDICINE

## 2025-06-18 PROCEDURE — 1036F TOBACCO NON-USER: CPT | Performed by: INTERNAL MEDICINE

## 2025-06-18 PROCEDURE — G8427 DOCREV CUR MEDS BY ELIG CLIN: HCPCS | Performed by: INTERNAL MEDICINE

## 2025-06-18 PROCEDURE — 2022F DILAT RTA XM EVC RTNOPTHY: CPT | Performed by: INTERNAL MEDICINE

## 2025-06-18 PROCEDURE — 1123F ACP DISCUSS/DSCN MKR DOCD: CPT | Performed by: INTERNAL MEDICINE

## 2025-06-18 PROCEDURE — 99214 OFFICE O/P EST MOD 30 MIN: CPT | Performed by: INTERNAL MEDICINE

## 2025-06-18 PROCEDURE — 3017F COLORECTAL CA SCREEN DOC REV: CPT | Performed by: INTERNAL MEDICINE

## 2025-06-18 ASSESSMENT — ENCOUNTER SYMPTOMS
CONSTIPATION: 0
BACK PAIN: 0
COUGH: 0
CHEST TIGHTNESS: 0
ABDOMINAL PAIN: 0
SORE THROAT: 0
SHORTNESS OF BREATH: 0
WHEEZING: 0
VOMITING: 0
COLOR CHANGE: 0
NAUSEA: 0

## 2025-06-18 NOTE — ASSESSMENT & PLAN NOTE
Was not able to get continuous glucose monitor due to cost, he will verify coverage with insurance since he is on insulin, reports home readings are fairly in the acceptable range, will continue current insulin regimen, reinforced recommendations to adhere to low-carb diet, update labs this visit and make recommendations pending results, he is up-to-date with ophthalmology exam

## 2025-06-18 NOTE — ASSESSMENT & PLAN NOTE
Patient reports he did discontinue temazepam due to feeling fatigued in the morning, although he still has some insomnia but fatigue subsided, reports insomnia for now is manageable and will continue to monitor off medication

## 2025-06-18 NOTE — ASSESSMENT & PLAN NOTE
Will update labs, currently not on any chemo therapy medications and hopefully his counts improved, he is otherwise doing well

## 2025-06-18 NOTE — ASSESSMENT & PLAN NOTE
Blood pressure has been doing well but last couple of days noticed readings lower than target goal, this morning reports home reading was 90 systolic over 60.  He is denying dizziness, advised can discontinue hydralazine 25 mg twice a day for now since readings are back to baseline and if starts going up again then can restart.  He will continue metoprolol, lisinopril and clonidine, continue attempts to adhere to healthy diet and active lifestyle

## 2025-06-18 NOTE — ASSESSMENT & PLAN NOTE
Remains stable and well-controlled on current dose of fluoxetine, continue same, can consider weaning next visit in 6 months if remains in full remission

## 2025-06-18 NOTE — PROGRESS NOTES
ASSESSMENT/PLAN:  1. Primary hypertension  Assessment & Plan:  Blood pressure has been doing well but last couple of days noticed readings lower than target goal, this morning reports home reading was 90 systolic over 60.  He is denying dizziness, advised can discontinue hydralazine 25 mg twice a day for now since readings are back to baseline and if starts going up again then can restart.  He will continue metoprolol, lisinopril and clonidine, continue attempts to adhere to healthy diet and active lifestyle   Orders:  -     Albumin/Creatinine Ratio, Urine; Future  -     Comprehensive Metabolic Panel; Future  2. Type 2 diabetes mellitus with hyperglycemia, with long-term current use of insulin ([facility])  Assessment & Plan:  Was not able to get continuous glucose monitor due to cost, he will verify coverage with insurance since he is on insulin, reports home readings are fairly in the acceptable range, will continue current insulin regimen, reinforced recommendations to adhere to low-carb diet, update labs this visit and make recommendations pending results, he is up-to-date with ophthalmology exam   Orders:  -     Hemoglobin A1C; Future  -     Albumin/Creatinine Ratio, Urine; Future  -     Lipid Panel; Future  -     Comprehensive Metabolic Panel; Future  -     CBC; Future  3. Adjustment insomnia  Assessment & Plan:  Patient reports he did discontinue temazepam due to feeling fatigued in the morning, although he still has some insomnia but fatigue subsided, reports insomnia for now is manageable and will continue to monitor off medication   4. MDD (major depressive disorder), recurrent, in partial remission  Assessment & Plan:  Remains stable and well-controlled on current dose of fluoxetine, continue same, can consider weaning next visit in 6 months if remains in full remission   5. Iron deficiency anemia due to chronic blood loss  Assessment & Plan:  Will update labs, currently not on any chemo therapy medications and

## 2025-06-30 DIAGNOSIS — E11.65 TYPE 2 DIABETES MELLITUS WITH HYPERGLYCEMIA, WITH LONG-TERM CURRENT USE OF INSULIN (HCC): ICD-10-CM

## 2025-06-30 DIAGNOSIS — I10 PRIMARY HYPERTENSION: ICD-10-CM

## 2025-06-30 DIAGNOSIS — Z79.4 TYPE 2 DIABETES MELLITUS WITH HYPERGLYCEMIA, WITH LONG-TERM CURRENT USE OF INSULIN (HCC): ICD-10-CM

## 2025-06-30 LAB
ALBUMIN SERPL-MCNC: 4.4 G/DL (ref 3.4–5)
ALBUMIN/GLOB SERPL: 2.1 {RATIO} (ref 1.1–2.2)
ALP SERPL-CCNC: 70 U/L (ref 40–129)
ALT SERPL-CCNC: 18 U/L (ref 10–40)
ANION GAP SERPL CALCULATED.3IONS-SCNC: 11 MMOL/L (ref 3–16)
AST SERPL-CCNC: 17 U/L (ref 15–37)
BILIRUB SERPL-MCNC: 0.4 MG/DL (ref 0–1)
BUN SERPL-MCNC: 30 MG/DL (ref 7–20)
CALCIUM SERPL-MCNC: 9.4 MG/DL (ref 8.3–10.6)
CHLORIDE SERPL-SCNC: 105 MMOL/L (ref 99–110)
CHOLEST SERPL-MCNC: 145 MG/DL (ref 0–199)
CO2 SERPL-SCNC: 23 MMOL/L (ref 21–32)
CREAT SERPL-MCNC: 1.3 MG/DL (ref 0.8–1.3)
CREAT UR-MCNC: 186 MG/DL (ref 39–259)
DEPRECATED RDW RBC AUTO: 15.6 % (ref 12.4–15.4)
GFR SERPLBLD CREATININE-BSD FMLA CKD-EPI: 61 ML/MIN/{1.73_M2}
GLUCOSE SERPL-MCNC: 149 MG/DL (ref 70–99)
HCT VFR BLD AUTO: 38.9 % (ref 40.5–52.5)
HDLC SERPL-MCNC: 37 MG/DL (ref 40–60)
HGB BLD-MCNC: 13.1 G/DL (ref 13.5–17.5)
LDLC SERPL CALC-MCNC: 76 MG/DL
MCH RBC QN AUTO: 28 PG (ref 26–34)
MCHC RBC AUTO-ENTMCNC: 33.6 G/DL (ref 31–36)
MCV RBC AUTO: 83.2 FL (ref 80–100)
MICROALBUMIN UR DL<=1MG/L-MCNC: 5.41 MG/DL
MICROALBUMIN/CREAT UR: 29.1 MG/G (ref 0–30)
PLATELET # BLD AUTO: 141 K/UL (ref 135–450)
PMV BLD AUTO: 10.3 FL (ref 5–10.5)
POTASSIUM SERPL-SCNC: 5.6 MMOL/L (ref 3.5–5.1)
PROT SERPL-MCNC: 6.5 G/DL (ref 6.4–8.2)
RBC # BLD AUTO: 4.67 M/UL (ref 4.2–5.9)
SODIUM SERPL-SCNC: 139 MMOL/L (ref 136–145)
TRIGL SERPL-MCNC: 162 MG/DL (ref 0–150)
VLDLC SERPL CALC-MCNC: 32 MG/DL
WBC # BLD AUTO: 5 K/UL (ref 4–11)

## 2025-07-01 ENCOUNTER — RESULTS FOLLOW-UP (OUTPATIENT)
Dept: INTERNAL MEDICINE CLINIC | Age: 65
End: 2025-07-01

## 2025-07-01 LAB
EST. AVERAGE GLUCOSE BLD GHB EST-MCNC: 137 MG/DL
HBA1C MFR BLD: 6.4 %

## 2025-07-22 ENCOUNTER — HOSPITAL ENCOUNTER (OUTPATIENT)
Dept: CT IMAGING | Age: 65
Discharge: HOME | End: 2025-07-22
Attending: STUDENT IN AN ORGANIZED HEALTH CARE EDUCATION/TRAINING PROGRAM
Payer: MEDICARE

## 2025-07-22 DIAGNOSIS — C20 RECTAL CANCER (HCC): ICD-10-CM

## 2025-07-22 PROCEDURE — 74177 CT ABD & PELVIS W/CONTRAST: CPT

## 2025-07-22 PROCEDURE — 6360000004 HC RX CONTRAST MEDICATION: Performed by: STUDENT IN AN ORGANIZED HEALTH CARE EDUCATION/TRAINING PROGRAM

## 2025-07-22 RX ORDER — IOPAMIDOL 755 MG/ML
75 INJECTION, SOLUTION INTRAVASCULAR
Status: COMPLETED | OUTPATIENT
Start: 2025-07-22 | End: 2025-07-22

## 2025-07-22 RX ADMIN — IOHEXOL 25 ML: 350 INJECTION, SOLUTION INTRAVENOUS at 08:14

## 2025-07-22 RX ADMIN — IOPAMIDOL 75 ML: 755 INJECTION, SOLUTION INTRAVENOUS at 08:14

## 2025-08-19 ENCOUNTER — OFFICE VISIT (OUTPATIENT)
Dept: SURGERY | Age: 65
End: 2025-08-19
Payer: MEDICARE

## 2025-08-19 VITALS — BODY MASS INDEX: 28.93 KG/M2 | WEIGHT: 207.4 LBS

## 2025-08-19 DIAGNOSIS — C20 RECTAL CANCER (HCC): Primary | ICD-10-CM

## 2025-08-19 DIAGNOSIS — K43.5 PARASTOMAL HERNIA WITHOUT OBSTRUCTION OR GANGRENE: ICD-10-CM

## 2025-08-19 PROCEDURE — G8427 DOCREV CUR MEDS BY ELIG CLIN: HCPCS | Performed by: SURGERY

## 2025-08-19 PROCEDURE — 99214 OFFICE O/P EST MOD 30 MIN: CPT | Performed by: SURGERY

## 2025-08-19 PROCEDURE — 1036F TOBACCO NON-USER: CPT | Performed by: SURGERY

## 2025-08-19 PROCEDURE — 1123F ACP DISCUSS/DSCN MKR DOCD: CPT | Performed by: SURGERY

## 2025-08-19 PROCEDURE — G8417 CALC BMI ABV UP PARAM F/U: HCPCS | Performed by: SURGERY

## 2025-08-19 PROCEDURE — 3017F COLORECTAL CA SCREEN DOC REV: CPT | Performed by: SURGERY

## 2025-08-28 ENCOUNTER — OFFICE VISIT (OUTPATIENT)
Dept: BARIATRICS/WEIGHT MGMT | Age: 65
End: 2025-08-28
Payer: MEDICARE

## 2025-08-28 VITALS
HEART RATE: 76 BPM | SYSTOLIC BLOOD PRESSURE: 152 MMHG | BODY MASS INDEX: 29.26 KG/M2 | DIASTOLIC BLOOD PRESSURE: 74 MMHG | HEIGHT: 71 IN | WEIGHT: 209 LBS

## 2025-08-28 DIAGNOSIS — K43.3 PARASTOMAL HERNIA WITH OBSTRUCTION AND WITHOUT GANGRENE: Primary | ICD-10-CM

## 2025-08-28 PROCEDURE — 1036F TOBACCO NON-USER: CPT | Performed by: SURGERY

## 2025-08-28 PROCEDURE — 3078F DIAST BP <80 MM HG: CPT | Performed by: SURGERY

## 2025-08-28 PROCEDURE — 3077F SYST BP >= 140 MM HG: CPT | Performed by: SURGERY

## 2025-08-28 PROCEDURE — 1123F ACP DISCUSS/DSCN MKR DOCD: CPT | Performed by: SURGERY

## 2025-08-28 PROCEDURE — 99204 OFFICE O/P NEW MOD 45 MIN: CPT | Performed by: SURGERY

## 2025-08-28 PROCEDURE — G8427 DOCREV CUR MEDS BY ELIG CLIN: HCPCS | Performed by: SURGERY

## 2025-08-28 PROCEDURE — 3017F COLORECTAL CA SCREEN DOC REV: CPT | Performed by: SURGERY

## 2025-08-28 PROCEDURE — G8417 CALC BMI ABV UP PARAM F/U: HCPCS | Performed by: SURGERY

## (undated) DEVICE — MERCY FAIRFIELD TURNOVER KIT: Brand: MEDLINE INDUSTRIES, INC.

## (undated) DEVICE — SOLUTION ANTIFOG VIS SYS CLEARIFY LAPSCP

## (undated) DEVICE — ENDOSCOPIC KIT 6X3/16 FT COLON W/ 1.1 OZ 2 GWN W/O BRSH

## (undated) DEVICE — GLOVE SURG SZ 7 L12IN FNGR THK75MIL WHT LTX POLYMER BEAD

## (undated) DEVICE — GOWN SIRUS NONREIN XL W/TWL: Brand: MEDLINE INDUSTRIES, INC.

## (undated) DEVICE — SUTURE PERMA-HAND SZ 3-0 L30IN NONABSORBABLE BLK L17MM RB-1 K872H

## (undated) DEVICE — STERILE PVP: Brand: MEDLINE INDUSTRIES, INC.

## (undated) DEVICE — SUTURE MCRYL SZ 4-0 L27IN ABSRB UD L19MM PS-2 1/2 CIR PRIM Y426H

## (undated) DEVICE — COVER LT HNDL BLU PLAS

## (undated) DEVICE — 3M™ IOBAN™ 2 ANTIMICROBIAL INCISE DRAPE 6648EZ: Brand: IOBAN™ 2

## (undated) DEVICE — GLOVE SURG SZ 7 CRM LTX FREE POLYISOPRENE POLYMER BEAD ANTI

## (undated) DEVICE — SEALER/DIVIDER LAP SHFT L44CM JAW APER 11.4MM 315DEG ROT

## (undated) DEVICE — AIR/WATER CLEANING ADAPTER FOR OLYMPUS® GI ENDOSCOPE: Brand: BULLDOG®

## (undated) DEVICE — SUTURE VCRL + SZ 4-0 L18IN ABSRB UD L19MM PS-2 3/8 CIR PRIM VCP496H

## (undated) DEVICE — TROCAR: Brand: KII FIOS FIRST ENTRY

## (undated) DEVICE — BW-412T DISP COMBO CLEANING BRUSH: Brand: SINGLE USE COMBINATION CLEANING BRUSH

## (undated) DEVICE — SUTURE VCRL SZ 2-0 L18IN ABSRB VLT L26MM SH 1/2 CIR J775D

## (undated) DEVICE — 40583 XL ADVANCED TRENDELENBURG POSITIONING KIT: Brand: 40583 XL ADVANCED TRENDELENBURG POSITIONING KIT

## (undated) DEVICE — PUMP SUC IRR TBNG L10FT W/ HNDPC ASSEMB STRYKEFLOW 2

## (undated) DEVICE — SUTURE VCRL SZ 0 L27IN ABSRB UD L26MM CT-2 1/2 CIR J270H

## (undated) DEVICE — Device

## (undated) DEVICE — SUREFORM 45: Brand: SUREFORM

## (undated) DEVICE — 20 ML SYRINGE LUER-LOCK TIP: Brand: MONOJECT

## (undated) DEVICE — SUTURE VCRL + SZ 3-0 L18IN ABSRB UD SH 1/2 CIR TAPERCUT NDL VCP864D

## (undated) DEVICE — ROBOTIC: Brand: MEDLINE INDUSTRIES, INC.

## (undated) DEVICE — APPLICATOR MEDICATED 26 CC SOLUTION HI LT ORNG CHLORAPREP

## (undated) DEVICE — FORCEPS BX L240CM WRK CHN 2.8MM STD CAP W/ NDL MIC MESH

## (undated) DEVICE — COAGULATOR SUCT 10FR LAIN FTSWCH ACTIVATION DISP VALLEYLAB

## (undated) DEVICE — SYRINGE MED 10ML SLIP TIP BLNT FILL AND LUERLOCK DISP

## (undated) DEVICE — TOWEL,STOP FLAG GOLD N-W: Brand: MEDLINE

## (undated) DEVICE — DRAPE,T,LAPARO,TRANS,STERILE: Brand: MEDLINE

## (undated) DEVICE — SUTURE VCRL SZ 0 L54IN ABSRB VLT W/O NDL POLYGLACTIN 910 J616H

## (undated) DEVICE — ADHESIVE SKIN CLSR 0.7ML TOP DERMBND ADV

## (undated) DEVICE — AGENT HEMSTAT W2XL14IN OXIDIZED REGENERATED CELOS ABSRB FOR

## (undated) DEVICE — SUTURE VCRL SZ 3-0 L18IN ABSRB UD POLYGLACTIN 910 BRAID TIE J910T

## (undated) DEVICE — TRAP SPEC RETRV CLR PLAS POLYP IN LN SUCT QUIK CTCH

## (undated) DEVICE — TIP-UP FENESTRATED GRASPER: Brand: ENDOWRIST

## (undated) DEVICE — TIP COVER ACCESSORY

## (undated) DEVICE — GOWN AURORA NONREINF LG: Brand: MEDLINE INDUSTRIES, INC.

## (undated) DEVICE — SUTURE VCRL SZ 3-0 L18IN ABSRB UD L26MM SH 1/2 CIR J864D

## (undated) DEVICE — VESSEL SEALER EXTEND: Brand: ENDOWRIST

## (undated) DEVICE — SPONGE,LAP,18"X18",DLX,XR,ST,5/PK,40/PK: Brand: MEDLINE

## (undated) DEVICE — BLADE ES ELASTOMERIC COAT INSUL DURABLE BEND UPTO 90DEG

## (undated) DEVICE — SYRINGE CATH TIP 50ML

## (undated) DEVICE — GAUZE,PACKING STRIP,PLAIN,1/2"X5YD,STRL: Brand: CURAD

## (undated) DEVICE — INTENDED FOR TISSUE SEPARATION, AND OTHER PROCEDURES THAT REQUIRE A SHARP SURGICAL BLADE TO PUNCTURE OR CUT.: Brand: BARD-PARKER ® CARBON RIB-BACK BLADES

## (undated) DEVICE — GENERAL LAPAROSCOPIC: Brand: MEDLINE INDUSTRIES, INC.

## (undated) DEVICE — SYNCHROSEAL: Brand: DA VINCI ENERGY

## (undated) DEVICE — SET VLV 3 PC AWS DISPOSABLE GRDIAN SCOPEVALET

## (undated) DEVICE — SCISSORS SURG DIA8MM MPLR CRV ENDOWRIST

## (undated) DEVICE — DECANTER FLD 9IN ST BG FOR ASEP TRNSF OF FLD

## (undated) DEVICE — SUTURE VCRL SZ 0 L27IN ABSRB UD L36MM CT-1 1/2 CIR J260H

## (undated) DEVICE — SNARE COLD DIAMOND 15MM THIN

## (undated) DEVICE — SUTURE PROL SZ 2-0 L36IN NONABSORBABLE BLU SH L26MM 1/2 CIR 8523H

## (undated) DEVICE — PREMIUM WET SKIN PREP TRAY: Brand: MEDLINE INDUSTRIES, INC.

## (undated) DEVICE — SYRINGE MED 10ML LUERLOCK TIP W/O SFTY DISP

## (undated) DEVICE — CANNULA SEAL

## (undated) DEVICE — PROCEDURE KIT ENDOSCP CUST

## (undated) DEVICE — SOLUTION IRRIG 500ML STRL H2O NONPYROGENIC

## (undated) DEVICE — SEAL

## (undated) DEVICE — MARYLAND BIPOLAR FORCEPS: Brand: ENDOWRIST

## (undated) DEVICE — CATHETER DRNGE 34FR 2 EYE PROPORTIONATE HD DISP FOR

## (undated) DEVICE — SUTURE PERMA-HAND SZ 2-0 L30IN NONABSORBABLE BLK L26MM SH K833H

## (undated) DEVICE — LAPAROSCOPIC ACCESS SYSTEM: Brand: ALEXIS LAPAROSCOPIC SYSTEM WITH KII FIOS FIRST ENTRY

## (undated) DEVICE — SOLUTION BOWL: Brand: KENDALL

## (undated) DEVICE — SOLUTION INJ LR VISIV 1000ML BG

## (undated) DEVICE — ELECTRODE PT RET AD L9FT HI MOIST COND ADH HYDRGEL CORDED

## (undated) DEVICE — CADIERE FORCEPS: Brand: ENDOWRIST

## (undated) DEVICE — C-ARM: Brand: UNBRANDED

## (undated) DEVICE — PENCIL ES L3M BTTN SWCH S STL HEX LOK BLDE ELECTRD HOLSTER

## (undated) DEVICE — SUTURE VCRL SZ 3-0 L27IN ABSRB UD L26MM SH 1/2 CIR J416H

## (undated) DEVICE — GLOVE SURG SZ 7 L12IN FNGR THK79MIL GRN LTX FREE

## (undated) DEVICE — INTENDED FOR TISSUE SEPARATION, AND OTHER PROCEDURES THAT REQUIRE A SHARP SURGICAL BLADE TO PUNCTURE OR CUT.: Brand: BARD-PARKER ® STAINLESS STEEL BLADES

## (undated) DEVICE — BLADELESS OBTURATOR: Brand: WECK VISTA

## (undated) DEVICE — NEEDLE HYPO 22GA L1.5IN BLK POLYPR HUB S STL REG BVL STR

## (undated) DEVICE — LARGE HEM-O-LOK CLIP APPLIER: Brand: ENDOWRIST

## (undated) DEVICE — RING RETRCT W14.1XL14.1CM PLAS SELF RET ADJ LTWT DISP LONE 3307G] COOPER SURGICAL]

## (undated) DEVICE — REDUCER: Brand: ENDOWRIST

## (undated) DEVICE — SYRINGE MED 10ML TRNSLUC BRL PLUNG BLK MRK POLYPR CTRL

## (undated) DEVICE — SUTURE VCRL SZ 2-0 L12X18IN ABSRB UD POLYGLACTIN 910 BRAID J911T

## (undated) DEVICE — MAJOR SET UP PK

## (undated) DEVICE — ARM DRAPE

## (undated) DEVICE — LEGGINGS, PAIR, 33X51 XL, STERILE: Brand: MEDLINE

## (undated) DEVICE — SYSTEM SMK EVAC LAP TBNG FILTER HSNG BENT STYL PNK SEE CLR

## (undated) DEVICE — Z DISCONTINUED NEEDLE HYPO 22GA L1.5IN BLK POLYPR HUB S STL REG BVL STR - SEE COMMENT

## (undated) DEVICE — SUREFORM 45 RELOAD GREEN: Brand: SUREFORM

## (undated) DEVICE — SOLUTION IV IRRIG WATER 500ML POUR BRL ST 2F7113

## (undated) DEVICE — SOLUTION IRRIG 1000ML 0.9% SOD CHL USP POUR PLAS BTL

## (undated) DEVICE — DRAPE,UNDERBUTTOCKS,PCH,STERILE: Brand: MEDLINE

## (undated) DEVICE — STAPLER EXT 65MM S STL AUTO DISP PURSTRING

## (undated) DEVICE — HOOK RETRCT L5MM E SHRP SELF RET SYS LONE STAR

## (undated) DEVICE — SEALER/DIVIDER LAP SHFT L37CM JAW APER 11.4MM 315DEG ROT

## (undated) DEVICE — SINGLE USE AIR/WATER, SUCTION AND BIOPSY VALVES SET: Brand: ORCAPOD™

## (undated) DEVICE — SNARES COLD OVAL 10MM THIN

## (undated) DEVICE — TOTAL TRAY, 16FR 10ML SIL FOLEY, URN: Brand: MEDLINE

## (undated) DEVICE — SHEET,DRAPE,40X58,STERILE: Brand: MEDLINE

## (undated) DEVICE — SOLUTION IV 1000ML 0.9% SOD CHL PH 5 INJ USP VIAFLX PLAS

## (undated) DEVICE — CANNULA SAMP CO2 AD GRN 7FT CO2 AND 7FT O2 TBNG UNIV CONN

## (undated) DEVICE — STERILE POLYISOPRENE POWDER-FREE SURGICAL GLOVES: Brand: PROTEXIS